# Patient Record
Sex: FEMALE | Race: BLACK OR AFRICAN AMERICAN | Employment: OTHER | URBAN - METROPOLITAN AREA
[De-identification: names, ages, dates, MRNs, and addresses within clinical notes are randomized per-mention and may not be internally consistent; named-entity substitution may affect disease eponyms.]

---

## 2017-01-03 ENCOUNTER — OFFICE VISIT (OUTPATIENT)
Dept: FAMILY MEDICINE CLINIC | Age: 67
End: 2017-01-03

## 2017-01-03 VITALS
HEIGHT: 62 IN | RESPIRATION RATE: 16 BRPM | TEMPERATURE: 98.1 F | OXYGEN SATURATION: 98 % | HEART RATE: 68 BPM | DIASTOLIC BLOOD PRESSURE: 60 MMHG | SYSTOLIC BLOOD PRESSURE: 135 MMHG | BODY MASS INDEX: 27.05 KG/M2 | WEIGHT: 147 LBS

## 2017-01-03 DIAGNOSIS — B02.9 HERPES ZOSTER WITHOUT COMPLICATION: Primary | ICD-10-CM

## 2017-01-03 RX ORDER — AMOXICILLIN AND CLAVULANATE POTASSIUM 875; 125 MG/1; MG/1
TABLET, FILM COATED ORAL
Refills: 0 | COMMUNITY
Start: 2016-12-19 | End: 2017-01-03 | Stop reason: ALTCHOICE

## 2017-01-03 RX ORDER — AMITRIPTYLINE HYDROCHLORIDE 25 MG/1
25 TABLET, FILM COATED ORAL
Qty: 30 TAB | Refills: 5 | Status: ON HOLD | OUTPATIENT
Start: 2017-01-03 | End: 2017-03-31

## 2017-01-03 RX ORDER — VALACYCLOVIR HYDROCHLORIDE 1 G/1
TABLET, FILM COATED ORAL
Refills: 0 | COMMUNITY
Start: 2016-12-21 | End: 2017-01-03 | Stop reason: ALTCHOICE

## 2017-01-03 RX ORDER — PREDNISONE 20 MG/1
TABLET ORAL
Refills: 0 | COMMUNITY
Start: 2016-12-21 | End: 2017-01-03 | Stop reason: ALTCHOICE

## 2017-01-03 NOTE — MR AVS SNAPSHOT
Visit Information Date & Time Provider Department Dept. Phone Encounter #  
 1/3/2017  2:15 PM April Rai MD [facility] [id number] Your Appointments 5/1/2017 10:45 AM  
ROUTINE CARE with April Rai MD  
[facility] Appt Note: 6m f/u  
 [address] [facility] [id number] [phone] [address] [facility] [address] Upcoming Health Maintenance Date Due  
 EYE EXAM RETINAL OR DILATED Q1 7/17/2016 FOOT EXAM Q1 8/13/2016 PAP AKA CERVICAL CYTOLOGY 4/23/2017 MICROALBUMIN Q1 3/29/2017 HEMOGLOBIN A1C Q6M 5/1/2017 LIPID PANEL Q1 11/1/2017 BREAST CANCER SCRN MAMMOGRAM 7/22/2018 COLONOSCOPY 5/2/2026 DTaP/Tdap/Td series (2 - Td) 8/9/2026 Allergies as of 1/3/2017  Review Complete On: 1/3/2017 By: April Rai MD  
  
 Severity Noted Reaction Type Reactions Lisinopril High 05/27/2015    Swelling Sulfa (Sulfonamide Antibiotics)  06/09/2010    Itching Current Immunizations  Reviewed on 11/17/2015 Name Date Influenza Vaccine 10/21/2014, 10/21/2013 Influenza Vaccine Alcario Ravens) 10/7/2015 Influenza Vaccine (Quad) PF 11/1/2016 Influenza Vaccine Split 10/25/2012, 10/13/2011, 10/19/2010 Pneumococcal Vaccine (Unspecified Type) 10/19/2010 Tdap 8/9/2016 Varicella Virus Vaccine 10/17/2012 Not reviewed this visit You Were Diagnosed With   
  
 Codes Comments Herpes zoster without complication    -  Primary ICD-10-CM: B02.9 ICD-9-CM: 066. 9 Vitals BP Pulse Temp Resp Height(growth percentile) Weight(growth percentile) 135/60 68 98.1 °F (36.7 °C) (Oral) 16 5' 2\" (1.575 m) 147 lb (66.7 kg) LMP SpO2 BMI OB Status Smoking Status (LMP Unknown) 98% 26.89 kg/m2 Postmenopausal Former Smoker BMI and BSA Data Body Mass Index Body Surface Area  
 26.89 kg/m 2 1.71 m 2 Preferred Pharmacy Pharmacy Name Phone [facility] [address] [phone] Your Updated Medication List  
  
   
This list is accurate as of: 1/3/17  2:44 PM.  Always use your most recent med list.  
  
  
  
  
 acetaminophen-codeine 300-30 mg per tablet Commonly known as:  TYLENOL #3 Take 1 Tab by mouth every four (4) hours as needed for Pain. Max Daily Amount: 6 Tabs. albuterol 90 mcg/actuation inhaler Commonly known as:  PROVENTIL HFA, VENTOLIN HFA, PROAIR HFA This is a rescue medication for when you are short of breath: 2 puffs every 4 hrs PRN wheezing/SOB (1 for home/1 for work/school) amitriptyline 25 mg tablet Commonly known as:  ELAVIL Take 1 Tab by mouth nightly. For shingles pain  
  
 atorvastatin 20 mg tablet Commonly known as:  LIPITOR Take 1 Tab by mouth daily. For cholesterol  
  
 benzonatate 100 mg capsule Commonly known as:  TESSALON  
  
 citalopram 20 mg tablet Commonly known as:  CELEXA  
TAKE ONE TABLET BY MOUTH ONE TIME DAILY  
  
 famotidine 20 mg tablet Commonly known as:  PEPCID  
  
 fluticasone-salmeterol 250-50 mcg/dose diskus inhaler Commonly known as:  ADVAIR Take 1 Puff by inhalation two (2) times a day. glucose blood VI test strips strip Commonly known as:  ACCU-CHEK SOFIA PLUS TEST STRP Test blood sugar 2 - 4 times daily  
  
 hydroCHLOROthiazide 12.5 mg tablet Commonly known as:  HYDRODIURIL Take 1 Tab by mouth daily. For blood pressure  
  
 ibuprofen 600 mg tablet Commonly known as:  MOTRIN Take 1 Tab by mouth every eight (8) hours as needed for Pain. Lancets Misc Commonly known as:  ACCU-CHEK MULTICLIX LANCET  
test blood sugar as directed by physician. Dx code E11.9  
  
 metFORMIN 1,000 mg tablet Commonly known as:  GLUCOPHAGE  
1  in am and 1 in pm. For diabetes  
  
 naproxen sodium 220 mg Cap Commonly known as:  ALEVE  
1 tab twice daily as needed for back pain pantoprazole 40 mg tablet Commonly known as:  PROTONIX  
  
 promethazine-dextromethorphan 6.25-15 mg/5 mL syrup Commonly known as:  PROMETHAZINE-DM Take 5 mL by mouth four (4) times daily as needed for Cough. zolpidem 10 mg tablet Commonly known as:  AMBIEN  
TAKE ONE TABLET BY MOUTH NIGHTLY AT BEDTIME AS NEEDED Prescriptions Sent to Pharmacy Refills  
 amitriptyline (ELAVIL) 25 mg tablet 5 Sig: Take 1 Tab by mouth nightly. For shingles pain  
 Class: Normal  
 Pharmacy: [address] Ph #: [phone] Route: Oral  
  
Introducing [facility] SERVICES! Dear Anthony Burns: Thank you for requesting a [employer] account. Our records indicate that you already have an active [employer] account. You can access your account anytime at [email or web address] Did you know that you can access your hospital and ER discharge instructions at any time in [employer] You can also review all of your test results from your hospital stay or ER visit. Additional Information If you have questions, please visit the Frequently Asked Questions section of the [employer] website at [email or web address] Remember, [employer] is NOT to be used for urgent needs. For medical emergencies, dial 911. Now available from your iPhone and Android! Please provide this summary of care documentation to your next provider. Your primary care clinician is listed as Yareli Loaiza. If you have any questions after today's visit, please call [phone].

## 2017-01-03 NOTE — PROGRESS NOTES
Chief Complaint   Patient presents with    Skin Problem     shingles follow up     1. Have you been to the ER, urgent care clinic since your last visit? Hospitalized since your last visit? [facility] Dec 2016  Shingles    2. Have you seen or consulted any other health care providers outside of the [address] [place] [address] since your last visit? Include any pap smears or colon screening.  No     Health Maintenance Due   Topic Date Due    EYE EXAM RETINAL OR DILATED Q1  07/17/2016    FOOT EXAM Q1  08/13/2016    PAP AKA CERVICAL CYTOLOGY  04/23/2017

## 2017-01-03 NOTE — PROGRESS NOTES
HISTORY OF PRESENT ILLNESS  Haseeb Vizcarra is a 61 y.o. female. HPI 2 week hx L side of face to L ear. Was having pain, itching, burning. Still has some pain and itching around L ear. ROS    Physical Exam   Constitutional: She is oriented to person, place, and time. She appears well-developed and well-nourished. Neck: No thyromegaly present. Cardiovascular: Normal rate, regular rhythm and normal heart sounds. No murmur heard. Pulmonary/Chest: Effort normal and breath sounds normal. She has no wheezes. Abdominal: Soft. Bowel sounds are normal. She exhibits no distension. There is no tenderness. There is no rebound and no guarding. Musculoskeletal: Normal range of motion. She exhibits no edema. Lymphadenopathy:     She has no cervical adenopathy. Neurological: She is alert and oriented to person, place, and time. Nursing note and vitals reviewed. ASSESSMENT and PLAN  Orders Placed This Encounter    amitriptyline (ELAVIL) 25 mg tablet     Elizabeth Pedraza was seen today for skin problem. Diagnoses and all orders for this visit:    Herpes zoster without complication    Other orders  -     amitriptyline (ELAVIL) 25 mg tablet; Take 1 Tab by mouth nightly.  For shingles pain      Follow-up Disposition: Not on File

## 2017-01-16 RX ORDER — ZOLPIDEM TARTRATE 10 MG/1
TABLET ORAL
Qty: 30 TAB | Refills: 5 | OUTPATIENT
Start: 2017-01-16 | End: 2017-11-15 | Stop reason: ALTCHOICE

## 2017-02-09 ENCOUNTER — HOSPITAL ENCOUNTER (EMERGENCY)
Age: 67
Discharge: HOME | End: 2017-02-09
Attending: EMERGENCY MEDICINE
Payer: COMMERCIAL

## 2017-02-09 ENCOUNTER — APPOINTMENT (OUTPATIENT)
Dept: ULTRASOUND IMAGING | Age: 67
End: 2017-02-09
Attending: EMERGENCY MEDICINE
Payer: COMMERCIAL

## 2017-02-09 VITALS
DIASTOLIC BLOOD PRESSURE: 65 MMHG | SYSTOLIC BLOOD PRESSURE: 130 MMHG | WEIGHT: 140.65 LBS | HEIGHT: 62 IN | TEMPERATURE: 97.4 F | RESPIRATION RATE: 18 BRPM | HEART RATE: 77 BPM | OXYGEN SATURATION: 97 % | BODY MASS INDEX: 25.88 KG/M2

## 2017-02-09 DIAGNOSIS — R10.13 ABDOMINAL PAIN, EPIGASTRIC: Primary | ICD-10-CM

## 2017-02-09 LAB
ALBUMIN SERPL BCP-MCNC: 4.4 G/DL (ref 3.5–5)
ALBUMIN/GLOB SERPL: 1.1 {RATIO} (ref 1.1–2.2)
ALP SERPL-CCNC: 87 U/L (ref 45–117)
ALT SERPL-CCNC: 40 U/L (ref 12–78)
ANION GAP BLD CALC-SCNC: 11 MMOL/L (ref 5–15)
APPEARANCE UR: CLEAR
AST SERPL W P-5'-P-CCNC: 40 U/L (ref 15–37)
BACTERIA URNS QL MICRO: NEGATIVE /HPF
BASOPHILS # BLD AUTO: 0 K/UL (ref 0–0.1)
BASOPHILS # BLD: 1 % (ref 0–1)
BILIRUB SERPL-MCNC: 0.7 MG/DL (ref 0.2–1)
BILIRUB UR QL CFM: NEGATIVE
BUN SERPL-MCNC: 18 MG/DL (ref 6–20)
BUN/CREAT SERPL: 16 (ref 12–20)
CALCIUM SERPL-MCNC: 9.5 MG/DL (ref 8.5–10.1)
CHLORIDE SERPL-SCNC: 103 MMOL/L (ref 97–108)
CO2 SERPL-SCNC: 27 MMOL/L (ref 21–32)
COLOR UR: ABNORMAL
CREAT SERPL-MCNC: 1.15 MG/DL (ref 0.55–1.02)
EOSINOPHIL # BLD: 0 K/UL (ref 0–0.4)
EOSINOPHIL NFR BLD: 1 % (ref 0–7)
EPITH CASTS URNS QL MICRO: ABNORMAL /LPF
ERYTHROCYTE [DISTWIDTH] IN BLOOD BY AUTOMATED COUNT: 14.8 % (ref 11.5–14.5)
GLOBULIN SER CALC-MCNC: 4.1 G/DL (ref 2–4)
GLUCOSE SERPL-MCNC: 122 MG/DL (ref 65–100)
GLUCOSE UR STRIP.AUTO-MCNC: NEGATIVE MG/DL
HCT VFR BLD AUTO: 39.3 % (ref 35–47)
HGB BLD-MCNC: 13.4 G/DL (ref 11.5–16)
HGB UR QL STRIP: ABNORMAL
HYALINE CASTS URNS QL MICRO: ABNORMAL /LPF (ref 0–5)
KETONES UR QL STRIP.AUTO: NEGATIVE MG/DL
LEUKOCYTE ESTERASE UR QL STRIP.AUTO: ABNORMAL
LIPASE SERPL-CCNC: 124 U/L (ref 73–393)
LYMPHOCYTES # BLD AUTO: 42 % (ref 12–49)
LYMPHOCYTES # BLD: 1.7 K/UL (ref 0.8–3.5)
MCH RBC QN AUTO: 30.2 PG (ref 26–34)
MCHC RBC AUTO-ENTMCNC: 34.1 G/DL (ref 30–36.5)
MCV RBC AUTO: 88.7 FL (ref 80–99)
MONOCYTES # BLD: 0.3 K/UL (ref 0–1)
MONOCYTES NFR BLD AUTO: 8 % (ref 5–13)
NEUTS SEG # BLD: 2 K/UL (ref 1.8–8)
NEUTS SEG NFR BLD AUTO: 48 % (ref 32–75)
NITRITE UR QL STRIP.AUTO: NEGATIVE
PH UR STRIP: 6 [PH] (ref 5–8)
PLATELET # BLD AUTO: 168 K/UL (ref 150–400)
POTASSIUM SERPL-SCNC: 3.3 MMOL/L (ref 3.5–5.1)
PROT SERPL-MCNC: 8.5 G/DL (ref 6.4–8.2)
PROT UR STRIP-MCNC: ABNORMAL MG/DL
RBC # BLD AUTO: 4.43 M/UL (ref 3.8–5.2)
RBC #/AREA URNS HPF: ABNORMAL /HPF (ref 0–5)
SODIUM SERPL-SCNC: 141 MMOL/L (ref 136–145)
SP GR UR REFRACTOMETRY: 1.02 (ref 1–1.03)
UA: UC IF INDICATED,UAUC: ABNORMAL
UROBILINOGEN UR QL STRIP.AUTO: 1 EU/DL (ref 0.2–1)
WBC # BLD AUTO: 4.1 K/UL (ref 3.6–11)
WBC URNS QL MICRO: ABNORMAL /HPF (ref 0–4)

## 2017-02-09 PROCEDURE — 74011000250 HC RX REV CODE- 250: Performed by: EMERGENCY MEDICINE

## 2017-02-09 PROCEDURE — 36415 COLL VENOUS BLD VENIPUNCTURE: CPT | Performed by: EMERGENCY MEDICINE

## 2017-02-09 PROCEDURE — 96361 HYDRATE IV INFUSION ADD-ON: CPT

## 2017-02-09 PROCEDURE — 76705 ECHO EXAM OF ABDOMEN: CPT

## 2017-02-09 PROCEDURE — 96375 TX/PRO/DX INJ NEW DRUG ADDON: CPT

## 2017-02-09 PROCEDURE — 74011250636 HC RX REV CODE- 250/636: Performed by: EMERGENCY MEDICINE

## 2017-02-09 PROCEDURE — 80053 COMPREHEN METABOLIC PANEL: CPT | Performed by: EMERGENCY MEDICINE

## 2017-02-09 PROCEDURE — 74011250637 HC RX REV CODE- 250/637: Performed by: EMERGENCY MEDICINE

## 2017-02-09 PROCEDURE — 96376 TX/PRO/DX INJ SAME DRUG ADON: CPT

## 2017-02-09 PROCEDURE — 83690 ASSAY OF LIPASE: CPT | Performed by: EMERGENCY MEDICINE

## 2017-02-09 PROCEDURE — 85025 COMPLETE CBC W/AUTO DIFF WBC: CPT | Performed by: EMERGENCY MEDICINE

## 2017-02-09 PROCEDURE — 99284 EMERGENCY DEPT VISIT MOD MDM: CPT

## 2017-02-09 PROCEDURE — 81001 URINALYSIS AUTO W/SCOPE: CPT | Performed by: EMERGENCY MEDICINE

## 2017-02-09 PROCEDURE — 96374 THER/PROPH/DIAG INJ IV PUSH: CPT

## 2017-02-09 RX ORDER — SUCRALFATE 1 G/10ML
1 SUSPENSION ORAL 4 TIMES DAILY
Qty: 414 ML | Refills: 1 | Status: ON HOLD | OUTPATIENT
Start: 2017-02-09 | End: 2017-03-31

## 2017-02-09 RX ORDER — SODIUM CHLORIDE 0.9 % (FLUSH) 0.9 %
5-10 SYRINGE (ML) INJECTION AS NEEDED
Status: DISCONTINUED | OUTPATIENT
Start: 2017-02-09 | End: 2017-02-09 | Stop reason: HOSPADM

## 2017-02-09 RX ORDER — SODIUM CHLORIDE 0.9 % (FLUSH) 0.9 %
5-10 SYRINGE (ML) INJECTION EVERY 8 HOURS
Status: DISCONTINUED | OUTPATIENT
Start: 2017-02-09 | End: 2017-02-09 | Stop reason: HOSPADM

## 2017-02-09 RX ORDER — ONDANSETRON 2 MG/ML
4 INJECTION INTRAMUSCULAR; INTRAVENOUS
Status: COMPLETED | OUTPATIENT
Start: 2017-02-09 | End: 2017-02-09

## 2017-02-09 RX ORDER — HYDROMORPHONE HYDROCHLORIDE 1 MG/ML
0.5 INJECTION, SOLUTION INTRAMUSCULAR; INTRAVENOUS; SUBCUTANEOUS
Status: COMPLETED | OUTPATIENT
Start: 2017-02-09 | End: 2017-02-09

## 2017-02-09 RX ORDER — ONDANSETRON 4 MG/1
4 TABLET, ORALLY DISINTEGRATING ORAL
Qty: 10 TAB | Refills: 0 | Status: SHIPPED | OUTPATIENT
Start: 2017-02-09 | End: 2017-04-08

## 2017-02-09 RX ORDER — LIDOCAINE HYDROCHLORIDE 20 MG/ML
10 SOLUTION OROPHARYNGEAL AS NEEDED
Qty: 1 BOTTLE | Refills: 1 | Status: ON HOLD | OUTPATIENT
Start: 2017-02-09 | End: 2017-03-31

## 2017-02-09 RX ORDER — HYDROCODONE BITARTRATE AND ACETAMINOPHEN 7.5; 325 MG/1; MG/1
1 TABLET ORAL
Qty: 20 TAB | Refills: 0 | Status: ON HOLD | OUTPATIENT
Start: 2017-02-09 | End: 2017-03-31

## 2017-02-09 RX ADMIN — HYDROMORPHONE HYDROCHLORIDE 0.5 MG: 1 INJECTION, SOLUTION INTRAMUSCULAR; INTRAVENOUS; SUBCUTANEOUS at 15:58

## 2017-02-09 RX ADMIN — ONDANSETRON HYDROCHLORIDE 4 MG: 2 INJECTION, SOLUTION INTRAMUSCULAR; INTRAVENOUS at 12:47

## 2017-02-09 RX ADMIN — SODIUM CHLORIDE 1000 ML: 900 INJECTION, SOLUTION INTRAVENOUS at 12:54

## 2017-02-09 RX ADMIN — LIDOCAINE HYDROCHLORIDE 40 ML: 20 SOLUTION ORAL; TOPICAL at 16:21

## 2017-02-09 RX ADMIN — Medication 10 ML: at 15:58

## 2017-02-09 RX ADMIN — HYDROMORPHONE HYDROCHLORIDE 0.5 MG: 1 INJECTION, SOLUTION INTRAMUSCULAR; INTRAVENOUS; SUBCUTANEOUS at 12:46

## 2017-02-09 RX ADMIN — ONDANSETRON HYDROCHLORIDE 4 MG: 2 INJECTION, SOLUTION INTRAMUSCULAR; INTRAVENOUS at 15:57

## 2017-02-09 NOTE — ED NOTES
Assumed care of pt. Pt. Placed in position of comfort. Call bell within reach. Pt. Made aware of care plan. Pt came in with abdominal pain that gets worse with eating.

## 2017-02-09 NOTE — ED PROVIDER NOTES
HPI Comments: Honorio Wellington, 61 y.o. Female with PMHx of kidney stones, HTN, GERD, DM, and asthma presents ambulatory to ED [facility] ED with cc of worsening, severe RUQ pain x 2 weeks. Pt notes that RUQ pain increased significantly last night. Pt also c/o of a metallic taste in her mouth that is worsened after eating. Pt has taken pain medication without relief. She denies a hx of gallbladder or pancreas problems. She denies any fevers, chills, nausea, vomiting, diarrhea, CP, SOB, dysuria, hematuria, or change in urinary frequency. PCP: Diana Casarez MD    Social history significant for: - Tobacco, - EtOH, - Illicit Drug Use  PSHx: T&A, EGD    There are no other complaints, changes, or physical findings at this time. Written by Tom Flores ED Scribrogelio, as dictated by Jaylyn Landeros DO. The history is provided by the patient. No  was used. Past Medical History:   Diagnosis Date    Allergic rhinitis due to allergen 10/22/2014    Asthma     Depression     Diabetes (HCC)     GERD (gastroesophageal reflux disease)     Hypertension     Kidney stones     Other ill-defined conditions(799.89)      GERD    Positive PPD 2009     treated with INH       Past Surgical History:   Procedure Laterality Date    Hx tonsil and adenoidectomy      Endoscopy, colon, diagnostic  12/2007     2 polyps    Pr breast surgery procedure unlisted       abcess    Colonoscopy  4-11     manetas- n ormal    Egd  4-11     manetas- esophageal ring    Hx orthopaedic       had an injection in her back to help with leg pain         Family History:   Problem Relation Age of Onset    Diabetes Mother     Hypertension Mother     Cancer Father      lung       Social History     Social History    Marital status:      Spouse name: N/A    Number of children: N/A    Years of education: N/A     Occupational History    Not on file.      Social History Main Topics    Smoking status: Former Smoker Packs/day: 1.00     Quit date: 8/23/2010    Smokeless tobacco: Never Used    Alcohol use No    Drug use: No    Sexual activity: Not on file     Other Topics Concern    Not on file     Social History Narrative         ALLERGIES: Lisinopril and Sulfa (sulfonamide antibiotics)    Review of Systems   Constitutional: Negative. Negative for appetite change, chills, fatigue and fever. HENT: Negative for congestion, rhinorrhea, sinus pressure and sore throat. (+) metallic taste in mouth    Eyes: Negative. Respiratory: Negative. Negative for cough, choking, chest tightness, shortness of breath and wheezing. Cardiovascular: Negative. Negative for chest pain, palpitations and leg swelling. Gastrointestinal: Positive for abdominal pain. Negative for constipation, diarrhea, nausea and vomiting. Endocrine: Negative. Genitourinary: Negative. Negative for difficulty urinating, dysuria, flank pain and urgency. Musculoskeletal: Negative. Skin: Negative. Neurological: Negative. Negative for dizziness, speech difficulty, weakness, light-headedness, numbness and headaches. Psychiatric/Behavioral: Negative. All other systems reviewed and are negative. Patient Vitals for the past 12 hrs:   Temp Pulse Resp BP SpO2   02/09/17 1645 - - - 130/65 97 %   02/09/17 1615 - - - 121/57 99 %   02/09/17 1545 - - - 138/69 100 %   02/09/17 1515 - - - 139/75 100 %   02/09/17 1220 97.4 °F (36.3 °C) 77 18 (!) 166/96 97 %       Physical Exam   Constitutional: She is oriented to person, place, and time. She appears well-developed and well-nourished. No distress. HENT:   Head: Normocephalic and atraumatic. Mouth/Throat: Oropharynx is clear and moist. No oropharyngeal exudate. Eyes: Conjunctivae and EOM are normal. Pupils are equal, round, and reactive to light. Neck: Normal range of motion. Neck supple. No JVD present. No tracheal deviation present.    Cardiovascular: Normal rate, regular rhythm, normal heart sounds and intact distal pulses. No murmur heard. Pulmonary/Chest: Effort normal and breath sounds normal. No stridor. No respiratory distress. She has no wheezes. She has no rales. She exhibits no tenderness. Abdominal: Soft. She exhibits no distension. There is no tenderness. There is no rebound and no guarding.   + tenderness in the RUQ and epigastrum   Musculoskeletal: Normal range of motion. She exhibits no edema or tenderness. Neurological: She is alert and oriented to person, place, and time. No cranial nerve deficit. No gross motor or sensory deficits    Skin: Skin is warm and dry. She is not diaphoretic. Psychiatric: She has a normal mood and affect. Her behavior is normal.   Nursing note and vitals reviewed. MDM  Number of Diagnoses or Management Options  Diagnosis management comments: DDx: cholecystitis, cholelithiasis, common bowel duct obstruction, pancreatitis        Amount and/or Complexity of Data Reviewed  Clinical lab tests: reviewed and ordered  Tests in the radiology section of CPT®: ordered and reviewed  Review and summarize past medical records: yes    Patient Progress  Patient progress: stable    ED Course       Procedures      3:17 PM  Reevaluated the pt. Pt is still having discomfort but does not want any pain medication. 3:38 PM  Updated pt on lab results. Will give pt more pain medication. 4:43 PM  Updated pt on US results. PT states she is feeling better. Will PO challenge the pt.     LABORATORY TESTS:  Recent Results (from the past 12 hour(s))   URINALYSIS W/ REFLEX CULTURE    Collection Time: 02/09/17 12:34 PM   Result Value Ref Range    Color YELLOW/STRAW      Appearance CLEAR CLEAR      Specific gravity 1.025 1.003 - 1.030      pH (UA) 6.0 5.0 - 8.0      Protein TRACE (A) NEG mg/dL    Glucose NEGATIVE  NEG mg/dL    Ketone NEGATIVE  NEG mg/dL    Blood TRACE (A) NEG      Urobilinogen 1.0 0.2 - 1.0 EU/dL    Nitrites NEGATIVE  NEG      Leukocyte Esterase TRACE (A) NEG      WBC 0-4 0 - 4 /hpf    RBC 5-10 0 - 5 /hpf    Epithelial cells MANY (A) FEW /lpf    Bacteria NEGATIVE  NEG /hpf    UA:UC IF INDICATED CULTURE NOT INDICATED BY UA RESULT CNI      Hyaline cast 0-2 0 - 5 /lpf   BILIRUBIN, CONFIRM    Collection Time: 02/09/17 12:34 PM   Result Value Ref Range    Bilirubin UA, confirm NEGATIVE  NEG     CBC WITH AUTOMATED DIFF    Collection Time: 02/09/17 12:41 PM   Result Value Ref Range    WBC 4.1 3.6 - 11.0 K/uL    RBC 4.43 3.80 - 5.20 M/uL    HGB 13.4 11.5 - 16.0 g/dL    HCT 39.3 35.0 - 47.0 %    MCV 88.7 80.0 - 99.0 FL    MCH 30.2 26.0 - 34.0 PG    MCHC 34.1 30.0 - 36.5 g/dL    RDW 14.8 (H) 11.5 - 14.5 %    PLATELET 780 570 - 813 K/uL    NEUTROPHILS 48 32 - 75 %    LYMPHOCYTES 42 12 - 49 %    MONOCYTES 8 5 - 13 %    EOSINOPHILS 1 0 - 7 %    BASOPHILS 1 0 - 1 %    ABS. NEUTROPHILS 2.0 1.8 - 8.0 K/UL    ABS. LYMPHOCYTES 1.7 0.8 - 3.5 K/UL    ABS. MONOCYTES 0.3 0.0 - 1.0 K/UL    ABS. EOSINOPHILS 0.0 0.0 - 0.4 K/UL    ABS. BASOPHILS 0.0 0.0 - 0.1 K/UL   METABOLIC PANEL, COMPREHENSIVE    Collection Time: 02/09/17 12:41 PM   Result Value Ref Range    Sodium 141 136 - 145 mmol/L    Potassium 3.3 (L) 3.5 - 5.1 mmol/L    Chloride 103 97 - 108 mmol/L    CO2 27 21 - 32 mmol/L    Anion gap 11 5 - 15 mmol/L    Glucose 122 (H) 65 - 100 mg/dL    BUN 18 6 - 20 MG/DL    Creatinine 1.15 (H) 0.55 - 1.02 MG/DL    BUN/Creatinine ratio 16 12 - 20      GFR est AA 58 (L) >60 ml/min/1.73m2    GFR est non-AA 48 (L) >60 ml/min/1.73m2    Calcium 9.5 8.5 - 10.1 MG/DL    Bilirubin, total 0.7 0.2 - 1.0 MG/DL    ALT (SGPT) 40 12 - 78 U/L    AST (SGOT) 40 (H) 15 - 37 U/L    Alk.  phosphatase 87 45 - 117 U/L    Protein, total 8.5 (H) 6.4 - 8.2 g/dL    Albumin 4.4 3.5 - 5.0 g/dL    Globulin 4.1 (H) 2.0 - 4.0 g/dL    A-G Ratio 1.1 1.1 - 2.2     LIPASE    Collection Time: 02/09/17 12:41 PM   Result Value Ref Range    Lipase 124 73 - 393 U/L       IMAGING RESULTS:  INDICATION: Right upper quadrant pain with nausea and vomiting.     COMPARISON: 3/16/2006.      TECHNIQUE:   Multiple sonographic real time images were obtained of the right upper abdomen.      FINDINGS:   The gallbladder is visualized as a normally distended fluid filled structure. No gallstones, gallbladder wall thickening, pericholecystic edema, or biliary  ductal dilatation is seen. The visualized portions of the liver are echogenic with decreased penetrance  compatible with hepatic steatosis. Flow in the portal vein is appropriate towards the liver. The visualized portions of the pancreas are normal in size. The right kidney measures 8.5 cm and shows no hydronephrosis. No ascites is visualized.       IMPRESSION  IMPRESSION:   Echogenic liver with decreased penetrance suggesting hepatic steatosis.     No gallstones or biliary ductal dilatation.     No right hydronephrosis. MEDICATIONS GIVEN:  Medications   sodium chloride (NS) flush 5-10 mL (10 mL IntraVENous Given 2/9/17 1558)   sodium chloride (NS) flush 5-10 mL (not administered)   sodium chloride 0.9 % bolus infusion 1,000 mL (0 mL IntraVENous IV Completed 2/9/17 1608)   HYDROmorphone (PF) (DILAUDID) injection 0.5 mg (0.5 mg IntraVENous Given 2/9/17 1246)   ondansetron (ZOFRAN) injection 4 mg (4 mg IntraVENous Given 2/9/17 1247)   HYDROmorphone (PF) (DILAUDID) injection 0.5 mg (0.5 mg IntraVENous Given 2/9/17 1558)   ondansetron (ZOFRAN) injection 4 mg (4 mg IntraVENous Given 2/9/17 1557)   mylanta/viscous lidocaine (LENA)(GI COCKTAIL) (40 mL Oral Given 2/9/17 1621)       IMPRESSION:  1. Abdominal pain, epigastric        PLAN:  1. Current Discharge Medication List      CONTINUE these medications which have NOT CHANGED    Details   acetaminophen-codeine (TYLENOL #3) 300-30 mg per tablet Take 1 Tab by mouth every four (4) hours as needed for Pain. Max Daily Amount: 6 Tabs.   Qty: 50 Tab, Refills: 3      glucose blood VI test strips (ACCU-CHEK SOFIA PLUS TEST STRP) strip Test blood sugar 2 - 4 times daily  Qty: 100 Strip, Refills: 11      atorvastatin (LIPITOR) 20 mg tablet Take 1 Tab by mouth daily. For cholesterol  Qty: 30 Tab, Refills: 12      Hydrochlorothiazide (HYDRODIURIL) 12.5 mg tablet Take 1 Tab by mouth daily. For blood pressure  Qty: 90 Tab, Refills: 3      albuterol (PROVENTIL HFA, VENTOLIN HFA, PROAIR HFA) 90 mcg/actuation inhaler This is a rescue medication for when you are short of breath: 2 puffs every 4 hrs PRN wheezing/SOB (1 for home/1 for work/school)  Qty: 2 Inhaler, Refills: 11    Associated Diagnoses: Cough      citalopram (CELEXA) 20 mg tablet TAKE ONE TABLET BY MOUTH ONE TIME DAILY   Qty: 30 Tab, Refills: 12      fluticasone-salmeterol (ADVAIR) 250-50 mcg/dose diskus inhaler Take 1 Puff by inhalation two (2) times a day. Qty: 1 Inhaler, Refills: 12      zolpidem (AMBIEN) 10 mg tablet TAKE ONE TABLET BY MOUTH NIGHTLY AT BEDTIME AS NEEDED  Qty: 30 Tab, Refills: 5    Comments: Not to exceed 5 additional fills before 08/28/2016. amitriptyline (ELAVIL) 25 mg tablet Take 1 Tab by mouth nightly. For shingles pain  Qty: 30 Tab, Refills: 5      Lancets (ACCU-CHEK MULTICLIX LANCET) misc test blood sugar as directed by physician. Dx code E11.9  Qty: 100 Each, Refills: PRN      metFORMIN (GLUCOPHAGE) 1,000 mg tablet 1  in am and 1 in pm. For diabetes  Qty: 75 Tab, Refills: 11      pantoprazole (PROTONIX) 40 mg tablet     Associated Diagnoses: Type 2 diabetes mellitus without complication, with long-term current use of insulin (HCC)      ibuprofen (MOTRIN) 600 mg tablet Take 1 Tab by mouth every eight (8) hours as needed for Pain. Qty: 90 Tab, Refills: 5      benzonatate (TESSALON) 100 mg capsule       promethazine-dextromethorphan (PROMETHAZINE-DM) 6.25-15 mg/5 mL syrup Take 5 mL by mouth four (4) times daily as needed for Cough.   Qty: 240 mL, Refills: 2      famotidine (PEPCID) 20 mg tablet       naproxen sodium (ALEVE) 220 mg cap 1 tab twice daily as needed for back pain  Qty: 60 Cap, Refills: 2           2. Follow-up Information     Follow up With Details Comments Contact Info    MD Jt Solis [id number] [phone]      Luis Sung, [address] [place] [address]  [phone]          Return to ED if worse     DISCHARGE NOTE:    5:50 PM  The patient is ready for discharge. The patient signs, symptoms, diagnosis, and discharge instructions have been discussed and the patient has conveyed their understanding. The patient is to follow-up as reccommended or returned to the ER should their symptoms worsen. Plan has been discussed and patient is in agreement.

## 2017-02-09 NOTE — DISCHARGE INSTRUCTIONS
Abdominal Pain: Care Instructions  Your Care Instructions    Abdominal pain has many possible causes. Some aren't serious and get better on their own in a few days. Others need more testing and treatment. If your pain continues or gets worse, you need to be rechecked and may need more tests to find out what is wrong. You may need surgery to correct the problem. Don't ignore new symptoms, such as fever, nausea and vomiting, urination problems, pain that gets worse, and dizziness. These may be signs of a more serious problem. Your doctor may have recommended a follow-up visit in the next 8 to 12 hours. If you are not getting better, you may need more tests or treatment. The doctor has checked you carefully, but problems can develop later. If you notice any problems or new symptoms, get medical treatment right away. Follow-up care is a key part of your treatment and safety. Be sure to make and go to all appointments, and call your doctor if you are having problems. It's also a good idea to know your test results and keep a list of the medicines you take. How can you care for yourself at home? · Rest until you feel better. · To prevent dehydration, drink plenty of fluids, enough so that your urine is light yellow or clear like water. Choose water and other caffeine-free clear liquids until you feel better. If you have kidney, heart, or liver disease and have to limit fluids, talk with your doctor before you increase the amount of fluids you drink. · If your stomach is upset, eat mild foods, such as rice, dry toast or crackers, bananas, and applesauce. Try eating several small meals instead of two or three large ones. · Wait until 48 hours after all symptoms have gone away before you have spicy foods, alcohol, and drinks that contain caffeine. · Do not eat foods that are high in fat. · Avoid anti-inflammatory medicines such as aspirin, ibuprofen (Advil, Motrin), and naproxen (Aleve).  These can cause stomach upset. Talk to your doctor if you take daily aspirin for another health problem. When should you call for help? Call 911 anytime you think you may need emergency care. For example, call if:  · You passed out (lost consciousness). · You pass maroon or very bloody stools. · You vomit blood or what looks like coffee grounds. · You have new, severe belly pain. Call your doctor now or seek immediate medical care if:  · Your pain gets worse, especially if it becomes focused in one area of your belly. · You have a new or higher fever. · Your stools are black and look like tar, or they have streaks of blood. · You have unexpected vaginal bleeding. · You have symptoms of a urinary tract infection. These may include:  ¨ Pain when you urinate. ¨ Urinating more often than usual.  ¨ Blood in your urine. · You are dizzy or lightheaded, or you feel like you may faint. Watch closely for changes in your health, and be sure to contact your doctor if:  · You are not getting better after 1 day (24 hours). Where can you learn more? Go to http://dolly-juani.info/. Enter B381 in the search box to learn more about \"Abdominal Pain: Care Instructions. \"  Current as of: May 27, 2016  Content Version: 11.1  © 9175-9610 [employer]. Care instructions adapted under license by [employer] (which disclaims liability or warranty for this information). If you have questions about a medical condition or this instruction, always ask your healthcare professional. [place] [address] any warranty or liability for your use of this information. Indigestion (Dyspepsia or Heartburn): Care Instructions  Your Care Instructions  Sometimes it can be hard to pinpoint the cause of indigestion (dyspepsia or heartburn). Most cases of an upset stomach with bloating, burning, burping, and nausea are minor and go away within several hours.  Home treatment and over-the-counter medicine often are able to control symptoms. But if you take medicine to relieve your indigestion without making diet and lifestyle changes, your symptoms are likely to return again and again. If you get indigestion often, it may be a sign of a more serious medical problem. Be sure to follow up with your doctor, who may want to do tests to be sure of the cause of your indigestion. Follow-up care is a key part of your treatment and safety. Be sure to make and go to all appointments, and call your doctor if you are having problems. Its also a good idea to know your test results and keep a list of the medicines you take. How can you care for yourself at home? · Your doctor may recommend over-the-counter medicine. For mild or occasional indigestion, antacids such as Tums, Gaviscon, Mylanta, or Maalox may help. Your doctor also may recommend over-the-counter acid reducers, such as Pepcid AC, Tagamet HB, Zantac 75, or Prilosec. Read and follow all instructions on the label. If you use these medicines often, talk with your doctor. · Change your eating habits. ¨ Its best to eat several small meals instead of two or three large meals. ¨ After you eat, wait 2 to 3 hours before you lie down. ¨ Chocolate, mint, and alcohol can make GERD worse. ¨ Spicy foods, foods that have a lot of acid (like tomatoes and oranges), and coffee can make GERD symptoms worse in some people. If your symptoms are worse after you eat a certain food, you may want to stop eating that food to see if your symptoms get better. · Do not smoke or chew tobacco. Smoking can make GERD worse. If you need help quitting, talk to your doctor about stop-smoking programs and medicines. These can increase your chances of quitting for good. · If you have GERD symptoms at night, raise the head of your bed 6 to 8 inches by putting the frame on blocks or placing a foam wedge under the head of your mattress.  (Adding extra pillows does not work.)  · Do not wear tight clothing around your middle. · Lose weight if you need to. Losing just 5 to 10 pounds can help. · Do not take anti-inflammatory medicines, such as aspirin, ibuprofen (Advil, Motrin), or naproxen (Aleve). These can irritate the stomach. If you need a pain medicine, try acetaminophen (Tylenol), which does not cause stomach upset. When should you call for help? Call 911 anytime you think you may need emergency care. For example, call if:  · You passed out (lost consciousness). · You vomit blood or what looks like coffee grounds. · You pass maroon or very bloody stools. · You have chest pain or pressure. This may occur with:  ¨ Sweating. ¨ Shortness of breath. ¨ Nausea or vomiting. ¨ Pain that spreads from the chest to the neck, jaw, or one or both shoulders or arms. ¨ Feeling dizzy or lightheaded. ¨ A fast or uneven pulse. After calling 911, chew 1 adult-strength aspirin. Wait for an ambulance. Do not try to drive yourself. Call your doctor now or seek immediate medical care if:  · You have severe belly pain. · Your stools are black and tarlike or have streaks of blood. · You have trouble swallowing. · You are losing weight and do not know why. Watch closely for changes in your health, and be sure to contact your doctor if:  · You do not get better as expected. Where can you learn more? Go to http://dolly-juani.info/. Enter J189 in the search box to learn more about \"Indigestion (Dyspepsia or Heartburn): Care Instructions. \"  Current as of: August 9, 2016  Content Version: 11.1  © 3677-5912 [employer]. Care instructions adapted under license by [employer] (which disclaims liability or warranty for this information). If you have questions about a medical condition or this instruction, always ask your healthcare professional. [address] any warranty or liability for your use of this information.

## 2017-02-09 NOTE — ED NOTES
Patient remains in ultrasound. Ultrasound dept contacted and state that patient having test performed at this time.

## 2017-02-10 ENCOUNTER — OFFICE VISIT (OUTPATIENT)
Dept: FAMILY MEDICINE CLINIC | Age: 67
End: 2017-02-10

## 2017-02-10 VITALS
TEMPERATURE: 98.4 F | WEIGHT: 142.8 LBS | HEIGHT: 62 IN | BODY MASS INDEX: 26.28 KG/M2 | DIASTOLIC BLOOD PRESSURE: 58 MMHG | RESPIRATION RATE: 14 BRPM | HEART RATE: 64 BPM | SYSTOLIC BLOOD PRESSURE: 130 MMHG | OXYGEN SATURATION: 98 %

## 2017-02-10 DIAGNOSIS — R10.11 RUQ PAIN: Primary | ICD-10-CM

## 2017-02-10 RX ORDER — HYDROCHLOROTHIAZIDE 12.5 MG/1
CAPSULE ORAL
COMMUNITY
Start: 2017-01-10 | End: 2017-04-20

## 2017-02-10 RX ORDER — FAMOTIDINE 40 MG/1
TABLET, FILM COATED ORAL
Refills: 11 | COMMUNITY
Start: 2017-01-13 | End: 2018-07-29 | Stop reason: DRUGHIGH

## 2017-02-10 NOTE — PROGRESS NOTES
Chief Complaint   Patient presents with   [facility] ED Follow-up     Stomach pain/metalic taste in mouth  ED [facility]  2/10/17    Epigastric Pain     1. Have you been to the ER, urgent care clinic since your last visit? Hospitalized since your last visit? See Chief complaint    2. Have you seen or consulted any other health care providers outside of the [address] [place] [address] since your last visit? Include any pap smears or colon screening.  No    Health Maintenance Due   Topic Date Due    EYE EXAM RETINAL OR DILATED Q1  07/17/2016    FOOT EXAM Q1  08/13/2016    PAP AKA CERVICAL CYTOLOGY  04/23/2017

## 2017-02-10 NOTE — MR AVS SNAPSHOT
Visit Information Date & Time Provider Department Dept. Phone Encounter #  
 2/10/2017 10:45 AM Cy Mckoy MD [facility] [phone] [id number] Your Appointments 5/1/2017 10:45 AM  
ROUTINE CARE with Cy Mckoy MD  
[facility]) Appt Note: 6m f/u  
 [address] [place]  [id number] [phone] [address] [place] [address] [address] Upcoming Health Maintenance Date Due  
 EYE EXAM RETINAL OR DILATED Q1 7/17/2016 FOOT EXAM Q1 8/13/2016 PAP AKA CERVICAL CYTOLOGY 4/23/2017 MICROALBUMIN Q1 3/29/2017 HEMOGLOBIN A1C Q6M 5/1/2017 LIPID PANEL Q1 11/1/2017 BREAST CANCER SCRN MAMMOGRAM 7/22/2018 COLONOSCOPY 5/2/2026 DTaP/Tdap/Td series (2 - Td) 8/9/2026 Allergies as of 2/10/2017  Review Complete On: 2/10/2017 By: Cy Mckoy MD  
  
 Severity Noted Reaction Type Reactions Lisinopril High 05/27/2015    Swelling Sulfa (Sulfonamide Antibiotics)  06/09/2010    Itching Current Immunizations  Reviewed on 11/17/2015 Name Date Influenza Vaccine 10/21/2014, 10/21/2013 Influenza Vaccine Che Savory) 10/7/2015 Influenza Vaccine (Quad) PF 11/1/2016 Influenza Vaccine Split 10/25/2012, 10/13/2011, 10/19/2010 Pneumococcal Vaccine (Unspecified Type) 10/19/2010 Tdap 8/9/2016 Varicella Virus Vaccine 10/17/2012 Not reviewed this visit You Were Diagnosed With   
  
 Codes Comments RUQ pain    -  Primary ICD-10-CM: R10.11 ICD-9-CM: 789.01 Vitals BP Pulse Temp Resp Height(growth percentile) Weight(growth percentile) 130/58 64 98.4 °F (36.9 °C) (Oral) 14 5' 2\" (1.575 m) 142 lb 12.8 oz (64.8 kg) LMP SpO2 BMI OB Status Smoking Status (LMP Unknown) 98% 26.12 kg/m2 Postmenopausal Former Smoker Vitals History BMI and BSA Data  Body Mass Index Body Surface Area  
 26.12 kg/m 2 1.68 m 2  
  
  
 Preferred Pharmacy Pharmacy Name Phone [facility] [place] [address] [phone] Your Updated Medication List  
  
   
This list is accurate as of: 2/10/17 12:28 PM.  Always use your most recent med list.  
  
  
  
  
 acetaminophen-codeine 300-30 mg per tablet Commonly known as:  TYLENOL #3 Take 1 Tab by mouth every four (4) hours as needed for Pain. Max Daily Amount: 6 Tabs. albuterol 90 mcg/actuation inhaler Commonly known as:  PROVENTIL HFA, VENTOLIN HFA, PROAIR HFA This is a rescue medication for when you are short of breath: 2 puffs every 4 hrs PRN wheezing/SOB (1 for home/1 for work/school) amitriptyline 25 mg tablet Commonly known as:  ELAVIL Take 1 Tab by mouth nightly. For shingles pain  
  
 atorvastatin 20 mg tablet Commonly known as:  LIPITOR Take 1 Tab by mouth daily. For cholesterol  
  
 benzonatate 100 mg capsule Commonly known as:  TESSALON  
  
 citalopram 20 mg tablet Commonly known as:  CELEXA  
TAKE ONE TABLET BY MOUTH ONE TIME DAILY * famotidine 20 mg tablet Commonly known as:  PEPCID * famotidine 40 mg tablet Commonly known as:  PEPCID  
TAKE 1 TABLET BY MOUTH AT BEDTIME  
  
 fluticasone-salmeterol 250-50 mcg/dose diskus inhaler Commonly known as:  ADVAIR Take 1 Puff by inhalation two (2) times a day. glucose blood VI test strips strip Commonly known as:  ACCU-CHEK SOFIA PLUS TEST STRP Test blood sugar 2 - 4 times daily * hydroCHLOROthiazide 12.5 mg tablet Commonly known as:  HYDRODIURIL Take 1 Tab by mouth daily. For blood pressure * hydroCHLOROthiazide 12.5 mg capsule Commonly known as:  Vertie Cheadle HYDROcodone-acetaminophen 7.5-325 mg per tablet Commonly known as:  Macy Saenz Take 1 Tab by mouth every six (6) hours as needed for Pain. Max Daily Amount: 4 Tabs. ibuprofen 600 mg tablet Commonly known as:  MOTRIN  
 Take 1 Tab by mouth every eight (8) hours as needed for Pain. Lancets Misc Commonly known as:  ACCU-CHEK MULTICLIX LANCET  
test blood sugar as directed by physician. Dx code E11.9  
  
 lidocaine 2 % solution Commonly known as:  XYLOCAINE Take 10 mL by mouth as needed for Pain.  
  
 metFORMIN 1,000 mg tablet Commonly known as:  GLUCOPHAGE  
1  in am and 1 in pm. For diabetes  
  
 naproxen sodium 220 mg Cap Commonly known as:  ALEVE  
1 tab twice daily as needed for back pain  
  
 ondansetron 4 mg disintegrating tablet Commonly known as:  ZOFRAN ODT Take 1 Tab by mouth every eight (8) hours as needed for Nausea. pantoprazole 40 mg tablet Commonly known as:  PROTONIX  
  
 promethazine-dextromethorphan 6.25-15 mg/5 mL syrup Commonly known as:  PROMETHAZINE-DM Take 5 mL by mouth four (4) times daily as needed for Cough. sucralfate 100 mg/mL suspension Commonly known as:  Gladystine Kalyani Take 10 mL by mouth four (4) times daily. zolpidem 10 mg tablet Commonly known as:  AMBIEN  
TAKE ONE TABLET BY MOUTH NIGHTLY AT BEDTIME AS NEEDED * Notice: This list has 4 medication(s) that are the same as other medications prescribed for you. Read the directions carefully, and ask your doctor or other care provider to review them with you. We Performed the Following AMB POC HEMOGLOBIN A1C [91406 CPT(R)] REFERRAL TO GASTROENTEROLOGY [JBS94 Custom] Comments:  
 Please evaluate patient for RUQ pain. To-Do List   
 02/10/2017 Imaging:  NM HEPATOBILIARY DUCT SCAN Referral Information Referral ID Referred By Referred To  
  
 [id number] Joselyn Brown Not Available Visits Status Start Date End Date 1 New Request 2/10/17 2/10/18 If your referral has a status of pending review or denied, additional information will be sent to support the outcome of this decision. Introducing [facility]! Dear Pasha James: Thank you for requesting a Otogami account. Our records indicate that you already have an active Otogami account. You can access your account anytime at [email or web address] Did you know that you can access your hospital and ER discharge instructions at any time in Otogami? You can also review all of your test results from your hospital stay or ER visit. Additional Information If you have questions, please visit the Frequently Asked Questions section of the Otogami website at [email or web address] Remember, Otogami is NOT to be used for urgent needs. For medical emergencies, dial 911. Now available from your iPhone and Android! Please provide this summary of care documentation to your next provider. Your primary care clinician is listed as Yeny Fernando. If you have any questions after today's visit, please call [phone].

## 2017-02-10 NOTE — PROGRESS NOTES
HISTORY OF PRESENT ILLNESS  Graciela Nolasco is a 61 y.o. female. HPI Has been having abdominal pains, in ruq. Pains began Wednesday night, became fairly severe, went to ER yesterday. Has had these pains every time after eating for the last 6-7 weeks. Had a normal ultrasound, and was given caralfate. Has been taking protonix and famotidine regularly for years. Rarely takes otc meds, takes tyl #3 for pain, and rarely takes ibuprofen. Has had an EGD with Dr. Brenna Lay in the past. Has lost some weight, not eating well. ROS    Physical Exam   Constitutional: She is oriented to person, place, and time. She appears well-developed and well-nourished. Neck: No thyromegaly present. Cardiovascular: Normal rate, regular rhythm and normal heart sounds. No murmur heard. Pulmonary/Chest: Effort normal and breath sounds normal. She has no wheezes. Abdominal: Soft. Bowel sounds are normal. She exhibits no distension. There is no tenderness. There is no rebound and no guarding. Mild tenderness RUQ   Musculoskeletal: Normal range of motion. She exhibits no edema. Lymphadenopathy:     She has no cervical adenopathy. Neurological: She is alert and oriented to person, place, and time. Nursing note and vitals reviewed. ASSESSMENT and PLAN  Orders Placed This Encounter    NM HEPATOBILIARY DUCT SCAN    REFERRAL TO GASTROENTEROLOGY    AMB POC HEMOGLOBIN A1C     Thong Almazan was seen today for ed follow-up and epigastric pain.     Diagnoses and all orders for this visit:    RUQ pain  -     AMB POC HEMOGLOBIN A1C  -     NM HEPATOBILIARY DUCT SCAN; Future  -     REFERRAL TO GASTROENTEROLOGY      Follow-up Disposition: Not on File

## 2017-02-17 ENCOUNTER — HOSPITAL ENCOUNTER (OUTPATIENT)
Dept: NUCLEAR MEDICINE | Age: 67
Discharge: HOME | End: 2017-02-17
Attending: FAMILY MEDICINE
Payer: COMMERCIAL

## 2017-02-17 VITALS — BODY MASS INDEX: 26.52 KG/M2 | WEIGHT: 145 LBS

## 2017-02-17 DIAGNOSIS — R10.11 RUQ PAIN: ICD-10-CM

## 2017-02-17 PROCEDURE — 78227 HEPATOBIL SYST IMAGE W/DRUG: CPT

## 2017-02-17 PROCEDURE — 78226 HEPATOBILIARY SYSTEM IMAGING: CPT

## 2017-02-17 PROCEDURE — 74011250636 HC RX REV CODE- 250/636

## 2017-02-17 RX ADMIN — SINCALIDE 1.32 MCG: 5 INJECTION, POWDER, LYOPHILIZED, FOR SOLUTION INTRAVENOUS at 11:30

## 2017-03-17 ENCOUNTER — HOSPITAL ENCOUNTER (OUTPATIENT)
Dept: NUCLEAR MEDICINE | Age: 67
Discharge: HOME | End: 2017-03-17
Attending: PHYSICIAN ASSISTANT
Payer: COMMERCIAL

## 2017-03-17 DIAGNOSIS — R11.2 NAUSEA AND VOMITING: ICD-10-CM

## 2017-03-17 DIAGNOSIS — R10.13 EPIGASTRIC PAIN: ICD-10-CM

## 2017-03-17 DIAGNOSIS — R10.11 RIGHT UPPER QUADRANT PAIN: ICD-10-CM

## 2017-03-17 DIAGNOSIS — R68.81 EARLY SATIETY: ICD-10-CM

## 2017-03-17 PROCEDURE — 78264 GASTRIC EMPTYING IMG STUDY: CPT

## 2017-03-23 ENCOUNTER — TELEPHONE (OUTPATIENT)
Dept: FAMILY MEDICINE CLINIC | Age: 67
End: 2017-03-23

## 2017-03-23 RX ORDER — CITALOPRAM 20 MG/1
TABLET, FILM COATED ORAL
Qty: 30 TAB | Refills: 0 | Status: SHIPPED | OUTPATIENT
Start: 2017-03-23 | End: 2017-04-26 | Stop reason: SDUPTHER

## 2017-03-23 NOTE — TELEPHONE ENCOUNTER
Pharm is [facility] [phone], calling reg a drug interaction alert with the Celexa, pt is on Zofran.

## 2017-03-23 NOTE — TELEPHONE ENCOUNTER
Spoke with patient and gave patient Юлия Cornell NP recommendation regarding pharmacy notice of possible interaction of Celexa (prescribed by Dr. Tena Boss) and Zofran ( prescribed by Dr. Nathaniel Flynn). Per Юлия Cornell NP patient to take Celexa as prescribed and refrain from Zofran. Patient may use Pepto until clearance from primary physician. Patient stated Dr. Nathaniel Aggarwal prescribing physician for Zofran and she had contacted her for clearance already. Patient verbalized understanding of Celexa at pharmacy for .

## 2017-03-23 NOTE — TELEPHONE ENCOUNTER
Spoke with patient regarding medications Zofran and Celexa. Patient has been on Celexa since 2010 per patient and Zofran one month without any issues. Patient notified this information will be reviewed by Christ Bajwa NP and I will call patient back with prescription status update. Patient verbalized understanding.

## 2017-03-24 RX ORDER — FLUTICASONE PROPIONATE 50 MCG
SPRAY, SUSPENSION (ML) NASAL
Refills: 3 | COMMUNITY
Start: 2017-03-10 | End: 2018-07-29

## 2017-03-24 RX ORDER — ONDANSETRON HYDROCHLORIDE 8 MG/1
TABLET, FILM COATED ORAL
Refills: 1 | COMMUNITY
Start: 2017-03-08 | End: 2017-04-08

## 2017-03-27 RX ORDER — CITALOPRAM 20 MG/1
TABLET, FILM COATED ORAL
Qty: 30 TAB | Refills: 0 | OUTPATIENT
Start: 2017-03-27

## 2017-03-31 ENCOUNTER — ANESTHESIA EVENT (OUTPATIENT)
Dept: ENDOSCOPY | Age: 67
End: 2017-03-31
Payer: COMMERCIAL

## 2017-03-31 ENCOUNTER — ANESTHESIA (OUTPATIENT)
Dept: ENDOSCOPY | Age: 67
End: 2017-03-31
Payer: COMMERCIAL

## 2017-03-31 ENCOUNTER — APPOINTMENT (OUTPATIENT)
Dept: ULTRASOUND IMAGING | Age: 67
End: 2017-03-31
Attending: INTERNAL MEDICINE
Payer: COMMERCIAL

## 2017-03-31 ENCOUNTER — HOSPITAL ENCOUNTER (OUTPATIENT)
Age: 67
Setting detail: OUTPATIENT SURGERY
Discharge: HOME | End: 2017-03-31
Attending: INTERNAL MEDICINE | Admitting: INTERNAL MEDICINE
Payer: COMMERCIAL

## 2017-03-31 VITALS
HEIGHT: 62 IN | TEMPERATURE: 97 F | OXYGEN SATURATION: 97 % | RESPIRATION RATE: 14 BRPM | HEART RATE: 65 BPM | SYSTOLIC BLOOD PRESSURE: 113 MMHG | BODY MASS INDEX: 25.95 KG/M2 | WEIGHT: 141 LBS | DIASTOLIC BLOOD PRESSURE: 60 MMHG

## 2017-03-31 LAB
GLUCOSE BLD STRIP.AUTO-MCNC: 140 MG/DL (ref 65–100)
H PYLORI FROM TISSUE: NEGATIVE
KIT LOT NO., HCLOLOT: NORMAL
NEGATIVE CONTROL: NEGATIVE
POSITIVE CONTROL: POSITIVE
SERVICE CMNT-IMP: ABNORMAL

## 2017-03-31 PROCEDURE — 82962 GLUCOSE BLOOD TEST: CPT

## 2017-03-31 PROCEDURE — 76040000019: Performed by: INTERNAL MEDICINE

## 2017-03-31 PROCEDURE — 87077 CULTURE AEROBIC IDENTIFY: CPT | Performed by: INTERNAL MEDICINE

## 2017-03-31 PROCEDURE — 74011000250 HC RX REV CODE- 250

## 2017-03-31 PROCEDURE — 77030009426 HC FCPS BIOP ENDOSC BSC -B: Performed by: INTERNAL MEDICINE

## 2017-03-31 PROCEDURE — 74011250636 HC RX REV CODE- 250/636

## 2017-03-31 PROCEDURE — 76060000031 HC ANESTHESIA FIRST 0.5 HR: Performed by: INTERNAL MEDICINE

## 2017-03-31 RX ORDER — SODIUM CHLORIDE 0.9 % (FLUSH) 0.9 %
5-10 SYRINGE (ML) INJECTION AS NEEDED
Status: DISCONTINUED | OUTPATIENT
Start: 2017-03-31 | End: 2017-03-31 | Stop reason: HOSPADM

## 2017-03-31 RX ORDER — PROPOFOL 10 MG/ML
INJECTION, EMULSION INTRAVENOUS AS NEEDED
Status: DISCONTINUED | OUTPATIENT
Start: 2017-03-31 | End: 2017-03-31 | Stop reason: HOSPADM

## 2017-03-31 RX ORDER — LIDOCAINE HYDROCHLORIDE 20 MG/ML
INJECTION, SOLUTION EPIDURAL; INFILTRATION; INTRACAUDAL; PERINEURAL AS NEEDED
Status: DISCONTINUED | OUTPATIENT
Start: 2017-03-31 | End: 2017-03-31 | Stop reason: HOSPADM

## 2017-03-31 RX ORDER — NALOXONE HYDROCHLORIDE 0.4 MG/ML
0.4 INJECTION, SOLUTION INTRAMUSCULAR; INTRAVENOUS; SUBCUTANEOUS
Status: DISCONTINUED | OUTPATIENT
Start: 2017-03-31 | End: 2017-03-31 | Stop reason: HOSPADM

## 2017-03-31 RX ORDER — MIDAZOLAM HYDROCHLORIDE 1 MG/ML
.25-1 INJECTION, SOLUTION INTRAMUSCULAR; INTRAVENOUS
Status: DISCONTINUED | OUTPATIENT
Start: 2017-03-31 | End: 2017-03-31 | Stop reason: HOSPADM

## 2017-03-31 RX ORDER — FLUMAZENIL 0.1 MG/ML
0.2 INJECTION INTRAVENOUS
Status: DISCONTINUED | OUTPATIENT
Start: 2017-03-31 | End: 2017-03-31 | Stop reason: HOSPADM

## 2017-03-31 RX ORDER — FENTANYL CITRATE 50 UG/ML
100 INJECTION, SOLUTION INTRAMUSCULAR; INTRAVENOUS
Status: DISCONTINUED | OUTPATIENT
Start: 2017-03-31 | End: 2017-03-31 | Stop reason: HOSPADM

## 2017-03-31 RX ORDER — EPINEPHRINE 0.1 MG/ML
1 INJECTION INTRACARDIAC; INTRAVENOUS
Status: DISCONTINUED | OUTPATIENT
Start: 2017-03-31 | End: 2017-03-31 | Stop reason: HOSPADM

## 2017-03-31 RX ORDER — ATROPINE SULFATE 0.1 MG/ML
0.5 INJECTION INTRAVENOUS
Status: DISCONTINUED | OUTPATIENT
Start: 2017-03-31 | End: 2017-03-31 | Stop reason: HOSPADM

## 2017-03-31 RX ORDER — SODIUM CHLORIDE 0.9 % (FLUSH) 0.9 %
5-10 SYRINGE (ML) INJECTION EVERY 8 HOURS
Status: DISCONTINUED | OUTPATIENT
Start: 2017-03-31 | End: 2017-03-31 | Stop reason: HOSPADM

## 2017-03-31 RX ORDER — SODIUM CHLORIDE 9 MG/ML
50 INJECTION, SOLUTION INTRAVENOUS CONTINUOUS
Status: DISCONTINUED | OUTPATIENT
Start: 2017-03-31 | End: 2017-03-31 | Stop reason: HOSPADM

## 2017-03-31 RX ORDER — DEXTROMETHORPHAN/PSEUDOEPHED 2.5-7.5/.8
1.2 DROPS ORAL
Status: DISCONTINUED | OUTPATIENT
Start: 2017-03-31 | End: 2017-03-31 | Stop reason: HOSPADM

## 2017-03-31 RX ORDER — SODIUM CHLORIDE 9 MG/ML
INJECTION, SOLUTION INTRAVENOUS
Status: DISCONTINUED | OUTPATIENT
Start: 2017-03-31 | End: 2017-03-31 | Stop reason: HOSPADM

## 2017-03-31 RX ADMIN — PROPOFOL 30 MG: 10 INJECTION, EMULSION INTRAVENOUS at 09:27

## 2017-03-31 RX ADMIN — PROPOFOL 40 MG: 10 INJECTION, EMULSION INTRAVENOUS at 09:38

## 2017-03-31 RX ADMIN — PROPOFOL 100 MG: 10 INJECTION, EMULSION INTRAVENOUS at 09:23

## 2017-03-31 RX ADMIN — PROPOFOL 20 MG: 10 INJECTION, EMULSION INTRAVENOUS at 09:36

## 2017-03-31 RX ADMIN — PROPOFOL 40 MG: 10 INJECTION, EMULSION INTRAVENOUS at 09:32

## 2017-03-31 RX ADMIN — LIDOCAINE HYDROCHLORIDE 40 MG: 20 INJECTION, SOLUTION EPIDURAL; INFILTRATION; INTRACAUDAL; PERINEURAL at 09:22

## 2017-03-31 RX ADMIN — PROPOFOL 30 MG: 10 INJECTION, EMULSION INTRAVENOUS at 09:25

## 2017-03-31 RX ADMIN — PROPOFOL 40 MG: 10 INJECTION, EMULSION INTRAVENOUS at 09:40

## 2017-03-31 RX ADMIN — PROPOFOL 40 MG: 10 INJECTION, EMULSION INTRAVENOUS at 09:30

## 2017-03-31 RX ADMIN — PROPOFOL 40 MG: 10 INJECTION, EMULSION INTRAVENOUS at 09:34

## 2017-03-31 RX ADMIN — SODIUM CHLORIDE: 9 INJECTION, SOLUTION INTRAVENOUS at 09:19

## 2017-03-31 NOTE — ROUTINE PROCESS
Omer [facility]  1950  [id number]    Situation:  Verbal report received from: Mavis Tinsley RN  Procedure: Procedure(s) with comments:  ENDOSCOPIC ULTRASOUND (EUS)/ no path - eus  ESOPHAGOGASTRODUODENOSCOPY (EGD)  ESOPHAGOGASTRODUODENAL (EGD) BIOPSY    Background:    Preoperative diagnosis: eus  Postoperative diagnosis: 1. Gastritis  2. Hiatal Hernia      :  Dr. Maritza Cleaning  Assistant(s): Endoscopy Technician-1: Trell Ruiz  Endoscopy RN-1: Abhilash Ken RN    Specimens: * No specimens in log *  H. Pylori  yes    Assessment:  Intra-procedure medications     Anesthesia gave intra-procedure sedation and medications, see anesthesia flow sheet yes    Intravenous fluids: NS@ KVO     Vital signs stable     Abdominal assessment: round and soft     Recommendation:  Discharge patient per MD order.     Family or Friend   Permission to share finding with family or friend yes

## 2017-03-31 NOTE — DISCHARGE INSTRUCTIONS
[address] [facility].  [address] [facility] [address]  [id number]  1950    DISCOMFORT:  Sore throat- warm salt water gargle  redness at IV site- apply warm compress to area; if redness or soreness persist- contact your physician  Gaseous discomfort- walking, belching will help relieve any discomfort  You may not operate a vehicle for 12 hours  You may not engage in an occupation involving machinery or appliances for rest of today  You may not drink alcoholic beverages for at least 12 hours  Avoid making any critical decisions for at least 24 hour  DIET  You may eat and drink after you leave. You may resume your regular diet - however -  remember your colon is empty and a heavy meal will produce gas. Avoid these foods:  vegetables, fried / greasy foods, carbonated drinks    ACTIVITY  You may resume your normal daily activities   Spend the remainder of the day resting -  avoid any strenuous activity. CALL M.D. ANY SIGN OF   Increasing pain, nausea, vomiting  Abdominal distension (swelling)  New increased bleeding (oral or rectal)  Fever (chills)  Pain in chest area  Bloody discharge from nose or mouth  Shortness of breath    Follow-up Instructions:   Call Dr. Omaira Arana for any questions or problems. If we took a biopsy please call the office within 2 weeks to discuss your                             pathology results. Telephone # [phone]       Continue same medications.

## 2017-03-31 NOTE — PROCEDURES
[address]  [address] [place], [address]  [phone]                                Endoscopic Ultrasound    NAME:  Omer Salgado   :   1950   MRN:   [id number]       Date/Time:  3/31/2017   Procedure Type: Linear Upper EUS    Indications: Abdominal pain    Pre-operative Diagnosis: see indication above    Post-operative Diagnosis:  See findings below    : Harry Hermosillo MD    Referring Provider: Duane Brito MD -Patricia Stevenson MD    Anethesia/Sedation:  MAC anesthesia      Procedure Details   After infom consent was obtained for the procedure, with all risks and benefits of procedure explained the patient was taken to the endoscopy suite and placed in the left lateral decubitus position. Following sequential administration of sedation as per above, the linear echoendoscope was inserted into the mouth and advanced under direct vision to second portion of the duodenum. A careful inspection was made as the gastroscope was withdrawn, including a retroflexed view of the proximal stomach; findings and interventions are described below. Findings:     Endoscopic:   Esophagus: Lots of tertiary contractions were noted. A small sliding hiatal hernia was seen. Stomach: Patchy erythema was noted in the gastric antrum. No ulcer was seen. Rest of the stomach was normal.    Duodenum/jejunum: normal    Ultrasound:   Esophagus: not examined   Stomach: not examined   Pancreas:     Areas examined: the entire gland    Parenchyma: -normal pancreatogram, including the main pancreatic duct and side branches    Pancreatic Duct: normal findings   Liver:     Parenchyma: not examined    Gallbladder: Two small 2-3 mm sldge ball were seen. No wall thickening, stones or layering sludge were seen. Bile Duct: the common bile duct was normal and no stones or sludge were seen. It measured about 4 mm in largest dimension. Cystic duct was patent.                 Lymph Node: no adenopathy        Specimen Removed:  Gastric biopsy from antrum and body for ANNALISE test    Complications: None. EBL:  None.     Interventions: none      Recommendations:   -Await ANNALISE test  -Continue current medications  -Follow up with Dr Ramsey Patrick as scheduled    Sharda Hassan MD  3/31/2017  9:43 AM

## 2017-03-31 NOTE — H&P
[address] [facility].  [address] [address] [address], [address]  [phone]                                History and Physical     NAME: Leilani Kussmaul   :  1950   MRN:  [id number]     HPI:  The patient was seen and examined.     Past Surgical History:   Procedure Laterality Date    BREAST SURGERY PROCEDURE UNLISTED      abcess    COLONOSCOPY  4-11    manetas- n ormal    EGD  4-11    manetas- esophageal ring    ENDOSCOPY, COLON, DIAGNOSTIC  2007    2 polyps    HX ORTHOPAEDIC      had an injection in her back to help with leg pain    HX TONSIL AND ADENOIDECTOMY       Past Medical History:   Diagnosis Date    Allergic rhinitis due to allergen 10/22/2014    Asthma     Depression     Diabetes ([facility] [address]     GERD (gastroesophageal reflux disease)     Hypertension     Kidney stones     Other ill-defined conditions(799.89)     GERD    Positive PPD     treated with INH     Social History   Substance Use Topics    Smoking status: Former Smoker     Packs/day: 1.00     Quit date: 2010    Smokeless tobacco: Never Used    Alcohol use No     Allergies   Allergen Reactions    Lisinopril Swelling    Sulfa (Sulfonamide Antibiotics) Itching     Family History   Problem Relation Age of Onset    Diabetes Mother     Hypertension Mother     Cancer Father      lung     Current Facility-Administered Medications   Medication Dose Route Frequency    0.9% sodium chloride infusion  50 mL/hr IntraVENous CONTINUOUS    sodium chloride (NS) flush 5-10 mL  5-10 mL IntraVENous Q8H    sodium chloride (NS) flush 5-10 mL  5-10 mL IntraVENous PRN    midazolam (VERSED) injection 0.25-10 mg  0.25-10 mg IntraVENous Multiple    fentaNYL citrate (PF) injection 100 mcg  100 mcg IntraVENous MULTIPLE DOSE GIVEN    naloxone (NARCAN) injection 0.4 mg  0.4 mg IntraVENous Multiple    flumazenil (ROMAZICON) 0.1 mg/mL injection 0.2 mg  0.2 mg IntraVENous Multiple    simethicone (MYLICON) 09UC/6.7YT oral drops 80 mg  1.2 mL Oral Multiple    atropine injection 0.5 mg  0.5 mg IntraVENous ONCE PRN    EPINEPHrine (ADRENALIN) 0.1 mg/mL syringe 1 mg  1 mg Endoscopically ONCE PRN         PHYSICAL EXAM:  General: WD, WN. Alert, cooperative, no acute distress    HEENT: NC, Atraumatic. PERRLA, EOMI. Anicteric sclerae. Lungs:  CTA Bilaterally. No Wheezing/Rhonchi/Rales. Heart:  Regular  rhythm,  No murmur, No Rubs, No Gallops  Abdomen: Soft, Non distended, Non tender.  +Bowel sounds, no HSM  Extremities: No c/c/e  Neurologic:  CN 2-12 gi, Alert and oriented X 3. No acute neurological distress   Psych:   Good insight. Not anxious nor agitated. The heart, lungs and mental status were satisfactory for the administration of MAC sedation and for the procedure.       Mallampati score: 3       Assessment:   · RUQ pain    Plan:   · Endoscopic procedure  · MAC sedation   ·

## 2017-03-31 NOTE — ANESTHESIA PREPROCEDURE EVALUATION
Anesthetic History               Review of Systems / Medical History  Patient summary reviewed, nursing notes reviewed and pertinent labs reviewed    Pulmonary            Asthma        Neuro/Psych         Psychiatric history     Cardiovascular    Hypertension              Exercise tolerance: >4 METS     GI/Hepatic/Renal               Comments: Ab pain Endo/Other    Diabetes: well controlled, type 2         Other Findings              Physical Exam    Airway  Mallampati: I  TM Distance: > 6 cm  Neck ROM: normal range of motion   Mouth opening: Normal     Cardiovascular    Rhythm: regular  Rate: normal         Dental  No notable dental hx       Pulmonary  Breath sounds clear to auscultation               Abdominal  Abdominal exam normal       Other Findings            Anesthetic Plan    ASA: 2  Anesthesia type: MAC          Induction: Intravenous  Anesthetic plan and risks discussed with: Patient

## 2017-03-31 NOTE — ANESTHESIA POSTPROCEDURE EVALUATION
Post-Anesthesia Evaluation and Assessment    Patient: Simran Packer MRN: [id number]  SSN: [id number]    YOB: 1950  Age: 61 y.o. Sex: female       Cardiovascular Function/Vital Signs  Visit Vitals    /60    Pulse 65    Temp 36.1 °C (97 °F)    Resp 14    Ht 5' 2\" (1.575 m)    Wt 64 kg (141 lb)    SpO2 97%    BMI 25.79 kg/m2       Patient is status post MAC anesthesia for Procedure(s):  ENDOSCOPIC ULTRASOUND (EUS)/ no path  ESOPHAGOGASTRODUODENOSCOPY (EGD)  ESOPHAGOGASTRODUODENAL (EGD) BIOPSY. Nausea/Vomiting: None    Postoperative hydration reviewed and adequate. Pain:  Pain Scale 1: Numeric (0 - 10) (03/31/17 1006)  Pain Intensity 1: 0 (03/31/17 1006)   Managed    Neurological Status: At baseline    Mental Status and Level of Consciousness: Arousable    Pulmonary Status:   O2 Device: Room air (03/31/17 1006)   Adequate oxygenation and airway patent    Complications related to anesthesia: None    Post-anesthesia assessment completed.  No concerns    Signed By: Ramona Davis MD     March 31, 2017

## 2017-03-31 NOTE — IP AVS SNAPSHOT
[address] 
[phone] Patient: Dmitriy Stevenson MRN: [id number] IQL:[id number] You are allergic to the following Allergen Reactions Lisinopril Swelling Sulfa (Sulfonamide Antibiotics) Itching Recent Documentation Height Weight BMI OB Status Smoking Status 1.575 m 64 kg 25.79 kg/m2 Postmenopausal Former Smoker Emergency Contacts Name Discharge Info Relation Home Work Mobile [address] [employer] CAREGIVER [3] Other Relative [6] [phone] About your hospitalization You were admitted on:  March 31, 2017 You last received care in the:  [facility] ENDOSCOPY You were discharged on:  March 31, 2017 Unit phone number:  [phone] Why you were hospitalized Your primary diagnosis was:  Not on File Providers Seen During Your Hospitalizations Provider Role Specialty Primary office phone Pao Paredes MD Attending Provider Gastroenterology [phone] Your Primary Care Physician (PCP) Primary Care Physician Office Phone Office Fax Bonnie Burroughs [phone] Follow-up Information None Your Appointments Monday May 01, 2017 10:45 AM EDT ROUTINE CARE with Corby Mckinley MD  
[address] [id number] [phone] Current Discharge Medication List  
  
CONTINUE these medications which have NOT CHANGED Dose & Instructions Dispensing Information Comments Morning Noon Evening Bedtime  
 acetaminophen-codeine 300-30 mg per tablet Commonly known as:  TYLENOL #3 Your last dose was: Your next dose is:    
   
   
 Dose:  1 Tab Take 1 Tab by mouth every four (4) hours as needed for Pain. Max Daily Amount: 6 Tabs. Quantity:  50 Tab Refills:  3 albuterol 90 mcg/actuation inhaler Commonly known as:  PROVENTIL HFA, VENTOLIN HFA, PROAIR HFA Your last dose was: Your next dose is: This is a rescue medication for when you are short of breath: 2 puffs every 4 hrs PRN wheezing/SOB (1 for home/1 for work/school) Quantity:  2 Inhaler Refills:  11  
     
   
   
   
  
 atorvastatin 20 mg tablet Commonly known as:  LIPITOR Your last dose was: Your next dose is:    
   
   
 Dose:  20 mg Take 1 Tab by mouth daily. For cholesterol Quantity:  30 Tab Refills:  12  
     
   
   
   
  
 citalopram 20 mg tablet Commonly known as:  Dasha Demarco Your last dose was: Your next dose is: TAKE ONE TABLET BY MOUTH ONE TIME DAILY Quantity:  30 Tab Refills:  0  
     
   
   
   
  
 * famotidine 20 mg tablet Commonly known as:  PEPCID Your last dose was: Your next dose is:    
   
   
  Refills:  0  
     
   
   
   
  
 * famotidine 40 mg tablet Commonly known as:  PEPCID Your last dose was: Your next dose is: TAKE 1 TABLET BY MOUTH AT BEDTIME Refills:  11  
     
   
   
   
  
 glucose blood VI test strips strip Commonly known as:  ACCU-CHEK SOFIA PLUS TEST STRP Your last dose was: Your next dose is:    
   
   
 Test blood sugar 2 - 4 times daily Quantity:  100 Strip Refills:  11  
     
   
   
   
  
 * hydroCHLOROthiazide 12.5 mg tablet Commonly known as:  HYDRODIURIL Your last dose was: Your next dose is:    
   
   
 Dose:  12.5 mg Take 1 Tab by mouth daily. For blood pressure Quantity:  90 Tab Refills:  3  
     
   
   
   
  
 * hydroCHLOROthiazide 12.5 mg capsule Commonly known as:  Roe Umanzor Your last dose was: Your next dose is:    
   
   
  Refills:  0 Lancets Misc Commonly known as:  [address] [place] [address] Your last dose was: Your next dose is:    
   
   
 test blood sugar as directed by physician. Dx code E11.9 Quantity:  100 Each Refills:  PRN  
     
   
   
   
  
 metFORMIN 1,000 mg tablet Commonly known as:  GLUCOPHAGE Your last dose was: Your next dose is:    
   
   
 1  in am and 1 in pm. For diabetes Quantity:  75 Tab Refills:  11  
     
   
   
   
  
 naproxen sodium 220 mg Cap Commonly known as:  Christa Crisp Your last dose was: Your next dose is:    
   
   
 1 tab twice daily as needed for back pain Quantity:  60 Cap Refills:  2  
     
   
   
   
  
 ondansetron 4 mg disintegrating tablet Commonly known as:  ZOFRAN ODT Your last dose was: Your next dose is:    
   
   
 Dose:  4 mg Take 1 Tab by mouth every eight (8) hours as needed for Nausea. Quantity:  10 Tab Refills:  0  
     
   
   
   
  
 ondansetron hcl 8 mg tablet Commonly known as:  Penny Anes Your last dose was: Your next dose is: TAKE 1 TABLET BY MOUTH TWICE A DAY AS NEEDED FOR NAUSEA Refills:  1  
     
   
   
   
  
 pantoprazole 40 mg tablet Commonly known as:  PROTONIX Your last dose was: Your next dose is:    
   
   
  Refills:  0 PURELAX 17 gram packet Generic drug:  polyethylene glycol Your last dose was: Your next dose is: DRINK 1 PACKET DISSOLVED IN WATER ONCE A DAY Refills:  3  
     
   
   
   
  
 zolpidem 10 mg tablet Commonly known as:  AMBIEN Your last dose was: Your next dose is: TAKE ONE TABLET BY MOUTH NIGHTLY AT BEDTIME AS NEEDED Quantity:  30 Tab Refills:  5 Not to exceed 5 additional fills before 08/28/2016. * Notice: This list has 4 medication(s) that are the same as other medications prescribed for you. Read the directions carefully, and ask your doctor or other care provider to review them with you. Discharge Instructions Nadeem Diana. 
[address] [facility] [address] [place], [address] 
[phone] DISCHARGE INSTRUCTIONS Benito Troncoso [id number] 
1950 DISCOMFORT: 
Sore throat- warm salt water gargle 
redness at IV site- apply warm compress to area; if redness or soreness persist- contact your physician Gaseous discomfort- walking, belching will help relieve any discomfort You may not operate a vehicle for 12 hours You may not engage in an occupation involving machinery or appliances for rest of today You may not drink alcoholic beverages for at least 12 hours Avoid making any critical decisions for at least 24 hour DIET You may eat and drink after you leave. You may resume your regular diet  however -  remember your colon is empty and a heavy meal will produce gas. Avoid these foods:  vegetables, fried / greasy foods, carbonated drinks ACTIVITY You may resume your normal daily activities Spend the remainder of the day resting -  avoid any strenuous activity. CALL M.D. ANY SIGN OF Increasing pain, nausea, vomiting Abdominal distension (swelling) New increased bleeding (oral or rectal) Fever (chills) Pain in chest area Bloody discharge from nose or mouth Shortness of breath Follow-up Instructions: 
 Call Dr. Uma Calixto for any questions or problems. If we took a biopsy please call the office within 2 weeks to discuss your                             pathology results. Telephone # [phone] Continue same medications. Discharge Orders None Introducing [facility]! Dear Edwin Stager: Thank you for requesting a [employer] account. Our records indicate that you already have an active [employer] account. You can access your account anytime at [email or web address] Did you know that you can access your hospital and ER discharge instructions at any time in MyChart? You can also review all of your test results from your hospital stay or ER visit. Additional Information If you have questions, please visit the Frequently Asked Questions section of the [employer] website at [email or web address] Remember, MyChart is NOT to be used for urgent needs. For medical emergencies, dial 911. Now available from your iPhone and Android! General Information Please provide this summary of care documentation to your next provider. Patient Signature:  ____________________________________________________________ Date:  ____________________________________________________________  
  
[facility] Provider Signature:  ____________________________________________________________ Date:  ____________________________________________________________

## 2017-03-31 NOTE — PROGRESS NOTES
Assumed care of the patient at the time of this note from Medina Sarmiento CRNA. Patient transported to recovery by Endoscopy Procedure RN. SBAR report given to recovery RN.

## 2017-04-08 ENCOUNTER — HOSPITAL ENCOUNTER (EMERGENCY)
Age: 67
Discharge: HOME | End: 2017-04-09
Attending: EMERGENCY MEDICINE | Admitting: EMERGENCY MEDICINE
Payer: COMMERCIAL

## 2017-04-08 ENCOUNTER — APPOINTMENT (OUTPATIENT)
Dept: CT IMAGING | Age: 67
End: 2017-04-08
Attending: EMERGENCY MEDICINE
Payer: COMMERCIAL

## 2017-04-08 DIAGNOSIS — R10.11 RUQ PAIN: Primary | ICD-10-CM

## 2017-04-08 LAB
ALBUMIN SERPL BCP-MCNC: 3.7 G/DL (ref 3.5–5)
ALBUMIN/GLOB SERPL: 1 {RATIO} (ref 1.1–2.2)
ALP SERPL-CCNC: 92 U/L (ref 45–117)
ALT SERPL-CCNC: 24 U/L (ref 12–78)
ANION GAP BLD CALC-SCNC: 10 MMOL/L (ref 5–15)
APPEARANCE UR: CLEAR
AST SERPL W P-5'-P-CCNC: 16 U/L (ref 15–37)
BACTERIA URNS QL MICRO: NEGATIVE /HPF
BASOPHILS # BLD AUTO: 0 K/UL (ref 0–0.1)
BASOPHILS # BLD: 0 % (ref 0–1)
BILIRUB SERPL-MCNC: 0.3 MG/DL (ref 0.2–1)
BILIRUB UR QL CFM: NEGATIVE
BUN SERPL-MCNC: 12 MG/DL (ref 6–20)
BUN/CREAT SERPL: 11 (ref 12–20)
CALCIUM SERPL-MCNC: 9.4 MG/DL (ref 8.5–10.1)
CHLORIDE SERPL-SCNC: 105 MMOL/L (ref 97–108)
CO2 SERPL-SCNC: 28 MMOL/L (ref 21–32)
COLOR UR: ABNORMAL
CREAT SERPL-MCNC: 1.1 MG/DL (ref 0.55–1.02)
EOSINOPHIL # BLD: 0.1 K/UL (ref 0–0.4)
EOSINOPHIL NFR BLD: 2 % (ref 0–7)
EPITH CASTS URNS QL MICRO: ABNORMAL /LPF
ERYTHROCYTE [DISTWIDTH] IN BLOOD BY AUTOMATED COUNT: 14.2 % (ref 11.5–14.5)
GLOBULIN SER CALC-MCNC: 3.8 G/DL (ref 2–4)
GLUCOSE SERPL-MCNC: 108 MG/DL (ref 65–100)
GLUCOSE UR STRIP.AUTO-MCNC: NEGATIVE MG/DL
HCT VFR BLD AUTO: 36.8 % (ref 35–47)
HGB BLD-MCNC: 12.3 G/DL (ref 11.5–16)
HGB UR QL STRIP: NEGATIVE
HYALINE CASTS URNS QL MICRO: ABNORMAL /LPF (ref 0–5)
KETONES UR QL STRIP.AUTO: ABNORMAL MG/DL
LEUKOCYTE ESTERASE UR QL STRIP.AUTO: ABNORMAL
LYMPHOCYTES # BLD AUTO: 52 % (ref 12–49)
LYMPHOCYTES # BLD: 2.4 K/UL (ref 0.8–3.5)
MCH RBC QN AUTO: 29.9 PG (ref 26–34)
MCHC RBC AUTO-ENTMCNC: 33.4 G/DL (ref 30–36.5)
MCV RBC AUTO: 89.5 FL (ref 80–99)
MONOCYTES # BLD: 0.3 K/UL (ref 0–1)
MONOCYTES NFR BLD AUTO: 7 % (ref 5–13)
NEUTS SEG # BLD: 1.8 K/UL (ref 1.8–8)
NEUTS SEG NFR BLD AUTO: 39 % (ref 32–75)
NITRITE UR QL STRIP.AUTO: NEGATIVE
PH UR STRIP: 5.5 [PH] (ref 5–8)
PLATELET # BLD AUTO: 175 K/UL (ref 150–400)
POTASSIUM SERPL-SCNC: 3.4 MMOL/L (ref 3.5–5.1)
PROT SERPL-MCNC: 7.5 G/DL (ref 6.4–8.2)
PROT UR STRIP-MCNC: NEGATIVE MG/DL
RBC # BLD AUTO: 4.11 M/UL (ref 3.8–5.2)
RBC #/AREA URNS HPF: ABNORMAL /HPF (ref 0–5)
SODIUM SERPL-SCNC: 143 MMOL/L (ref 136–145)
SP GR UR REFRACTOMETRY: 1.02 (ref 1–1.03)
UA: UC IF INDICATED,UAUC: ABNORMAL
UROBILINOGEN UR QL STRIP.AUTO: 1 EU/DL (ref 0.2–1)
WBC # BLD AUTO: 4.6 K/UL (ref 3.6–11)
WBC URNS QL MICRO: ABNORMAL /HPF (ref 0–4)

## 2017-04-08 PROCEDURE — 87186 SC STD MICRODIL/AGAR DIL: CPT | Performed by: EMERGENCY MEDICINE

## 2017-04-08 PROCEDURE — 74177 CT ABD & PELVIS W/CONTRAST: CPT

## 2017-04-08 PROCEDURE — 96374 THER/PROPH/DIAG INJ IV PUSH: CPT

## 2017-04-08 PROCEDURE — 74011636320 HC RX REV CODE- 636/320: Performed by: EMERGENCY MEDICINE

## 2017-04-08 PROCEDURE — 87077 CULTURE AEROBIC IDENTIFY: CPT | Performed by: EMERGENCY MEDICINE

## 2017-04-08 PROCEDURE — 83690 ASSAY OF LIPASE: CPT | Performed by: EMERGENCY MEDICINE

## 2017-04-08 PROCEDURE — 81001 URINALYSIS AUTO W/SCOPE: CPT | Performed by: EMERGENCY MEDICINE

## 2017-04-08 PROCEDURE — 36415 COLL VENOUS BLD VENIPUNCTURE: CPT | Performed by: EMERGENCY MEDICINE

## 2017-04-08 PROCEDURE — 85025 COMPLETE CBC W/AUTO DIFF WBC: CPT | Performed by: EMERGENCY MEDICINE

## 2017-04-08 PROCEDURE — 99282 EMERGENCY DEPT VISIT SF MDM: CPT

## 2017-04-08 PROCEDURE — 96361 HYDRATE IV INFUSION ADD-ON: CPT

## 2017-04-08 PROCEDURE — 80053 COMPREHEN METABOLIC PANEL: CPT | Performed by: EMERGENCY MEDICINE

## 2017-04-08 PROCEDURE — 87086 URINE CULTURE/COLONY COUNT: CPT | Performed by: EMERGENCY MEDICINE

## 2017-04-08 PROCEDURE — 96375 TX/PRO/DX INJ NEW DRUG ADDON: CPT

## 2017-04-08 PROCEDURE — 74011250636 HC RX REV CODE- 250/636: Performed by: EMERGENCY MEDICINE

## 2017-04-08 RX ORDER — SODIUM CHLORIDE 0.9 % (FLUSH) 0.9 %
10 SYRINGE (ML) INJECTION
Status: COMPLETED | OUTPATIENT
Start: 2017-04-08 | End: 2017-04-08

## 2017-04-08 RX ORDER — FENTANYL CITRATE 50 UG/ML
50 INJECTION, SOLUTION INTRAMUSCULAR; INTRAVENOUS
Status: COMPLETED | OUTPATIENT
Start: 2017-04-08 | End: 2017-04-08

## 2017-04-08 RX ORDER — SODIUM CHLORIDE 9 MG/ML
50 INJECTION, SOLUTION INTRAVENOUS
Status: COMPLETED | OUTPATIENT
Start: 2017-04-08 | End: 2017-04-09

## 2017-04-08 RX ORDER — ONDANSETRON 2 MG/ML
4 INJECTION INTRAMUSCULAR; INTRAVENOUS
Status: COMPLETED | OUTPATIENT
Start: 2017-04-08 | End: 2017-04-08

## 2017-04-08 RX ADMIN — SODIUM CHLORIDE 50 ML/HR: 900 INJECTION, SOLUTION INTRAVENOUS at 23:49

## 2017-04-08 RX ADMIN — ONDANSETRON HYDROCHLORIDE 4 MG: 2 INJECTION, SOLUTION INTRAMUSCULAR; INTRAVENOUS at 23:21

## 2017-04-08 RX ADMIN — IOPAMIDOL 100 ML: 755 INJECTION, SOLUTION INTRAVENOUS at 23:49

## 2017-04-08 RX ADMIN — Medication 10 ML: at 23:49

## 2017-04-08 RX ADMIN — FENTANYL CITRATE 50 MCG: 50 INJECTION, SOLUTION INTRAMUSCULAR; INTRAVENOUS at 23:21

## 2017-04-08 RX ADMIN — SODIUM CHLORIDE 1000 ML: 900 INJECTION, SOLUTION INTRAVENOUS at 23:22

## 2017-04-09 VITALS
RESPIRATION RATE: 18 BRPM | SYSTOLIC BLOOD PRESSURE: 117 MMHG | WEIGHT: 144.4 LBS | HEART RATE: 77 BPM | BODY MASS INDEX: 26.57 KG/M2 | TEMPERATURE: 98.9 F | HEIGHT: 62 IN | OXYGEN SATURATION: 94 % | DIASTOLIC BLOOD PRESSURE: 43 MMHG

## 2017-04-09 LAB — LIPASE SERPL-CCNC: 173 U/L (ref 73–393)

## 2017-04-09 RX ORDER — CYCLOBENZAPRINE HCL 5 MG
5 TABLET ORAL
Qty: 20 TAB | Refills: 0 | Status: SHIPPED | OUTPATIENT
Start: 2017-04-09 | End: 2017-04-20

## 2017-04-09 RX ORDER — HYDROCODONE BITARTRATE AND ACETAMINOPHEN 5; 325 MG/1; MG/1
1 TABLET ORAL
Qty: 10 TAB | Refills: 0 | Status: SHIPPED | OUTPATIENT
Start: 2017-04-09 | End: 2017-05-15 | Stop reason: ALTCHOICE

## 2017-04-09 RX ORDER — ONDANSETRON 4 MG/1
4 TABLET, FILM COATED ORAL
Qty: 20 TAB | Refills: 0 | Status: SHIPPED | OUTPATIENT
Start: 2017-04-09 | End: 2017-05-15 | Stop reason: ALTCHOICE

## 2017-04-09 NOTE — ED NOTES
Pt arrives ambulatory to room. Pt c/o RUQ abdominal pain and nausea since 7 pm. Pt has had 1 episode like this about 2 months prior and nothing was found. Pt has GI specialist who thinks this may be the gall bladder. Pt states she has had many tests and all were negative. Monitor x 2. Call bell within reach and plan of care discussed.

## 2017-04-09 NOTE — ED PROVIDER NOTES
HPI Comments: Maury Fisher is a 61 y.o. female with PMHx significant for HTN,GERD who presents ambulatory to the ED with cc of  intermittent RUQ pain rated 10/10 usually starting after meals, the most recent episode starting at 1900 today. Pt states that she has had this issue for months. She also c/o nasuea. Pt reports that she is followed by GI and has had numerous tests including ultrasounds, endoscopies, barium swallows, Nuclear medicine, and HIDA scans that have showed no obvious gallbladder etiology. Chart shows a normal EGD. Chart also shows that a CT of the abdomen was ordered but does not show any results, even though pt claims to remember the scan. Pt denies any F/C, vomiting, diarrhea, urinary sxs, CP, SOB. PCP: Shruthi Lowe MD    Social hx: smoking (quit 2010) EtOH (-)    There are no other complaints, changes, or physical findings at this time. The history is provided by the patient. Past Medical History:   Diagnosis Date    Allergic rhinitis due to allergen 10/22/2014    Asthma     Depression     Diabetes ([facility]     GERD (gastroesophageal reflux disease)     Hypertension     Kidney stones     Other ill-defined conditions     GERD    Positive PPD 2009    treated with INH       Past Surgical History:   Procedure Laterality Date    BREAST SURGERY PROCEDURE UNLISTED      abcess    COLONOSCOPY  4-11    manetas- n ormal    EGD  4-11    manetas- esophageal ring    ENDOSCOPY, COLON, DIAGNOSTIC  12/2007    2 polyps    HX ORTHOPAEDIC      had an injection in her back to help with leg pain    HX TONSIL AND ADENOIDECTOMY           Family History:   Problem Relation Age of Onset    Diabetes Mother     Hypertension Mother     Cancer Father      lung       Social History     Social History    Marital status:      Spouse name: N/A    Number of children: N/A    Years of education: N/A     Occupational History    Not on file.      Social History Main Topics    Smoking status: Former Smoker     Packs/day: 1.00     Quit date: 8/23/2010    Smokeless tobacco: Never Used    Alcohol use No    Drug use: No    Sexual activity: Not on file     Other Topics Concern    Not on file     Social History Narrative         ALLERGIES: Lisinopril and Sulfa (sulfonamide antibiotics)    Review of Systems   Constitutional: Negative. Negative for chills, diaphoresis and fever. HENT: Negative. Negative for congestion, sore throat and trouble swallowing. Eyes: Negative. Negative for photophobia, pain and redness. Respiratory: Negative. Negative for cough, chest tightness, shortness of breath and wheezing. Cardiovascular: Negative. Negative for chest pain and palpitations. Gastrointestinal: Positive for abdominal pain and nausea. Negative for blood in stool, diarrhea and vomiting. Genitourinary: Negative for difficulty urinating, dysuria and frequency. Musculoskeletal: Negative. Negative for arthralgias, myalgias, neck pain and neck stiffness. Skin: Negative. Neurological: Negative. Negative for dizziness, tremors, seizures, syncope, speech difficulty, light-headedness and headaches. Psychiatric/Behavioral: Negative. Negative for confusion. The patient is not nervous/anxious. All other systems reviewed and are negative. Vitals:    04/08/17 2155 04/08/17 2315 04/08/17 2330   BP: 137/65 128/69 117/43   Pulse: 77     Resp: 18     Temp: 98.9 °F (37.2 °C)     SpO2: 99% 99% 94%   Weight: 65.5 kg (144 lb 6.4 oz)     Height: 5' 2\" (1.575 m)              Physical Exam   Constitutional: She is oriented to person, place, and time. She appears well-developed and well-nourished. HENT:   Head: Normocephalic and atraumatic. Eyes: Conjunctivae and EOM are normal.   Neck: Normal range of motion. Neck supple. Cardiovascular: Normal rate and regular rhythm. Pulmonary/Chest: Effort normal and breath sounds normal. No respiratory distress. Abdominal: Soft.  She exhibits no distension. There is tenderness. Mild RUQ tenderness   Musculoskeletal: Normal range of motion. Neurological: She is alert and oriented to person, place, and time. Skin: Skin is warm and dry. Psychiatric: She has a normal mood and affect. Nursing note and vitals reviewed. MDM  Number of Diagnoses or Management Options  RUQ pain:   Diagnosis management comments: Patient presents with RUQ abdominal pain. Differential includes gastritis, pancreatitis cholelithiasis, cholecystitis, hepatitis, muscular strain, renal pathology, gastroenteritis. Less likely ACS. Will obtain labs and imaging. I have reviewed all available prior imaging results- doesn't seem to be the GB. MSK? Will give fluids, analgesics and antiemetics PRN. Amount and/or Complexity of Data Reviewed  Clinical lab tests: reviewed and ordered  Tests in the radiology section of CPT®: reviewed and ordered  Review and summarize past medical records: yes    Patient Progress  Patient progress: stable    ED Course       Procedures    Progress Note:  12:44 AM  Negative CT and labs. Will try flexril. Ready for discharge.   Written by Kaz Jenkins ED Scribe as dictated by Rom Vazquez M.D      DISCHARGE NOTE:  LABORATORY TESTS:  Recent Results (from the past 12 hour(s))   URINALYSIS W/ REFLEX CULTURE    Collection Time: 04/08/17 10:05 PM   Result Value Ref Range    Color YELLOW/STRAW      Appearance CLEAR CLEAR      Specific gravity 1.024 1.003 - 1.030      pH (UA) 5.5 5.0 - 8.0      Protein NEGATIVE  NEG mg/dL    Glucose NEGATIVE  NEG mg/dL    Ketone TRACE (A) NEG mg/dL    Blood NEGATIVE  NEG      Urobilinogen 1.0 0.2 - 1.0 EU/dL    Nitrites NEGATIVE  NEG      Leukocyte Esterase SMALL (A) NEG      WBC 5-10 0 - 4 /hpf    RBC 0-5 0 - 5 /hpf    Epithelial cells FEW FEW /lpf    Bacteria NEGATIVE  NEG /hpf    UA:UC IF INDICATED URINE CULTURE ORDERED (A) CNI      Hyaline cast 0-2 0 - 5 /lpf   BILIRUBIN, CONFIRM    Collection Time: 04/08/17 10:05 PM   Result Value Ref Range    Bilirubin UA, confirm NEGATIVE  NEG     CBC WITH AUTOMATED DIFF    Collection Time: 04/08/17 10:35 PM   Result Value Ref Range    WBC 4.6 3.6 - 11.0 K/uL    RBC 4.11 3.80 - 5.20 M/uL    HGB 12.3 11.5 - 16.0 g/dL    HCT 36.8 35.0 - 47.0 %    MCV 89.5 80.0 - 99.0 FL    MCH 29.9 26.0 - 34.0 PG    MCHC 33.4 30.0 - 36.5 g/dL    RDW 14.2 11.5 - 14.5 %    PLATELET 152 512 - 546 K/uL    NEUTROPHILS 39 32 - 75 %    LYMPHOCYTES 52 (H) 12 - 49 %    MONOCYTES 7 5 - 13 %    EOSINOPHILS 2 0 - 7 %    BASOPHILS 0 0 - 1 %    ABS. NEUTROPHILS 1.8 1.8 - 8.0 K/UL    ABS. LYMPHOCYTES 2.4 0.8 - 3.5 K/UL    ABS. MONOCYTES 0.3 0.0 - 1.0 K/UL    ABS. EOSINOPHILS 0.1 0.0 - 0.4 K/UL    ABS. BASOPHILS 0.0 0.0 - 0.1 K/UL   METABOLIC PANEL, COMPREHENSIVE    Collection Time: 04/08/17 10:35 PM   Result Value Ref Range    Sodium 143 136 - 145 mmol/L    Potassium 3.4 (L) 3.5 - 5.1 mmol/L    Chloride 105 97 - 108 mmol/L    CO2 28 21 - 32 mmol/L    Anion gap 10 5 - 15 mmol/L    Glucose 108 (H) 65 - 100 mg/dL    BUN 12 6 - 20 MG/DL    Creatinine 1.10 (H) 0.55 - 1.02 MG/DL    BUN/Creatinine ratio 11 (L) 12 - 20      GFR est AA >60 >60 ml/min/1.73m2    GFR est non-AA 50 (L) >60 ml/min/1.73m2    Calcium 9.4 8.5 - 10.1 MG/DL    Bilirubin, total 0.3 0.2 - 1.0 MG/DL    ALT (SGPT) 24 12 - 78 U/L    AST (SGOT) 16 15 - 37 U/L    Alk. phosphatase 92 45 - 117 U/L    Protein, total 7.5 6.4 - 8.2 g/dL    Albumin 3.7 3.5 - 5.0 g/dL    Globulin 3.8 2.0 - 4.0 g/dL    A-G Ratio 1.0 (L) 1.1 - 2.2     LIPASE    Collection Time: 04/08/17 10:35 PM   Result Value Ref Range    Lipase 173 73 - 393 U/L       IMAGING RESULTS:  CT ABD PELV W CONT   Final ResultINDICATION: Nonacute right upper quadrant abdominal pain.     COMPARISON: Ultrasound right upper quadrant of the abdomen on 2/9/2017.  CT  abdomen/pelvis on 7/22/2014.     TECHNIQUE:   Following the uneventful intravenous administration of 76 cc Isovue-370, thin  axial images were obtained through the abdomen and pelvis. Coronal and sagittal  reconstructions were generated. Oral contrast was not administered. CT dose  reduction was achieved through use of a standardized protocol tailored for this  examination and automatic exposure control for dose modulation.     FINDINGS:   LUNG BASES: Patchy dependent atelectasis. No pneumonia. INCIDENTALLY IMAGED HEART AND MEDIASTINUM: Unremarkable. LIVER: Hypoattenuation is most compatible with hepatic steatosis. No liver mass. GALLBLADDER: Unremarkable. SPLEEN: No mass. PANCREAS: No mass or ductal dilatation. ADRENALS: Unremarkable. KIDNEYS: No solid renal mass or hydronephrosis. Bilateral nonobstructing  nephrolithiasis is favored over early excretion of contrast.  STOMACH: Distended with food products. SMALL BOWEL: No dilatation or wall thickening. COLON: No dilatation or wall thickening. APPENDIX: Unremarkable. PERITONEUM: No ascites or pneumoperitoneum. RETROPERITONEUM: Aorta is atherosclerotic without aneurysm. No lymphadenopathy. REPRODUCTIVE ORGANS:  URINARY BLADDER: Partially distended. BONES: L5-S1 osseous ankylosis. ADDITIONAL COMMENTS: N/A     IMPRESSION  IMPRESSION:     1. No acute process on CT. 2. Evidence of hepatic steatosis.   3. Bilateral nonobstructing nephrolithiasis.             MEDICATIONS GIVEN:  Medications   sodium chloride 0.9 % bolus infusion 1,000 mL (1,000 mL IntraVENous New Bag 4/8/17 2322)   ondansetron (ZOFRAN) injection 4 mg (4 mg IntraVENous Given 4/8/17 2321)   fentaNYL citrate (PF) injection 50 mcg (50 mcg IntraVENous Given 4/8/17 2321)   0.9% sodium chloride infusion (50 mL/hr IntraVENous New Bag 4/8/17 2349)   iopamidol (ISOVUE-370) 76 % injection 100 mL (100 mL IntraVENous Given 4/8/17 2349)   sodium chloride (NS) flush 10 mL (10 mL IntraVENous Given 4/8/17 2349)       IMPRESSION:  1. RUQ pain        PLAN:  Current Discharge Medication List      START taking these medications    Details HYDROcodone-acetaminophen (NORCO) 5-325 mg per tablet Take 1 Tab by mouth every four (4) hours as needed for Pain. Max Daily Amount: 6 Tabs. Qty: 10 Tab, Refills: 0      cyclobenzaprine (FLEXERIL) 5 mg tablet Take 1 Tab by mouth three (3) times daily as needed for Muscle Spasm(s). Qty: 20 Tab, Refills: 0      ondansetron hcl (ZOFRAN, AS HYDROCHLORIDE,) 4 mg tablet Take 1 Tab by mouth every eight (8) hours as needed for Nausea. Qty: 20 Tab, Refills: 0           Follow-up Information     Follow up With Details Comments Contact Info    Your GI doctor  As needed         Return to ED if worse       Discharge Note:  12:50 AM  The patient has been re-evaluated and is ready for discharge. Reviewed available results with patient. Counseled patient on diagnosis and care plan. Patient has expressed understanding, and all questions have been answered. Patient agrees with plan and agrees to follow up as recommended, or to return to the ED if their symptoms worsen. Discharge instructions have been provided and explained to the patient, along with reasons to return to the ED. Written by Marycruz Rivas, ED Scribe, as dictated by Kaitlynn Brooks M.D. ATTESTATION:  This note is prepared by Marycruz Rivas, acting as Scribe for Kaitlynn Brooks M.D. Kaitlynn Brooks M.D and personally reviewed by me in its entirety. I confirm that the note above accurately reflects all work, treatment, procedures, and medical decision making performed by me.

## 2017-04-09 NOTE — DISCHARGE INSTRUCTIONS

## 2017-04-09 NOTE — ED NOTES
Pt received discharge instructions from Dr Alaina Cook and verbalized understanding. Pt ambulatory to exit.

## 2017-04-11 LAB
BACTERIA SPEC CULT: ABNORMAL
CC UR VC: ABNORMAL
SERVICE CMNT-IMP: ABNORMAL

## 2017-04-19 ENCOUNTER — OFFICE VISIT (OUTPATIENT)
Dept: SURGERY | Age: 67
End: 2017-04-19

## 2017-04-19 VITALS
WEIGHT: 143.5 LBS | SYSTOLIC BLOOD PRESSURE: 134 MMHG | BODY MASS INDEX: 26.41 KG/M2 | OXYGEN SATURATION: 96 % | DIASTOLIC BLOOD PRESSURE: 62 MMHG | HEART RATE: 68 BPM | HEIGHT: 62 IN

## 2017-04-19 DIAGNOSIS — K83.8 BILIARY SLUDGE DETERMINED BY ULTRASOUND: Primary | ICD-10-CM

## 2017-04-19 NOTE — MR AVS SNAPSHOT
Visit Information Date & Time Provider Department Dept. Phone Encounter #  
 4/19/2017  2:20 PM Juventino Sandoval MD [facility] [place] [id number] Your Appointments 5/1/2017 10:45 AM  
ROUTINE CARE with Yuri Arauz MD  
[facility]) Appt Note: 6m f/u  
 [address] [facility] [id number] [phone] [address] Upcoming Health Maintenance Date Due  
 EYE EXAM RETINAL OR DILATED Q1 7/17/2016 FOOT EXAM Q1 8/13/2016 MICROALBUMIN Q1 3/29/2017 PAP AKA CERVICAL CYTOLOGY 4/23/2017 HEMOGLOBIN A1C Q6M 5/1/2017 LIPID PANEL Q1 11/1/2017 BREAST CANCER SCRN MAMMOGRAM 7/22/2018 COLONOSCOPY 5/2/2026 DTaP/Tdap/Td series (2 - Td) 8/9/2026 Allergies as of 4/19/2017  Review Complete On: 4/19/2017 By: Juventino Sandoval MD  
  
 Severity Noted Reaction Type Reactions Lisinopril High 05/27/2015    Swelling Sulfa (Sulfonamide Antibiotics)  06/09/2010    Itching Current Immunizations  Reviewed on 11/17/2015 Name Date Influenza Vaccine 10/21/2014, 10/21/2013 Influenza Vaccine Gregg Vic) 10/7/2015 Influenza Vaccine (Quad) PF 11/1/2016 Influenza Vaccine Split 10/25/2012, 10/13/2011, 10/19/2010 Pneumococcal Vaccine (Unspecified Type) 10/19/2010 Tdap 8/9/2016 Varicella Virus Vaccine 10/17/2012 Not reviewed this visit Vitals BP Pulse Height(growth percentile) Weight(growth percentile) LMP SpO2  
 134/62 (BP 1 Location: Right arm, BP Patient Position: Sitting) 68 5' 2\" (1.575 m) 143 lb 8 oz (65.1 kg) (LMP Unknown) 96% BMI OB Status Smoking Status 26.25 kg/m2 Postmenopausal Former Smoker Vitals History BMI and BSA Data Body Mass Index Body Surface Area  
 26.25 kg/m 2 1.69 m 2 Preferred Pharmacy Pharmacy Name Phone [address] [phone] Your Updated Medication List  
  
   
This list is accurate as of: 4/19/17  3:14 PM.  Always use your most recent med list.  
  
  
  
  
 albuterol 90 mcg/actuation inhaler Commonly known as:  PROVENTIL HFA, VENTOLIN HFA, PROAIR HFA This is a rescue medication for when you are short of breath: 2 puffs every 4 hrs PRN wheezing/SOB (1 for home/1 for work/school)  
  
 atorvastatin 20 mg tablet Commonly known as:  LIPITOR Take 1 Tab by mouth daily. For cholesterol  
  
 citalopram 20 mg tablet Commonly known as:  CELEXA  
TAKE ONE TABLET BY MOUTH ONE TIME DAILY  
  
 cyclobenzaprine 5 mg tablet Commonly known as:  FLEXERIL Take 1 Tab by mouth three (3) times daily as needed for Muscle Spasm(s). * famotidine 20 mg tablet Commonly known as:  PEPCID * famotidine 40 mg tablet Commonly known as:  PEPCID  
TAKE 1 TABLET BY MOUTH AT BEDTIME  
  
 glucose blood VI test strips strip Commonly known as:  ACCU-CHEK SOFIA PLUS TEST STRP Test blood sugar 2 - 4 times daily * hydroCHLOROthiazide 12.5 mg tablet Commonly known as:  HYDRODIURIL Take 1 Tab by mouth daily. For blood pressure * hydroCHLOROthiazide 12.5 mg capsule Commonly known as:  Guerita Hernandez HYDROcodone-acetaminophen 5-325 mg per tablet Commonly known as:  Brown Fryer Take 1 Tab by mouth every four (4) hours as needed for Pain. Max Daily Amount: 6 Tabs. Lancets Misc Commonly known as:  ACCU-CHEK MULTICLIX LANCET  
test blood sugar as directed by physician. Dx code E11.9  
  
 metFORMIN 1,000 mg tablet Commonly known as:  GLUCOPHAGE  
1  in am and 1 in pm. For diabetes  
  
 ondansetron hcl 4 mg tablet Commonly known as:  ZOFRAN (AS HYDROCHLORIDE) Take 1 Tab by mouth every eight (8) hours as needed for Nausea. pantoprazole 40 mg tablet Commonly known as:  PROTONIX PURELAX 17 gram packet Generic drug:  polyethylene glycol DRINK 1 PACKET DISSOLVED IN WATER ONCE A DAY  
  
 zolpidem 10 mg tablet Commonly known as:  AMBIEN  
TAKE ONE TABLET BY MOUTH NIGHTLY AT BEDTIME AS NEEDED * Notice: This list has 4 medication(s) that are the same as other medications prescribed for you. Read the directions carefully, and ask your doctor or other care provider to review them with you. Patient Instructions Surgery Instruction Sheet You have been scheduled for surgery on 4/24/17 at 3:30 at [facility]. Please report to the Surgery Center at 2:30, this is approximately 1 hours prior to your surgery time. The Surgery Center is located on the [address] [place] [address] side of the [facility], [address]. You may or may not need to have a Pre-op Visit prior to your surgery. The Surgery Center nurse will call you directly and set this appointment with you. Bring a list of medications and your insurance cards with you. You may eat/drink prior to this visit. The Pre-op nurse will review your medical history, medications and give you additional instructions. They will also confirm your arrival time Call your physician immediately if you notice a change in your health between the time you saw your physician and the day of surgery If you take a blood thinner, please let us know. Call your ordering Doctor to make sure you can stop taking it prior to your surgery. STOP YOUR  ASPIRIN 10 DAYS PRIOR TO SURGERY. DO NOT TAKE IBUPROFEN, ADVIL, MOTRIN, ALEVE, EXCEDRIN, BC POWDER, GOODIES, FISH OIL OR ANY MEDICATION CONTAINING ASPIRIN 10 DAYS PRIOR TO YOUR SURGERY. MAY TAKE TYLENOL Eat a light dinner the evening before your surgery. DO NOT EAT OR DRINK ANYTHING AFTER MIDNIGHT THE NIGHT BEFORE YOUR SURGERY. This includes water, chewing gum, lifesavers, etc.  The Pre op nurse will check with you about any medication that you may need to take the morning of surgery. Shower with a new bar of Dial, Safeguard (antibacterial) soap or solution given to you by Preop, the night before surgery. Do not use lotion, powder or deodorant on the skin after showering. Wear loose, comfortable clothing the day of surgery and bring a container to store your contacts, eyeglasses, dentures, hearing aid, etc.  Do not bring money, valuables, jewelry, etc. to the hospital.   
 
If you are having outpatient surgery, someone must come with you the morning of surgery to drive you home. You can not drive for 24 hours after any anesthesia. Sometimes it is necessary to stay overnight and leave the next morning. This is still considered outpatient for insurance deductibles. Someone will still need to drive you home. If you have questions or concerns, please feel free to call Dr Chely Martinez at [phone]. If you need to cancel your surgery, please call as soon as possible. Introducing [facility]! Dear Tunde Maldonado: Thank you for requesting a [employer] account. Our records indicate that you already have an active [employer] account. You can access your account anytime at [email or web address] Did you know that you can access your hospital and ER discharge instructions at any time in [employer]? You can also review all of your test results from your hospital stay or ER visit. Additional Information If you have questions, please visit the Frequently Asked Questions section of the [employer] website at [email or web address] Remember, [employer] is NOT to be used for urgent needs. For medical emergencies, dial 911. Now available from your iPhone and Android! Please provide this summary of care documentation to your next provider. Your primary care clinician is listed as Jazmine Strickland. If you have any questions after today's visit, please call [phone].

## 2017-04-19 NOTE — PROGRESS NOTES
HISTORY OF PRESENT ILLNESS  Dieudonne Parish is a 61 y.o. female who comes in for consultation by Thais Ambrose NP for biliary disease  HPI  She has been having post prandial RUQ pain since Feb 2017. It occurs all the time now but is worse after fatty food. It is associated with nausea and vomiting and radiates to the right upper back. She has some intermittent diarrhea/constipation as well as decreased appetite and weight loss due to fear of food. She had an EGD, US, CT (non obstructing bilateral kidney stones), gastric emptying study and HIDA scan which were normal with the exception that the CCK reproduced her pain during the HIDA scan. She subsequently underwent an EUS demonstrating gallbladder sludge balls. She denies fever, chills, sweats, melena, hematochezia, dysuria, hematuria.     Past Medical History:   Diagnosis Date    Allergic rhinitis due to allergen 10/22/2014    Arthritis     spine    Asthma     Depression     Diabetes ([address]     GERD (gastroesophageal reflux disease)     Hypercholesterolemia     Hypertension     Kidney stones     Other ill-defined conditions     GERD    Positive PPD 2009    treated with INH     Past Surgical History:   Procedure Laterality Date    BREAST SURGERY PROCEDURE UNLISTED      abcess    COLONOSCOPY  4-11    manetas- n ormal    EGD  4-11    manetas- esophageal ring    ENDOSCOPY, COLON, DIAGNOSTIC  12/2007    2 polyps    HX GYN  1978    tubal laparoscopy    HX ORTHOPAEDIC      had an injection in her back to help with leg pain    HX OTHER SURGICAL  1997    bunionectomy    HX TONSIL AND ADENOIDECTOMY       Family History   Problem Relation Age of Onset    Diabetes Mother     Hypertension Mother     Cancer Father      lung    Cancer Paternal Aunt      colon     Social History   Substance Use Topics    Smoking status: Former Smoker     Packs/day: 1.00     Quit date: 8/23/2010    Smokeless tobacco: Never Used    Alcohol use No     Current Outpatient Prescriptions   Medication Sig    HYDROcodone-acetaminophen (NORCO) 5-325 mg per tablet Take 1 Tab by mouth every four (4) hours as needed for Pain. Max Daily Amount: 6 Tabs.  ondansetron hcl (ZOFRAN, AS HYDROCHLORIDE,) 4 mg tablet Take 1 Tab by mouth every eight (8) hours as needed for Nausea.  PURELAX 17 gram packet DRINK 1 PACKET DISSOLVED IN WATER ONCE A DAY    citalopram (CELEXA) 20 mg tablet TAKE ONE TABLET BY MOUTH ONE TIME DAILY    zolpidem (AMBIEN) 10 mg tablet TAKE ONE TABLET BY MOUTH NIGHTLY AT BEDTIME AS NEEDED    Lancets (ACCU-CHEK MULTICLIX LANCET) misc test blood sugar as directed by physician. Dx code E11.9    metFORMIN (GLUCOPHAGE) 1,000 mg tablet 1  in am and 1 in pm. For diabetes    pantoprazole (PROTONIX) 40 mg tablet     glucose blood VI test strips (ACCU-CHEK SOFIA PLUS TEST STRP) strip Test blood sugar 2 - 4 times daily    atorvastatin (LIPITOR) 20 mg tablet Take 1 Tab by mouth daily. For cholesterol    albuterol (PROVENTIL HFA, VENTOLIN HFA, PROAIR HFA) 90 mcg/actuation inhaler This is a rescue medication for when you are short of breath: 2 puffs every 4 hrs PRN wheezing/SOB (1 for home/1 for work/school)    cyclobenzaprine (FLEXERIL) 5 mg tablet Take 1 Tab by mouth three (3) times daily as needed for Muscle Spasm(s).  famotidine (PEPCID) 40 mg tablet TAKE 1 TABLET BY MOUTH AT BEDTIME    hydroCHLOROthiazide (MICROZIDE) 12.5 mg capsule     famotidine (PEPCID) 20 mg tablet     Hydrochlorothiazide (HYDRODIURIL) 12.5 mg tablet Take 1 Tab by mouth daily. For blood pressure     No current facility-administered medications for this visit. Allergies   Allergen Reactions    Lisinopril Swelling    Sulfa (Sulfonamide Antibiotics) Itching       Review of Systems   Constitutional: Positive for malaise/fatigue and weight loss. Negative for chills, diaphoresis and fever. HENT: Negative for congestion, ear pain and sore throat.     Eyes: Negative for blurred vision and pain. Respiratory: Negative for cough, hemoptysis, sputum production, shortness of breath, wheezing and stridor. Cardiovascular: Negative for chest pain, palpitations, orthopnea, claudication, leg swelling and PND. Gastrointestinal: Positive for abdominal pain, constipation, diarrhea, heartburn, nausea and vomiting. Negative for blood in stool and melena. Genitourinary: Negative for dysuria, flank pain, frequency, hematuria and urgency. Musculoskeletal: Negative for back pain, joint pain, myalgias and neck pain. Skin: Negative for itching and rash. Neurological: Negative for dizziness, tremors, focal weakness, seizures, weakness and headaches. Endo/Heme/Allergies: Negative for polydipsia. Psychiatric/Behavioral: Positive for depression. Negative for memory loss. The patient is not nervous/anxious. Visit Vitals    /62 (BP 1 Location: Right arm, BP Patient Position: Sitting)    Pulse 68    Ht 5' 2\" (1.575 m)    Wt 65.1 kg (143 lb 8 oz)    LMP  (LMP Unknown)    SpO2 96%    BMI 26.25 kg/m2       Physical Exam   Constitutional: She is oriented to person, place, and time. She appears well-developed and well-nourished. No distress. HENT:   Head: Normocephalic and atraumatic. Mouth/Throat: Oropharynx is clear and moist. No oropharyngeal exudate. Eyes: Conjunctivae and EOM are normal. Pupils are equal, round, and reactive to light. No scleral icterus. Neck: Normal range of motion. Neck supple. No JVD present. No tracheal deviation present. No thyromegaly present. Cardiovascular: Normal rate and regular rhythm. Exam reveals no gallop and no friction rub. No murmur heard. Pulmonary/Chest: Effort normal and breath sounds normal. No respiratory distress. She has no wheezes. She has no rales. Abdominal: Soft. Bowel sounds are normal. She exhibits no distension and no mass. There is no hepatosplenomegaly.  There is tenderness (mild to deep palpation) in the right upper quadrant. There is no rebound, no guarding, no CVA tenderness and negative Concepcion's sign. No hernia. Hernia confirmed negative in the ventral area. Musculoskeletal: Normal range of motion. She exhibits no edema. Lymphadenopathy:     She has no cervical adenopathy. Neurological: She is alert and oriented to person, place, and time. No cranial nerve deficit. Skin: Skin is warm and dry. No rash noted. She is not diaphoretic. No erythema. No pallor. Psychiatric: She has a normal mood and affect. Her behavior is normal. Judgment and thought content normal.       ASSESSMENT and PLAN  1. Biliary sludge with classic biliary colic. I explained the anatomy and pathophysiology of biliary tract disease and cholecystitis, pancreatitis, cholangitis, choledocholithiasis. I explained about laparoscopic possible open cholecystectomy with possible cholangiogram and the risks of surgery including but not limited to bleeding, infection, bile duct or bowel injury, hernia development, retained common duct stones requiring further therapy, non resolution of symptoms, post cholecystectomy diarrhea, DVT, and risks of general anesthesia. The patient wishes to proceed with a laparoscopic possible open cholecystectomy with cholangiogram under general anesthesia as an outpatient  2. Hypertension. Controlled on current therapy  3. NIDDM type 2.   Will increase risks of morbidity    Omer Werner MD FACS

## 2017-04-20 ENCOUNTER — HOSPITAL ENCOUNTER (OUTPATIENT)
Dept: SURGERY | Age: 67
Setting detail: OUTPATIENT SURGERY
Discharge: HOME | End: 2017-04-20
Payer: COMMERCIAL

## 2017-04-20 LAB
ATRIAL RATE: 64 BPM
CALCULATED P AXIS, ECG09: 61 DEGREES
CALCULATED T AXIS, ECG11: 51 DEGREES
DIAGNOSIS, 93000: NORMAL
P-R INTERVAL, ECG05: 154 MS
Q-T INTERVAL, ECG07: 428 MS
QRS DURATION, ECG06: 82 MS
QTC CALCULATION (BEZET), ECG08: 441 MS
VENTRICULAR RATE, ECG03: 64 BPM

## 2017-04-20 PROCEDURE — 93005 ELECTROCARDIOGRAM TRACING: CPT

## 2017-04-20 PROCEDURE — 93005 ELECTROCARDIOGRAM TRACING: CPT | Performed by: ANESTHESIOLOGY

## 2017-04-20 NOTE — PERIOP NOTES
[facility]  Ambulatory Surgery Unit  Pre-operative Instructions    Surgery/Procedure Date  4/24/17            Tentative Arrival Time 2:15pm      1. On the day of your surgery/procedure, please report to the Ambulatory Surgery Unit Registration Desk and sign in at your designated time. The Ambulatory Surgery Unit is located in [facility] on the [facility] side of the [facility] across from the [address] [place] [address]. Please have all of your health insurance cards and a photo ID. 2. You must have someone with you to drive you home, as you should not drive a car for 24 hours following anesthesia. Please make arrangements for a responsible adult friend or family member to stay with you for at least the first 24 hours after your surgery. 3. Do not have anything to eat or drink (including water, gum, mints, coffee, juice) after midnight   4/23/17. This may not apply to medications prescribed by your physician. (Please note below the special instructions with medications to take the morning of surgery, if applicable.)    4. We recommend you do not drink any alcoholic beverages for 24 hours before and after your surgery. 5. Stop all Aspirin, non-steroidal anti-inflammatory drugs (i.e. Advil, Aleve), vitamins, and supplements as directed by your surgeon's office. **If you are currently taking Plavix, Coumadin, or other blood-thinning agents, contact your surgeon for instructions. **    6. In an effort to help prevent surgical site infection, we ask that you shower with an anti-bacterial soap (i.e. Dial or Safeguard) for 3 days prior to and on the morning of surgery, using a fresh towel after each shower. (Please begin this process with fresh bed linens.) Do not apply any lotions, powders, or deodorants after the shower on the day of your procedure. If applicable, please do not shave the operative site for 48 hours prior to surgery. 7. Wear comfortable clothes.  Wear glasses instead of contacts. Do not bring any jewelry or money (other than copays or fees as instructed). Do not wear make-up, particularly mascara, the morning of your surgery. Do not wear nail polish, particularly if you are having foot /hand surgery. Wear your hair loose or down, no ponytails, buns, afshin pins or clips. All body piercings must be removed. 8. You should understand that if you do not follow these instructions your surgery may be cancelled. If your physical condition changes (i.e. fever, cold or flu) please contact your surgeon as soon as possible. 9. It is important that you be on time. If a situation occurs where you may be late, or if you have any questions or problems, please call [phone].    10. Your surgery time may be subject to change. You will receive a phone call the day prior to surgery to confirm your arrival time. 11. Pediatric patients: please bring a change of clothes, diapers, bottle/sippy cup, pacifier, etc.      Special Instructions:    MEDICATIONS TO TAKE THE MORNING OF SURGERY WITH A SIP OF WATER: Albuterol as needed (BRING DAY OF SURGERY), Citalopram, Zofran, Pantoprazole      I understand a pre-operative phone call will be made to verify my surgery time. In the event that I am not available, I give permission for a message to be left on my answering service and/or with another person?       yes         ___________________      ___________________      ________________4/20/17  (Signature of Patient)          (Witness)                   (Date and Time)

## 2017-04-20 NOTE — PERIOP NOTES
Called Fabian/Dr. wright's assistant: patient has CBC/BMP results from 4/8/17 with several abnormal lab values. Have faxed them to Sonya Solis. Sonya Solis will call Dr. Vincent Brewer at the main OR to see if these are okay so we won't have to repeat today. Faxed confirmation of CBC/BMP was received from Dr. St[facility] office. [id number]:Fabian returned my call: Dr. Vincent Brewer says the CBC/BMP done 4/8/17 okay, do not repeat per Dr. Vincent Brewer. 1200: Dr. Kong Means is okay with the BMP results from 4/8/17 labwork.

## 2017-04-21 ENCOUNTER — ANESTHESIA EVENT (OUTPATIENT)
Dept: SURGERY | Age: 67
End: 2017-04-21
Payer: COMMERCIAL

## 2017-04-21 RX ORDER — CEFAZOLIN SODIUM IN 0.9 % NACL 2 G/100 ML
2 PLASTIC BAG, INJECTION (ML) INTRAVENOUS ONCE
Status: DISCONTINUED | OUTPATIENT
Start: 2017-04-22 | End: 2017-04-21

## 2017-04-21 RX ORDER — CEFAZOLIN SODIUM IN 0.9 % NACL 2 G/100 ML
2 PLASTIC BAG, INJECTION (ML) INTRAVENOUS ONCE
Status: ACTIVE | OUTPATIENT
Start: 2017-04-24 | End: 2017-04-24

## 2017-04-24 ENCOUNTER — APPOINTMENT (OUTPATIENT)
Dept: GENERAL RADIOLOGY | Age: 67
End: 2017-04-24
Attending: SURGERY
Payer: COMMERCIAL

## 2017-04-24 ENCOUNTER — ANESTHESIA (OUTPATIENT)
Dept: SURGERY | Age: 67
End: 2017-04-24
Payer: COMMERCIAL

## 2017-04-24 ENCOUNTER — HOSPITAL ENCOUNTER (OUTPATIENT)
Age: 67
Setting detail: OUTPATIENT SURGERY
Discharge: HOME | End: 2017-04-24
Attending: SURGERY | Admitting: SURGERY
Payer: COMMERCIAL

## 2017-04-24 VITALS
RESPIRATION RATE: 16 BRPM | DIASTOLIC BLOOD PRESSURE: 51 MMHG | BODY MASS INDEX: 25.99 KG/M2 | HEIGHT: 62 IN | OXYGEN SATURATION: 91 % | HEART RATE: 73 BPM | SYSTOLIC BLOOD PRESSURE: 120 MMHG | TEMPERATURE: 97.7 F | WEIGHT: 141.25 LBS

## 2017-04-24 DIAGNOSIS — R11.2 NAUSEA AND VOMITING: ICD-10-CM

## 2017-04-24 DIAGNOSIS — R68.81 EARLY SATIETY: ICD-10-CM

## 2017-04-24 DIAGNOSIS — R10.11 RIGHT UPPER QUADRANT PAIN: ICD-10-CM

## 2017-04-24 DIAGNOSIS — R10.13 EPIGASTRIC PAIN: ICD-10-CM

## 2017-04-24 LAB
GLUCOSE BLD STRIP.AUTO-MCNC: 121 MG/DL (ref 65–100)
GLUCOSE BLD STRIP.AUTO-MCNC: 92 MG/DL (ref 65–100)
SERVICE CMNT-IMP: ABNORMAL
SERVICE CMNT-IMP: NORMAL

## 2017-04-24 PROCEDURE — 77030011640 HC PAD GRND REM COVD -A: Performed by: SURGERY

## 2017-04-24 PROCEDURE — 88304 TISSUE EXAM BY PATHOLOGIST: CPT | Performed by: SURGERY

## 2017-04-24 PROCEDURE — 76210000040 HC AMBSU PH I REC FIRST 0.5 HR: Performed by: SURGERY

## 2017-04-24 PROCEDURE — 77030026438 HC STYL ET INTUB CARD -A: Performed by: ANESTHESIOLOGY

## 2017-04-24 PROCEDURE — 77030035220 HC TRCR ENDOSC BLNTPRT ANCHR COVD -B: Performed by: SURGERY

## 2017-04-24 PROCEDURE — 77030008771 HC TU NG SALEM SUMP -A: Performed by: ANESTHESIOLOGY

## 2017-04-24 PROCEDURE — 77030008756 HC TU IRR SUC STRY -B: Performed by: SURGERY

## 2017-04-24 PROCEDURE — 74011000250 HC RX REV CODE- 250: Performed by: SURGERY

## 2017-04-24 PROCEDURE — 76210000050 HC AMBSU PH II REC 0.5 TO 1 HR: Performed by: SURGERY

## 2017-04-24 PROCEDURE — 77030008771 HC TU NG SALEM SUMP -A: Performed by: SURGERY

## 2017-04-24 PROCEDURE — 77030020255 HC SOL INJ LR 1000ML BG: Performed by: SURGERY

## 2017-04-24 PROCEDURE — 77030019908 HC STETH ESOPH SIMS -A: Performed by: ANESTHESIOLOGY

## 2017-04-24 PROCEDURE — 74300 X-RAY BILE DUCTS/PANCREAS: CPT

## 2017-04-24 PROCEDURE — 74011250636 HC RX REV CODE- 250/636: Performed by: ANESTHESIOLOGY

## 2017-04-24 PROCEDURE — 77030021352 HC CBL LD SYS DISP COVD -B: Performed by: SURGERY

## 2017-04-24 PROCEDURE — 77030008684 HC TU ET CUF COVD -B: Performed by: ANESTHESIOLOGY

## 2017-04-24 PROCEDURE — 77030018836 HC SOL IRR NACL ICUM -A: Performed by: SURGERY

## 2017-04-24 PROCEDURE — 74011250636 HC RX REV CODE- 250/636

## 2017-04-24 PROCEDURE — 77030010513 HC APPL CLP LIG J&J -C: Performed by: SURGERY

## 2017-04-24 PROCEDURE — C1758 CATHETER, URETERAL: HCPCS | Performed by: SURGERY

## 2017-04-24 PROCEDURE — 74011000250 HC RX REV CODE- 250

## 2017-04-24 PROCEDURE — 77030011265 HC ELECTRD BLD HEX COVD -A: Performed by: SURGERY

## 2017-04-24 PROCEDURE — 76030000001 HC AMB SURG OR TIME 1 TO 1.5: Performed by: SURGERY

## 2017-04-24 PROCEDURE — 74011250637 HC RX REV CODE- 250/637: Performed by: SURGERY

## 2017-04-24 PROCEDURE — 82962 GLUCOSE BLOOD TEST: CPT

## 2017-04-24 PROCEDURE — 76060000062 HC AMB SURG ANES 1 TO 1.5 HR: Performed by: SURGERY

## 2017-04-24 PROCEDURE — 77030010507 HC ADH SKN DERMBND J&J -B: Performed by: SURGERY

## 2017-04-24 PROCEDURE — 74011636320 HC RX REV CODE- 636/320: Performed by: SURGERY

## 2017-04-24 PROCEDURE — 77030031139 HC SUT VCRL2 J&J -A: Performed by: SURGERY

## 2017-04-24 RX ORDER — ROCURONIUM BROMIDE 10 MG/ML
INJECTION, SOLUTION INTRAVENOUS AS NEEDED
Status: DISCONTINUED | OUTPATIENT
Start: 2017-04-24 | End: 2017-04-24 | Stop reason: HOSPADM

## 2017-04-24 RX ORDER — ACETAMINOPHEN 10 MG/ML
INJECTION, SOLUTION INTRAVENOUS
Status: COMPLETED
Start: 2017-04-24 | End: 2017-04-24

## 2017-04-24 RX ORDER — MORPHINE SULFATE 10 MG/ML
2 INJECTION, SOLUTION INTRAMUSCULAR; INTRAVENOUS
Status: DISCONTINUED | OUTPATIENT
Start: 2017-04-24 | End: 2017-04-24 | Stop reason: HOSPADM

## 2017-04-24 RX ORDER — IBUPROFEN 400 MG/1
400 TABLET ORAL
Qty: 30 TAB | Refills: 0 | Status: SHIPPED | OUTPATIENT
Start: 2017-04-24 | End: 2017-05-14

## 2017-04-24 RX ORDER — NEOSTIGMINE METHYLSULFATE 1 MG/ML
INJECTION INTRAVENOUS AS NEEDED
Status: DISCONTINUED | OUTPATIENT
Start: 2017-04-24 | End: 2017-04-24 | Stop reason: HOSPADM

## 2017-04-24 RX ORDER — DIPHENHYDRAMINE HYDROCHLORIDE 50 MG/ML
12.5 INJECTION, SOLUTION INTRAMUSCULAR; INTRAVENOUS AS NEEDED
Status: DISCONTINUED | OUTPATIENT
Start: 2017-04-24 | End: 2017-04-24 | Stop reason: HOSPADM

## 2017-04-24 RX ORDER — SODIUM CHLORIDE 0.9 % (FLUSH) 0.9 %
5-10 SYRINGE (ML) INJECTION EVERY 8 HOURS
Status: DISCONTINUED | OUTPATIENT
Start: 2017-04-24 | End: 2017-04-24 | Stop reason: HOSPADM

## 2017-04-24 RX ORDER — OXYCODONE HYDROCHLORIDE 5 MG/1
5 TABLET ORAL
Status: COMPLETED | OUTPATIENT
Start: 2017-04-24 | End: 2017-04-24

## 2017-04-24 RX ORDER — SODIUM CHLORIDE, SODIUM LACTATE, POTASSIUM CHLORIDE, CALCIUM CHLORIDE 600; 310; 30; 20 MG/100ML; MG/100ML; MG/100ML; MG/100ML
25 INJECTION, SOLUTION INTRAVENOUS CONTINUOUS
Status: DISCONTINUED | OUTPATIENT
Start: 2017-04-24 | End: 2017-04-24 | Stop reason: HOSPADM

## 2017-04-24 RX ORDER — MIDAZOLAM HYDROCHLORIDE 1 MG/ML
INJECTION, SOLUTION INTRAMUSCULAR; INTRAVENOUS AS NEEDED
Status: DISCONTINUED | OUTPATIENT
Start: 2017-04-24 | End: 2017-04-24 | Stop reason: HOSPADM

## 2017-04-24 RX ORDER — FENTANYL CITRATE 50 UG/ML
INJECTION, SOLUTION INTRAMUSCULAR; INTRAVENOUS AS NEEDED
Status: DISCONTINUED | OUTPATIENT
Start: 2017-04-24 | End: 2017-04-24 | Stop reason: HOSPADM

## 2017-04-24 RX ORDER — HYDROMORPHONE HYDROCHLORIDE 2 MG/ML
INJECTION, SOLUTION INTRAMUSCULAR; INTRAVENOUS; SUBCUTANEOUS AS NEEDED
Status: DISCONTINUED | OUTPATIENT
Start: 2017-04-24 | End: 2017-04-24 | Stop reason: HOSPADM

## 2017-04-24 RX ORDER — FENTANYL CITRATE 50 UG/ML
INJECTION, SOLUTION INTRAMUSCULAR; INTRAVENOUS
Status: DISCONTINUED
Start: 2017-04-24 | End: 2017-04-24 | Stop reason: HOSPADM

## 2017-04-24 RX ORDER — LABETALOL HYDROCHLORIDE 5 MG/ML
INJECTION, SOLUTION INTRAVENOUS AS NEEDED
Status: DISCONTINUED | OUTPATIENT
Start: 2017-04-24 | End: 2017-04-24 | Stop reason: HOSPADM

## 2017-04-24 RX ORDER — CEFAZOLIN SODIUM IN 0.9 % NACL 2 G/100 ML
PLASTIC BAG, INJECTION (ML) INTRAVENOUS
Status: COMPLETED
Start: 2017-04-24 | End: 2017-04-24

## 2017-04-24 RX ORDER — SODIUM CHLORIDE 0.9 % (FLUSH) 0.9 %
5-10 SYRINGE (ML) INJECTION AS NEEDED
Status: DISCONTINUED | OUTPATIENT
Start: 2017-04-24 | End: 2017-04-24 | Stop reason: HOSPADM

## 2017-04-24 RX ORDER — PROPOFOL 10 MG/ML
INJECTION, EMULSION INTRAVENOUS AS NEEDED
Status: DISCONTINUED | OUTPATIENT
Start: 2017-04-24 | End: 2017-04-24 | Stop reason: HOSPADM

## 2017-04-24 RX ORDER — ACETAMINOPHEN 10 MG/ML
1000 INJECTION, SOLUTION INTRAVENOUS
Status: COMPLETED | OUTPATIENT
Start: 2017-04-24 | End: 2017-04-24

## 2017-04-24 RX ORDER — LIDOCAINE HYDROCHLORIDE 20 MG/ML
INJECTION, SOLUTION EPIDURAL; INFILTRATION; INTRACAUDAL; PERINEURAL AS NEEDED
Status: DISCONTINUED | OUTPATIENT
Start: 2017-04-24 | End: 2017-04-24 | Stop reason: HOSPADM

## 2017-04-24 RX ORDER — ONDANSETRON 2 MG/ML
INJECTION INTRAMUSCULAR; INTRAVENOUS AS NEEDED
Status: DISCONTINUED | OUTPATIENT
Start: 2017-04-24 | End: 2017-04-24 | Stop reason: HOSPADM

## 2017-04-24 RX ORDER — DEXAMETHASONE SODIUM PHOSPHATE 4 MG/ML
INJECTION, SOLUTION INTRA-ARTICULAR; INTRALESIONAL; INTRAMUSCULAR; INTRAVENOUS; SOFT TISSUE AS NEEDED
Status: DISCONTINUED | OUTPATIENT
Start: 2017-04-24 | End: 2017-04-24 | Stop reason: HOSPADM

## 2017-04-24 RX ORDER — HYDROMORPHONE HYDROCHLORIDE 1 MG/ML
.2-.5 INJECTION, SOLUTION INTRAMUSCULAR; INTRAVENOUS; SUBCUTANEOUS ONCE
Status: DISCONTINUED | OUTPATIENT
Start: 2017-04-24 | End: 2017-04-24 | Stop reason: HOSPADM

## 2017-04-24 RX ORDER — SUCCINYLCHOLINE CHLORIDE 20 MG/ML
INJECTION INTRAMUSCULAR; INTRAVENOUS AS NEEDED
Status: DISCONTINUED | OUTPATIENT
Start: 2017-04-24 | End: 2017-04-24 | Stop reason: HOSPADM

## 2017-04-24 RX ORDER — BUPIVACAINE HYDROCHLORIDE AND EPINEPHRINE 5; 5 MG/ML; UG/ML
INJECTION, SOLUTION EPIDURAL; INTRACAUDAL; PERINEURAL AS NEEDED
Status: DISCONTINUED | OUTPATIENT
Start: 2017-04-24 | End: 2017-04-24 | Stop reason: HOSPADM

## 2017-04-24 RX ORDER — OXYCODONE AND ACETAMINOPHEN 5; 325 MG/1; MG/1
1-2 TABLET ORAL
Qty: 30 TAB | Refills: 0 | Status: SHIPPED | OUTPATIENT
Start: 2017-04-24 | End: 2017-05-14

## 2017-04-24 RX ORDER — OXYCODONE AND ACETAMINOPHEN 5; 325 MG/1; MG/1
1 TABLET ORAL ONCE
Status: DISCONTINUED | OUTPATIENT
Start: 2017-04-24 | End: 2017-04-24 | Stop reason: HOSPADM

## 2017-04-24 RX ORDER — FENTANYL CITRATE 50 UG/ML
25 INJECTION, SOLUTION INTRAMUSCULAR; INTRAVENOUS
Status: DISCONTINUED | OUTPATIENT
Start: 2017-04-24 | End: 2017-04-24 | Stop reason: HOSPADM

## 2017-04-24 RX ORDER — LIDOCAINE HYDROCHLORIDE 10 MG/ML
0.1 INJECTION, SOLUTION EPIDURAL; INFILTRATION; INTRACAUDAL; PERINEURAL AS NEEDED
Status: DISCONTINUED | OUTPATIENT
Start: 2017-04-24 | End: 2017-04-24 | Stop reason: HOSPADM

## 2017-04-24 RX ORDER — GLYCOPYRROLATE 0.2 MG/ML
INJECTION INTRAMUSCULAR; INTRAVENOUS AS NEEDED
Status: DISCONTINUED | OUTPATIENT
Start: 2017-04-24 | End: 2017-04-24 | Stop reason: HOSPADM

## 2017-04-24 RX ORDER — OXYCODONE HYDROCHLORIDE 5 MG/1
TABLET ORAL
Status: DISCONTINUED
Start: 2017-04-24 | End: 2017-04-24 | Stop reason: HOSPADM

## 2017-04-24 RX ADMIN — HYDROMORPHONE HYDROCHLORIDE 0.6 MG: 2 INJECTION, SOLUTION INTRAMUSCULAR; INTRAVENOUS; SUBCUTANEOUS at 15:11

## 2017-04-24 RX ADMIN — FENTANYL CITRATE 25 MCG: 50 INJECTION, SOLUTION INTRAMUSCULAR; INTRAVENOUS at 16:15

## 2017-04-24 RX ADMIN — ACETAMINOPHEN 1000 MG: 10 INJECTION, SOLUTION INTRAVENOUS at 16:18

## 2017-04-24 RX ADMIN — FENTANYL CITRATE 100 MCG: 50 INJECTION, SOLUTION INTRAMUSCULAR; INTRAVENOUS at 14:54

## 2017-04-24 RX ADMIN — ROCURONIUM BROMIDE 5 MG: 10 INJECTION, SOLUTION INTRAVENOUS at 14:54

## 2017-04-24 RX ADMIN — SUCCINYLCHOLINE CHLORIDE 140 MG: 20 INJECTION INTRAMUSCULAR; INTRAVENOUS at 14:54

## 2017-04-24 RX ADMIN — CEFAZOLIN 2 G: 10 INJECTION, POWDER, FOR SOLUTION INTRAVENOUS; PARENTERAL at 14:57

## 2017-04-24 RX ADMIN — SODIUM CHLORIDE, SODIUM LACTATE, POTASSIUM CHLORIDE, AND CALCIUM CHLORIDE 25 ML/HR: 600; 310; 30; 20 INJECTION, SOLUTION INTRAVENOUS at 17:16

## 2017-04-24 RX ADMIN — SODIUM CHLORIDE, SODIUM LACTATE, POTASSIUM CHLORIDE, AND CALCIUM CHLORIDE 25 ML/HR: 600; 310; 30; 20 INJECTION, SOLUTION INTRAVENOUS at 14:32

## 2017-04-24 RX ADMIN — SODIUM CHLORIDE, SODIUM LACTATE, POTASSIUM CHLORIDE, AND CALCIUM CHLORIDE 25 ML/HR: 600; 310; 30; 20 INJECTION, SOLUTION INTRAVENOUS at 16:08

## 2017-04-24 RX ADMIN — LIDOCAINE HYDROCHLORIDE 60 MG: 20 INJECTION, SOLUTION EPIDURAL; INFILTRATION; INTRACAUDAL; PERINEURAL at 14:54

## 2017-04-24 RX ADMIN — MIDAZOLAM HYDROCHLORIDE 2 MG: 1 INJECTION, SOLUTION INTRAMUSCULAR; INTRAVENOUS at 14:45

## 2017-04-24 RX ADMIN — OXYCODONE HYDROCHLORIDE 5 MG: 5 TABLET ORAL at 17:06

## 2017-04-24 RX ADMIN — FENTANYL CITRATE 25 MCG: 50 INJECTION, SOLUTION INTRAMUSCULAR; INTRAVENOUS at 16:21

## 2017-04-24 RX ADMIN — NEOSTIGMINE METHYLSULFATE 3 MG: 1 INJECTION INTRAVENOUS at 15:38

## 2017-04-24 RX ADMIN — LABETALOL HYDROCHLORIDE 5 MG: 5 INJECTION, SOLUTION INTRAVENOUS at 15:28

## 2017-04-24 RX ADMIN — FENTANYL CITRATE 50 MCG: 50 INJECTION, SOLUTION INTRAMUSCULAR; INTRAVENOUS at 14:07

## 2017-04-24 RX ADMIN — PROPOFOL 150 MG: 10 INJECTION, EMULSION INTRAVENOUS at 14:54

## 2017-04-24 RX ADMIN — GLYCOPYRROLATE 0.5 MG: 0.2 INJECTION INTRAMUSCULAR; INTRAVENOUS at 15:38

## 2017-04-24 RX ADMIN — FENTANYL CITRATE 50 MCG: 50 INJECTION, SOLUTION INTRAMUSCULAR; INTRAVENOUS at 15:11

## 2017-04-24 RX ADMIN — FENTANYL CITRATE 25 MCG: 50 INJECTION, SOLUTION INTRAMUSCULAR; INTRAVENOUS at 16:55

## 2017-04-24 RX ADMIN — DEXAMETHASONE SODIUM PHOSPHATE 8 MG: 4 INJECTION, SOLUTION INTRA-ARTICULAR; INTRALESIONAL; INTRAMUSCULAR; INTRAVENOUS; SOFT TISSUE at 15:35

## 2017-04-24 RX ADMIN — ONDANSETRON 4 MG: 2 INJECTION INTRAMUSCULAR; INTRAVENOUS at 15:35

## 2017-04-24 RX ADMIN — ROCURONIUM BROMIDE 25 MG: 10 INJECTION, SOLUTION INTRAVENOUS at 15:03

## 2017-04-24 RX ADMIN — FENTANYL CITRATE 50 MCG: 50 INJECTION, SOLUTION INTRAMUSCULAR; INTRAVENOUS at 16:06

## 2017-04-24 NOTE — DISCHARGE INSTRUCTIONS
Discharge Instructions:  Laparoscopic Cholecystectomy (Gallbladder Removal)  Dr. Sirisha Hall    Call for appointment for follow up in 3 weeks [phone]    Activity:    Walk regularly. No lifting more than 10 -15 pounds for 4 weeks. Light aerobic activity is okay when you feel up to it. You may resume driving in three days unless still requiring narcotics for pain. Work:    You may return to work in 1 or 2 weeks to light activity. No lifting more than 10 pounds for four weeks. Diet:    You may resume normal diet after 24 hours. Fatty foods may still cause some stomach upset. Wound Care: You have a special dressing called Dermabond. It is okay to shower and let the water run over the incisions but do not scrub the area or soak in a tub. If you have a small amount of drainage you may place a dry bandage over the wound and change it daily. If you experience a lot of drainage, develop redness around the wound, or a fever over 101 F occurs please call the office. Medications:    Resume home medications as indicated on the Medical Reconciliation form. Aspirin and Coumadin can be restarted immediately if you were taking them preoperatively. If taking Plavix do not restart it until post operative day 2. Pain medications:  Non steroidal antiinflammatories seem to work best for post surgical pain. Try these first as prescribed. A narcotic prescription will also be given for breakthrough pain. Over the counter stool softeners and laxatives may be used if needed. Narcotics and anesthesia sometimes cause nausea and vomiting. If persistent please call the office. Do not hesitate to call with questions or concerns. You received an IV form of Tylenol 1000mg during your surgery, you may take tylenol (or pain medication containing Tylenol or Acetaminophen) in 6 hours at 10:30 pm.    DO NOT TAKE TYLENOL/ACETAMINOPHEN WITH PERCOCET.     TAKE NARCOTIC PAIN MEDICATIONS WITH FOOD   Narcotics tend to be constipating, we suggest taking a stool softener such as Colace or Miralax (follow package instructions). DO NOT DRIVE WHILE TAKING NARCOTIC PAIN MEDICATIONS. DO NOT TAKE SLEEPING MEDICATIONS OR ANTIANXIETY MEDICATIONS WHILE TAKING NARCOTIC PAIN MEDICATIONS,  ESPECIALLY THE NIGHT OF ANESTHESIA. CPAP PATIENTS BE SURE TO WEAR MACHINE WHENEVER NAPPING OR SLEEPING. DISCHARGE SUMMARY from Nurse    The following personal items collected during your admission are returned to you:   Dental Appliance: Dental Appliances: None  Vision: Visual Aid: None  Hearing Aid:    Jewelry:    Clothing: Clothing:  (clothing under stretcher)  Other Valuables:    Valuables sent to safe:        PATIENT INSTRUCTIONS:    After General Anesthesia or Intravenous Sedation, for 24 hours or while taking prescription Narcotics:        Someone should be with you for the next 24 hours. For your own safety, a responsible adult must drive you home. · Limit your activities  · Recommended activity: Rest today, up with assistance today. Do not climb stairs or shower unattended for the next 24 hours. · Please start with a soft bland diet and advance as tolerated (no nausea) to regular diet. · If you have a sore throat you should try the following: fluids, warm salt water gargles, or throat lozenges. If it does not improve after several days please follow up with your primary physician. · Do not drive and operate hazardous machinery  · Do not make important personal or business decisions  · Do  not drink alcoholic beverages  · If you have not urinated within 8 hours after discharge, please contact your surgeon on call.     Report the following to your surgeon:  · Excessive pain, swelling, redness or odor of or around the surgical area  · Temperature over 100.5  · Nausea and vomiting lasting longer than 4 hours or if unable to take medications  · Any signs of decreased circulation or nerve impairment to extremity: change in color, persistent  numbness, tingling, coldness or increase pain      · You will receive a Post Operative Call from one of the Recovery Room Nurses on the day after your surgery to check on you. It is very important for us to know how you are recovering after your surgery. If you have an issue or need to speak with someone, please call your surgeon, do not wait for the post operative call. · You may receive an e-mail or letter in the mail from [facility] regarding your experience with us in the Ambulatory Surgery Unit. Your feedback is valuable to us and we appreciate your participation in the survey. · If the above instructions are not adequate, please contact Chalo Mars RN, [facility] anesthesia Nurse Manager or our Anesthesiologist, at [phone]. If you are having problems after your surgery, call the physician at his office number. · We wish you a speedy recovery ? What to do at Home:      *  Please give a list of your current medications to your Primary Care Provider. *  Please update this list whenever your medications are discontinued, doses are      changed, or new medications (including over-the-counter products) are added. *  Please carry medication information at all times in case of emergency situations. These are general instructions for a healthy lifestyle:    No smoking/ No tobacco products/ Avoid exposure to second hand smoke    Surgeon General's Warning:  Quitting smoking now greatly reduces serious risk to your health.     Obesity, smoking, and sedentary lifestyle greatly increases your risk for illness    A healthy diet, regular physical exercise & weight monitoring are important for maintaining a healthy lifestyle    You may be retaining fluid if you have a history of heart failure or if you experience any of the following symptoms:  Weight gain of 3 pounds or more overnight or 5 pounds in a week, increased swelling in our hands or feet or shortness of breath while lying flat in bed. Please call your doctor as soon as you notice any of these symptoms; do not wait until your next office visit. Recognize signs and symptoms of STROKE:    F-face looks uneven    A-arms unable to move or move even    S-speech slurred or non-existent    T-time-call 911 as soon as signs and symptoms begin-DO NOT go       Back to bed or wait to see if you get better-TIME IS BRAIN. If you have not received your influenza and/or pneumococcal vaccine, please follow up with your primary care physician. The discharge information has been reviewed with the patient and caregiver. The patient and caregiver verbalized understanding. Jovanni Medellin THROMBOSIS AND PULMONARY EMBOLUS    SURGICAL PATIENTS  Surgical patients are the #1 risk for DVT and PE. WHAT IS DVT? WHAT IS PE?  DVT is a serious condition where blood clots develop deep in the veins of the legs. PE occurs when a blood clot breaks loose from the wall of a vein and travels to the lungs blocking the pulmonary artery or one of its branches impairing blood flow from the heart, which could result in death.   RISK FACTORS   Surgery lasting longer than 45 minutes   History of inflammatory bowel disease   Oral contraceptive or hormone replacement therapy   Immobilization   Varicose veins / swollen legs   Smoking    CHF / Acute MI / Irregular heart beat   Family history of thrombosis   General anesthesia greater than 2 hours   Obesity   Infection of less than one month   Less than 1 month postpartum   COPD / Pneumonia   Arthroscopic surgery   Malignancy / cancer   Spine surgery   Blood abnormalities   Stroke / Paralysis / Coma    SIGNS AND SYMPTOMS OF DEEP VEIN TROMBOSIS  Usually occurs in one leg, above or below the knee   Swelling - one calf or thigh may be larger than the other   Feeling increased warmth in the area of the leg that is swollen or painful   Leg pain, which may increase when standing or walking   Swelling along the vein of the leg   When swollen areas is pressed with a finger, a depression may remain   Tenderness of the leg that may be confined to one area   Change in leg color (bluish or red)    SIGNS AND SYMPTOMS OF PULMONARY EMBOLUS   Chest pain that gets worse with deep breath, coughing or chest movement   Coughing up blood   Sweating   Shortness of breath or difficulty breathing   Rapid heart beat   Lightheadedness    HOW TO REDUCE THE POSSIBLE RISK OF DVT   Exercise - simple activities as rotating ankles and wrists, wiggling toes and fingers, tightening and relaxing muscles in calves and thighs promotes circulation while recovering from surgery. Please do these exercises every hour during waking hours   Take mediation as prescribed by your physician (Lovenox, Coumadin, Aspirin)   Resume your normal activities as soon as your doctor advises you to do so.  Remember, when traveling, to [place] your legs frequently.         PATIENTS WHO BELIEVE THEY MAY BE EXPERIENCING SIGNS AND SYMPTOMS OF DVT OR PE SHOULD SEEK MEDICAL HELP IMMEDIATELY

## 2017-04-24 NOTE — OP NOTES
Operative Note/Laparoscopic Cholecystectomy      Patient ID:   Name: Margarito [facility] Record Number: [id number]   YOB: 1950            OPERATIVE REPORT      PREOPERATIVE DIAGNOSIS:   1. Biliary dyskinesia    POSTOPERATIVE DIAGNOSIS  1. Biliary dyskinesia  2. Possible sphincter of Oddi dysfunction    OPERATIVE PROCEDURE:   1. Laparoscopic cholecystectomy with intraoperative cholangiogram    SURGEON: Cris Hickman. Eleni Rojas MD    ANESTHESIA: General.    COMPLICATIONS:   None    SPECIMENS:  1.  Gallbladder    FINDINGS:  1.  minimally diseased gallbladder  2.  normal liver  3. Normal Intraoperative cholangiogram except that it required glucagon to relax the sphincter of Oddi ? ??sphincter dysfunction  4.  minimall adhesions    ESTIMATED BLOOD LOSS: 25 mL. BRIEF HISTORY: The patient is a 61 y.o. yo female with symptomatic cholelithiasis for cholecystectomy. The patient understood the risks and benefits  of laparoscopic cholecystectomy with possible cholangiogram including bleeding,  infection, biliary injury, bowel injury, post cholecystectomy diarrhea, and  residual stones, post operative respiratory and cardiac complications, DVT, and wishes to proceed. PROCEDURE: The patient was taken to the operating room, placed on  the operating table in the supine position and underwent general anesthesia. Afterward, the abdomen was prepped and  draped in the usual sterile fashion. After appropriate time-out 0.5%  Marcaine with epinephrine was infiltrated in the skin and subcutaneous  tissues in the periumbilical region. A curvilinear incision was made above  the umbilicus, and subcutaneous tissue dissected off bluntly. Electrocautery was used to go through midline of the fascia, and a 0 Vicryl  stay suture was placed on either side of the midline.  The peritoneal cavity  was cautiously entered, and a blunt 12-mm Glendy trocar was inserted in, CO2  insufflation begun, and 15 mmHg pressure gave good visualization of the  peritoneal cavity. Two 5-mm trocars were placed in the upper abdomen. The  liver looked normal, and the gallbladder was moderately diseased with adhesions around it. The remaining abdominal compartment was grossly without unusual findings. The infundibulum was pulled up superior-laterally and the cystic duct  dissected out. A clip was placed at the cystic duct-infundibular junction  and a partial ductotomy performed. A cholangiogram was obtained giving good filling of the cystic duct and intra and extrahepatic ducts. There was minimal drainage into the duodenum initially. A small amount drained but it seemed slow. After 1 mg glucagon, the contrast flowed freely into the duodenum. There were no strictures or filling defects. Two clips were placed proximally on the cystic duct and the cystic duct was divided. The cystic artery was dissected  out and 2 clips placed proximally and 1 distally, and the cystic artery was divided. Then utilizing the electrocautery,  the gallbladder was removed from the liver bed. The gallbladder was brought  out the umbilical trocar site. Reinsufflation begun. A small amount of  bleeding on the liver bed was controlled with electrocautery, and  irrigation and suctioning performed. Trocars were removed and there was no apparent bleeding internally from the trocar sites. CO2 was allowed to be evacuated from the abdominal cavity. Interrupted 0-Vicryl was used to approximate the fascia at the umbilical trocar site. Running 4-0 Vicryl used to close skin on all the incisions and a dermabond dressing was placed. Upon completion of the procedure, the needle, sponge and instrument  counts were correct x2. The patient was extubated and brought to the recovery room. The patient tolerated the procedure well.     Ann Gaines MD

## 2017-04-24 NOTE — ANESTHESIA POSTPROCEDURE EVALUATION
Post-Anesthesia Evaluation and Assessment    Patient: Armin Han MRN: [id number]  SSN: [id number]    YOB: 1950  Age: 61 y.o. Sex: female       Cardiovascular Function/Vital Signs  Visit Vitals    /51    Pulse 73    Temp 36.5 °C (97.7 °F)    Resp 16    Ht 5' 2\" (1.575 m)    Wt 64.1 kg (141 lb 4 oz)    SpO2 91%    BMI 25.83 kg/m2       Patient is status post general anesthesia for Procedure(s):  LAPAROSCOPIC CHOLECYSTECTOMY WITH CHOLANGIOGRAMS. Nausea/Vomiting: None    Postoperative hydration reviewed and adequate. Pain:  Pain Scale 1: Numeric (0 - 10) (04/24/17 1700)  Pain Intensity 1: 5 (04/24/17 1700)   Managed    Neurological Status:   Neuro (WDL): Exceptions to WDL (04/24/17 1700)  Neuro  Neurologic State: Eyes open to voice (04/24/17 1700)  LUE Motor Response: Spontaneous  (04/24/17 1700)  LLE Motor Response: Spontaneous  (04/24/17 1700)  RUE Motor Response: Spontaneous  (04/24/17 1700)  RLE Motor Response: Spontaneous  (04/24/17 1700)   At baseline    Mental Status and Level of Consciousness: Arousable    Pulmonary Status:   O2 Device: Room air (04/24/17 1700)   Adequate oxygenation and airway patent    Complications related to anesthesia: None    Post-anesthesia assessment completed.  No concerns    Signed By: Kerrie Scott MD     April 24, 2017

## 2017-04-24 NOTE — ANESTHESIA PREPROCEDURE EVALUATION
Anesthetic History   No history of anesthetic complications            Review of Systems / Medical History  Patient summary reviewed, nursing notes reviewed and pertinent labs reviewed    Pulmonary            Asthma : well controlled    Comments: Former smoker - Quit 2010 - 25 pack years   Neuro/Psych         Psychiatric history    Comments: Depression Cardiovascular    Hypertension: well controlled              Exercise tolerance: >4 METS     GI/Hepatic/Renal     GERD: well controlled    Renal disease: stones      Comments: Biliary Dyskinesia Endo/Other    Diabetes: well controlled, type 2    Arthritis     Other Findings            Physical Exam    Airway  Mallampati: II  TM Distance: > 6 cm  Neck ROM: normal range of motion   Mouth opening: Normal     Cardiovascular  Regular rate and rhythm,  S1 and S2 normal,  no murmur, click, rub, or gallop             Dental  No notable dental hx       Pulmonary  Breath sounds clear to auscultation               Abdominal  GI exam deferred       Other Findings            Anesthetic Plan    ASA: 2  Anesthesia type: general          Induction: Intravenous  Anesthetic plan and risks discussed with: Patient

## 2017-04-24 NOTE — IP AVS SNAPSHOT
[address] [facility] 
[phone] Patient: Maury Fisher MRN: [id number] You are allergic to the following Allergen Reactions Latex Itching Lisinopril Swelling Sulfa (Sulfonamide Antibiotics) Itching Recent Documentation Height Weight BMI OB Status Smoking Status 1.575 m 64.1 kg 25.83 kg/m2 Postmenopausal Former Smoker Emergency Contacts Name Discharge Info Relation Home Work Mobile [address] CAREGIVER [3] Other Relative [6] [phone] About your hospitalization You were admitted on:  April 24, 2017 You last received care in the:  [facility] PACU You were discharged on:  April 24, 2017 Unit phone number:  [phone] Why you were hospitalized Your primary diagnosis was:  Not on File Providers Seen During Your Hospitalizations Provider Role Specialty Primary office phone Carlos Bello MD Attending Provider General Surgery [phone] Your Primary Care Physician (PCP) Primary Care Physician Office Phone Office Fax Albert Carvalho [phone] Follow-up Information Follow up With Details Comments Contact Info Shruthi Lowe MD   [address] [place] [phone] 
[phone] Your Appointments Monday May 01, 2017 10:45 AM EDT ROUTINE CARE with Shruthi Lowe MD  
[facility]) [phone] [id number] [phone] Monday May 15, 2017  8:40 AM EDT  
POST OP with Carlos Bello MD  
[facility] Dr. Fish Simmons ([facility]) [address] [place] [address], [address] [facility]  
[phone] Current Discharge Medication List  
  
START taking these medications Dose & Instructions Dispensing Information Comments Morning Noon Evening Bedtime  
 ibuprofen 400 mg tablet Commonly known as:  MOTRIN Your last dose was: Your next dose is:    
   
   
 Dose:  400 mg Take 1 Tab by mouth every eight (8) hours as needed for Pain for up to 20 days. Quantity:  30 Tab Refills:  0  
     
   
   
   
  
 oxyCODONE-acetaminophen 5-325 mg per tablet Commonly known as:  PERCOCET Your last dose was: Your next dose is:    
   
   
 Dose:  1-2 Tab Take 1-2 Tabs by mouth every six (6) hours as needed for Pain for up to 20 days. Max Daily Amount: 8 Tabs. Quantity:  30 Tab Refills:  0 CONTINUE these medications which have CHANGED Dose & Instructions Dispensing Information Comments Morning Noon Evening Bedtime  
 atorvastatin 20 mg tablet Commonly known as:  LIPITOR What changed:   
- when to take this 
- additional instructions Your last dose was: Your next dose is:    
   
   
 Dose:  20 mg Take 1 Tab by mouth daily. For cholesterol Quantity:  30 Tab Refills:  12 CONTINUE these medications which have NOT CHANGED Dose & Instructions Dispensing Information Comments Morning Noon Evening Bedtime  
 albuterol 90 mcg/actuation inhaler Commonly known as:  PROVENTIL HFA, VENTOLIN HFA, PROAIR HFA Your last dose was: Your next dose is: This is a rescue medication for when you are short of breath: 2 puffs every 4 hrs PRN wheezing/SOB (1 for home/1 for work/school) Quantity:  2 Inhaler Refills:  11  
     
   
   
   
  
 citalopram 20 mg tablet Commonly known as:  Joshua Betancur Your last dose was: Your next dose is: TAKE ONE TABLET BY MOUTH ONE TIME DAILY Quantity:  30 Tab Refills:  0  
     
   
   
   
  
 famotidine 40 mg tablet Commonly known as:  PEPCID Your last dose was: Your next dose is: TAKE 1 TABLET BY MOUTH AT BEDTIME Refills:  11  
     
   
   
   
  
 glucose blood VI test strips strip Commonly known as:  ACCU-CHEK SOFIA PLUS TEST STRP Your last dose was: Your next dose is:    
   
   
 Test blood sugar 2 - 4 times daily Quantity:  100 Strip Refills:  11  
     
   
   
   
  
 hydroCHLOROthiazide 12.5 mg tablet Commonly known as:  HYDRODIURIL Your last dose was: Your next dose is:    
   
   
 Dose:  12.5 mg Take 1 Tab by mouth daily. For blood pressure Quantity:  90 Tab Refills:  3 HYDROcodone-acetaminophen 5-325 mg per tablet Commonly known as:  Beth Richard Your last dose was: Your next dose is:    
   
   
 Dose:  1 Tab Take 1 Tab by mouth every four (4) hours as needed for Pain. Max Daily Amount: 6 Tabs. Quantity:  10 Tab Refills:  0 Lancets Misc Commonly known as:  [address] [facility] Your last dose was: Your next dose is:    
   
   
 test blood sugar as directed by physician. Dx code E11.9 Quantity:  100 Each Refills:  PRN  
     
   
   
   
  
 metFORMIN 1,000 mg tablet Commonly known as:  GLUCOPHAGE Your last dose was: Your next dose is:    
   
   
 1  in am and 1 in pm. For diabetes Quantity:  75 Tab Refills:  11  
     
   
   
   
  
 ondansetron hcl 4 mg tablet Commonly known as:  ZOFRAN (AS HYDROCHLORIDE) Your last dose was: Your next dose is:    
   
   
 Dose:  4 mg Take 1 Tab by mouth every eight (8) hours as needed for Nausea. Quantity:  20 Tab Refills:  0  
     
   
   
   
  
 pantoprazole 40 mg tablet Commonly known as:  PROTONIX Your last dose was: Your next dose is:    
   
   
  Refills:  0 PURELAX 17 gram packet Generic drug:  polyethylene glycol Your last dose was: Your next dose is: DRINK 1 PACKET DISSOLVED IN WATER ONCE A DAY: takes at night Refills:  3  
     
   
   
   
  
 zolpidem 10 mg tablet Commonly known as:  AMBIEN Your last dose was: Your next dose is: TAKE ONE TABLET BY MOUTH NIGHTLY AT BEDTIME AS NEEDED Quantity:  30 Tab Refills:  5 Not to exceed 5 additional fills before 08/28/2016. Where to Get Your Medications These medications were sent to [address] [place] [address] Phone:  [phone]  
  ibuprofen 400 mg tablet Information on where to get these meds will be given to you by the nurse or doctor. ! Ask your nurse or doctor about these medications  
  oxyCODONE-acetaminophen 5-325 mg per tablet Discharge Instructions Discharge Instructions:  Laparoscopic Cholecystectomy (Gallbladder Removal)  Dr. Geovanni Lang Call for appointment for follow up in 3 weeks [phone] Activity: 
 
Walk regularly. No lifting more than 10 -15 pounds for 4 weeks. Light aerobic activity is okay when you feel up to it. You may resume driving in three days unless still requiring narcotics for pain. Work: 
 
You may return to work in 1 or 2 weeks to light activity. No lifting more than 10 pounds for four weeks. Diet: 
 
You may resume normal diet after 24 hours. Fatty foods may still cause some stomach upset. Wound Care: You have a special dressing called Dermabond. It is okay to shower and let the water run over the incisions but do not scrub the area or soak in a tub. If you have a small amount of drainage you may place a dry bandage over the wound and change it daily. If you experience a lot of drainage, develop redness around the wound, or a fever over 101 F occurs please call the office. Medications: 
 
Resume home medications as indicated on the Medical Reconciliation form. Aspirin and Coumadin can be restarted immediately if you were taking them preoperatively. If taking Plavix do not restart it until post operative day 2. Pain medications:  Non steroidal antiinflammatories seem to work best for post surgical pain. Try these first as prescribed. A narcotic prescription will also be given for breakthrough pain. Over the counter stool softeners and laxatives may be used if needed. Narcotics and anesthesia sometimes cause nausea and vomiting. If persistent please call the office. Do not hesitate to call with questions or concerns. You received an IV form of Tylenol 1000mg during your surgery, you may take tylenol (or pain medication containing Tylenol or Acetaminophen) in 6 hours at 10:30 pm. 
 
DO NOT TAKE TYLENOL/ACETAMINOPHEN WITH PERCOCET. TAKE NARCOTIC PAIN MEDICATIONS WITH FOOD Narcotics tend to be constipating, we suggest taking a stool softener such as Colace or Miralax (follow package instructions). DO NOT DRIVE WHILE TAKING NARCOTIC PAIN MEDICATIONS. DO NOT TAKE SLEEPING MEDICATIONS OR ANTIANXIETY MEDICATIONS WHILE TAKING NARCOTIC PAIN MEDICATIONS,  ESPECIALLY THE NIGHT OF ANESTHESIA. CPAP PATIENTS BE SURE TO WEAR MACHINE WHENEVER NAPPING OR SLEEPING. DISCHARGE SUMMARY from Nurse The following personal items collected during your admission are returned to you:  
Dental Appliance: Dental Appliances: None Vision: Visual Aid: None Hearing Aid:   
Jewelry:   
Clothing: Clothing:  (clothing under stretcher) Other Valuables:   
Valuables sent to safe:   
 
 
PATIENT INSTRUCTIONS: 
 
 
F-face looks uneven A-arms unable to move or move even S-speech slurred or non-existent T-time-call 911 as soon as signs and symptoms begin-DO NOT go Back to bed or wait to see if you get better-TIME IS BRAIN. If you have not received your influenza and/or pneumococcal vaccine, please follow up with your primary care physician. The discharge information has been reviewed with the patient and caregiver. The patient and caregiver verbalized understanding. [address] THROMBOSIS AND PULMONARY EMBOLUS 
 
SURGICAL PATIENTS Surgical patients are the #1 risk for DVT and PE. WHAT IS DVT? WHAT IS PE? 
DVT is a serious condition where blood clots develop deep in the veins of the legs.   PE occurs when a blood clot breaks loose from the wall of a vein and travels to the lungs blocking the pulmonary artery or one of its branches impairing blood flow from the heart, which could result in death. RISK FACTORS 
? Surgery lasting longer than 45 minutes ? History of inflammatory bowel disease 
? Oral contraceptive or hormone replacement therapy ? Immobilization ? Varicose veins / swollen legs ? Smoking  
? CHF / Acute MI / Irregular heart beat ? Family history of thrombosis ? General anesthesia greater than 2 hours ? Obesity ? Infection of less than one month ? Less than 1 month postpartum 
? COPD / Pneumonia ? Arthroscopic surgery ? Malignancy / cancer ? Spine surgery ? Blood abnormalities ? Stroke / Paralysis / Coma SIGNS AND SYMPTOMS OF DEEP VEIN TROMBOSIS Usually occurs in one leg, above or below the knee ? Swelling  one calf or thigh may be larger than the other ? Feeling increased warmth in the area of the leg that is swollen or painful ? Leg pain, which may increase when standing or walking ? Swelling along the vein of the leg ? When swollen areas is pressed with a finger, a depression may remain ? Tenderness of the leg that may be confined to one area ? Change in leg color (bluish or red) SIGNS AND SYMPTOMS OF PULMONARY EMBOLUS 
? Chest pain that gets worse with deep breath, coughing or chest movement ? Coughing up blood ? Sweating ? Shortness of breath or difficulty breathing ? Rapid heart beat ? Lightheadedness HOW TO REDUCE THE POSSIBLE RISK OF DVT ? Exercise  simple activities as rotating ankles and wrists, wiggling toes and fingers, tightening and relaxing muscles in calves and thighs promotes circulation while recovering from surgery. Please do these exercises every hour during waking hours ? Take mediation as prescribed by your physician (Lovenox, Coumadin, Aspirin) ? Resume your normal activities as soon as your doctor advises you to do so. 
? Remember, when traveling, to [place] your legs frequently. PATIENTS WHO BELIEVE THEY MAY BE EXPERIENCING SIGNS AND SYMPTOMS OF DVT OR PE SHOULD SEEK MEDICAL HELP IMMEDIATELY Discharge Instructions Attachments/References OXYCODONE/ACETAMINOPHEN (BY MOUTH) (ENGLISH) Discharge Orders None [facility]! Dear Rajinder: Thank you for requesting a [employer] account. Our records indicate that you already have an active [employer] account. You can access your account anytime at [email or web address] Did you know that you can access your hospital and ER discharge instructions at any time in [employer]? You can also review all of your test results from your hospital stay or ER visit. Additional Information If you have questions, please visit the Frequently Asked Questions section of the [employer] website at [email or web address] Remember, [employer] is NOT to be used for urgent needs. For medical emergencies, dial 911. Now available from your iPhone and Android! General Information Please provide this summary of care documentation to your next provider. Patient Signature:  ____________________________________________________________ Date:  ____________________________________________________________  
  
Henrik Amor Provider Signature:  ____________________________________________________________ Date:  ____________________________________________________________ More Information Oxycodone/Acetaminophen (By mouth) Acetaminophen (d-idbu-u-MIN-oh-fen), Oxycodone Hydrochloride (dd-c-QJF-done art-droe-KLOR-buzz) Treats moderate to moderately severe pain. This medicine is a narcotic pain reliever. Brand Name(s):Endocet, Percocet, Primlev, Roxicet, Xartemis XR There may be other brand names for this medicine. When This Medicine Should Not Be Used: This medicine is not right for everyone.  Do not use it if you had an allergic reaction to acetaminophen or oxycodone, or if you have serious breathing problems (such as severe asthma, hypercarbia, respiratory depression) or paralytic ileus. How to Use This Medicine:  
Capsule, Liquid, Tablet, Long Acting Tablet · Your doctor will tell you how much medicine to use. Do not use more than directed. · Measure the oral liquid medicine with a marked measuring spoon, oral syringe, or medicine cup. · Swallow the extended-release tablet whole. Do not crush, break, or chew it. Do not lick or wet the tablet before placing it in your mouth. Do not give this medicine through a feeding tube. · This medicine should come with a Medication Guide. Ask your pharmacist for a copy if you do not have one. · Store the medicine in a closed container at room temperature, away from heat, moisture, and direct light. Ask your pharmacist about the best way to dispose of medicine you do not use. Drugs and Foods to Avoid: Ask your doctor or pharmacist before using any other medicine, including over-the-counter medicines, vitamins, and herbal products. · Do not use Xartemis XR if you have used an MAO inhibitor in the past 14 days. · Some medicines and foods can affect how this medicine works. Tell your doctor if you are taking any of the following: ¨ Butorphanol, lamotrigine, nalbuphine, naltrexone, pentazocine, propranolol, zidovudine ¨ Birth control pills, a diuretic (water pill), muscle relaxants, a phenothiazine medicine (chlorpromazine, perphenazine, promethazine, prochlorperazine, thioridazine) · Do not drink alcohol while you are using this medicine. Acetaminophen can damage your liver, and alcohol can increase this risk. Do not take acetaminophen without asking your doctor if you have 3 or more drinks of alcohol every day. · Tell your doctor if you are using any medicine that makes you sleepy, such as allergy medicine or other narcotic pain medicine. Warnings While Using This Medicine: · Tell your doctor if you are pregnant, or if you have kidney disease, liver disease, heart disease, low blood pressure, breathing problems or lung disease, especially if you have asthma, COPD, cor pulmonale, or kyphoscoliosis (curved spine that causes breathing trouble). Tell your doctor if you have urination problems, an underactive thyroid, Fercho disease, pancreas or prostate problems, or a stomach disorder. Tell your doctor if you have a history of head injury or brain damage, seizures, or alcohol or drug abuse. Tell your doctor if you are allergic to codeine. · Do not breastfeed while you are using this medicine, unless otherwise directed by your doctor. · This medicine may cause the following problems: 
¨ Liver problems ¨ Serious skin reactions ¨ Low blood pressure · If you take this medicine for more than a few weeks, do not stop taking it suddenly. Your doctor will need to slowly decrease your dose before you stop it completely. · Get emergency help immediately if you think you may have taken too much of this medicine. Signs of an overdose include shallow breathing, fainting, confusion, nausea, vomiting, pinpoint pupils of the eyes, or pale or blue lips, fingernails, or skin. · This medicine may make you dizzy or drowsy. Do not drive or do anything that could be dangerous until you know how this medicine affects you. · This medicine contains acetaminophen. Read the labels of all other medicines you are using to see if they also contain acetaminophen, or ask your doctor or pharmacist. Filiberto Henderson not use more than 4 grams (4,000 milligrams) total of acetaminophen in one day. · This medicine can be habit-forming. Do not use more than your prescribed dose. Call your doctor if you think your medicine is not working. · This medicine may cause constipation, especially with long-term use. Ask your doctor if you should use a laxative to prevent and treat constipation. · Keep all medicine out of the reach of children. Never share your medicine with anyone. Possible Side Effects While Using This Medicine:  
Call your doctor right away if you notice any of these side effects: · Allergic reaction: Itching or hives, swelling in your face or hands, swelling or tingling in your mouth or throat, chest tightness, trouble breathing · Dark urine or pale stools, nausea, vomiting, loss of appetite, stomach pain, yellow skin or eyes · Extreme weakness, shallow breathing, uneven heartbeat, seizures, sweating, or cold or clammy skin · Lightheadedness, dizziness, or fainting · Trouble breathing If you notice these less serious side effects, talk with your doctor: · Constipation · Headache · Mild lightheadedness, sleepiness, or drowsiness · Mild nausea or vomiting If you notice other side effects that you think are caused by this medicine, tell your doctor. Call your doctor for medical advice about side effects. You may report side effects to FDA at 2-568-IOZ-4825 © 2016 [address] is for End User's use only and may not be sold, redistributed or otherwise used for commercial purposes. The above information is an  only. It is not intended as medical advice for individual conditions or treatments. Talk to your doctor, nurse or pharmacist before following any medical regimen to see if it is safe and effective for you.

## 2017-04-24 NOTE — PERIOP NOTES
Shoulder pain better after laying flat but belly pain still awful. Medicated with IV Fentanyl and IV Demerol and IV Tylenol. 1630 Pt states pain much better-(1). 2674 When raised HOB up pt states pain 6 again. Given another 25mcq Fentanyl and snack for PO Oxycodone. 1715 Pt. drowsy States pain tolerable-denies chill. Discharge instructions reviewed with caregiver and patient. Allowed and answered questions. Tolerating PO fluids. Both state ready for discharge.  1730 Pt 97% on room air at discharge and easily arousable

## 2017-04-24 NOTE — PERIOP NOTES
Adriana Waleska  1950  [id number]    Situation:  Verbal report given from: JENNIFER Norris RN and CRNA  Procedure: Procedure(s):  LAPAROSCOPIC CHOLECYSTECTOMY WITH CHOLANGIOGRAMS    Background:    Preoperative diagnosis: BILIARY DYSKENESIA    Postoperative diagnosis: BILIARY DYSKENESIA    :  Dr. Prince Aldana    Assistant(s): Circ-1: Gretchen Arndt  Circ-Relief: Abbey Osler, RN  Scrub Tech-1: Igor Marine  Surg Asst-1: Hermenia Maxim    Specimens:   ID Type Source Tests Collected by Time Destination   1 : Gallbladder Preservative Gallbladder  Priyanka Menjivar MD 4/24/2017 2339 Pathology       Assessment:  Intra-procedure medications         Anesthesia gave intra-procedure sedation and medications, see anesthesia flow sheet     Intravenous fluids: LR@ KVO     Vital signs stable. Pt complaining of severe pain in shoulder and belly. CRNA gave IV Fentanyl and Dr. Prince Aldana ordered 1000mg Acetaminophen IV.        Recommendation:    Permission to share finding with family or friend yes

## 2017-04-26 RX ORDER — CITALOPRAM 20 MG/1
TABLET, FILM COATED ORAL
Qty: 30 TAB | Refills: 0 | Status: SHIPPED | OUTPATIENT
Start: 2017-04-26 | End: 2017-05-25 | Stop reason: SDUPTHER

## 2017-05-01 ENCOUNTER — OFFICE VISIT (OUTPATIENT)
Dept: FAMILY MEDICINE CLINIC | Age: 67
End: 2017-05-01

## 2017-05-01 VITALS
TEMPERATURE: 97.4 F | RESPIRATION RATE: 16 BRPM | OXYGEN SATURATION: 99 % | WEIGHT: 142.6 LBS | SYSTOLIC BLOOD PRESSURE: 128 MMHG | HEIGHT: 62 IN | DIASTOLIC BLOOD PRESSURE: 53 MMHG | HEART RATE: 72 BPM | BODY MASS INDEX: 26.24 KG/M2

## 2017-05-01 DIAGNOSIS — E11.9 TYPE 2 DIABETES MELLITUS WITHOUT COMPLICATION, WITHOUT LONG-TERM CURRENT USE OF INSULIN (HCC): Primary | Chronic | ICD-10-CM

## 2017-05-01 DIAGNOSIS — I10 ESSENTIAL HYPERTENSION: Chronic | ICD-10-CM

## 2017-05-01 LAB — HBA1C MFR BLD HPLC: 6.8 %

## 2017-05-01 RX ORDER — HYDROCHLOROTHIAZIDE 12.5 MG/1
12.5 TABLET ORAL DAILY
Qty: 90 TAB | Refills: 3 | Status: SHIPPED | OUTPATIENT
Start: 2017-05-01 | End: 2017-10-26 | Stop reason: SDUPTHER

## 2017-05-01 RX ORDER — ATORVASTATIN CALCIUM 20 MG/1
20 TABLET, FILM COATED ORAL DAILY
Qty: 30 TAB | Refills: 12 | Status: SHIPPED | OUTPATIENT
Start: 2017-05-01 | End: 2017-10-26 | Stop reason: SDUPTHER

## 2017-05-01 NOTE — PROGRESS NOTES
Bhavesh Schaefer is a 61 y.o. female  Chief Complaint   Patient presents with    Hypertension    Diabetes     1. Have you been to an emergency room, urgent clinic, or hospitalized since your last visit? YES  If yes, where when, and reason for visit? [address] - gall bladder removed 4/24/17    2. Have seen or consulted any other health care provider since your last visit? NO  Please include any pap smears or colon screening in this section  If yes, where when, and reason for visit? 6. Do you have an Advanced Directive/ Living Will in place?  NO  If yes, do we have a copy on file NO  If no, would you like information NO

## 2017-05-01 NOTE — PROGRESS NOTES
HISTORY OF PRESENT ILLNESS  Dieudonne Parish is a 61 y.o. female. HPI In for recheck. Has had cholecystectomy since last seen. No complaints of chest pain, shortness of breath, TIAs, claudication or edema. ROS    Physical Exam   Constitutional: She is oriented to person, place, and time. She appears well-developed and well-nourished. Neck: No thyromegaly present. Cardiovascular: Normal rate, regular rhythm and normal heart sounds. No murmur heard. Pulmonary/Chest: Effort normal and breath sounds normal. She has no wheezes. Abdominal: Soft. Bowel sounds are normal. She exhibits no distension. There is no tenderness. There is no rebound and no guarding. Ecchymoses on abdomen from gallbladder surgery. Minimal tenderness   Musculoskeletal: Normal range of motion. She exhibits no edema. Lymphadenopathy:     She has no cervical adenopathy. Neurological: She is alert and oriented to person, place, and time. Nursing note and vitals reviewed. ASSESSMENT and PLAN  Orders Placed This Encounter    METABOLIC PANEL, COMPREHENSIVE    LIPID PANEL    CBC WITH AUTOMATED DIFF    AMB POC HEMOGLOBIN A1C    hydroCHLOROthiazide (HYDRODIURIL) 12.5 mg tablet    atorvastatin (LIPITOR) 20 mg tablet     Phil Quezada was seen today for hypertension and diabetes. Diagnoses and all orders for this visit:    Type 2 diabetes mellitus without complication, without long-term current use of insulin ([facility])  -     LIPID PANEL  -     AMB POC HEMOGLOBIN A1C    Essential hypertension  -     METABOLIC PANEL, COMPREHENSIVE  -     CBC WITH AUTOMATED DIFF    Other orders  -     hydroCHLOROthiazide (HYDRODIURIL) 12.5 mg tablet; Take 1 Tab by mouth daily. For blood pressure  -     atorvastatin (LIPITOR) 20 mg tablet; Take 1 Tab by mouth daily. For cholesterol      Follow-up Disposition:  Return in about 6 months (around 11/1/2017).

## 2017-05-01 NOTE — MR AVS SNAPSHOT
Visit Information Date & Time Provider Department Dept. Phone Encounter #  
 5/1/2017 10:45 AM Fidelina Brown MD [facility] [phone] [id number] Your Appointments 5/15/2017  8:40 AM  
POST OP with Natalia Krishnamurthy MD  
[facility] Simmons ([facility]) Appt Note: post op gallbladder 4/24/17  
 [address], [address], [address] [address]  
[address], [address], [address] [address] Upcoming Health Maintenance Date Due  
 EYE EXAM RETINAL OR DILATED Q1 7/17/2016 FOOT EXAM Q1 8/13/2016 MICROALBUMIN Q1 3/29/2017 PAP AKA CERVICAL CYTOLOGY 4/23/2017 HEMOGLOBIN A1C Q6M 5/1/2017 INFLUENZA AGE 9 TO ADULT 8/1/2017 LIPID PANEL Q1 11/1/2017 BREAST CANCER SCRN MAMMOGRAM 7/22/2018 COLONOSCOPY 5/2/2026 DTaP/Tdap/Td series (2 - Td) 8/9/2026 Allergies as of 5/1/2017  Review Complete On: 5/1/2017 By: Fidelina Brown MD  
  
 Severity Noted Reaction Type Reactions Latex Medium 04/20/2017   Topical Itching Lisinopril High 05/27/2015    Swelling Sulfa (Sulfonamide Antibiotics)  06/09/2010    Itching Current Immunizations  Reviewed on 11/17/2015 Name Date Influenza Vaccine 10/21/2014, 10/21/2013 Influenza Vaccine Otila Fish) 10/7/2015 Influenza Vaccine (Quad) PF 11/1/2016 Influenza Vaccine Split 10/25/2012, 10/13/2011, 10/19/2010 Pneumococcal Vaccine (Unspecified Type) 10/19/2010 Tdap 8/9/2016 Varicella Virus Vaccine 10/17/2012 Not reviewed this visit You Were Diagnosed With   
  
 Codes Comments Type 2 diabetes mellitus without complication, without long-term current use of insulin (HCC)    -  Primary ICD-10-CM: E11.9 ICD-9-CM: 250.00 Essential hypertension     ICD-10-CM: I10 
ICD-9-CM: 401.9 Vitals BP Pulse Temp Resp Height(growth percentile) Weight(growth percentile) 128/53 (BP 1 Location: Right arm, BP Patient Position: Sitting) 72 97.4 °F (36.3 °C) (Oral) 16 5' 2\" (1.575 m) 142 lb 9.6 oz (64.7 kg) LMP SpO2 BMI OB Status Smoking Status (LMP Unknown) 99% 26.08 kg/m2 Postmenopausal Former Smoker Vitals History BMI and BSA Data Body Mass Index Body Surface Area 26.08 kg/m 2 1.68 m 2 Preferred Pharmacy Pharmacy Name Phone [address] [phone] Your Updated Medication List  
  
   
This list is accurate as of: 5/1/17 12:05 PM.  Always use your most recent med list.  
  
  
  
  
 albuterol 90 mcg/actuation inhaler Commonly known as:  PROVENTIL HFA, VENTOLIN HFA, PROAIR HFA This is a rescue medication for when you are short of breath: 2 puffs every 4 hrs PRN wheezing/SOB (1 for home/1 for work/school)  
  
 atorvastatin 20 mg tablet Commonly known as:  LIPITOR Take 1 Tab by mouth daily. For cholesterol  
  
 citalopram 20 mg tablet Commonly known as:  CELEXA  
TAKE ONE TABLET BY MOUTH ONE TIME DAILY  
  
 famotidine 40 mg tablet Commonly known as:  PEPCID  
TAKE 1 TABLET BY MOUTH AT BEDTIME  
  
 glucose blood VI test strips strip Commonly known as:  ACCU-CHEK SOFIA PLUS TEST STRP Test blood sugar 2 - 4 times daily  
  
 hydroCHLOROthiazide 12.5 mg tablet Commonly known as:  HYDRODIURIL Take 1 Tab by mouth daily. For blood pressure HYDROcodone-acetaminophen 5-325 mg per tablet Commonly known as:  Carmina Holiday Take 1 Tab by mouth every four (4) hours as needed for Pain. Max Daily Amount: 6 Tabs. ibuprofen 400 mg tablet Commonly known as:  MOTRIN Take 1 Tab by mouth every eight (8) hours as needed for Pain for up to 20 days. Lancets Misc Commonly known as:  ACCU-CHEK MULTICLIX LANCET  
test blood sugar as directed by physician. Dx code E11.9  
  
 metFORMIN 1,000 mg tablet Commonly known as:  GLUCOPHAGE  
1  in am and 1 in pm. For diabetes ondansetron hcl 4 mg tablet Commonly known as:  ZOFRAN (AS HYDROCHLORIDE) Take 1 Tab by mouth every eight (8) hours as needed for Nausea. oxyCODONE-acetaminophen 5-325 mg per tablet Commonly known as:  PERCOCET Take 1-2 Tabs by mouth every six (6) hours as needed for Pain for up to 20 days. Max Daily Amount: 8 Tabs. pantoprazole 40 mg tablet Commonly known as:  PROTONIX PURELAX 17 gram packet Generic drug:  polyethylene glycol DRINK 1 PACKET DISSOLVED IN WATER ONCE A DAY: takes at night  
  
 zolpidem 10 mg tablet Commonly known as:  AMBIEN  
TAKE ONE TABLET BY MOUTH NIGHTLY AT BEDTIME AS NEEDED Prescriptions Sent to Pharmacy Refills  
 hydroCHLOROthiazide (HYDRODIURIL) 12.5 mg tablet 3 Sig: Take 1 Tab by mouth daily. For blood pressure Class: Normal  
 Pharmacy: [facility] [place] [address] Ph #: [phone] Route: Oral  
 atorvastatin (LIPITOR) 20 mg tablet 12 Sig: Take 1 Tab by mouth daily. For cholesterol Class: Normal  
 Pharmacy: [facility] [place] [address] Ph #: [phone] Route: Oral  
  
We Performed the Following AMB POC HEMOGLOBIN A1C [61975 CPT(R)] CBC WITH AUTOMATED DIFF [34841 CPT(R)] LIPID PANEL [26387 CPT(R)] METABOLIC PANEL, COMPREHENSIVE [80883 CPT(R)] Introducing \[facility] [place] [facility]\"" & [facility] SERVICES! Dear Vonnie Persons: Thank you for requesting a Genmab account. Our records indicate that you already have an active Genmab account. You can access your account anytime at [email or web address] Did you know that you can access your hospital and ER discharge instructions at any time in Genmab? You can also review all of your test results from your hospital stay or ER visit. Additional Information If you have questions, please visit the Frequently Asked Questions section of the Genmab website at [email or web address] Remember, [employer] is NOT to be used for urgent needs. For medical emergencies, dial 911. Now available from your iPhone and Android! Please provide this summary of care documentation to your next provider. Your primary care clinician is listed as Glenna Powers. If you have any questions after today's visit, please call [phone].

## 2017-05-02 LAB
ALBUMIN SERPL-MCNC: 4.1 G/DL (ref 3.6–4.8)
ALBUMIN/GLOB SERPL: 1.4 {RATIO} (ref 1.2–2.2)
ALP SERPL-CCNC: 121 IU/L (ref 39–117)
ALT SERPL-CCNC: 26 IU/L (ref 0–32)
AST SERPL-CCNC: 19 IU/L (ref 0–40)
BASOPHILS # BLD AUTO: 0 X10E3/UL (ref 0–0.2)
BASOPHILS NFR BLD AUTO: 1 %
BILIRUB SERPL-MCNC: 0.3 MG/DL (ref 0–1.2)
BUN SERPL-MCNC: 9 MG/DL (ref 8–27)
BUN/CREAT SERPL: 12 (ref 12–28)
CALCIUM SERPL-MCNC: 10 MG/DL (ref 8.7–10.3)
CHLORIDE SERPL-SCNC: 101 MMOL/L (ref 96–106)
CHOLEST SERPL-MCNC: 114 MG/DL (ref 100–199)
CO2 SERPL-SCNC: 24 MMOL/L (ref 18–29)
CREAT SERPL-MCNC: 0.73 MG/DL (ref 0.57–1)
EOSINOPHIL # BLD AUTO: 0.1 X10E3/UL (ref 0–0.4)
EOSINOPHIL NFR BLD AUTO: 1 %
ERYTHROCYTE [DISTWIDTH] IN BLOOD BY AUTOMATED COUNT: 14.7 % (ref 12.3–15.4)
GLOBULIN SER CALC-MCNC: 3 G/DL (ref 1.5–4.5)
GLUCOSE SERPL-MCNC: 118 MG/DL (ref 65–99)
HCT VFR BLD AUTO: 37 % (ref 34–46.6)
HDLC SERPL-MCNC: 52 MG/DL
HGB BLD-MCNC: 12.3 G/DL (ref 11.1–15.9)
IMM GRANULOCYTES # BLD: 0 X10E3/UL (ref 0–0.1)
IMM GRANULOCYTES NFR BLD: 0 %
INTERPRETATION, 910389: NORMAL
LDLC SERPL CALC-MCNC: 40 MG/DL (ref 0–99)
LYMPHOCYTES # BLD AUTO: 1.4 X10E3/UL (ref 0.7–3.1)
LYMPHOCYTES NFR BLD AUTO: 28 %
Lab: NORMAL
MCH RBC QN AUTO: 29.4 PG (ref 26.6–33)
MCHC RBC AUTO-ENTMCNC: 33.2 G/DL (ref 31.5–35.7)
MCV RBC AUTO: 89 FL (ref 79–97)
MONOCYTES # BLD AUTO: 0.4 X10E3/UL (ref 0.1–0.9)
MONOCYTES NFR BLD AUTO: 8 %
NEUTROPHILS # BLD AUTO: 3 X10E3/UL (ref 1.4–7)
NEUTROPHILS NFR BLD AUTO: 62 %
PLATELET # BLD AUTO: 231 X10E3/UL (ref 150–379)
POTASSIUM SERPL-SCNC: 4.8 MMOL/L (ref 3.5–5.2)
PROT SERPL-MCNC: 7.1 G/DL (ref 6–8.5)
RBC # BLD AUTO: 4.18 X10E6/UL (ref 3.77–5.28)
SODIUM SERPL-SCNC: 143 MMOL/L (ref 134–144)
TRIGL SERPL-MCNC: 109 MG/DL (ref 0–149)
VLDLC SERPL CALC-MCNC: 22 MG/DL (ref 5–40)
WBC # BLD AUTO: 4.8 X10E3/UL (ref 3.4–10.8)

## 2017-05-15 ENCOUNTER — OFFICE VISIT (OUTPATIENT)
Dept: SURGERY | Age: 67
End: 2017-05-15

## 2017-05-15 VITALS
HEIGHT: 62 IN | BODY MASS INDEX: 25.67 KG/M2 | WEIGHT: 139.5 LBS | OXYGEN SATURATION: 99 % | DIASTOLIC BLOOD PRESSURE: 64 MMHG | HEART RATE: 68 BPM | SYSTOLIC BLOOD PRESSURE: 126 MMHG | RESPIRATION RATE: 18 BRPM | TEMPERATURE: 96.2 F

## 2017-05-15 DIAGNOSIS — Z09 POSTOPERATIVE EXAMINATION: Primary | ICD-10-CM

## 2017-05-15 NOTE — PROGRESS NOTES
Surgery  Follow up  Procedure: lap enoch and IOC  OR date:  4/24/2017  Path:       Gallbladder, cholecystectomy:   Unremarkable gallbladder; clinically biliary dyskinesia. Cystic duct lymph node: Reactive lymph node.      S I feel fine, occ diarrhea    Visit Vitals    /64 (BP 1 Location: Left arm, BP Patient Position: Sitting)    Pulse 68    Temp 96.2 °F (35.7 °C) (Oral)    Resp 18    Ht 5' 2\" (1.575 m)    Wt 63.3 kg (139 lb 8 oz)    LMP  (LMP Unknown)    SpO2 99%    BMI 25.51 kg/m2       O Incisions healing well without infection   No signs of hernia    A/P Doing well   RTC prn    Billy Rodriguez MD FACS

## 2017-05-15 NOTE — MR AVS SNAPSHOT
Visit Information Date & Time Provider Department Dept. Phone Encounter #  
 5/15/2017  8:40 AM Stacy Lagunas MD [facility] [id number] Your Appointments 11/1/2017  9:30 AM  
ROUTINE CARE with Pavan Barth MD  
[facility] [address]) Appt Note: 6m f/u  
 [address] [address] [phone] [address] [facility] [address] Upcoming Health Maintenance Date Due  
 EYE EXAM RETINAL OR DILATED Q1 7/17/2016 FOOT EXAM Q1 8/13/2016 MICROALBUMIN Q1 3/29/2017 PAP AKA CERVICAL CYTOLOGY 4/23/2017 INFLUENZA AGE 9 TO ADULT 8/1/2017 HEMOGLOBIN A1C Q6M 11/1/2017 LIPID PANEL Q1 5/1/2018 BREAST CANCER SCRN MAMMOGRAM 7/22/2018 COLONOSCOPY 5/2/2026 DTaP/Tdap/Td series (2 - Td) 8/9/2026 Allergies as of 5/15/2017  Review Complete On: 5/15/2017 By: Stacy Lagunas MD  
  
 Severity Noted Reaction Type Reactions Latex Medium 04/20/2017   Topical Itching Lisinopril High 05/27/2015    Swelling Sulfa (Sulfonamide Antibiotics)  06/09/2010    Itching Current Immunizations  Reviewed on 11/17/2015 Name Date Influenza Vaccine 10/21/2014, 10/21/2013 Influenza Vaccine Elane Maureen) 10/7/2015 Influenza Vaccine (Quad) PF 11/1/2016 Influenza Vaccine Split 10/25/2012, 10/13/2011, 10/19/2010 Pneumococcal Vaccine (Unspecified Type) 10/19/2010 Tdap 8/9/2016 Varicella Virus Vaccine 10/17/2012 Not reviewed this visit You Were Diagnosed With   
  
 Codes Comments Postoperative examination    -  Primary ICD-10-CM: Y95 ICD-9-CM: V67.00 Vitals BP Pulse Temp Resp Height(growth percentile) Weight(growth percentile) 126/64 (BP 1 Location: Left arm, BP Patient Position: Sitting) 68 96.2 °F (35.7 °C) (Oral) 18 5' 2\" (1.575 m) 139 lb 8 oz (63.3 kg) LMP SpO2 BMI OB Status Smoking Status (LMP Unknown) 99% 25.51 kg/m2 Postmenopausal Former Smoker BMI and BSA Data Body Mass Index Body Surface Area 25.51 kg/m 2 1.66 m 2 Preferred Pharmacy Pharmacy Name Phone [facility] [place] [address] [phone] Your Updated Medication List  
  
   
This list is accurate as of: 5/15/17  9:25 AM.  Always use your most recent med list.  
  
  
  
  
 albuterol 90 mcg/actuation inhaler Commonly known as:  PROVENTIL HFA, VENTOLIN HFA, PROAIR HFA This is a rescue medication for when you are short of breath: 2 puffs every 4 hrs PRN wheezing/SOB (1 for home/1 for work/school)  
  
 atorvastatin 20 mg tablet Commonly known as:  LIPITOR Take 1 Tab by mouth daily. For cholesterol  
  
 citalopram 20 mg tablet Commonly known as:  CELEXA  
TAKE ONE TABLET BY MOUTH ONE TIME DAILY  
  
 famotidine 40 mg tablet Commonly known as:  PEPCID  
TAKE 1 TABLET BY MOUTH AT BEDTIME  
  
 glucose blood VI test strips strip Commonly known as:  ACCU-CHEK SOFIA PLUS TEST STRP Test blood sugar 2 - 4 times daily  
  
 hydroCHLOROthiazide 12.5 mg tablet Commonly known as:  HYDRODIURIL Take 1 Tab by mouth daily. For blood pressure Lancets Misc Commonly known as:  ACCU-CHEK MULTICLIX LANCET  
test blood sugar as directed by physician. Dx code E11.9  
  
 metFORMIN 1,000 mg tablet Commonly known as:  GLUCOPHAGE  
1  in am and 1 in pm. For diabetes  
  
 pantoprazole 40 mg tablet Commonly known as:  PROTONIX PURELAX 17 gram packet Generic drug:  polyethylene glycol DRINK 1 PACKET DISSOLVED IN WATER ONCE A DAY: takes at night  
  
 zolpidem 10 mg tablet Commonly known as:  AMBIEN  
TAKE ONE TABLET BY MOUTH NIGHTLY AT BEDTIME AS NEEDED Introducing [facility] & HEALTH SERVICES! Dear Phil Quezada: Thank you for requesting a Helpstream account. Our records indicate that you already have an active Helpstream account.   You can access your account anytime at [email or web address] Did you know that you can access your hospital and ER discharge instructions at any time in YourStreet? You can also review all of your test results from your hospital stay or ER visit. Additional Information If you have questions, please visit the Frequently Asked Questions section of the YourStreet website at [email or web address] Remember, YourStreet is NOT to be used for urgent needs. For medical emergencies, dial 911. Now available from your iPhone and Android! Please provide this summary of care documentation to your next provider. Your primary care clinician is listed as Trino Ivy. If you have any questions after today's visit, please call [phone].

## 2017-05-25 RX ORDER — CITALOPRAM 20 MG/1
TABLET, FILM COATED ORAL
Qty: 30 TAB | Refills: 8 | Status: SHIPPED | OUTPATIENT
Start: 2017-05-25 | End: 2017-10-26 | Stop reason: SDUPTHER

## 2017-06-15 ENCOUNTER — OFFICE VISIT (OUTPATIENT)
Dept: FAMILY MEDICINE CLINIC | Age: 67
End: 2017-06-15

## 2017-06-15 VITALS
WEIGHT: 145.8 LBS | BODY MASS INDEX: 26.83 KG/M2 | OXYGEN SATURATION: 99 % | DIASTOLIC BLOOD PRESSURE: 58 MMHG | RESPIRATION RATE: 18 BRPM | HEIGHT: 62 IN | HEART RATE: 77 BPM | TEMPERATURE: 97.4 F | SYSTOLIC BLOOD PRESSURE: 126 MMHG

## 2017-06-15 DIAGNOSIS — R05.9 COUGH: ICD-10-CM

## 2017-06-15 DIAGNOSIS — H61.23 BILATERAL IMPACTED CERUMEN: ICD-10-CM

## 2017-06-15 DIAGNOSIS — J01.10 ACUTE NON-RECURRENT FRONTAL SINUSITIS: Primary | ICD-10-CM

## 2017-06-15 RX ORDER — AMOXICILLIN 500 MG/1
500 CAPSULE ORAL 3 TIMES DAILY
Qty: 30 CAP | Refills: 0 | Status: SHIPPED | OUTPATIENT
Start: 2017-06-15 | End: 2017-06-25

## 2017-06-15 RX ORDER — ALBUTEROL SULFATE 90 UG/1
AEROSOL, METERED RESPIRATORY (INHALATION)
Qty: 2 INHALER | Refills: 11 | Status: SHIPPED | OUTPATIENT
Start: 2017-06-15 | End: 2018-07-29

## 2017-06-15 RX ORDER — MINERAL OIL
180 ENEMA (ML) RECTAL DAILY
Qty: 30 TAB | Refills: 5 | Status: SHIPPED | OUTPATIENT
Start: 2017-06-15 | End: 2018-05-29 | Stop reason: ALTCHOICE

## 2017-06-15 RX ORDER — CETIRIZINE HCL 10 MG
TABLET ORAL
COMMUNITY
End: 2018-07-29

## 2017-06-15 NOTE — PROGRESS NOTES
Chief Complaint   Patient presents with    Other     both ears are stopped up     Pt complaining of both ears being stopped up and cant hear well. Pt reports it has been a while but getting worse.  No pain in ears

## 2017-06-15 NOTE — PROGRESS NOTES
HISTORY OF PRESENT ILLNESS  Huerta Favorite is a 61 y.o. female. HPI Has had both ears stopped up, right worse than Left, for the last 2 weeks. Has also been having itchy eyes, runny nose, cough, frontal headache, post nasal drainage. Zyrtec- minimal relief. Has also been using eye drops. ROS    Physical Exam   Constitutional: She is oriented to person, place, and time. She appears well-developed and well-nourished. Neck: No thyromegaly present. Cardiovascular: Normal rate, regular rhythm and normal heart sounds. No murmur heard. Pulmonary/Chest: Effort normal and breath sounds normal. She has no wheezes. Abdominal: Soft. Bowel sounds are normal. She exhibits no distension. There is no tenderness. There is no rebound and no guarding. Musculoskeletal: Normal range of motion. She exhibits no edema. Lymphadenopathy:     She has no cervical adenopathy. Neurological: She is alert and oriented to person, place, and time. Nursing note and vitals reviewed. ASSESSMENT and PLAN  Orders Placed This Encounter    albuterol (PROVENTIL HFA, VENTOLIN HFA, PROAIR HFA) 90 mcg/actuation inhaler    amoxicillin (AMOXIL) 500 mg capsule    fexofenadine (ALLEGRA) 180 mg tablet     [facility] was seen today for other. Diagnoses and all orders for this visit:    Acute non-recurrent frontal sinusitis    Cough  -     albuterol (PROVENTIL HFA, VENTOLIN HFA, PROAIR HFA) 90 mcg/actuation inhaler; This is a rescue medication for when you are short of breath: 2 puffs every 4 hrs PRN wheezing/SOB (1 for home/1 for work/school)    Bilateral impacted cerumen    Other orders  -     amoxicillin (AMOXIL) 500 mg capsule; Take 1 Cap by mouth three (3) times daily for 10 days. -     fexofenadine (ALLEGRA) 180 mg tablet; Take 1 Tab by mouth daily.       Follow-up Disposition: Not on File

## 2017-06-15 NOTE — PROGRESS NOTES
Water flushed, cerumen removal from both ears. Pt tolerated procedure well and said they could hear better.

## 2017-06-15 NOTE — MR AVS SNAPSHOT
Visit Information Date & Time Provider Department Dept. Phone Encounter #  
 6/15/2017 10:30 AM Pavan Barth MD [facility] [phone] [id number] Your Appointments 11/1/2017  9:30 AM  
ROUTINE CARE with Pavan Barth MD  
[facility]) Appt Note: 6m f/u  
 [address] [facility] [place]  [id number] [phone] [address] [place] [address] [address] Upcoming Health Maintenance Date Due  
 EYE EXAM RETINAL OR DILATED Q1 7/17/2016 FOOT EXAM Q1 8/13/2016 MICROALBUMIN Q1 3/29/2017 PAP AKA CERVICAL CYTOLOGY 4/23/2017 INFLUENZA AGE 9 TO ADULT 8/1/2017 HEMOGLOBIN A1C Q6M 11/1/2017 LIPID PANEL Q1 5/1/2018 BREAST CANCER SCRN MAMMOGRAM 7/22/2018 COLONOSCOPY 5/2/2026 DTaP/Tdap/Td series (2 - Td) 8/9/2026 Allergies as of 6/15/2017  Review Complete On: 6/15/2017 By: Pavan Barth MD  
  
 Severity Noted Reaction Type Reactions Latex Medium 04/20/2017   Topical Itching Lisinopril High 05/27/2015    Swelling Sulfa (Sulfonamide Antibiotics)  06/09/2010    Itching Current Immunizations  Reviewed on 11/17/2015 Name Date Influenza Vaccine 10/21/2014, 10/21/2013 Influenza Vaccine Elane Maureen) 10/7/2015 Influenza Vaccine (Quad) PF 11/1/2016 Influenza Vaccine Split 10/25/2012, 10/13/2011, 10/19/2010 Pneumococcal Vaccine (Unspecified Type) 10/19/2010 Tdap 8/9/2016 Varicella Virus Vaccine 10/17/2012 Not reviewed this visit You Were Diagnosed With   
  
 Codes Comments Acute non-recurrent frontal sinusitis    -  Primary ICD-10-CM: J01.10 ICD-9-CM: 786.2 Cough     ICD-10-CM: R05 ICD-9-CM: 786.2 Bilateral impacted cerumen     ICD-10-CM: H61.23 
ICD-9-CM: 380.4 Vitals BP Pulse Temp Resp Height(growth percentile) Weight(growth percentile) 126/58 77 97.4 °F (36.3 °C) (Oral) 18 5' 2\" (1.575 m) 145 lb 12.8 oz (66.1 kg) LMP SpO2 BMI OB Status Smoking Status (LMP Unknown) 99% 26.67 kg/m2 Postmenopausal Former Smoker Vitals History BMI and BSA Data Body Mass Index Body Surface Area  
 26.67 kg/m 2 1.7 m 2 Preferred Pharmacy Pharmacy Name Phone [address] [phone] Your Updated Medication List  
  
   
This list is accurate as of: 6/15/17 11:08 AM.  Always use your most recent med list.  
  
  
  
  
 albuterol 90 mcg/actuation inhaler Commonly known as:  PROVENTIL HFA, VENTOLIN HFA, PROAIR HFA This is a rescue medication for when you are short of breath: 2 puffs every 4 hrs PRN wheezing/SOB (1 for home/1 for work/school)  
  
 amoxicillin 500 mg capsule Commonly known as:  AMOXIL Take 1 Cap by mouth three (3) times daily for 10 days. atorvastatin 20 mg tablet Commonly known as:  LIPITOR Take 1 Tab by mouth daily. For cholesterol  
  
 citalopram 20 mg tablet Commonly known as:  CELEXA  
TAKE ONE TABLET BY MOUTH ONE TIME DAILY  
  
 famotidine 40 mg tablet Commonly known as:  PEPCID  
TAKE 1 TABLET BY MOUTH AT BEDTIME  
  
 fexofenadine 180 mg tablet Commonly known as:  Kimo Houston Take 1 Tab by mouth daily. glucose blood VI test strips strip Commonly known as:  ACCU-CHEK SOFIA PLUS TEST STRP Test blood sugar 2 - 4 times daily  
  
 hydroCHLOROthiazide 12.5 mg tablet Commonly known as:  HYDRODIURIL Take 1 Tab by mouth daily. For blood pressure Lancets Misc Commonly known as:  ACCU-CHEK MULTICLIX LANCET  
test blood sugar as directed by physician. Dx code E11.9  
  
 metFORMIN 1,000 mg tablet Commonly known as:  GLUCOPHAGE  
1  in am and 1 in pm. For diabetes  
  
 pantoprazole 40 mg tablet Commonly known as:  PROTONIX PURELAX 17 gram packet Generic drug:  polyethylene glycol DRINK 1 PACKET DISSOLVED IN WATER ONCE A DAY: takes at night  
  
 zolpidem 10 mg tablet Commonly known as:  AMBIEN  
TAKE ONE TABLET BY MOUTH NIGHTLY AT BEDTIME AS NEEDED ZyrTEC 10 mg tablet Generic drug:  cetirizine Take  by mouth. Prescriptions Printed Refills  
 fexofenadine (ALLEGRA) 180 mg tablet 5 Sig: Take 1 Tab by mouth daily. Class: Print Route: Oral  
  
Prescriptions Sent to Pharmacy Refills  
 albuterol (PROVENTIL HFA, VENTOLIN HFA, PROAIR HFA) 90 mcg/actuation inhaler 11 Sig: This is a rescue medication for when you are short of breath: 2 puffs every 4 hrs PRN wheezing/SOB (1 for home/1 for work/school) Class: Normal  
 Pharmacy: [address] Ph #: [phone]  
 amoxicillin (AMOXIL) 500 mg capsule 0 Sig: Take 1 Cap by mouth three (3) times daily for 10 days. Class: Normal  
 Pharmacy: [facility] [address] [place] [address] Ph #: [phone] Route: Oral  
  
Introducing [facility] & HEALTH SERVICES! Dear Mariam Yarbrough: Thank you for requesting a Taketake account. Our records indicate that you already have an active Taketake account. You can access your account anytime at [email or web address] Did you know that you can access your hospital and ER discharge instructions at any time in Taketake? You can also review all of your test results from your hospital stay or ER visit. Additional Information If you have questions, please visit the Frequently Asked Questions section of the Taketake website at [email or web address] Remember, Taketake is NOT to be used for urgent needs. For medical emergencies, dial 911. Now available from your iPhone and Android! Please provide this summary of care documentation to your next provider. Your primary care clinician is listed as Verónica Guillen. If you have any questions after today's visit, please call [phone].

## 2017-07-11 ENCOUNTER — TELEPHONE (OUTPATIENT)
Dept: FAMILY MEDICINE CLINIC | Age: 67
End: 2017-07-11

## 2017-07-11 NOTE — TELEPHONE ENCOUNTER
----- Message from Ochoa Norris sent at 7/11/2017  1:47 PM EDT -----  Regarding: /Telephone  [employer] would like the status of medical records requested made 07/10/2017,  ref number [id number], Best contact number is [phone].

## 2017-08-01 ENCOUNTER — HOSPITAL ENCOUNTER (OUTPATIENT)
Dept: MAMMOGRAPHY | Age: 67
Discharge: HOME | End: 2017-08-01
Attending: FAMILY MEDICINE
Payer: COMMERCIAL

## 2017-08-01 DIAGNOSIS — Z12.31 VISIT FOR SCREENING MAMMOGRAM: ICD-10-CM

## 2017-08-01 PROCEDURE — 77067 SCR MAMMO BI INCL CAD: CPT

## 2017-10-26 RX ORDER — CITALOPRAM 20 MG/1
TABLET, FILM COATED ORAL
Qty: 90 TAB | Refills: 2 | Status: SHIPPED | OUTPATIENT
Start: 2017-10-26 | End: 2018-08-01 | Stop reason: SDUPTHER

## 2017-10-27 RX ORDER — ATORVASTATIN CALCIUM 20 MG/1
20 TABLET, FILM COATED ORAL DAILY
Qty: 90 TAB | Refills: 3 | Status: SHIPPED | OUTPATIENT
Start: 2017-10-27 | End: 2018-07-29 | Stop reason: DRUGHIGH

## 2017-10-27 RX ORDER — HYDROCHLOROTHIAZIDE 12.5 MG/1
12.5 TABLET ORAL DAILY
Qty: 90 TAB | Refills: 3 | Status: SHIPPED | OUTPATIENT
Start: 2017-10-27 | End: 2018-08-31 | Stop reason: SDUPTHER

## 2017-11-15 ENCOUNTER — OFFICE VISIT (OUTPATIENT)
Dept: FAMILY MEDICINE CLINIC | Age: 67
End: 2017-11-15

## 2017-11-15 VITALS
HEART RATE: 76 BPM | TEMPERATURE: 97 F | SYSTOLIC BLOOD PRESSURE: 118 MMHG | DIASTOLIC BLOOD PRESSURE: 55 MMHG | HEIGHT: 62 IN | BODY MASS INDEX: 27.49 KG/M2 | OXYGEN SATURATION: 99 % | WEIGHT: 149.4 LBS | RESPIRATION RATE: 14 BRPM

## 2017-11-15 DIAGNOSIS — Z01.818 PRE-OPERATIVE GENERAL PHYSICAL EXAMINATION: Primary | ICD-10-CM

## 2017-11-15 DIAGNOSIS — E11.9 TYPE 2 DIABETES MELLITUS WITHOUT COMPLICATION, WITHOUT LONG-TERM CURRENT USE OF INSULIN (HCC): Chronic | ICD-10-CM

## 2017-11-15 DIAGNOSIS — L72.3 SEBACEOUS CYST: ICD-10-CM

## 2017-11-15 LAB — HBA1C MFR BLD HPLC: 7 %

## 2017-11-15 RX ORDER — METFORMIN HYDROCHLORIDE 1000 MG/1
TABLET ORAL
Qty: 75 TAB | Refills: 11 | Status: ON HOLD | OUTPATIENT
Start: 2017-11-15 | End: 2018-07-30

## 2017-11-15 NOTE — PROGRESS NOTES
Chief Complaint   Patient presents with    Pre-op Exam     bunionectomy  left foot  midtarsal fusion  Dr. Yoseph Kellogg  12/12/17    Cyst     itchy   MID BACK - close to center  \"sometimes leaks\"       Diabetes     1. Have you been to the ER, urgent care clinic since your last visit? Hospitalized since your last visit? No    2. Have you seen or consulted any other health care providers outside of the [address] [place] [address] since your last visit? Include any pap smears or colon screening. No     Health Maintenance Due   Topic Date Due    FOOT EXAM Q1  08/13/2016    MICROALBUMIN Q1  03/29/2017    Influenza Age 9 to Adult  08/01/2017    OSTEOPOROSIS SCREENING (DEXA)  09/10/2017    Pneumococcal 65+ Low/Medium Risk (2 of 2 - PPSV23) 09/10/2017    MEDICARE YEARLY EXAM  09/10/2017    HEMOGLOBIN A1C Q6M  11/01/2017       [facility]  OFFICE PROCEDURE PROGRESS NOTE        Chart reviewed for the following:   Ignacio AVALOS LPN, have reviewed the History, Physical and updated the Allergic reactions for [address] [place] [address] gripNote performed immediately prior to start of procedure:   Ignacio AVALOS LPN, have performed the following [facility] to the start of the procedure:            * Patient was identified by name and date of birth   * Agreement on procedure being performed was verified  * Risks and Benefits explained to the patient  * Procedure site verified and marked as necessary  * Patient was positioned for comfort  * Consent was signed and verified     Time: 3:11pm      Date of procedure: 11/15/2017    Procedure performed by:  Jagdeep Palafox MD    Provider assisted by: Douglas Márquez LPN  Patient assisted by:  Douglas Márquez LPN    How tolerated by the patient ? Pt tolerated procedure well.   Comments pt said pain was a \"0\"

## 2017-11-15 NOTE — PROGRESS NOTES
HISTORY OF PRESENT ILLNESS  Delvis Sterling is a 74 y.o. female. HPI Going for bunionectomy Dec. 12, needs H and P. Also has a lesion on back that wants checked. ROS    Physical Exam   Constitutional: She is oriented to person, place, and time. She appears well-developed and well-nourished. Neck: No thyromegaly present. Cardiovascular: Normal rate, regular rhythm and normal heart sounds. No murmur heard. Pulmonary/Chest: Effort normal and breath sounds normal. She has no wheezes. Sebaceous cyst present on midback   Abdominal: Soft. Bowel sounds are normal. She exhibits no distension. There is no tenderness. There is no rebound and no guarding. Musculoskeletal: Normal range of motion. She exhibits no edema. Lymphadenopathy:     She has no cervical adenopathy. Neurological: She is alert and oriented to person, place, and time. Nursing note and vitals reviewed. ASSESSMENT and PLAN  Orders Placed This Encounter    CBC WITH AUTOMATED DIFF    METABOLIC PANEL, COMPREHENSIVE    LIPID PANEL    MICROALBUMIN, UR, RAND W/ MICROALBUMIN/CREA RATIO    AMB POC HEMOGLOBIN A1C     DIABETES FOOT EXAM    metFORMIN (GLUCOPHAGE) 1,000 mg tablet     Diagnoses and all orders for this visit:    1. Pre-operative general physical examination    2. Type 2 diabetes mellitus without complication, without long-term current use of insulin ([facility])  -     CBC WITH AUTOMATED DIFF  -     METABOLIC PANEL, COMPREHENSIVE  -     AMB POC HEMOGLOBIN A1C  -     LIPID PANEL  -      DIABETES FOOT EXAM  -     MICROALBUMIN, UR, RAND W/ MICROALBUMIN/CREA RATIO    3. Sebaceous cyst    Other orders  -     metFORMIN (GLUCOPHAGE) 1,000 mg tablet; 1  in am and 1 in pm. For diabetes      Follow-up Disposition:  Return in about 2 weeks (around 11/29/2017).

## 2017-11-16 LAB
ALBUMIN SERPL-MCNC: 4.2 G/DL (ref 3.6–4.8)
ALBUMIN/CREAT UR: 6.1 MG/G CREAT (ref 0–30)
ALBUMIN/GLOB SERPL: 1.6 {RATIO} (ref 1.2–2.2)
ALP SERPL-CCNC: 97 IU/L (ref 39–117)
ALT SERPL-CCNC: 20 IU/L (ref 0–32)
AST SERPL-CCNC: 24 IU/L (ref 0–40)
BASOPHILS # BLD AUTO: 0 X10E3/UL (ref 0–0.2)
BASOPHILS NFR BLD AUTO: 0 %
BILIRUB SERPL-MCNC: 0.2 MG/DL (ref 0–1.2)
BUN SERPL-MCNC: 19 MG/DL (ref 8–27)
BUN/CREAT SERPL: 21 (ref 12–28)
CALCIUM SERPL-MCNC: 9.6 MG/DL (ref 8.7–10.3)
CHLORIDE SERPL-SCNC: 101 MMOL/L (ref 96–106)
CHOLEST SERPL-MCNC: 135 MG/DL (ref 100–199)
CO2 SERPL-SCNC: 25 MMOL/L (ref 18–29)
CREAT SERPL-MCNC: 0.92 MG/DL (ref 0.57–1)
CREAT UR-MCNC: 184.8 MG/DL
EOSINOPHIL # BLD AUTO: 0.1 X10E3/UL (ref 0–0.4)
EOSINOPHIL NFR BLD AUTO: 2 %
ERYTHROCYTE [DISTWIDTH] IN BLOOD BY AUTOMATED COUNT: 14.8 % (ref 12.3–15.4)
GFR SERPLBLD CREATININE-BSD FMLA CKD-EPI: 66 ML/MIN/1.73
GFR SERPLBLD CREATININE-BSD FMLA CKD-EPI: 76 ML/MIN/1.73
GLOBULIN SER CALC-MCNC: 2.6 G/DL (ref 1.5–4.5)
GLUCOSE SERPL-MCNC: 85 MG/DL (ref 65–99)
HCT VFR BLD AUTO: 36.5 % (ref 34–46.6)
HDLC SERPL-MCNC: 62 MG/DL
HGB BLD-MCNC: 12.2 G/DL (ref 11.1–15.9)
IMM GRANULOCYTES # BLD: 0 X10E3/UL (ref 0–0.1)
IMM GRANULOCYTES NFR BLD: 0 %
INTERPRETATION, 910389: NORMAL
LDLC SERPL CALC-MCNC: 43 MG/DL (ref 0–99)
LYMPHOCYTES # BLD AUTO: 2.2 X10E3/UL (ref 0.7–3.1)
LYMPHOCYTES NFR BLD AUTO: 45 %
Lab: NORMAL
Lab: NORMAL
MCH RBC QN AUTO: 28.8 PG (ref 26.6–33)
MCHC RBC AUTO-ENTMCNC: 33.4 G/DL (ref 31.5–35.7)
MCV RBC AUTO: 86 FL (ref 79–97)
MICROALBUMIN UR-MCNC: 11.3 UG/ML
MONOCYTES # BLD AUTO: 0.5 X10E3/UL (ref 0.1–0.9)
MONOCYTES NFR BLD AUTO: 11 %
NEUTROPHILS # BLD AUTO: 2.1 X10E3/UL (ref 1.4–7)
NEUTROPHILS NFR BLD AUTO: 42 %
PLATELET # BLD AUTO: 201 X10E3/UL (ref 150–379)
POTASSIUM SERPL-SCNC: 3.7 MMOL/L (ref 3.5–5.2)
PROT SERPL-MCNC: 6.8 G/DL (ref 6–8.5)
RBC # BLD AUTO: 4.24 X10E6/UL (ref 3.77–5.28)
SODIUM SERPL-SCNC: 143 MMOL/L (ref 134–144)
TRIGL SERPL-MCNC: 148 MG/DL (ref 0–149)
VLDLC SERPL CALC-MCNC: 30 MG/DL (ref 5–40)
WBC # BLD AUTO: 5 X10E3/UL (ref 3.4–10.8)

## 2017-11-29 ENCOUNTER — OFFICE VISIT (OUTPATIENT)
Dept: FAMILY MEDICINE CLINIC | Age: 67
End: 2017-11-29

## 2017-11-29 VITALS
HEART RATE: 68 BPM | HEIGHT: 62 IN | SYSTOLIC BLOOD PRESSURE: 126 MMHG | DIASTOLIC BLOOD PRESSURE: 61 MMHG | RESPIRATION RATE: 14 BRPM | BODY MASS INDEX: 27.6 KG/M2 | TEMPERATURE: 97.3 F | WEIGHT: 150 LBS | OXYGEN SATURATION: 95 %

## 2017-11-29 DIAGNOSIS — Z23 ENCOUNTER FOR IMMUNIZATION: Primary | ICD-10-CM

## 2017-11-29 NOTE — PROGRESS NOTES
HISTORY OF PRESENT ILLNESS  Honorio Wellington is a 74 y.o. female. HPI IN for recheck of syst removal and need prevnar vaccine    ROS    Physical Exam Back- no sns infection at site of cyst removal.     ASSESSMENT and PLAN  Orders Placed This Encounter    Pneumococcal conjugate 13 valent, IM (PREVNAR-13)     Diagnoses and all orders for this visit:    1. Encounter for immunization  -     Pneumococcal conjugate 13 valent, IM (PREVNAR-13)    sutures removed.    Follow-up Disposition: Not on File

## 2017-11-29 NOTE — MR AVS SNAPSHOT
Visit Information Date & Time Provider Department Dept. Phone Encounter #  
 11/29/2017 12:00 PM Dustin Arce MD [facility] [phone] [id number] Upcoming Health Maintenance Date Due OSTEOPOROSIS SCREENING (DEXA) 9/10/2017 Pneumococcal 65+ Low/Medium Risk (2 of 2 - PPSV23) 9/10/2017 MEDICARE YEARLY EXAM 9/10/2017 HEMOGLOBIN A1C Q6M 5/15/2018 EYE EXAM RETINAL OR DILATED Q1 9/11/2018 FOOT EXAM Q1 11/15/2018 MICROALBUMIN Q1 11/15/2018 LIPID PANEL Q1 11/15/2018 BREAST CANCER SCRN MAMMOGRAM 8/1/2019 GLAUCOMA SCREENING Q2Y 9/11/2019 COLONOSCOPY 5/2/2026 DTaP/Tdap/Td series (2 - Td) 8/9/2026 Allergies as of 11/29/2017  Review Complete On: 11/29/2017 By: Bola Reynoso Severity Noted Reaction Type Reactions Latex Medium 04/20/2017   Topical Itching Lisinopril High 05/27/2015    Swelling Sulfa (Sulfonamide Antibiotics)  06/09/2010    Itching Current Immunizations  Reviewed on 11/17/2015 Name Date Influenza Vaccine 10/25/2017, 10/21/2014, 10/21/2013 Influenza Vaccine [place] Can) 10/7/2015 Influenza Vaccine (Quad) PF 11/1/2016 Influenza Vaccine Split 10/25/2012, 10/13/2011, 10/19/2010 Tdap 8/9/2016 Varicella Virus Vaccine 10/17/2012 ZZZ-RETIRED (DO NOT USE) Pneumococcal Vaccine (Unspecified Type) 10/19/2010 Not reviewed this visit You Were Diagnosed With   
  
 Codes Comments Encounter for immunization    -  Primary ICD-10-CM: L68 ICD-9-CM: V03.89 Vitals BP Pulse Temp Resp Height(growth percentile) Weight(growth percentile) 126/61 (BP 1 Location: Right arm, BP Patient Position: Sitting) 68 97.3 °F (36.3 °C) 14 5' 2\" (1.575 m) 150 lb (68 kg) LMP SpO2 BMI OB Status Smoking Status (LMP Unknown) 95% 27.44 kg/m2 Postmenopausal Former Smoker BMI and BSA Data Body Mass Index Body Surface Area  
 27.44 kg/m 2 1.72 m 2 Preferred Pharmacy Pharmacy Name Phone [address] [phone] Your Updated Medication List  
  
   
This list is accurate as of: 11/29/17  1:13 PM.  Always use your most recent med list.  
  
  
  
  
 albuterol 90 mcg/actuation inhaler Commonly known as:  PROVENTIL HFA, VENTOLIN HFA, PROAIR HFA This is a rescue medication for when you are short of breath: 2 puffs every 4 hrs PRN wheezing/SOB (1 for home/1 for work/school)  
  
 atorvastatin 20 mg tablet Commonly known as:  LIPITOR Take 1 Tab by mouth daily. For cholesterol  
  
 citalopram 20 mg tablet Commonly known as:  CELEXA  
TAKE ONE TABLET BY MOUTH ONE TIME DAILY  
  
 famotidine 40 mg tablet Commonly known as:  PEPCID  
TAKE 1 TABLET BY MOUTH AT BEDTIME  
  
 fexofenadine 180 mg tablet Commonly known as:  Jo Ann Saran Take 1 Tab by mouth daily. glucose blood VI test strips strip Commonly known as:  ACCU-CHEK SOFIA PLUS TEST STRP Test blood sugar 2 - 4 times daily  
  
 hydroCHLOROthiazide 12.5 mg tablet Commonly known as:  HYDRODIURIL Take 1 Tab by mouth daily. For blood pressure Lancets Misc Commonly known as:  ACCU-CHEK MULTICLIX LANCET  
test blood sugar as directed by physician. Dx code E11.9  
  
 metFORMIN 1,000 mg tablet Commonly known as:  GLUCOPHAGE  
1  in am and 1 in pm. For diabetes  
  
 pantoprazole 40 mg tablet Commonly known as:  PROTONIX PURELAX 17 gram packet Generic drug:  polyethylene glycol DRINK 1 PACKET DISSOLVED IN WATER ONCE A DAY: takes at night ZyrTEC 10 mg tablet Generic drug:  cetirizine Take  by mouth. Patient Instructions Vaccine Information Statement Pneumococcal Polysaccharide Vaccine: What You Need to Know Many Vaccine Information Statements are available in Greek and other languages. See www.immunize.org/vis.  
Hojas de información Sobre Vacunas están disponibles en español y en eugenia otros marcia. Visite Bob.si. 1. Why get vaccinated? Vaccination can protect older adults (and some children and younger adults) from pneumococcal disease. Pneumococcal disease is caused by bacteria that can spread from person to person through close contact. It can cause ear infections, and it can also lead to more serious infections of the: 
 Lungs (pneumonia),  Blood (bacteremia), and 
 Covering of the brain and spinal cord (meningitis). Meningitis can cause deafness and brain damage, and it can be fatal.   
 
Anyone can get pneumococcal disease, but children under 3years of age, people with certain medical conditions, adults over 72years of age, and cigarette smokers are at the highest risk. About 18,000 older adults die each year from pneumococcal disease in the [place]. Treatment of pneumococcal infections with penicillin and other drugs used to be more effective. But some strains of the disease have become resistant to these drugs. This makes prevention of the disease, through vaccination, even more important. 2. Pneumococcal polysaccharide vaccine (PPSV23) Pneumococcal polysaccharide vaccine (PPSV23) protects against 23 types of pneumococcal bacteria. It will not prevent all pneumococcal disease. PPSV23 is recommended for:  All adults 72years of age and older,  Anyone 2 through 59years of age with certain long-term health problems, 
 Anyone 2 through 59years of age with a weakened immune system, 
 Adults 23 through 59years of age who smoke cigarettes or have asthma. Most people need only one dose of PPSV. A second dose is recommended for certain high-risk groups. People 72 and older should get a dose even if they have gotten one or more doses of the vaccine before they turned 65. Your healthcare provider can give you more information about these recommendations. Most healthy adults develop protection within 2 to 3 weeks of getting the shot. 3. Some people should not get this vaccine  Anyone who has had a life-threatening allergic reaction to PPSV should not get another dose.  Anyone who has a severe allergy to any component of PPSV should not receive it. Tell your provider if you have any severe allergies.  Anyone who is moderately or severely ill when the shot is scheduled may be asked to wait until they recover before getting the vaccine. Someone with a mild illness can usually be vaccinated.  Children less than 3years of age should not receive this vaccine.  There is no evidence that PPSV is harmful to either a pregnant woman or to her fetus. However, as a precaution, women who need the vaccine should be vaccinated before becoming pregnant, if possible. 4. Risks of a vaccine reaction With any medicine, including vaccines, there is a chance of side effects. These are usually mild and go away on their own, but serious reactions are also possible. About half of people who get PPSV have mild side effects, such as redness or pain where the shot is given, which go away within about two days. Less than 1 out of 100 people develop a fever, muscle aches, or more severe local reactions. Problems that could happen after any vaccine:  People sometimes faint after a medical procedure, including vaccination. Sitting or lying down for about 15 minutes can help prevent fainting, and injuries caused by a fall. Tell your doctor if you feel dizzy, or have vision changes or ringing in the ears.  Some people get severe pain in the shoulder and have difficulty moving the arm where a shot was given. This happens very rarely.  Any medication can cause a severe allergic reaction. Such reactions from a vaccine are very rare, estimated at about 1 in a million doses, and would happen within a few minutes to a few hours after the vaccination. As with any medicine, there is a very remote chance of a vaccine causing a serious injury or death. The safety of vaccines is always being monitored. For more information, visit: www.cdc.gov/vaccinesafety/  
 
5. What if there is a serious reaction? What should I look for? Look for anything that concerns you, such as signs of a severe allergic reaction, very high fever, or unusual behavior. Signs of a severe allergic reaction can include hives, swelling of the face and throat, difficulty breathing, a fast heartbeat, dizziness, and weakness. These would usually start a few minutes to a few hours after the vaccination. What should I do? If you think it is a severe allergic reaction or other emergency that cant wait, call 9-1-1 or get to the nearest hospital. Otherwise, call your doctor. Afterward, the reaction should be reported to the Vaccine Adverse Event Reporting System (VAERS). Your doctor might file this report, or you can do it yourself through the VAERS web site at www.vaers. [facility].gov, or by calling 1-213.838.2519. VAERS does not give medical advice. 6. How can I learn more?  Ask your doctor. He or she can give you the vaccine package insert or suggest other sources of information.  Call your local or state health department.  Contact the Centers for Disease Control and Prevention (CDC): 
- Call 4-299.243.4298 (1-800-CDC-INFO) or 
- Visit CDCs website at www.cdc.gov/vaccines Vaccine Information Statement PPSV  
04/24/2015 Department of Health and [employer] Centers for Disease Control and Prevention Office Use Only Introducing [facility]! Dear Pasha James: Thank you for requesting a [employer]-One account. Our records indicate that you already have an active N-of-One account. You can access your account anytime at [email or web address] Did you know that you can access your hospital and ER discharge instructions at any time in Ryonet? You can also review all of your test results from your hospital stay or ER visit. Additional Information If you have questions, please visit the Frequently Asked Questions section of the Ryonet website at [email or web address] Remember, Ryonet is NOT to be used for urgent needs. For medical emergencies, dial 911. Now available from your iPhone and Android! Please provide this summary of care documentation to your next provider. Your primary care clinician is listed as Rox Hardin. If you have any questions after today's visit, please call [phone].

## 2017-11-29 NOTE — PATIENT INSTRUCTIONS
Vaccine Information Statement    Pneumococcal Polysaccharide Vaccine: What You Need to Know    Many Vaccine Information Statements are available in Georgian and other languages. See www.immunize.org/vis. Hojas de información Sobre Vacunas están disponibles en español y en muchos otros idiomas. Visite Bob.si. 1. Why get vaccinated? Vaccination can protect older adults (and some children and younger adults) from pneumococcal disease. Pneumococcal disease is caused by bacteria that can spread from person to person through close contact. It can cause ear infections, and it can also lead to more serious infections of the:   Lungs (pneumonia),   Blood (bacteremia), and   Covering of the brain and spinal cord (meningitis). Meningitis can cause deafness and brain damage, and it can be fatal.      Anyone can get pneumococcal disease, but children under 3years of age, people with certain medical conditions, adults over 72years of age, and cigarette smokers are at the highest risk. About 18,000 older adults die each year from pneumococcal disease in the [place]. Treatment of pneumococcal infections with penicillin and other drugs used to be more effective. But some strains of the disease have become resistant to these drugs. This makes prevention of the disease, through vaccination, even more important. 2. Pneumococcal polysaccharide vaccine (PPSV23)    Pneumococcal polysaccharide vaccine (PPSV23) protects against 23 types of pneumococcal bacteria. It will not prevent all pneumococcal disease. PPSV23 is recommended for:   All adults 72years of age and older,   Anyone 2 through 59years of age with certain long-term health problems,   Anyone 2 through 59years of age with a weakened immune system,   Adults 23 through 59years of age who smoke cigarettes or have asthma. Most people need only one dose of PPSV.   A second dose is recommended for certain high-risk groups. People 72 and older should get a dose even if they have gotten one or more doses of the vaccine before they turned 65. Your healthcare provider can give you more information about these recommendations. Most healthy adults develop protection within 2 to 3 weeks of getting the shot. 3. Some people should not get this vaccine     Anyone who has had a life-threatening allergic reaction to PPSV should not get another dose.  Anyone who has a severe allergy to any component of PPSV should not receive it. Tell your provider if you have any severe allergies.  Anyone who is moderately or severely ill when the shot is scheduled may be asked to wait until they recover before getting the vaccine. Someone with a mild illness can usually be vaccinated.  Children less than 3years of age should not receive this vaccine.  There is no evidence that PPSV is harmful to either a pregnant woman or to her fetus. However, as a precaution, women who need the vaccine should be vaccinated before becoming pregnant, if possible. 4. Risks of a vaccine reaction    With any medicine, including vaccines, there is a chance of side effects. These are usually mild and go away on their own, but serious reactions are also possible. About half of people who get PPSV have mild side effects, such as redness or pain where the shot is given, which go away within about two days. Less than 1 out of 100 people develop a fever, muscle aches, or more severe local reactions. Problems that could happen after any vaccine:     People sometimes faint after a medical procedure, including vaccination. Sitting or lying down for about 15 minutes can help prevent fainting, and injuries caused by a fall. Tell your doctor if you feel dizzy, or have vision changes or ringing in the ears.  Some people get severe pain in the shoulder and have difficulty moving the arm where a shot was given.  This happens very rarely.  Any medication can cause a severe allergic reaction. Such reactions from a vaccine are very rare, estimated at about 1 in a million doses, and would happen within a few minutes to a few hours after the vaccination. As with any medicine, there is a very remote chance of a vaccine causing a serious injury or death. The safety of vaccines is always being monitored. For more information, visit: www.cdc.gov/vaccinesafety/     5. What if there is a serious reaction? What should I look for? Look for anything that concerns you, such as signs of a severe allergic reaction, very high fever, or unusual behavior. Signs of a severe allergic reaction can include hives, swelling of the face and throat, difficulty breathing, a fast heartbeat, dizziness, and weakness. These would usually start a few minutes to a few hours after the vaccination. What should I do? If you think it is a severe allergic reaction or other emergency that cant wait, call 9-1-1 or get to the nearest hospital. Otherwise, call your doctor. Afterward, the reaction should be reported to the Vaccine Adverse Event Reporting System (VAERS). Your doctor might file this report, or you can do it yourself through the VAERS web site at www.vaers. [facility].gov, or by calling 0-808.963.9746. [employer] does not give medical advice. 6. How can I learn more?  Ask your doctor. He or she can give you the vaccine package insert or suggest other sources of information.  Call your local or state health department.    Contact the Centers for Disease Control and Prevention (CDC):  - Call 2-246.380.6118 (1-800-CDC-INFO) or  - Visit CDCs website at [employer] 18 04/24/2015    [facility] for Disease Control and Prevention    Office Use Only

## 2017-11-29 NOTE — PROGRESS NOTES
Name and  Verified    Chief Complaint   Patient presents with    Diabetes     f/u    Suture Removal     from back     1. Have you been to the ER, urgent care clinic since your last visit? Hospitalized since your last visit? No    2. Have you seen or consulted any other health care providers outside of the [address] [place] [address] since your last visit? Include any pap smears or colon screening.  No    Verbal Order per Dr. Shelli Webber for Prevnar-13  Given in  Left Deltoid  Injection given without difficulty  Pt tolerated well with no reaction

## 2017-12-11 ENCOUNTER — ANESTHESIA EVENT (OUTPATIENT)
Dept: SURGERY | Age: 67
End: 2017-12-11
Payer: MEDICARE

## 2017-12-12 ENCOUNTER — ANESTHESIA (OUTPATIENT)
Dept: SURGERY | Age: 67
End: 2017-12-12
Payer: MEDICARE

## 2017-12-12 ENCOUNTER — HOSPITAL ENCOUNTER (OUTPATIENT)
Age: 67
Setting detail: OUTPATIENT SURGERY
Discharge: HOME | End: 2017-12-12
Attending: PODIATRIST | Admitting: PODIATRIST
Payer: MEDICARE

## 2017-12-12 VITALS
RESPIRATION RATE: 12 BRPM | WEIGHT: 150 LBS | SYSTOLIC BLOOD PRESSURE: 122 MMHG | TEMPERATURE: 98.5 F | BODY MASS INDEX: 27.6 KG/M2 | HEART RATE: 70 BPM | OXYGEN SATURATION: 94 % | DIASTOLIC BLOOD PRESSURE: 65 MMHG | HEIGHT: 62 IN

## 2017-12-12 LAB
GLUCOSE BLD STRIP.AUTO-MCNC: 108 MG/DL (ref 65–100)
GLUCOSE BLD STRIP.AUTO-MCNC: 125 MG/DL (ref 65–100)
SERVICE CMNT-IMP: ABNORMAL
SERVICE CMNT-IMP: ABNORMAL

## 2017-12-12 PROCEDURE — 74011250636 HC RX REV CODE- 250/636: Performed by: PODIATRIST

## 2017-12-12 PROCEDURE — 76210000023 HC REC RM PH II 2 TO 2.5 HR: Performed by: PODIATRIST

## 2017-12-12 PROCEDURE — 77030018836 HC SOL IRR NACL ICUM -A: Performed by: PODIATRIST

## 2017-12-12 PROCEDURE — 77030006773 HC BLD SAW OSC BRSM -A: Performed by: PODIATRIST

## 2017-12-12 PROCEDURE — 74011250636 HC RX REV CODE- 250/636: Performed by: ANESTHESIOLOGY

## 2017-12-12 PROCEDURE — 74011250636 HC RX REV CODE- 250/636

## 2017-12-12 PROCEDURE — 77030008684 HC TU ET CUF COVD -B: Performed by: ANESTHESIOLOGY

## 2017-12-12 PROCEDURE — 82962 GLUCOSE BLOOD TEST: CPT

## 2017-12-12 PROCEDURE — 76210000016 HC OR PH I REC 1 TO 1.5 HR: Performed by: PODIATRIST

## 2017-12-12 PROCEDURE — 77030028224 HC PDNG CST BSNM -A: Performed by: PODIATRIST

## 2017-12-12 PROCEDURE — 77030002933 HC SUT MCRYL J&J -A: Performed by: PODIATRIST

## 2017-12-12 PROCEDURE — 77030026438 HC STYL ET INTUB CARD -A: Performed by: ANESTHESIOLOGY

## 2017-12-12 PROCEDURE — 77030013079 HC BLNKT BAIR HGGR 3M -A: Performed by: ANESTHESIOLOGY

## 2017-12-12 PROCEDURE — 74011000250 HC RX REV CODE- 250: Performed by: PODIATRIST

## 2017-12-12 PROCEDURE — 74011250637 HC RX REV CODE- 250/637: Performed by: ANESTHESIOLOGY

## 2017-12-12 PROCEDURE — 77030020754 HC CUF TRNQT 2BLA STRY -B: Performed by: PODIATRIST

## 2017-12-12 PROCEDURE — 77030031139 HC SUT VCRL2 J&J -A: Performed by: PODIATRIST

## 2017-12-12 PROCEDURE — 77030020274 HC MISC IMPL ORTHOPEDIC: Performed by: PODIATRIST

## 2017-12-12 PROCEDURE — 76060000035 HC ANESTHESIA 2 TO 2.5 HR: Performed by: PODIATRIST

## 2017-12-12 PROCEDURE — 77030020275 HC MISC ORTHOPEDIC: Performed by: PODIATRIST

## 2017-12-12 PROCEDURE — 76010000131 HC OR TIME 2 TO 2.5 HR: Performed by: PODIATRIST

## 2017-12-12 PROCEDURE — 74011000250 HC RX REV CODE- 250

## 2017-12-12 PROCEDURE — C1713 ANCHOR/SCREW BN/BN,TIS/BN: HCPCS | Performed by: PODIATRIST

## 2017-12-12 PROCEDURE — 77030011640 HC PAD GRND REM COVD -A: Performed by: PODIATRIST

## 2017-12-12 RX ORDER — PROPOFOL 10 MG/ML
INJECTION, EMULSION INTRAVENOUS AS NEEDED
Status: DISCONTINUED | OUTPATIENT
Start: 2017-12-12 | End: 2017-12-12 | Stop reason: HOSPADM

## 2017-12-12 RX ORDER — ASPIRIN 325 MG
325 TABLET ORAL DAILY
Qty: 30 TAB | Refills: 0 | Status: SHIPPED | OUTPATIENT
Start: 2017-12-12 | End: 2018-07-29

## 2017-12-12 RX ORDER — CEFAZOLIN SODIUM/WATER 2 G/20 ML
2 SYRINGE (ML) INTRAVENOUS ONCE
Status: COMPLETED | OUTPATIENT
Start: 2017-12-12 | End: 2017-12-12

## 2017-12-12 RX ORDER — BUPIVACAINE HYDROCHLORIDE 5 MG/ML
30 INJECTION, SOLUTION EPIDURAL; INTRACAUDAL ONCE
Status: COMPLETED | OUTPATIENT
Start: 2017-12-12 | End: 2017-12-12

## 2017-12-12 RX ORDER — ROCURONIUM BROMIDE 10 MG/ML
INJECTION, SOLUTION INTRAVENOUS AS NEEDED
Status: DISCONTINUED | OUTPATIENT
Start: 2017-12-12 | End: 2017-12-12 | Stop reason: HOSPADM

## 2017-12-12 RX ORDER — KETOROLAC TROMETHAMINE 30 MG/ML
INJECTION, SOLUTION INTRAMUSCULAR; INTRAVENOUS AS NEEDED
Status: DISCONTINUED | OUTPATIENT
Start: 2017-12-12 | End: 2017-12-12 | Stop reason: HOSPADM

## 2017-12-12 RX ORDER — MIDAZOLAM HYDROCHLORIDE 1 MG/ML
0.5 INJECTION, SOLUTION INTRAMUSCULAR; INTRAVENOUS
Status: DISCONTINUED | OUTPATIENT
Start: 2017-12-12 | End: 2017-12-13 | Stop reason: HOSPADM

## 2017-12-12 RX ORDER — FENTANYL CITRATE 50 UG/ML
50 INJECTION, SOLUTION INTRAMUSCULAR; INTRAVENOUS AS NEEDED
Status: DISCONTINUED | OUTPATIENT
Start: 2017-12-12 | End: 2017-12-12 | Stop reason: HOSPADM

## 2017-12-12 RX ORDER — OXYCODONE AND ACETAMINOPHEN 5; 325 MG/1; MG/1
1-2 TABLET ORAL
Qty: 50 TAB | Refills: 0 | Status: SHIPPED | OUTPATIENT
Start: 2017-12-12 | End: 2018-05-29 | Stop reason: ALTCHOICE

## 2017-12-12 RX ORDER — MORPHINE SULFATE 10 MG/ML
2 INJECTION, SOLUTION INTRAMUSCULAR; INTRAVENOUS
Status: DISCONTINUED | OUTPATIENT
Start: 2017-12-12 | End: 2017-12-13 | Stop reason: HOSPADM

## 2017-12-12 RX ORDER — MIDAZOLAM HYDROCHLORIDE 1 MG/ML
1 INJECTION, SOLUTION INTRAMUSCULAR; INTRAVENOUS AS NEEDED
Status: DISCONTINUED | OUTPATIENT
Start: 2017-12-12 | End: 2017-12-12 | Stop reason: HOSPADM

## 2017-12-12 RX ORDER — FENTANYL CITRATE 50 UG/ML
25 INJECTION, SOLUTION INTRAMUSCULAR; INTRAVENOUS
Status: DISCONTINUED | OUTPATIENT
Start: 2017-12-12 | End: 2017-12-13 | Stop reason: HOSPADM

## 2017-12-12 RX ORDER — SODIUM CHLORIDE 0.9 % (FLUSH) 0.9 %
5-10 SYRINGE (ML) INJECTION AS NEEDED
Status: DISCONTINUED | OUTPATIENT
Start: 2017-12-12 | End: 2017-12-13 | Stop reason: HOSPADM

## 2017-12-12 RX ORDER — ONDANSETRON 2 MG/ML
4 INJECTION INTRAMUSCULAR; INTRAVENOUS AS NEEDED
Status: DISCONTINUED | OUTPATIENT
Start: 2017-12-12 | End: 2017-12-13 | Stop reason: HOSPADM

## 2017-12-12 RX ORDER — SUCCINYLCHOLINE CHLORIDE 20 MG/ML
INJECTION INTRAMUSCULAR; INTRAVENOUS AS NEEDED
Status: DISCONTINUED | OUTPATIENT
Start: 2017-12-12 | End: 2017-12-12 | Stop reason: HOSPADM

## 2017-12-12 RX ORDER — SODIUM CHLORIDE, SODIUM LACTATE, POTASSIUM CHLORIDE, CALCIUM CHLORIDE 600; 310; 30; 20 MG/100ML; MG/100ML; MG/100ML; MG/100ML
125 INJECTION, SOLUTION INTRAVENOUS CONTINUOUS
Status: DISCONTINUED | OUTPATIENT
Start: 2017-12-12 | End: 2017-12-13 | Stop reason: HOSPADM

## 2017-12-12 RX ORDER — CEPHALEXIN 500 MG/1
500 CAPSULE ORAL 2 TIMES DAILY
Qty: 14 CAP | Refills: 0 | Status: SHIPPED | OUTPATIENT
Start: 2017-12-12 | End: 2017-12-19

## 2017-12-12 RX ORDER — SODIUM CHLORIDE 0.9 % (FLUSH) 0.9 %
5-10 SYRINGE (ML) INJECTION EVERY 8 HOURS
Status: DISCONTINUED | OUTPATIENT
Start: 2017-12-12 | End: 2017-12-12 | Stop reason: HOSPADM

## 2017-12-12 RX ORDER — SODIUM CHLORIDE, SODIUM LACTATE, POTASSIUM CHLORIDE, CALCIUM CHLORIDE 600; 310; 30; 20 MG/100ML; MG/100ML; MG/100ML; MG/100ML
125 INJECTION, SOLUTION INTRAVENOUS CONTINUOUS
Status: DISCONTINUED | OUTPATIENT
Start: 2017-12-12 | End: 2017-12-12 | Stop reason: HOSPADM

## 2017-12-12 RX ORDER — DEXAMETHASONE SODIUM PHOSPHATE 4 MG/ML
INJECTION, SOLUTION INTRA-ARTICULAR; INTRALESIONAL; INTRAMUSCULAR; INTRAVENOUS; SOFT TISSUE AS NEEDED
Status: DISCONTINUED | OUTPATIENT
Start: 2017-12-12 | End: 2017-12-12 | Stop reason: HOSPADM

## 2017-12-12 RX ORDER — MIDAZOLAM HYDROCHLORIDE 1 MG/ML
INJECTION, SOLUTION INTRAMUSCULAR; INTRAVENOUS AS NEEDED
Status: DISCONTINUED | OUTPATIENT
Start: 2017-12-12 | End: 2017-12-12 | Stop reason: HOSPADM

## 2017-12-12 RX ORDER — ONDANSETRON 2 MG/ML
INJECTION INTRAMUSCULAR; INTRAVENOUS AS NEEDED
Status: DISCONTINUED | OUTPATIENT
Start: 2017-12-12 | End: 2017-12-12 | Stop reason: HOSPADM

## 2017-12-12 RX ORDER — OXYCODONE AND ACETAMINOPHEN 5; 325 MG/1; MG/1
1 TABLET ORAL AS NEEDED
Status: DISCONTINUED | OUTPATIENT
Start: 2017-12-12 | End: 2017-12-13 | Stop reason: HOSPADM

## 2017-12-12 RX ORDER — LABETALOL HYDROCHLORIDE 5 MG/ML
INJECTION, SOLUTION INTRAVENOUS AS NEEDED
Status: DISCONTINUED | OUTPATIENT
Start: 2017-12-12 | End: 2017-12-12 | Stop reason: HOSPADM

## 2017-12-12 RX ORDER — DIPHENHYDRAMINE HYDROCHLORIDE 50 MG/ML
12.5 INJECTION, SOLUTION INTRAMUSCULAR; INTRAVENOUS AS NEEDED
Status: ACTIVE | OUTPATIENT
Start: 2017-12-12 | End: 2017-12-12

## 2017-12-12 RX ORDER — LIDOCAINE HYDROCHLORIDE 10 MG/ML
20 INJECTION INFILTRATION; PERINEURAL ONCE
Status: COMPLETED | OUTPATIENT
Start: 2017-12-12 | End: 2017-12-12

## 2017-12-12 RX ORDER — LIDOCAINE HYDROCHLORIDE 20 MG/ML
INJECTION, SOLUTION EPIDURAL; INFILTRATION; INTRACAUDAL; PERINEURAL AS NEEDED
Status: DISCONTINUED | OUTPATIENT
Start: 2017-12-12 | End: 2017-12-12 | Stop reason: HOSPADM

## 2017-12-12 RX ORDER — SODIUM CHLORIDE 9 MG/ML
25 INJECTION, SOLUTION INTRAVENOUS CONTINUOUS
Status: DISCONTINUED | OUTPATIENT
Start: 2017-12-12 | End: 2017-12-12 | Stop reason: HOSPADM

## 2017-12-12 RX ORDER — LIDOCAINE HYDROCHLORIDE 10 MG/ML
0.1 INJECTION, SOLUTION EPIDURAL; INFILTRATION; INTRACAUDAL; PERINEURAL AS NEEDED
Status: DISCONTINUED | OUTPATIENT
Start: 2017-12-12 | End: 2017-12-12 | Stop reason: HOSPADM

## 2017-12-12 RX ORDER — PROMETHAZINE HYDROCHLORIDE 25 MG/1
25 TABLET ORAL
Qty: 30 TAB | Refills: 0 | Status: SHIPPED | OUTPATIENT
Start: 2017-12-12 | End: 2018-05-29 | Stop reason: ALTCHOICE

## 2017-12-12 RX ORDER — SODIUM CHLORIDE, SODIUM LACTATE, POTASSIUM CHLORIDE, CALCIUM CHLORIDE 600; 310; 30; 20 MG/100ML; MG/100ML; MG/100ML; MG/100ML
INJECTION, SOLUTION INTRAVENOUS
Status: DISCONTINUED | OUTPATIENT
Start: 2017-12-12 | End: 2017-12-12 | Stop reason: HOSPADM

## 2017-12-12 RX ORDER — SODIUM CHLORIDE 0.9 % (FLUSH) 0.9 %
5-10 SYRINGE (ML) INJECTION AS NEEDED
Status: DISCONTINUED | OUTPATIENT
Start: 2017-12-12 | End: 2017-12-12 | Stop reason: HOSPADM

## 2017-12-12 RX ORDER — FENTANYL CITRATE 50 UG/ML
INJECTION, SOLUTION INTRAMUSCULAR; INTRAVENOUS AS NEEDED
Status: DISCONTINUED | OUTPATIENT
Start: 2017-12-12 | End: 2017-12-12 | Stop reason: HOSPADM

## 2017-12-12 RX ADMIN — SODIUM CHLORIDE, SODIUM LACTATE, POTASSIUM CHLORIDE, AND CALCIUM CHLORIDE 125 ML/HR: 600; 310; 30; 20 INJECTION, SOLUTION INTRAVENOUS at 12:23

## 2017-12-12 RX ADMIN — MEPERIDINE HYDROCHLORIDE 12.5 MG: 25 INJECTION INTRAMUSCULAR; INTRAVENOUS; SUBCUTANEOUS at 17:37

## 2017-12-12 RX ADMIN — OXYCODONE HYDROCHLORIDE AND ACETAMINOPHEN 1 TABLET: 5; 325 TABLET ORAL at 15:55

## 2017-12-12 RX ADMIN — LABETALOL HYDROCHLORIDE 7.5 MG: 5 INJECTION, SOLUTION INTRAVENOUS at 14:00

## 2017-12-12 RX ADMIN — MIDAZOLAM HYDROCHLORIDE 2 MG: 1 INJECTION, SOLUTION INTRAMUSCULAR; INTRAVENOUS at 12:36

## 2017-12-12 RX ADMIN — FENTANYL CITRATE 50 MCG: 50 INJECTION, SOLUTION INTRAMUSCULAR; INTRAVENOUS at 13:00

## 2017-12-12 RX ADMIN — ROCURONIUM BROMIDE 10 MG: 10 INJECTION, SOLUTION INTRAVENOUS at 12:48

## 2017-12-12 RX ADMIN — SUCCINYLCHOLINE CHLORIDE 30 MG: 20 INJECTION INTRAMUSCULAR; INTRAVENOUS at 12:53

## 2017-12-12 RX ADMIN — ONDANSETRON 4 MG: 2 INJECTION INTRAMUSCULAR; INTRAVENOUS at 16:55

## 2017-12-12 RX ADMIN — SUCCINYLCHOLINE CHLORIDE 140 MG: 20 INJECTION INTRAMUSCULAR; INTRAVENOUS at 12:48

## 2017-12-12 RX ADMIN — SODIUM CHLORIDE, SODIUM LACTATE, POTASSIUM CHLORIDE, CALCIUM CHLORIDE: 600; 310; 30; 20 INJECTION, SOLUTION INTRAVENOUS at 12:36

## 2017-12-12 RX ADMIN — PROPOFOL 50 MG: 10 INJECTION, EMULSION INTRAVENOUS at 14:11

## 2017-12-12 RX ADMIN — Medication 2 G: at 12:59

## 2017-12-12 RX ADMIN — LIDOCAINE HYDROCHLORIDE 60 MG: 20 INJECTION, SOLUTION EPIDURAL; INFILTRATION; INTRACAUDAL; PERINEURAL at 12:48

## 2017-12-12 RX ADMIN — KETOROLAC TROMETHAMINE 15 MG: 30 INJECTION, SOLUTION INTRAMUSCULAR; INTRAVENOUS at 14:40

## 2017-12-12 RX ADMIN — DEXAMETHASONE SODIUM PHOSPHATE 8 MG: 4 INJECTION, SOLUTION INTRA-ARTICULAR; INTRALESIONAL; INTRAMUSCULAR; INTRAVENOUS; SOFT TISSUE at 14:31

## 2017-12-12 RX ADMIN — PROPOFOL 100 MG: 10 INJECTION, EMULSION INTRAVENOUS at 14:00

## 2017-12-12 RX ADMIN — PROPOFOL 150 MG: 10 INJECTION, EMULSION INTRAVENOUS at 12:48

## 2017-12-12 RX ADMIN — PROPOFOL 50 MG: 10 INJECTION, EMULSION INTRAVENOUS at 12:53

## 2017-12-12 RX ADMIN — ONDANSETRON 4 MG: 2 INJECTION INTRAMUSCULAR; INTRAVENOUS at 14:31

## 2017-12-12 RX ADMIN — FENTANYL CITRATE 50 MCG: 50 INJECTION, SOLUTION INTRAMUSCULAR; INTRAVENOUS at 12:48

## 2017-12-12 NOTE — ANESTHESIA PREPROCEDURE EVALUATION
Anesthetic History   No history of anesthetic complications            Review of Systems / Medical History  Patient summary reviewed, nursing notes reviewed and pertinent labs reviewed    Pulmonary            Asthma : well controlled    Comments: Former smoker - Quit 2010 - 25 pack years   Neuro/Psych         Psychiatric history    Comments: Depression Cardiovascular    Hypertension: well controlled              Exercise tolerance: >4 METS     GI/Hepatic/Renal     GERD: well controlled    Renal disease: stones      Comments: Biliary Dyskinesia Endo/Other    Diabetes: well controlled, type 2    Arthritis     Other Findings              Physical Exam    Airway  Mallampati: II  TM Distance: > 6 cm  Neck ROM: normal range of motion   Mouth opening: Normal     Cardiovascular  Regular rate and rhythm,  S1 and S2 normal,  no murmur, click, rub, or gallop             Dental  No notable dental hx       Pulmonary  Breath sounds clear to auscultation               Abdominal  GI exam deferred       Other Findings            Anesthetic Plan    ASA: 3  Anesthesia type: general          Induction: Intravenous  Anesthetic plan and risks discussed with: Patient

## 2017-12-12 NOTE — BRIEF OP NOTE
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  Item Serial No.  Lot No. LRB No. Used Action   Lapidus Plate   NA PARAGON 28 NA Left 1 Implanted   Short THread Screw   NA PARAGON 28 NA Left 1 Implanted   2.7 X 20mm Locking Plate Screw   NA PARAGON 28 NA Left 1 Implanted   2.7 X 16mm Locking Plate Screw   NA PARAGON 28 NA Left 2 Implanted   2.7 X 18mm Locking Plate Screw   NA PARAGON 28 NA Left 1 Implanted   Non-Locking Plate Screw     NA PARAGON 28 NA Left 1 Implanted

## 2017-12-12 NOTE — DISCHARGE INSTRUCTIONS
Bunionectomy: What to Expect at [address] had bunion surgery to remove a lump of bone (bunion) from the joint where your big toe joins your foot, and to straighten your big toe. You will have pain and swelling that slowly improves in the 6 weeks after surgery. You may have some minor pain and swelling that lasts as long as 6 months to a year. After surgery, you will need to wear a cast or a special type of shoe to protect your toe and to keep it in the right position for at least 3 to 6 weeks. After some types of surgeries, a cast or special shoe is used for a few months. Your doctor will remove your stitches or sutures about 2 weeks after the surgery. If you have removable pins holding your toe in place, they are usually removed in about 4 to 6 weeks. This care sheet gives you a general idea about how long it will take for you to recover. But each person recovers at a different pace. Follow the steps below to get better as quickly as possible. How can you care for yourself at home? Activity  ? · Rest when you feel tired. Getting enough sleep will help you recover. ? · Ask your doctor when you can drive again. ? · You may shower, unless your doctor tells you not to. Keep the bandage dry. If the bandage has been removed, you can wash the area with warm water and soap. Pat the area dry. ? · You will probably need to take several weeks off from work. How much time you need to take off depends on the type of work you do and the extent of your surgery. ? · You may need to avoid heavy lifting for 3 to 8 weeks or longer, depending on the type of surgery you had.   ? · You may need to do regular rehabilitation (rehab) exercises to strengthen your foot and improve movement. Start out slowly, and follow your doctor's instructions. Diet  ? · You can eat your normal diet. If your stomach is upset, try bland, low-fat foods like plain rice, broiled chicken, toast, and yogurt.    ? · You may notice that your bowel movements are not regular right after your surgery. This is common. Try to avoid constipation and straining with bowel movements. You may want to take a fiber supplement every day. If you have not had a bowel movement after a couple of days, ask your doctor about taking a mild laxative. Medicines  ? · Your doctor will tell you if and when you can restart your medicines. He or she will also give you instructionsabout taking any new medicines. ? · If you take blood thinners, such as warfarin (Coumadin), clopidogrel (Plavix), or aspirin, be sure to talk to yourdoctor. He or she will tell you if and when to start taking those medicines again. Make sure that you understandexactly what your doctor wants you to do. ? · Be safe with medicines. Take pain medicines exactly as directed. ¨ If the doctor gave you a prescription medicine for pain, take it as prescribed. ¨ If you are not taking a prescription pain medicine, ask your doctor if you can take an over-the-counter medicine. ? · If your doctor prescribed antibiotics, take them as directed. Do not stop taking them just because you feel better. You need to take the full course of antibiotics. ? · If you think your pain medicine is making you sick to your stomach:  ¨ Take your medicine after meals (unless your doctor has told you not to). ¨ Ask your doctor for a different pain medicine. Incision care  ? · You will leave the hospital with bandages holding your toe in the correct position. Your doctor will probably remove the bandages after several days. Or your doctor may have you remove your bandages at home. Do not touch the surgery area. Keep it dry. ? · Do not soak your foot until your doctor says it is okay. Ice and elevation  ? · For pain and swelling, put ice or a cold pack on your foot for 10 to 20 minutes each hour. Put a thin cloth between the ice and your skin.    ? · Prop up your foot and leg on a pillow when you ice it or anytime you sit or lie down during the next 3 days. Try to keep it above the level of your heart. This will help reduce swelling. Follow-up care is a key part of your treatment and safety. Be sure to make and go to all appointments, and call your doctor if you are having problems. It's also a good idea to know your test results and keep a list of the medicines you take. When should you call for help? Call 911 anytime you think you may need emergency care. For example, call if:  ? · You passed out (lost consciousness). ? · You have sudden chest pain and shortness of breath, or you cough up blood. ? · You have severe trouble breathing. ?Call your doctor now or seek immediate medical care if:  ? · Your foot or toe is cool or pale or changes color. ? · You have numbness, tingling, or less feeling in your foot or toes. ? · You have pain that does not get better after you take pain medicine. ? · You have loose stitches, or your incision comes open. ? · Bright red blood has soaked through the bandage over your incision. ? · You have signs of infection, such as:  ¨ Increased pain, swelling, warmth, or redness. ¨ Red streaks leading from the incision. ¨ Pus draining from the incision. ¨ Swollen lymph nodes in your neck, armpits, or groin. ¨ A fever. ? Watch closely for any changes in your health, and be sure to contact your doctor if:  ? · You do not have a bowel movement after taking a laxative. Where can you learn more? Go to http://dolly-juani.info/. Enter 0699 472 39 89 in the search box to learn more about \"Bunionectomy: What to Expect at Home. \"  Current as of: March 21, 2017  Content Version: 11.4  © 8516-1700 [employer]. Care instructions adapted under license by [employer] (which disclaims liability or warranty for this information).  If you have questions about a medical condition or this instruction, always ask your healthcare professional. [employer] disclaims any warranty or liability for your use of this information. Learning About How to Use Crutches  Your Care Instructions  Crutches can help you walk when you have an injured hip, leg, knee, ankle, or foot. Your doctor will tell you how much weight-if any-you can put on your leg. Be sure your crutches fit you. When you stand up in your normal posture, there should be space for two or three fingers between the top of the crutch and your armpit. When you let your hands hang down, the hand  should be at your wrists. When you put your hands on the hand , your elbows should be slightly bent. To stay safe when using crutches:  · Look straight ahead, not down at your feet. · Clear away small rugs, cords, or anything else that could cause you to trip, slip, or fall. · Be very careful around pets and small children. They can get in your path when you least expect it. · Be sure the rubber tips on your crutches are clean and in good condition to help prevent slipping. · Avoid slick conditions, such as wet floors and snowy or icy driveways. In bad weather, be especially careful on curbs and steps. How to use crutches  Getting ready to walk    1. Bend your elbows slightly. Press the padded top parts of the crutches against your sides, under your armpits. 2. If you have been told not to put any weight on your injured leg, keep that leg bent and off the ground. Walking with crutches    1. Put both crutches about 12 inches in front of you. 2. Put your weight on the handgrips, not on the pads under your arms. (Constant pressure against your underarms can cause numbness.) Swing your body forward. (If you have been told not to put any weight on your injured leg, keep that leg bent and off the ground.)  3. To complete the step, put your weight on the strong leg. 4. Move your crutches about 12 inches in front of you, and start the next step.   5. When you're confident using the crutches, you can move the crutches and your injured leg at the same time. Then push straight down on the crutches as you step past the crutches with your strong leg, as you would in normal walking. 6. Take small steps. 7. Use ramps and elevators when you can. Sitting down    1. To sit, back up to the chair. Use one hand to hold both crutches by the handgrips, beside your injured leg. With the other hand, hold onto the seat and slowly lower yourself onto the chair. 2. Lay the crutches on the ground near your chair. If you prop them up, they may fall over. Getting up from a chair    1. To get up from a chair,  the crutches and put them in one hand beside your injured leg. 2. Put your weight on the handgrips of the crutches and on your strong leg to stand up. Walking up stairs    1. To go up stairs, step up with your strong leg and then bring the crutches and your injured leg to the upper step. 2. For stairs that have handrails: Put both crutches under the arm opposite the handrail. Use the hand opposite the handrail to hold both crutches by the handgrips. 3. Hold onto the handrail as you go up. Put your strong leg on the step first when you go up. Walking down stairs    1. To go down stairs, put your crutches and injured leg on the lower step. 2. Bring your strong leg to the lower step. This saying may help you remember: \"Up with the good, down with the bad. \"  3. For stairs that have handrails: Put both crutches under the arm opposite the handrail. Use the hand opposite the handrail to hold both crutches by the handgrips. Hold onto the handrail as you go down. Follow the same process you use for stairs: Lead with your crutches and injured leg on the way down. Follow-up care is a key part of your treatment and safety. Be sure to make and go to all appointments, and call your doctor if you are having problems. It's also a good idea to know your test results and keep a list of the medicines you take.   Where can you learn more? Go to http://dolly-juani.info/. Enter J786 in the search box to learn more about \"Learning About How to Use Crutches. \"  Current as of: March 21, 2017  Content Version: 11.4  © 0445-9524 [employer]. Care instructions adapted under license by [employer] (which disclaims liability or warranty for this information). If you have questions about a medical condition or this instruction, always ask your healthcare professional. [place] [address] any warranty or liability for your use of this information. DISCHARGE SUMMARY from Nurse    PATIENT INSTRUCTIONS:    After general anesthesia or intravenous sedation, for 24 hours or while taking prescription Narcotics:  · Limit your activities  · Do not drive and operate hazardous machinery  · Do not make important personal or business decisions  · Do  not drink alcoholic beverages  · If you have not urinated within 8 hours after discharge, please contact your surgeon on call. Report the following to your surgeon:  · Excessive pain, swelling, redness or odor of or around the surgical area  · Temperature over 100.5  · Nausea and vomiting lasting longer than 4 hours or if unable to take medications  · Any signs of decreased circulation or nerve impairment to extremity: change in color, persistent  numbness, tingling, coldness or increase pain  · Any questions      *  Please give a list of your current medications to your Primary Care Provider. *  Please update this list whenever your medications are discontinued, doses are      changed, or new medications (including over-the-counter products) are added. *  Please carry medication information at all times in case of emergency situations.     These are general instructions for a healthy lifestyle:    No smoking/ No tobacco products/ Avoid exposure to second hand smoke  Surgeon General's Warning:  Quitting smoking now greatly reduces serious risk to your health. Obesity, smoking, and sedentary lifestyle greatly increases your risk for illness    A healthy diet, regular physical exercise & weight monitoring are important for maintaining a healthy lifestyle    You may be retaining fluid if you have a history of heart failure or if you experience any of the following symptoms:  Weight gain of 3 pounds or more overnight or 5 pounds in a week, increased swelling in our hands or feet or shortness of breath while lying flat in bed. Please call your doctor as soon as you notice any of these symptoms; do not wait until your next office visit. Recognize signs and symptoms of STROKE:    F-face looks uneven    A-arms unable to move or move unevenly    S-speech slurred or non-existent    T-time-call 911 as soon as signs and symptoms begin-DO NOT go       Back to bed or wait to see if you get better-TIME IS BRAIN. Warning Signs of HEART ATTACK     Call 911 if you have these symptoms:   Chest discomfort. Most heart attacks involve discomfort in the center of the chest that lasts more than a few minutes, or that goes away and comes back. It can feel like uncomfortable pressure, squeezing, fullness, or pain.  Discomfort in other areas of the upper body. Symptoms can include pain or discomfort in one or both arms, the back, neck, jaw, or stomach.  Shortness of breath with or without chest discomfort.  Other signs may include breaking out in a cold sweat, nausea, or lightheadedness. Don't wait more than five minutes to call 911 - MINUTES MATTER! Fast action can save your life. Calling 911 is almost always the fastest way to get lifesaving treatment. Emergency Medical Services staff can begin treatment when they arrive -- up to an hour sooner than if someone gets to the hospital by car. The discharge information has been reviewed with the {PATIENT PARENT GUARDIAN:61171}. The {PATIENT PARENT GUARDIAN:96852} verbalized understanding.   Discharge medications reviewed with the {Sourav meds reviewed CUT} and appropriate educational materials and side effects teaching were provided. ___________________________________________________________________________________________________________________________________[address], [facility]Gen[facility] Murillo, DPAUGUSTINE  [address]  [place], [place]. [address]  Phone: [phone]  Fax: [phone]    Post-Op Instructions    Proper care during the postoperative period is an integral part of your surgical treatment program.  It is imperative that these instructions are followed to insure proper healing and to obtain the best results. 1.) Go directly home and keep your feet elevated on the way. 2.) At home, elevate your feet 6 inches above hip level by supporting feet & legs with pillows. 3.) Apply an ice bag covered with a towel just above but not on the operative site for 30 minutes out of each hour for the first 48 hours. 4.) Limited swelling is expected. Occasionally, the skin may take on a bruised appearance. There is no cause for alarm. 5.) Keep your bandages/cast clean and dry. Do NOT remove the bandages or inspect the wound. A small amount of blood on the bandage is normal.    6.) Have prescriptions filled and take medication as directed. If medication causes stomach upset, headache, rash, or other abnormal reactions, discontinue use and CALL THE DOCTOR.    7.) Get plenty of rest with the foot elevated. Drink plenty of fluids and eat regular well-balanced meals. 8.) If you have any problems or concerns, you can call the office anytime. There is a doctor on call 24 hours a day.   CALL THE OFFICE IMMEDIATELY if:   -The bandages become overly stained   -Your medications do not stop the discomfort    -You bump or injure the surgical site   -You develop a fever above 100 degrees   -You get your dressings wet    9.) Your activity level is:   _____Weightbearing as tolerated on _________ foot with surgical shoe   __X___Partial weight bearing to ____LEFT______ heel with surgical boot & crutches   _____Non weight bearing on ___________ foot with crutches    10.) Your return appointment with Dr. Madisyn Rogel is:      ______Monday, 12/18/17 @ 10:15am_____________________________________________

## 2017-12-12 NOTE — IP AVS SNAPSHOT
[address] [id number] Patient: Nathan Montenegro MRN: [id number] About your hospitalization You were admitted on:  December 12, 2017 You last received care in the:  [facility] PACU You were discharged on:  December 12, 2017 Why you were hospitalized Your primary diagnosis was:  Not on File Things You Need To Do (next 8 weeks) Follow up with Renate Patino MD  
  
Phone:  [phone] Where:  [address] [facility], [place] [id number] Monday Dec 18, 2017 Go to Mare Williamson DPM  
@ 10:15 Phone:  [phone] Where:  [address], [address] [facility] [address], [facility] [address] [address] Discharge Orders None A check joelle indicates which time of day the medication should be taken. My Medications TAKE these medications as instructed Instructions Each Dose to Equal  
 Morning Noon Evening Bedtime  
 albuterol 90 mcg/actuation inhaler Commonly known as:  PROVENTIL HFA, VENTOLIN HFA, PROAIR HFA Your last dose was: Your next dose is: This is a rescue medication for when you are short of breath: 2 puffs every 4 hrs PRN wheezing/SOB (1 for home/1 for work/school) aspirin 325 mg tablet Commonly known as:  ASPIRIN Your last dose was: Your next dose is: Take 1 Tab by mouth daily. 325 mg  
    
   
   
   
  
 atorvastatin 20 mg tablet Commonly known as:  LIPITOR Your last dose was: Your next dose is: Take 1 Tab by mouth daily. For cholesterol 20 mg  
    
   
   
   
  
 cephALEXin 500 mg capsule Commonly known as:  Renzosafia Ramosr Your last dose was: Your next dose is: Take 1 Cap by mouth two (2) times a day for 7 days. Take with food. Take until done.   
 500 mg  
    
   
   
   
  
 citalopram 20 mg tablet Commonly known as:  Cade Gupta Your last dose was: Your next dose is: TAKE ONE TABLET BY MOUTH ONE TIME DAILY  
     
   
   
   
  
 famotidine 40 mg tablet Commonly known as:  PEPCID Your last dose was: Your next dose is: TAKE 1 TABLET BY MOUTH AT BEDTIME  
     
   
   
   
  
 fexofenadine 180 mg tablet Commonly known as:  Alexei Woods Your last dose was: Your next dose is: Take 1 Tab by mouth daily. 180 mg  
    
   
   
   
  
 glucose blood VI test strips strip Commonly known as:  ACCU-CHEK SOFIA PLUS TEST STRP Your last dose was: Your next dose is:    
   
   
 Test blood sugar 2 - 4 times daily  
     
   
   
   
  
 hydroCHLOROthiazide 12.5 mg tablet Commonly known as:  HYDRODIURIL Your last dose was: Your next dose is: Take 1 Tab by mouth daily. For blood pressure 12.5 mg Lancets Misc Commonly known as:  [address] [place] [address] Your last dose was: Your next dose is:    
   
   
 test blood sugar as directed by physician. Dx code E11.9  
     
   
   
   
  
 metFORMIN 1,000 mg tablet Commonly known as:  GLUCOPHAGE Your last dose was: Your next dose is:    
   
   
 1  in am and 1 in pm. For diabetes  
     
   
   
   
  
 oxyCODONE-acetaminophen 5-325 mg per tablet Commonly known as:  PERCOCET Your last dose was: Your next dose is: Take 1-2 Tabs by mouth every four (4) hours as needed for Pain. Max Daily Amount: 12 Tabs. Indications: Pain 1-2 Tab  
    
   
   
   
  
 pantoprazole 40 mg tablet Commonly known as:  PROTONIX Your last dose was: Your next dose is:    
   
   
      
   
   
   
  
 promethazine 25 mg tablet Commonly known as:  PHENERGAN Your last dose was: Your next dose is: Take 1 Tab by mouth every six (6) hours as needed for Nausea. 25 mg PURELAX 17 gram packet Generic drug:  polyethylene glycol Your last dose was: Your next dose is: DRINK 1 PACKET DISSOLVED IN WATER ONCE A DAY: takes at night ZyrTEC 10 mg tablet Generic drug:  cetirizine Your last dose was: Your next dose is: Take  by mouth. Where to Get Your Medications Information on where to get these meds will be given to you by the nurse or doctor. ! Ask your nurse or doctor about these medications  
  aspirin 325 mg tablet  
 cephALEXin 500 mg capsule  
 oxyCODONE-acetaminophen 5-325 mg per tablet  
 promethazine 25 mg tablet Discharge Instructions Bunionectomy: What to Expect at [facility] Your Recovery You had bunion surgery to remove a lump of bone (bunion) from the joint where your big toe joins your foot, and to straighten your big toe. You will have pain and swelling that slowly improves in the 6 weeks after surgery. You may have some minor pain and swelling that lasts as long as 6 months to a year. After surgery, you will need to wear a cast or a special type of shoe to protect your toe and to keep it in the right position for at least 3 to 6 weeks. After some types of surgeries, a cast or special shoe is used for a few months. Your doctor will remove your stitches or sutures about 2 weeks after the surgery. If you have removable pins holding your toe in place, they are usually removed in about 4 to 6 weeks. This care sheet gives you a general idea about how long it will take for you to recover. But each person recovers at a different pace. Follow the steps below to get better as quickly as possible. How can you care for yourself at home? Activity ? · Rest when you feel tired. Getting enough sleep will help you recover. ? · Ask your doctor when you can drive again. ? · You may shower, unless your doctor tells you not to. Keep the bandage dry. If the bandage has been removed, you can wash the area with warm water and soap. Pat the area dry. ? · You will probably need to take several weeks off from work. How much time you need to take off depends on the type of work you do and the extent of your surgery. ? · You may need to avoid heavy lifting for 3 to 8 weeks or longer, depending on the type of surgery you had.  
? · You may need to do regular rehabilitation (rehab) exercises to strengthen your foot and improve movement. Start out slowly, and follow your doctor's instructions. Diet ? · You can eat your normal diet. If your stomach is upset, try bland, low-fat foods like plain rice, broiled chicken, toast, and yogurt. ? · You may notice that your bowel movements are not regular right after your surgery. This is common. Try to avoid constipation and straining with bowel movements. You may want to take a fiber supplement every day. If you have not had a bowel movement after a couple of days, ask your doctor about taking a mild laxative. Medicines ? · Your doctor will tell you if and when you can restart your medicines. He or she will also give you instructionsabout taking any new medicines. ? · If you take blood thinners, such as warfarin (Coumadin), clopidogrel (Plavix), or aspirin, be sure to talk to yourdoctor. He or she will tell you if and when to start taking those medicines again. Make sure that you understandexactly what your doctor wants you to do. ? · Be safe with medicines. Take pain medicines exactly as directed. ¨ If the doctor gave you a prescription medicine for pain, take it as prescribed. ¨ If you are not taking a prescription pain medicine, ask your doctor if you can take an over-the-counter medicine. ? · If your doctor prescribed antibiotics, take them as directed.  Do not stop taking them just because you feel better. You need to take the full course of antibiotics. ? · If you think your pain medicine is making you sick to your stomach: 
¨ Take your medicine after meals (unless your doctor has told you not to). ¨ Ask your doctor for a different pain medicine. Incision care ? · You will leave the hospital with bandages holding your toe in the correct position. Your doctor will probably remove the bandages after several days. Or your doctor may have you remove your bandages at home. Do not touch the surgery area. Keep it dry. ? · Do not soak your foot until your doctor says it is okay. Ice and elevation ? · For pain and swelling, put ice or a cold pack on your foot for 10 to 20 minutes each hour. Put a thin cloth between the ice and your skin. ? · Prop up your foot and leg on a pillow when you ice it or anytime you sit or lie down during the next 3 days. Try to keep it above the level of your heart. This will help reduce swelling. Follow-up care is a key part of your treatment and safety. Be sure to make and go to all appointments, and call your doctor if you are having problems. It's also a good idea to know your test results and keep a list of the medicines you take. When should you call for help? Call 911 anytime you think you may need emergency care. For example, call if: 
? · You passed out (lost consciousness). ? · You have sudden chest pain and shortness of breath, or you cough up blood. ? · You have severe trouble breathing. ?Call your doctor now or seek immediate medical care if: 
? · Your foot or toe is cool or pale or changes color. ? · You have numbness, tingling, or less feeling in your foot or toes. ? · You have pain that does not get better after you take pain medicine. ? · You have loose stitches, or your incision comes open. ? · Bright red blood has soaked through the bandage over your incision. ? · You have signs of infection, such as: 
¨ Increased pain, swelling, warmth, or redness. ¨ Red streaks leading from the incision. ¨ Pus draining from the incision. ¨ Swollen lymph nodes in your neck, armpits, or groin. ¨ A fever. ? Watch closely for any changes in your health, and be sure to contact your doctor if: 
? · You do not have a bowel movement after taking a laxative. Where can you learn more? Go to http://dolly-juani.info/. Enter 0699 472 39 [id number] in the search box to learn more about \"Bunionectomy: What to Expect at Home. \" Current as of: March 21, 2017 Content Version: 11.4 © 8570-0794 [employer]. Care instructions adapted under license by [employer] (which disclaims liability or warranty for this information). If you have questions about a medical condition or this instruction, always ask your healthcare professional. [place] [address] any warranty or liability for your use of this information. Learning About How to Use Crutches Your Care Instructions Crutches can help you walk when you have an injured hip, leg, knee, ankle, or foot. Your doctor will tell you how much weight-if any-you can put on your leg. Be sure your crutches fit you. When you stand up in your normal posture, there should be space for two or three fingers between the top of the crutch and your armpit. When you let your hands hang down, the hand  should be at your wrists. When you put your hands on the hand , your elbows should be slightly bent. To stay safe when using crutches: 
· Look straight ahead, not down at your feet. · Clear away small rugs, cords, or anything else that could cause you to trip, slip, or fall. · Be very careful around pets and small children. They can get in your path when you least expect it. · Be sure the rubber tips on your crutches are clean and in good condition to help prevent slipping. · Avoid slick conditions, such as wet floors and snowy or icy driveways. In bad weather, be especially careful on curbs and steps. How to use crutches Getting ready to walk 1. Bend your elbows slightly. Press the padded top parts of the crutches against your sides, under your armpits. 2. If you have been told not to put any weight on your injured leg, keep that leg bent and off the ground. Walking with crutches 1. Put both crutches about 12 inches in front of you. 2. Put your weight on the handgrips, not on the pads under your arms. (Constant pressure against your underarms can cause numbness.) Swing your body forward. (If you have been told not to put any weight on your injured leg, keep that leg bent and off the ground.) 3. To complete the step, put your weight on the strong leg. 4. Move your crutches about 12 inches in front of you, and start the next step. 5. When you're confident using the crutches, you can move the crutches and your injured leg at the same time. Then push straight down on the crutches as you step past the crutches with your strong leg, as you would in normal walking. 6. Take small steps. 7. Use ramps and elevators when you can. Sitting down 1. To sit, back up to the chair. Use one hand to hold both crutches by the handgrips, beside your injured leg. With the other hand, hold onto the seat and slowly lower yourself onto the chair. 2. Lay the crutches on the ground near your chair. If you prop them up, they may fall over. Getting up from a chair 1. To get up from a chair,  the crutches and put them in one hand beside your injured leg. 2. Put your weight on the handgrips of the crutches and on your strong leg to stand up. Walking up stairs 1. To go up stairs, step up with your strong leg and then bring the crutches and your injured leg to the upper step.  
2. For stairs that have handrails: Put both crutches under the arm opposite the handrail. Use the hand opposite the handrail to hold both crutches by the handgrips. 3. Hold onto the handrail as you go up. Put your strong leg on the step first when you go up. Walking down stairs 1. To go down stairs, put your crutches and injured leg on the lower step. 2. Bring your strong leg to the lower step. This saying may help you remember: \"Up with the good, down with the bad. \" 3. For stairs that have handrails: Put both crutches under the arm opposite the handrail. Use the hand opposite the handrail to hold both crutches by the handgrips. Hold onto the handrail as you go down. Follow the same process you use for stairs: Lead with your crutches and injured leg on the way down. Follow-up care is a key part of your treatment and safety. Be sure to make and go to all appointments, and call your doctor if you are having problems. It's also a good idea to know your test results and keep a list of the medicines you take. Where can you learn more? Go to http://dolly-juani.info/. Enter S024 in the search box to learn more about \"Learning About How to Use Crutches. \" Current as of: March 21, 2017 Content Version: 11.4 © 7606-9617 Healthwise[employer] Incorporated. Care instructions adapted under license by [employer]ueda[employer] (which disclaims liability or warranty for this information). If you have questions about a medical condition or this instruction, always ask your healthcare professional. [place] [address] any warranty or liability for your use of this information. DISCHARGE SUMMARY from Nurse PATIENT INSTRUCTIONS: 
 
 
F-face looks uneven A-arms unable to move or move unevenly S-speech slurred or non-existent T-time-call 911 as soon as signs and symptoms begin-DO NOT go Back to bed or wait to see if you get better-TIME IS BRAIN.  
 
Warning Signs of HEART ATTACK  
 Call 911 if you have these symptoms: 
? Chest discomfort. Most heart attacks involve discomfort in the center of the chest that lasts more than a few minutes, or that goes away and comes back. It can feel like uncomfortable pressure, squeezing, fullness, or pain. ? Discomfort in other areas of the upper body. Symptoms can include pain or discomfort in one or both arms, the back, neck, jaw, or stomach. ? Shortness of breath with or without chest discomfort. ? Other signs may include breaking out in a cold sweat, nausea, or lightheadedness. Don't wait more than five minutes to call [address]! Fast action can save your life. Calling 911 is almost always the fastest way to get lifesaving treatment. Emergency Medical Services staff can begin treatment when they arrive  up to an hour sooner than if someone gets to the hospital by car. The discharge information has been reviewed with the {PATIENT PARENT GUARDIAN:55105}. The {PATIENT PARENT GUARDIAN:10166} verbalized understanding. Discharge medications reviewed with the {Dishcarge meds reviewed SIFS:75823} and appropriate educational materials and side effects teaching were provided. ___________________________________________________________________________________________________________________________________[address] Ortiz StreetDILAN DPM 
[address] [place] [address] [address] [place], [place]. [address] Phone: [phone] Fax: [phone] Post-Op Instructions Proper care during the postoperative period is an integral part of your surgical treatment program.  It is imperative that these instructions are followed to insure proper healing and to obtain the best results. 1.) Go directly home and keep your feet elevated on the way. 2.) At home, elevate your feet 6 inches above hip level by supporting feet & legs with pillows.  
 
3.) Apply an ice bag covered with a towel just above but not on the operative site for 30 minutes out of each hour for the first 48 hours. 4.) Limited swelling is expected. Occasionally, the skin may take on a bruised appearance. There is no cause for alarm. 5.) Keep your bandages/cast clean and dry. Do NOT remove the bandages or inspect the wound. A small amount of blood on the bandage is normal. 
 
6.) Have prescriptions filled and take medication as directed. If medication causes stomach upset, headache, rash, or other abnormal reactions, discontinue use and CALL THE DOCTOR. 
 
7.) Get plenty of rest with the foot elevated. Drink plenty of fluids and eat regular well-balanced meals. 8.) If you have any problems or concerns, you can call the office anytime. There is a doctor on call 24 hours a day. CALL THE OFFICE IMMEDIATELY if: 
 -The bandages become overly stained 
 -Your medications do not stop the discomfort  
 -You bump or injure the surgical site 
 -You develop a fever above 100 degrees 
 -You get your dressings wet 
 
9.) Your activity level is: 
 _____Weightbearing as tolerated on _________ foot with surgical shoe 
 __X___Partial weight bearing to ____LEFT______ heel with surgical boot & crutches 
 _____Non weight bearing on ___________ foot with crutches 10.) Your return appointment with Dr. Andriy Cardoso is: 
  
 ______Monday, 12/18/17 @ 10:15am_____________________________________________ Introducing [facility] & HEALTH SERVICES! Dear Brandon Rizo: Thank you for requesting a [employer] account. Our records indicate that you already have an active [employer] account. You can access your account anytime at [email or web address] Did you know that you can access your hospital and ER discharge instructions at any time in Hammer [employer] Chisel? You can also review all of your test results from your hospital stay or ER visit. Additional Information If you have questions, please visit the Frequently Asked Questions section of the [employer] website at [email or web address] Remember, MyChart is NOT to be used for urgent needs. For medical emergencies, dial 911. Now available from your iPhone and Android! Providers Seen During Your Hospitalization Provider Specialty Primary office phone [facility] [phone] Your Primary Care Physician (PCP) Primary Care Physician Office Phone Office Fax Patricia Dick [phone] You are allergic to the following Allergen Reactions Latex Itching Lisinopril Swelling Sulfa (Sulfonamide Antibiotics) Itching Recent Documentation Height Weight BMI OB Status Smoking Status 1.575 m 68 kg 27.44 kg/m2 Postmenopausal Former Smoker Emergency Contacts Name Discharge Info Relation Home Work Mobile Jessy Ernandez(Niece) DISCHARGE CAREGIVER [3] Other Relative [6] [phone] Wendy Escobar  Other Relative [6] [phone] Patient Belongings The following personal items are in your possession at time of discharge: 
  Dental Appliances: None                Clothing: At bedside, With patient    Other Valuables: At bedside, Brace, With patient Discharge Instructions Attachments/References MEFS - OXYCODONE/ACETAMINOPHEN (PERCOCET, ROXICET) - (BY MOUTH) (ENGLISH) MEFS - PROMETHAZINE (PHENERGAN, PROMACOT) - (BY MOUTH) (ENGLISH) MEFS - ASPIRIN (RALPH EXTRA STRENGTH, RALPH ASPIRIN CHILDREN'S, BUFFERIN, BUFFERIN LOW DOSE) - (BY MOUTH) (ENGLISH) MEFS - CEPHALEXIN (BIO-CEF, KEFLEX) - (BY MOUTH) (ENGLISH) Patient Handouts Oxycodone/Acetaminophen (Percocet, Roxicet) - (By mouth) Why this medicine is used:  
Treats pain. This medicine contains a narcotic pain reliever. Contact a nurse or doctor right away if you have: 
· Extreme weakness, shallow breathing, slow heartbeat · Sweating or cold, clammy skin · Skin blisters, rash, or peeling Common side effects: 
· Constipation · Nausea, vomiting · Tiredness © 2017 Am[employer] Information is for End User's use only and may not be sold, redistributed or otherwise used for commercial purposes. Promethazine (Phenergan, Promacot) - (By mouth) Why this medicine is used:  
Treats allergies and motion sickness. Also helps control nausea and vomiting. Contact a nurse or doctor right away if you have: 
· Fast, pounding, or uneven heartbeat; lightheadedness or fainting · Slow heartbeat, trouble breathing or speaking · Extreme tiredness or weakness · Fever, sweating, confusion, uneven heartbeat, muscle stiffness · Twitching, muscle movements you cannot control Common side effects: 
· Drowsiness · Nausea, vomiting, constipation, dry mouth © 2017 [employer] Information is for End User's use only and may not be sold, redistributed or otherwise used for commercial purposes. Aspirin (Elba Extra Strength, Elba Aspirin Children's, Bufferin, Bufferin Low Dose) - (By mouth) Why this medicine is used:  
Treats pain, fever, and inflammation. May also reduce the risk of heart attack. Contact a nurse or doctor right away if you have: · Bloody vomit or vomit that looks like coffee grounds · Blood in urine or bloody or black, tarry stools · Wheezing or trouble breathing Common side effects: · Upset stomach 
© 2017 [employer] Information is for End User's use only and may not be sold, redistributed or otherwise used for commercial purposes. Cephalexin (Bio-Cef, Keflex) - (By mouth) Why this medicine is used:  
Treats infections. Contact a nurse or doctor right away if you have: · Blistering, peeling, or red skin rash · Severe or bloody diarrhea Common side effects: · Mild diarrhea or nausea © 2017 [employer] Information is for End User's use only and may not be sold, redistributed or otherwise used for commercial purposes. Please provide this summary of care documentation to your next provider. Signatures-by signing, you are acknowledging that this After Visit Summary has been reviewed with you and you have received a copy. Patient Signature:  ____________________________________________________________ Date:  ____________________________________________________________  
  
Aloma Snellen Provider Signature:  ____________________________________________________________ Date:  ____________________________________________________________

## 2017-12-12 NOTE — PERIOP NOTES
Patient: Emir Alan MRN: [id number]  SSN: [id number]   YOB: 1950  Age: 74 y.o. Sex: female     Patient is status post Procedure(s):  BUNIONECTOMY WITH MIDTARSAL FUSION LEFT FOOT  . Surgeon(s) and Role:     * Carlis Sicard, DPM - Primary    Local/Dose/Irrigation:  See [facility]                  Peripheral IV 12/12/17 Right Wrist (Active)            Airway - Endotracheal Tube (Active)       Airway - Endotracheal Tube 12/12/17 (Active)                   Dressing/Packing:  Wound Foot Left-DRESSING TYPE: 4 x 4;Adhesive wound closure strips (Steri-Strips); Adhesive wound dressing (Mastisol); Fluffs (12/12/17 1300)  Splint/Cast:  ]    Other:

## 2017-12-13 NOTE — OP NOTES
Operative Report     Patient: Lalita Muñoz MRN: [id number]  SSN: [id number]    YOB: 1950  Age: 74 y.o. Sex: female       Indications: This is a 75 yo female who presents with a painful bunion deformity on the left foot. She is here for surgical intervention. Date of Surgery: 12/12/2017     Preoperative Diagnosis:  HALUX VALGUS LEFT FOOT    Postoperative Diagnosis: HALUX VALGUS LEFT FOOT     Surgeon(s) and Role:     * Thu Carbajal DPM - Primary      Anesthesia: General    Procedure:  Procedure(s):  BUNIONECTOMY WITH MIDTARSAL FUSION LEFT FOOT      Procedure in Detail:     Once the patient was identified by myself and my initials were placed on the left foot, she was brought back to the operating room under mild sedation. Next she was placed on the OR table in a supine position. Next general anesthesia was administered. Next a pneumatic thigh tourniquet was applied to the left thigh and was pre-set to 300 mmHg. Next the left foot and leg were prepped an draped in the normal aseptic fashion. Once the foot was exsanguinated and the tourniquet was inflated, a 15 blade was used to make a dorsal-medial incision along the left 1st MPJ. All superficial vessels were cauterized using the bovie. Next a lateral release was performed in the 1st interspace using a combination of a 15 blade and Metzenbaum scissors. Next a 15 blade was used to incise the capsule and expose the underlying 1st MPJ. The head of the 1st metatarsal was then freed from the base of the proximal phalanx using a 15 blade and McGlamary elevator. Next a sagittal saw was used to remove the medial eminence along the 1st met head. Next a fresh 15 blade was used to incise the capsule along the proximal 1st met and 1st met-medial cuneform joint. A saw was used to resect the articulating surfaces of this joint.   Once this was done, the 1st metatarsal was then placed in a more suitable, anatomic position and was temporarily fixated using K-wires. All positioning was checked using C-arm. Once I was satisfied with the positioning, a paragon lapidus plate was temporarily fixated along the medial aspect of the fusion site and using the associated guidance system, a 4.0 cannulated screw was inserted across the fusion site in standard inter-fragmentary fashion. Next, the plate was then fixated using the below listed screws. All final positioning was checked using C-arm. Next the surgical site was irrigated with saline and repaired with 3-0 & 4-0 vicryl and 4-0 monocryl. The incision was dressed with steri-strips, 4x4 gauze, kerlix, and a 4 inch ace wrap. The patient tolerated the procedure well. The tourniquet was deflated and the Pt was brought to the PACU afebrile, vital signs stable, and cap-refill intact to all toes on the left foot. She was instructed to be partial weightbearing to the heel with a surgical boot and crutches. She will follow up with me on Monday. She was given prescriptions for percocet 5/325mg #50, keflex 500mg #14, phenergan 25mg #30, and ASA 325mg #30. Estimated Blood Loss:  minimal    Specimens: * No specimens in log *            Implants:    Implant Name Type Inv.  Item Serial No.  Lot No. LRB No. Used Action   Lapidus Plate   NA PARAGON 28 NA Left 1 Implanted   Short THread Screw   NA PARAGON 28 NA Left 1 Implanted   2.7 X 20mm Locking Plate Screw   NA PARAGON 28 NA Left 1 Implanted   2.7 X 16mm Locking Plate Screw   NA PARAGON 28 NA Left 2 Implanted   2.7 X 18mm Locking Plate Screw   NA PARAGON 28 NA Left 1 Implanted   Non-Locking Plate Screw     NA PARAGON 28 NA Left 1 Implanted              Complications:  None    Signed By: Khushboo Figueredo DPM     December 12, 2017

## 2018-01-04 DIAGNOSIS — F51.01 PRIMARY INSOMNIA: Primary | ICD-10-CM

## 2018-01-04 RX ORDER — ZOLPIDEM TARTRATE 10 MG/1
10 TABLET ORAL
Qty: 30 TAB | Refills: 5 | Status: SHIPPED | OUTPATIENT
Start: 2018-01-04 | End: 2018-07-17 | Stop reason: SDUPTHER

## 2018-01-05 ENCOUNTER — DOCUMENTATION ONLY (OUTPATIENT)
Dept: FAMILY MEDICINE CLINIC | Age: 68
End: 2018-01-05

## 2018-01-10 NOTE — TELEPHONE ENCOUNTER
----- Message from Loida Jeff sent at 1/9/2018  5:28 PM EST -----  Regarding: Dr. Rashad Downey from [place] [address] in target request to have nurse call him in reference to pt's rx, best contact number [phone].

## 2018-05-29 ENCOUNTER — HOSPITAL ENCOUNTER (OUTPATIENT)
Dept: LAB | Age: 68
Discharge: HOME | End: 2018-05-29
Payer: MEDICARE

## 2018-05-29 ENCOUNTER — OFFICE VISIT (OUTPATIENT)
Dept: FAMILY MEDICINE CLINIC | Age: 68
End: 2018-05-29

## 2018-05-29 VITALS
TEMPERATURE: 96.6 F | BODY MASS INDEX: 27.42 KG/M2 | SYSTOLIC BLOOD PRESSURE: 119 MMHG | DIASTOLIC BLOOD PRESSURE: 56 MMHG | HEART RATE: 64 BPM | OXYGEN SATURATION: 96 % | HEIGHT: 62 IN | RESPIRATION RATE: 14 BRPM | WEIGHT: 149 LBS

## 2018-05-29 DIAGNOSIS — Z00.00 ENCOUNTER FOR MEDICARE ANNUAL WELLNESS EXAM: Primary | ICD-10-CM

## 2018-05-29 DIAGNOSIS — E11.9 TYPE 2 DIABETES MELLITUS WITHOUT COMPLICATION, WITHOUT LONG-TERM CURRENT USE OF INSULIN (HCC): Chronic | ICD-10-CM

## 2018-05-29 LAB
BILIRUB UR QL STRIP: NEGATIVE
GLUCOSE UR-MCNC: NEGATIVE MG/DL
HBA1C MFR BLD HPLC: 7.3 %
KETONES P FAST UR STRIP-MCNC: NEGATIVE MG/DL
PH UR STRIP: 6.5 [PH] (ref 4.6–8)
PROT UR QL STRIP: NEGATIVE
SP GR UR STRIP: 1.01 (ref 1–1.03)
UA UROBILINOGEN AMB POC: NORMAL (ref 0.2–1)
URINALYSIS CLARITY POC: CLEAR
URINALYSIS COLOR POC: YELLOW
URINE BLOOD POC: NEGATIVE
URINE LEUKOCYTES POC: NEGATIVE
URINE NITRITES POC: NEGATIVE

## 2018-05-29 PROCEDURE — 85025 COMPLETE CBC W/AUTO DIFF WBC: CPT

## 2018-05-29 PROCEDURE — 80053 COMPREHEN METABOLIC PANEL: CPT

## 2018-05-29 PROCEDURE — 36415 COLL VENOUS BLD VENIPUNCTURE: CPT

## 2018-05-29 PROCEDURE — 80061 LIPID PANEL: CPT

## 2018-05-29 PROCEDURE — 82043 UR ALBUMIN QUANTITATIVE: CPT

## 2018-05-29 RX ORDER — DICLOFENAC SODIUM 75 MG/1
TABLET, DELAYED RELEASE ORAL
Refills: 1 | COMMUNITY
Start: 2018-03-06 | End: 2018-07-29

## 2018-05-29 NOTE — PROGRESS NOTES
Chief Complaint   Patient presents with    Hypertension    Diabetes    Depression     1. Have you been to the ER, urgent care clinic since your last visit? Hospitalized since your last visit? No    2. Have you seen or consulted any other health care providers outside of the [address] [place] [address] since your last visit? Include any pap smears or colon screening.  No     Health Maintenance Due   Topic Date Due    Bone Densitometry (Dexa) Screening  09/10/2017    MEDICARE YEARLY EXAM  03/14/2018    HEMOGLOBIN A1C Q6M  05/15/2018

## 2018-05-29 NOTE — PROGRESS NOTES
HISTORY OF PRESENT ILLNESS  Eliane Grove is a 74 y.o. female. HPI In for medicare wellness exam. Some fatiuge. Review of Systems   HENT: Negative for hearing loss and tinnitus. Respiratory: Negative for cough and shortness of breath. Nonsmoker   Cardiovascular: Negative for chest pain and orthopnea. Walks 30 minutes  5 days a week. Gastrointestinal: Negative for abdominal pain and blood in stool. Genitourinary: Negative for frequency and hematuria. Skin: Negative for itching and rash. Neurological: Negative for focal weakness and loss of consciousness. Psychiatric/Behavioral: Negative for depression. The patient does not have insomnia. No alcohol       Physical Exam   Constitutional: She appears well-developed and well-nourished. HENT:   Right Ear: External ear normal.   Left Ear: External ear normal.   Mouth/Throat: Oropharynx is clear and moist.   Neck: No thyromegaly present. Cardiovascular: Normal rate, regular rhythm, normal heart sounds and intact distal pulses. Pulmonary/Chest: Breath sounds normal. She has no wheezes. Abdominal: Soft. Bowel sounds are normal. She exhibits no distension and no mass. There is no tenderness. Musculoskeletal: She exhibits no edema. Lymphadenopathy:     She has no cervical adenopathy. Nursing note and vitals reviewed. ASSESSMENT and PLAN  Orders Placed This Encounter    METABOLIC PANEL, COMPREHENSIVE    LIPID PANEL    CBC WITH AUTOMATED DIFF    MICROALBUMIN, UR, RAND W/ MICROALB/CREAT RATIO    AMB POC URINALYSIS DIP STICK AUTO W/ MICRO    AMB POC HEMOGLOBIN A1C     Orders Placed This Encounter    METABOLIC PANEL, COMPREHENSIVE    LIPID PANEL    CBC WITH AUTOMATED DIFF    MICROALBUMIN, UR, RAND W/ MICROALB/CREAT RATIO    AMB POC URINALYSIS DIP STICK AUTO W/ MICRO    AMB POC HEMOGLOBIN A1C     Diagnoses and all orders for this visit:    1.  Encounter for Medicare annual wellness exam  -     METABOLIC PANEL, COMPREHENSIVE  -     LIPID PANEL  -     CBC WITH AUTOMATED DIFF  -     AMB POC URINALYSIS DIP STICK AUTO W/ MICRO    2.  Type 2 diabetes mellitus without complication, without long-term current use of insulin (HCC)  -     AMB POC HEMOGLOBIN A1C  -     MICROALBUMIN, UR, RAND W/ MICROALB/CREAT RATIO

## 2018-05-29 NOTE — MR AVS SNAPSHOT
[facility] 
 
 
 [address] [facility] [address] [address] 
[phone] Patient: Phuong Baker MRN: [id number] EKV:[id number] Visit Information Date & Time Provider Department Dept. Phone Encounter #  
 5/29/2018  9:15 AM Chris Cota MD [facility] [phone] [id number] Upcoming Health Maintenance Date Due Bone Densitometry (Dexa) Screening 9/10/2017 MEDICARE YEARLY EXAM 3/14/2018 HEMOGLOBIN A1C Q6M 5/15/2018 Influenza Age 5 to Adult 8/1/2018 EYE EXAM RETINAL OR DILATED Q1 9/11/2018 FOOT EXAM Q1 11/15/2018 MICROALBUMIN Q1 11/15/2018 LIPID PANEL Q1 11/15/2018 Pneumococcal 65+ Low/Medium Risk (2 of 2 - PPSV23) 11/29/2018 BREAST CANCER SCRN MAMMOGRAM 8/1/2019 GLAUCOMA SCREENING Q2Y 9/11/2019 COLONOSCOPY 5/2/2026 DTaP/Tdap/Td series (2 - Td) 8/9/2026 Allergies as of 5/29/2018  Review Complete On: 5/29/2018 By: Chris Cota MD  
  
 Severity Noted Reaction Type Reactions Latex Medium 04/20/2017   Topical Itching Lisinopril High 05/27/2015    Swelling Sulfa (Sulfonamide Antibiotics)  06/09/2010    Itching Current Immunizations  Reviewed on 11/17/2015 Name Date Influenza Vaccine 10/25/2017, 10/21/2014, 10/21/2013 Influenza Vaccine Susana Blight) 10/7/2015 Influenza Vaccine (Quad) PF 11/1/2016 Influenza Vaccine Split 10/25/2012, 10/13/2011, 10/19/2010 Pneumococcal Conjugate (PCV-13) 11/29/2017 Tdap 8/9/2016 Varicella Virus Vaccine 10/17/2012 ZZZ-RETIRED (DO NOT USE) Pneumococcal Vaccine (Unspecified Type) 10/19/2010 Not reviewed this visit You Were Diagnosed With   
  
 Codes Comments Encounter for Medicare annual wellness exam    -  Primary ICD-10-CM: Z00.00 ICD-9-CM: V70.0 Type 2 diabetes mellitus without complication, without long-term current use of insulin (HCC)     ICD-10-CM: E11.9 ICD-9-CM: 250.00 Vitals BP Pulse Temp Resp Height(growth percentile) Weight(growth percentile) 119/56 64 96.6 °F (35.9 °C) (Oral) 14 5' 2\" (1.575 m) 149 lb (67.6 kg) LMP SpO2 BMI OB Status Smoking Status (LMP Unknown) 96% 27.25 kg/m2 Postmenopausal Former Smoker BMI and BSA Data Body Mass Index Body Surface Area  
 27.25 kg/m 2 1.72 m 2 Preferred Pharmacy Pharmacy Name Phone [address] [phone] Your Updated Medication List  
  
   
This list is accurate as of 5/29/18 10:28 AM.  Always use your most recent med list.  
  
  
  
  
 albuterol 90 mcg/actuation inhaler Commonly known as:  PROVENTIL HFA, VENTOLIN HFA, PROAIR HFA This is a rescue medication for when you are short of breath: 2 puffs every 4 hrs PRN wheezing/SOB (1 for home/1 for work/school) aspirin 325 mg tablet Commonly known as:  ASPIRIN Take 1 Tab by mouth daily. atorvastatin 20 mg tablet Commonly known as:  LIPITOR Take 1 Tab by mouth daily. For cholesterol  
  
 citalopram 20 mg tablet Commonly known as:  CELEXA  
TAKE ONE TABLET BY MOUTH ONE TIME DAILY  
  
 diclofenac EC 75 mg EC tablet Commonly known as:  VOLTAREN  
TAKE 1 TABLET BY MOUTH TWICE A DAY WITH FOOD FOR INFLAMATION AND PAIN  
  
 famotidine 40 mg tablet Commonly known as:  PEPCID  
TAKE 1 TABLET BY MOUTH AT BEDTIME  
  
 glucose blood VI test strips strip Commonly known as:  ACCU-CHEK SOFIA PLUS TEST STRP Test blood sugar 4 times daily. Dx code E11.9  
  
 hydroCHLOROthiazide 12.5 mg tablet Commonly known as:  HYDRODIURIL Take 1 Tab by mouth daily. For blood pressure Lancets Misc Commonly known as:  ACCU-CHEK MULTICLIX LANCET  
test blood sugar as directed by physician. Dx code E11.9  
  
 metFORMIN 1,000 mg tablet Commonly known as:  GLUCOPHAGE  
1  in am and 1 in pm. For diabetes  
  
 pantoprazole 40 mg tablet Commonly known as:  PROTONIX PURELAX 17 gram packet Generic drug:  polyethylene glycol DRINK 1 PACKET DISSOLVED IN WATER ONCE A DAY: takes at night  
  
 zolpidem 10 mg tablet Commonly known as:  AMBIEN Take 1 Tab by mouth nightly as needed for Sleep. Max Daily Amount: 10 mg. Take One tablet by mouth nightly ZyrTEC 10 mg tablet Generic drug:  cetirizine Take  by mouth. We Performed the Following AMB POC HEMOGLOBIN A1C [06216 CPT(R)] AMB POC URINALYSIS DIP STICK AUTO W/ MICRO [33531 CPT(R)] CBC WITH AUTOMATED DIFF [52204 CPT(R)] LIPID PANEL [63466 CPT(R)] METABOLIC PANEL, COMPREHENSIVE [70429 CPT(R)] MICROALBUMIN, UR, RAND W/ MICROALB/CREAT RATIO [id number] CPT(R)] Introducing [facility] SERVICES! Dear Theodora Muhammad: Thank you for requesting a [employer] account. Our records indicate that you already have an active [employer] account. You can access your account anytime at [email or web address] Did you know that you can access your hospital and ER discharge instructions at any time in [employer]? You can also review all of your test results from your hospital stay or ER visit. Additional Information If you have questions, please visit the Frequently Asked Questions section of the [employer] website at [email or web address] Remember, [employer] is NOT to be used for urgent needs. For medical emergencies, dial 911. Now available from your iPhone and Android! Please provide this summary of care documentation to your next provider. Your primary care clinician is listed as Nik Ge. If you have any questions after today's visit, please call [phone].

## 2018-05-29 NOTE — ACP (ADVANCE CARE PLANNING)
Wouldn't want to be kept alive on ventilator or feeding tube if was terminally ill. Would want nialexandra Flanagan to make medical decisions for her.

## 2018-05-30 LAB
ALBUMIN SERPL-MCNC: 4.7 G/DL (ref 3.6–4.8)
ALBUMIN/CREAT UR: 2.9 MG/G CREAT (ref 0–30)
ALBUMIN/GLOB SERPL: 1.7 {RATIO} (ref 1.2–2.2)
ALP SERPL-CCNC: 108 IU/L (ref 39–117)
ALT SERPL-CCNC: 32 IU/L (ref 0–32)
AST SERPL-CCNC: 29 IU/L (ref 0–40)
BASOPHILS # BLD AUTO: 0 X10E3/UL (ref 0–0.2)
BASOPHILS NFR BLD AUTO: 0 %
BILIRUB SERPL-MCNC: 0.5 MG/DL (ref 0–1.2)
BUN SERPL-MCNC: 16 MG/DL (ref 8–27)
BUN/CREAT SERPL: 15 (ref 12–28)
CALCIUM SERPL-MCNC: 10 MG/DL (ref 8.7–10.3)
CHLORIDE SERPL-SCNC: 101 MMOL/L (ref 96–106)
CHOLEST SERPL-MCNC: 133 MG/DL (ref 100–199)
CO2 SERPL-SCNC: 24 MMOL/L (ref 18–29)
CREAT SERPL-MCNC: 1.09 MG/DL (ref 0.57–1)
CREAT UR-MCNC: 143.7 MG/DL
EOSINOPHIL # BLD AUTO: 0.1 X10E3/UL (ref 0–0.4)
EOSINOPHIL NFR BLD AUTO: 1 %
ERYTHROCYTE [DISTWIDTH] IN BLOOD BY AUTOMATED COUNT: 14.7 % (ref 12.3–15.4)
GFR SERPLBLD CREATININE-BSD FMLA CKD-EPI: 53 ML/MIN/1.73
GFR SERPLBLD CREATININE-BSD FMLA CKD-EPI: 62 ML/MIN/1.73
GLOBULIN SER CALC-MCNC: 2.8 G/DL (ref 1.5–4.5)
GLUCOSE SERPL-MCNC: 108 MG/DL (ref 65–99)
HCT VFR BLD AUTO: 38.1 % (ref 34–46.6)
HDLC SERPL-MCNC: 63 MG/DL
HGB BLD-MCNC: 12.5 G/DL (ref 11.1–15.9)
IMM GRANULOCYTES # BLD: 0 X10E3/UL (ref 0–0.1)
IMM GRANULOCYTES NFR BLD: 0 %
INTERPRETATION, 910389: NORMAL
INTERPRETATION: NORMAL
LDLC SERPL CALC-MCNC: 49 MG/DL (ref 0–99)
LYMPHOCYTES # BLD AUTO: 1.8 X10E3/UL (ref 0.7–3.1)
LYMPHOCYTES NFR BLD AUTO: 33 %
Lab: NORMAL
MCH RBC QN AUTO: 28.6 PG (ref 26.6–33)
MCHC RBC AUTO-ENTMCNC: 32.8 G/DL (ref 31.5–35.7)
MCV RBC AUTO: 87 FL (ref 79–97)
MICROALBUMIN UR-MCNC: 4.2 UG/ML
MONOCYTES # BLD AUTO: 0.3 X10E3/UL (ref 0.1–0.9)
MONOCYTES NFR BLD AUTO: 5 %
NEUTROPHILS # BLD AUTO: 3.2 X10E3/UL (ref 1.4–7)
NEUTROPHILS NFR BLD AUTO: 61 %
PDF IMAGE, 910387: NORMAL
PLATELET # BLD AUTO: 194 X10E3/UL (ref 150–379)
POTASSIUM SERPL-SCNC: 3.9 MMOL/L (ref 3.5–5.2)
PROT SERPL-MCNC: 7.5 G/DL (ref 6–8.5)
RBC # BLD AUTO: 4.37 X10E6/UL (ref 3.77–5.28)
SODIUM SERPL-SCNC: 141 MMOL/L (ref 134–144)
TRIGL SERPL-MCNC: 103 MG/DL (ref 0–149)
VLDLC SERPL CALC-MCNC: 21 MG/DL (ref 5–40)
WBC # BLD AUTO: 5.3 X10E3/UL (ref 3.4–10.8)

## 2018-07-17 DIAGNOSIS — F51.01 PRIMARY INSOMNIA: ICD-10-CM

## 2018-07-17 RX ORDER — ZOLPIDEM TARTRATE 10 MG/1
10 TABLET ORAL
Qty: 30 TAB | Refills: 5 | Status: SHIPPED | OUTPATIENT
Start: 2018-07-17 | End: 2019-01-21 | Stop reason: SDUPTHER

## 2018-07-17 NOTE — TELEPHONE ENCOUNTER
Print and check     Last Visit: 5/29/18-Barbie  Next Appt: 11/30/18-Barbie  Previous Refill Encounter: 1/4/18-30+5    Requested Prescriptions     Pending Prescriptions Disp Refills    zolpidem (AMBIEN) 10 mg tablet 30 Tab 5     Sig: Take 1 Tab by mouth nightly as needed for Sleep. Max Daily Amount: 10 mg.

## 2018-07-18 ENCOUNTER — DOCUMENTATION ONLY (OUTPATIENT)
Dept: FAMILY MEDICINE CLINIC | Age: 68
End: 2018-07-18

## 2018-07-29 ENCOUNTER — APPOINTMENT (OUTPATIENT)
Dept: GENERAL RADIOLOGY | Age: 68
End: 2018-07-29
Attending: EMERGENCY MEDICINE
Payer: MEDICARE

## 2018-07-29 ENCOUNTER — HOSPITAL ENCOUNTER (OUTPATIENT)
Age: 68
Setting detail: OBSERVATION
Discharge: HOME | End: 2018-07-30
Attending: EMERGENCY MEDICINE | Admitting: INTERNAL MEDICINE
Payer: MEDICARE

## 2018-07-29 DIAGNOSIS — I20.0 UNSTABLE ANGINA (HCC): Primary | ICD-10-CM

## 2018-07-29 PROBLEM — I25.110 CORONARY ARTERY DISEASE INVOLVING NATIVE CORONARY ARTERY WITH UNSTABLE ANGINA PECTORIS (HCC): Status: ACTIVE | Noted: 2018-07-29

## 2018-07-29 LAB
ALBUMIN SERPL-MCNC: 4.1 G/DL (ref 3.5–5)
ALBUMIN/GLOB SERPL: 1 {RATIO} (ref 1.1–2.2)
ALP SERPL-CCNC: 107 U/L (ref 45–117)
ALT SERPL-CCNC: 42 U/L (ref 12–78)
ANION GAP SERPL CALC-SCNC: 8 MMOL/L (ref 5–15)
AST SERPL-CCNC: 28 U/L (ref 15–37)
BASOPHILS # BLD: 0.1 K/UL (ref 0–0.1)
BASOPHILS NFR BLD: 1 % (ref 0–1)
BILIRUB SERPL-MCNC: 0.5 MG/DL (ref 0.2–1)
BUN SERPL-MCNC: 18 MG/DL (ref 6–20)
BUN/CREAT SERPL: 14 (ref 12–20)
CALCIUM SERPL-MCNC: 9.4 MG/DL (ref 8.5–10.1)
CHLORIDE SERPL-SCNC: 103 MMOL/L (ref 97–108)
CK MB CFR SERPL CALC: 1.1 % (ref 0–2.5)
CK MB SERPL-MCNC: 1.3 NG/ML (ref 5–25)
CK SERPL-CCNC: 122 U/L (ref 26–192)
CK SERPL-CCNC: 138 U/L (ref 26–192)
CO2 SERPL-SCNC: 27 MMOL/L (ref 21–32)
CREAT SERPL-MCNC: 1.33 MG/DL (ref 0.55–1.02)
DIFFERENTIAL METHOD BLD: NORMAL
EOSINOPHIL # BLD: 0 K/UL (ref 0–0.4)
EOSINOPHIL NFR BLD: 0 % (ref 0–7)
ERYTHROCYTE [DISTWIDTH] IN BLOOD BY AUTOMATED COUNT: 13.7 % (ref 11.5–14.5)
GLOBULIN SER CALC-MCNC: 4.1 G/DL (ref 2–4)
GLUCOSE BLD STRIP.AUTO-MCNC: 102 MG/DL (ref 65–100)
GLUCOSE BLD STRIP.AUTO-MCNC: 260 MG/DL (ref 65–100)
GLUCOSE BLD STRIP.AUTO-MCNC: 97 MG/DL (ref 65–100)
GLUCOSE SERPL-MCNC: 117 MG/DL (ref 65–100)
HCT VFR BLD AUTO: 38.7 % (ref 35–47)
HGB BLD-MCNC: 12.7 G/DL (ref 11.5–16)
IMM GRANULOCYTES # BLD: 0 K/UL (ref 0–0.04)
IMM GRANULOCYTES NFR BLD AUTO: 0 % (ref 0–0.5)
LYMPHOCYTES # BLD: 1.2 K/UL (ref 0.8–3.5)
LYMPHOCYTES NFR BLD: 23 % (ref 12–49)
MCH RBC QN AUTO: 28.8 PG (ref 26–34)
MCHC RBC AUTO-ENTMCNC: 32.8 G/DL (ref 30–36.5)
MCV RBC AUTO: 87.8 FL (ref 80–99)
MONOCYTES # BLD: 0.5 K/UL (ref 0–1)
MONOCYTES NFR BLD: 9 % (ref 5–13)
NEUTS SEG # BLD: 3.5 K/UL (ref 1.8–8)
NEUTS SEG NFR BLD: 67 % (ref 32–75)
NRBC # BLD: 0 K/UL (ref 0–0.01)
NRBC BLD-RTO: 0 PER 100 WBC
PLATELET # BLD AUTO: 175 K/UL (ref 150–400)
PMV BLD AUTO: 11.7 FL (ref 8.9–12.9)
POTASSIUM SERPL-SCNC: 3.4 MMOL/L (ref 3.5–5.1)
PROT SERPL-MCNC: 8.2 G/DL (ref 6.4–8.2)
RBC # BLD AUTO: 4.41 M/UL (ref 3.8–5.2)
SERVICE CMNT-IMP: ABNORMAL
SERVICE CMNT-IMP: ABNORMAL
SERVICE CMNT-IMP: NORMAL
SODIUM SERPL-SCNC: 138 MMOL/L (ref 136–145)
TROPONIN I SERPL-MCNC: <0.05 NG/ML
TROPONIN I SERPL-MCNC: <0.05 NG/ML
WBC # BLD AUTO: 5.3 K/UL (ref 3.6–11)

## 2018-07-29 PROCEDURE — 84484 ASSAY OF TROPONIN QUANT: CPT | Performed by: PHYSICIAN ASSISTANT

## 2018-07-29 PROCEDURE — 99218 HC RM OBSERVATION: CPT

## 2018-07-29 PROCEDURE — 74011250637 HC RX REV CODE- 250/637: Performed by: INTERNAL MEDICINE

## 2018-07-29 PROCEDURE — 82550 ASSAY OF CK (CPK): CPT | Performed by: PHYSICIAN ASSISTANT

## 2018-07-29 PROCEDURE — 36415 COLL VENOUS BLD VENIPUNCTURE: CPT | Performed by: INTERNAL MEDICINE

## 2018-07-29 PROCEDURE — 82553 CREATINE MB FRACTION: CPT | Performed by: INTERNAL MEDICINE

## 2018-07-29 PROCEDURE — 96360 HYDRATION IV INFUSION INIT: CPT

## 2018-07-29 PROCEDURE — 80053 COMPREHEN METABOLIC PANEL: CPT | Performed by: PHYSICIAN ASSISTANT

## 2018-07-29 PROCEDURE — 74011250636 HC RX REV CODE- 250/636: Performed by: EMERGENCY MEDICINE

## 2018-07-29 PROCEDURE — 82962 GLUCOSE BLOOD TEST: CPT

## 2018-07-29 PROCEDURE — 99285 EMERGENCY DEPT VISIT HI MDM: CPT

## 2018-07-29 PROCEDURE — 85025 COMPLETE CBC W/AUTO DIFF WBC: CPT | Performed by: PHYSICIAN ASSISTANT

## 2018-07-29 PROCEDURE — 71046 X-RAY EXAM CHEST 2 VIEWS: CPT

## 2018-07-29 PROCEDURE — 93005 ELECTROCARDIOGRAM TRACING: CPT

## 2018-07-29 PROCEDURE — 74011250637 HC RX REV CODE- 250/637: Performed by: EMERGENCY MEDICINE

## 2018-07-29 RX ORDER — SODIUM CHLORIDE 0.9 % (FLUSH) 0.9 %
5-10 SYRINGE (ML) INJECTION AS NEEDED
Status: DISCONTINUED | OUTPATIENT
Start: 2018-07-29 | End: 2018-07-30 | Stop reason: HOSPADM

## 2018-07-29 RX ORDER — METOPROLOL TARTRATE 25 MG/1
12.5 TABLET, FILM COATED ORAL EVERY 12 HOURS
Status: DISCONTINUED | OUTPATIENT
Start: 2018-07-29 | End: 2018-07-30 | Stop reason: HOSPADM

## 2018-07-29 RX ORDER — NITROGLYCERIN 0.4 MG/1
0.4 TABLET SUBLINGUAL AS NEEDED
Status: DISCONTINUED | OUTPATIENT
Start: 2018-07-29 | End: 2018-07-30 | Stop reason: HOSPADM

## 2018-07-29 RX ORDER — MAGNESIUM SULFATE 100 %
4 CRYSTALS MISCELLANEOUS AS NEEDED
Status: DISCONTINUED | OUTPATIENT
Start: 2018-07-29 | End: 2018-07-30 | Stop reason: HOSPADM

## 2018-07-29 RX ORDER — FAMOTIDINE 20 MG/1
40 TABLET, FILM COATED ORAL
Status: DISCONTINUED | OUTPATIENT
Start: 2018-07-29 | End: 2018-07-30 | Stop reason: HOSPADM

## 2018-07-29 RX ORDER — NITROGLYCERIN 0.4 MG/1
0.4 TABLET SUBLINGUAL
Status: COMPLETED | OUTPATIENT
Start: 2018-07-29 | End: 2018-07-29

## 2018-07-29 RX ORDER — POLYETHYLENE GLYCOL 3350 17 G/17G
17 POWDER, FOR SOLUTION ORAL
COMMUNITY

## 2018-07-29 RX ORDER — CITALOPRAM 20 MG/1
20 TABLET, FILM COATED ORAL DAILY
Status: DISCONTINUED | OUTPATIENT
Start: 2018-07-30 | End: 2018-07-30 | Stop reason: HOSPADM

## 2018-07-29 RX ORDER — ASPIRIN 81 MG/1
81 TABLET ORAL DAILY
Status: DISCONTINUED | OUTPATIENT
Start: 2018-07-30 | End: 2018-07-30 | Stop reason: HOSPADM

## 2018-07-29 RX ORDER — ATORVASTATIN CALCIUM 20 MG/1
20 TABLET, FILM COATED ORAL
COMMUNITY
End: 2018-12-12 | Stop reason: ALTCHOICE

## 2018-07-29 RX ORDER — HYDROCHLOROTHIAZIDE 25 MG/1
12.5 TABLET ORAL DAILY
Status: DISCONTINUED | OUTPATIENT
Start: 2018-07-30 | End: 2018-07-30 | Stop reason: HOSPADM

## 2018-07-29 RX ORDER — GUAIFENESIN 100 MG/5ML
324 LIQUID (ML) ORAL
Status: COMPLETED | OUTPATIENT
Start: 2018-07-29 | End: 2018-07-29

## 2018-07-29 RX ORDER — ZOLPIDEM TARTRATE 5 MG/1
10 TABLET ORAL
Status: DISCONTINUED | OUTPATIENT
Start: 2018-07-29 | End: 2018-07-30 | Stop reason: HOSPADM

## 2018-07-29 RX ORDER — ATORVASTATIN CALCIUM 20 MG/1
20 TABLET, FILM COATED ORAL
Status: DISCONTINUED | OUTPATIENT
Start: 2018-07-29 | End: 2018-07-30 | Stop reason: HOSPADM

## 2018-07-29 RX ORDER — INSULIN LISPRO 100 [IU]/ML
INJECTION, SOLUTION INTRAVENOUS; SUBCUTANEOUS
Status: DISCONTINUED | OUTPATIENT
Start: 2018-07-29 | End: 2018-07-30 | Stop reason: HOSPADM

## 2018-07-29 RX ORDER — DEXTROSE 50 % IN WATER (D50W) INTRAVENOUS SYRINGE
12.5-25 AS NEEDED
Status: DISCONTINUED | OUTPATIENT
Start: 2018-07-29 | End: 2018-07-30 | Stop reason: HOSPADM

## 2018-07-29 RX ORDER — ACETAMINOPHEN 325 MG/1
650 TABLET ORAL
Status: DISCONTINUED | OUTPATIENT
Start: 2018-07-29 | End: 2018-07-30 | Stop reason: HOSPADM

## 2018-07-29 RX ORDER — SODIUM CHLORIDE 0.9 % (FLUSH) 0.9 %
5-10 SYRINGE (ML) INJECTION EVERY 8 HOURS
Status: DISCONTINUED | OUTPATIENT
Start: 2018-07-29 | End: 2018-07-30 | Stop reason: HOSPADM

## 2018-07-29 RX ORDER — FAMOTIDINE 40 MG/1
40 TABLET, FILM COATED ORAL
COMMUNITY
End: 2021-05-05 | Stop reason: ALTCHOICE

## 2018-07-29 RX ORDER — PANTOPRAZOLE SODIUM 40 MG/1
40 TABLET, DELAYED RELEASE ORAL
Status: DISCONTINUED | OUTPATIENT
Start: 2018-07-30 | End: 2018-07-30 | Stop reason: HOSPADM

## 2018-07-29 RX ADMIN — ACETAMINOPHEN 650 MG: 325 TABLET ORAL at 17:01

## 2018-07-29 RX ADMIN — NITROGLYCERIN 0.5 INCH: 20 OINTMENT TOPICAL at 17:01

## 2018-07-29 RX ADMIN — METOPROLOL TARTRATE 12.5 MG: 25 TABLET ORAL at 17:03

## 2018-07-29 RX ADMIN — ASPIRIN 81 MG 324 MG: 81 TABLET ORAL at 13:49

## 2018-07-29 RX ADMIN — FAMOTIDINE 40 MG: 20 TABLET, FILM COATED ORAL at 21:43

## 2018-07-29 RX ADMIN — Medication 10 ML: at 17:03

## 2018-07-29 RX ADMIN — NITROGLYCERIN 0.4 MG: 0.4 TABLET, ORALLY DISINTEGRATING SUBLINGUAL at 13:49

## 2018-07-29 RX ADMIN — NITROGLYCERIN 0.4 MG: 0.4 TABLET, ORALLY DISINTEGRATING SUBLINGUAL at 16:44

## 2018-07-29 RX ADMIN — NITROGLYCERIN 0.4 MG: 0.4 TABLET, ORALLY DISINTEGRATING SUBLINGUAL at 16:39

## 2018-07-29 RX ADMIN — ATORVASTATIN CALCIUM 20 MG: 20 TABLET, FILM COATED ORAL at 21:43

## 2018-07-29 RX ADMIN — ACETAMINOPHEN 650 MG: 325 TABLET ORAL at 21:43

## 2018-07-29 RX ADMIN — NITROGLYCERIN 0.5 INCH: 20 OINTMENT TOPICAL at 23:04

## 2018-07-29 RX ADMIN — SODIUM CHLORIDE 1000 ML: 900 INJECTION, SOLUTION INTRAVENOUS at 13:49

## 2018-07-29 NOTE — CONSULTS
CARDIOLOGY H&P       Date of  Admission: 7/29/2018 11:21 AM     Admission type:Emergency    Consult for: Chest pain  Consulted by: Dr. Blaine Walker:     Honorio Wellington is a 74 y.o. female with PMHx significant for HTN, GERD, asthma, arthritis, DM, hypercholesterolemia, presents ambulatory to the ED with cc of constant CP since 3205-1468 this morning. Pt states her pain radiates to her back and describes it as a \"heaviness. \" She reports intermittent CP on exertion x a few days that resolves with resting as well as intermittent pain with deep inspiration. She states she has never been evaluated by cardiology and had a stress test less than 5 years ago but no cardiac cath. She also reports episode of lightheadedness today and notes she thought her blood sugar dropped so she drank OJ and had a glucose tab. She denies hx of PE. She was last seen by Dr. Christiano Fuller in 2014 with chest discomfort and had a negative stress test and echo at that time. CP resolved with 1 NTG in ER. EKG nonischemic. Markers times 1 negative.     Patient Active Problem List    Diagnosis Date Noted    Coronary artery disease involving native coronary artery with unstable angina pectoris ([facility] 07/29/2018    Biliary sludge determined by ultrasound 04/19/2017    Suppurative otitis media 10/22/2014    Cerumen impaction 10/22/2014    Allergic rhinitis due to allergen 10/22/2014    DM (diabetes mellitus) ([facility] 02/17/2011    HTN (hypertension) 02/17/2011    Depression 02/17/2011    Hypokalemia 02/17/2011      Diana Casarez MD  Past Medical History:   Diagnosis Date    Allergic rhinitis due to allergen 10/22/2014    Arthritis     spine    Asthma     Depression     Diabetes ([facility]     Type II    GERD (gastroesophageal reflux disease)     Hypercholesterolemia     Hypertension     Kidney stones 1990's    Other ill-defined conditions(799.89)     GERD    Positive PPD 2009    treated with 124 NewACT       Social History Social History    Marital status: SINGLE     Spouse name: N/A    Number of children: N/A    Years of education: N/A     Social History Main Topics    Smoking status: Former Smoker     Packs/day: 1.00     Years: 25.00     Quit date: 8/23/2010    Smokeless tobacco: Never Used    Alcohol use No    Drug use: No    Sexual activity: Not on file     Other Topics Concern    Not on file     Social History Narrative    Retired schoolteacher. Raised 2 nieces. Allergies   Allergen Reactions    Latex Itching    Lisinopril Swelling    Sulfa (Sulfonamide Antibiotics) Itching      Family History   Problem Relation Age of Onset    Diabetes Mother     Hypertension Mother     Cancer Father      lung    Cancer Paternal Aunt      colon      Current Facility-Administered Medications   Medication Dose Route Frequency    nitroglycerin (NITROSTAT) tablet 0.4 mg  0.4 mg SubLINGual Q5MIN PRN    sodium chloride 0.9 % bolus infusion 1,000 mL  1,000 mL IntraVENous ONCE     Current Outpatient Prescriptions   Medication Sig    zolpidem (AMBIEN) 10 mg tablet Take 1 Tab by mouth nightly as needed for Sleep. Max Daily Amount: 10 mg.    diclofenac EC (VOLTAREN) 75 mg EC tablet TAKE 1 TABLET BY MOUTH TWICE A DAY WITH FOOD FOR INFLAMATION AND PAIN    glucose blood VI test strips (ACCU-CHEK SOFIA PLUS TEST STRP) strip Test blood sugar 4 times daily. Dx code E11.9    aspirin (ASPIRIN) 325 mg tablet Take 1 Tab by mouth daily.  metFORMIN (GLUCOPHAGE) 1,000 mg tablet 1  in am and 1 in pm. For diabetes    atorvastatin (LIPITOR) 20 mg tablet Take 1 Tab by mouth daily. For cholesterol    hydroCHLOROthiazide (HYDRODIURIL) 12.5 mg tablet Take 1 Tab by mouth daily. For blood pressure    citalopram (CELEXA) 20 mg tablet TAKE ONE TABLET BY MOUTH ONE TIME DAILY    cetirizine (ZYRTEC) 10 mg tablet Take  by mouth.     albuterol (PROVENTIL HFA, VENTOLIN HFA, PROAIR HFA) 90 mcg/actuation inhaler This is a rescue medication for when you are short of breath: 2 puffs every 4 hrs PRN wheezing/SOB (1 for home/1 for work/school)    PURELAX 17 gram packet DRINK 1 PACKET DISSOLVED IN WATER ONCE A DAY: takes at night    famotidine (PEPCID) 40 mg tablet TAKE 1 TABLET BY MOUTH AT BEDTIME    Lancets (ACCU-CHEK MULTICLIX LANCET) misc test blood sugar as directed by physician. Dx code E11.9    pantoprazole (PROTONIX) 40 mg tablet          Review of Symptoms:  11 systems reviewed, negative other than as stated in HPI     Subjective:        Visit Vitals    /61    Pulse 69    Temp 98.5 °F (36.9 °C)    Resp 12    Wt 160 lb (72.6 kg)    SpO2 100%    BMI 29.26 kg/m2       Physical:  Gen:  NAD  Heart: regular rhythm, no murmur, gallop or rub  Lungs: clear to auscultation bilaterally  Neck: supple, symmetrical, trachea midline, no adenopathy, thyroid: not enlarged, symmetric, no tenderness/mass/nodules, no carotid bruit and no JVD  Abdomen: soft, non-tender.  Bowel sounds normal. No masses,  no organomegaly  Extremities: extremities normal, atraumatic, no cyanosis or edema, positive femorals without bruits, normal dp pulses  Neurologic: CN, motor and speech grossly normal    Data Review:   Recent Labs      07/29/18   1254   WBC  5.3   HGB  12.7   HCT  38.7   PLT  175     Recent Labs      07/29/18   1254   NA  138   K  3.4*   CL  103   CO2  27   GLU  117*   BUN  18   CREA  1.33*   CA  9.4   ALB  4.1   TBILI  0.5   SGOT  28   ALT  42       Recent Labs      07/29/18   1254   TROIQ  <0.05       Lab Results   Component Value Date/Time    Cholesterol, total 133 05/29/2018 10:36 AM    HDL Cholesterol 63 05/29/2018 10:36 AM    LDL, calculated 49 05/29/2018 10:36 AM    VLDL, calculated 21 05/29/2018 10:36 AM    Triglyceride 103 05/29/2018 10:36 AM    CHOL/HDL Ratio 3.8 10/18/2010 11:35 AM         No intake or output data in the 24 hours ending 07/29/18 1431     Cardiographics    Telemetry: normal sinus rhythm    ECG: normal EKG, normal sinus rhythm    Echocardiogram: Normal in 2014, ordered for this admission     Assessment:       Principal Problem:    Coronary artery disease involving native coronary artery with unstable angina pectoris ([facility] (7/29/2018)    Active Problems:    DM (diabetes mellitus) ([facility] (2/17/2011)      HTN (hypertension) (2/17/2011)         Plan:     74 y.o. with multiple CV risk factors, admitted with unstable angina pectoris:  Telemetry   Rule out MI by serial cardiac markers  ASA, beta-blocker, nitrate, statin, ACEI. Antiplatelet agent/ anticoagulation if positive markers or evidence of ischemia, otherwise will give on cardiac cath table PRN. I will otherwise continue the patient's home regimen. I have discussed the risks and benefits of stress test and or cardiac catheterization +/- percutaneous coronary intervention if necessary. The patient expressed understanding and wishes to proceed with whichever Dr. Ana Francis feels is appropriate based on clinical course. NPO p MN, consent. The patient knows to call a nurse immediately if any recurrence/ progression of symptoms. DM:   accuchecks and SSI.

## 2018-07-29 NOTE — PROGRESS NOTES
Pharmacy Clarification of Prior to Admission Medication Regimen     The patient was interviewed regarding clarification of the prior to admission medication regimen. Patient's friend was present in room and obtained permission from patient to discuss drug regimen with visitor(s) present. Patient was questioned regarding use of any other inhalers, topical products, over the counter medications, herbal medications, vitamin products or ophthalmic/nasal/otic medication use. Information Obtained From: Patient and Rx Query    Pertinent Pharmacy Findings: None    PTA medication list was corrected to the following:     Prior to Admission Medications   Prescriptions Last Dose Informant Patient Reported? Taking?   atorvastatin (LIPITOR) 20 mg tablet 2018 at Unknown time Self Yes Yes   Sig: Take 20 mg by mouth nightly. citalopram (CELEXA) 20 mg tablet 2018 at Unknown time Self No Yes   Sig: TAKE ONE TABLET BY MOUTH ONE TIME DAILY   famotidine (PEPCID) 40 mg tablet 2018 at Unknown time Self Yes Yes   Sig: Take 40 mg by mouth nightly. hydroCHLOROthiazide (HYDRODIURIL) 12.5 mg tablet 2018 at Unknown time Self No Yes   Sig: Take 1 Tab by mouth daily. For blood pressure   metFORMIN (GLUCOPHAGE) 1,000 mg tablet 2018 at Unknown time Self No Yes   Si  in am and 1 in pm. For diabetes   multivit,calc,mins/iron/folic (ONE-A-DAY WOMENS FORMULA PO) 2018 at Unknown time Self Yes Yes   Sig: Take 1 Tab by mouth daily. pantoprazole (PROTONIX) 40 mg tablet 2018 at Unknown time Self Yes Yes   Sig: Take 40 mg by mouth daily. polyethylene glycol (MIRALAX) 17 gram packet 2018 at Unknown time Self Yes Yes   Sig: Take 17 g by mouth daily as needed ('Constipation'). zolpidem (AMBIEN) 10 mg tablet 2018 at Unknown time Self No Yes   Sig: Take 1 Tab by mouth nightly as needed for Sleep. Max Daily Amount: 10 mg.       Facility-Administered Medications: None          Thank you,  BHAVANA Inocente Alas  Medication History Pharmacy Technician

## 2018-07-29 NOTE — PROGRESS NOTES
TRANSFER - IN REPORT:    Verbal report received from Shaylee Jennings RN (name) on Deepak Cruz  being received from ED (unit) for routine progression of care      Report consisted of patients Situation, Background, Assessment and   Recommendations(SBAR). Information from the following report(s) SBAR, ED Summary, Intake/Output, Recent Results and Cardiac Rhythm NSR was reviewed with the receiving nurse. Opportunity for questions and clarification was provided. Assessment completed upon patients arrival to unit and care assumed. 3165  Patient complains of 5/10 pressure to midline chest that radiates to her back. Denies associated symptoms. 0.4 sublingual nitroglycerin administered. Patient report pain resolved on reassessment. 0.5 nitroglycerin patch applied to right leg per order. Verbal report given to oncoming nurse Abelardo Rowe RN    Report consisted of patients Situation, Background, Assessment, and   Recommendations    Information was reviewed with the receiving nurse. Opportunity for questions and clarification was provided.       Anita Birmingham RN

## 2018-07-29 NOTE — IP AVS SNAPSHOT
[address] [facility] 
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 [address] [phone] Nick Diez MD On 9/10/2018 10;15am  Cardiology follow-up [address] [facility] 
[phone] Your Scheduled Appointments Wednesday August 15, 2018  3:00 PM EDT  
[facility] MAMMO SCREENING with [address] [facility] 1 [facility] ([address]) 311 [facility] [address] [address] [facility]  
[phone] Shower or bathe using soap and water. Do not use deodorant, powder, perfumes, or lotion the day of your exam.  If your prior mammograms were not performed at [facility] 6 please bring films with you or forward prior images 2 days before your procedure. Check in at registration 15min before your appointment time unless you were instructed to do otherwise. A script is not necessary, but if you have one, please bring it on the day of the mammogram or have it faxed to the department. You are responsible for finding a method of transportation to your appointment. If you don't have transportation, please reschedule your appointment at least 24 hours in advance.   [facility] [phone] [facility]  [phone] [facility] [address] [facility] [phone] [facility] 1150 [facility] UKDPRZOSO [phone] Monday September 10, 2018 10:15 AM EDT  
ESTABLISHED PATIENT with Mukund Márquez MD  
[facility]) [address] [facility] [address]  
[phone] Discharge Orders None A check joelle indicates which time of day the medication should be taken. My Medications CHANGE how you take these medications Instructions Each Dose to Equal  
 Morning Noon Evening Bedtime  
 atorvastatin 20 mg tablet Commonly known as:  LIPITOR What changed:  Another medication with the same name was removed. Continue taking this medication, and follow the directions you see here. Your last dose was: Your next dose is: Take 20 mg by mouth nightly. 20 mg  
    
   
   
   
  
 famotidine 40 mg tablet Commonly known as:  PEPCID What changed:  Another medication with the same name was removed. Continue taking this medication, and follow the directions you see here. Your last dose was: Your next dose is: Take 40 mg by mouth nightly. 40 mg  
    
   
   
   
  
 metFORMIN 1,000 mg tablet Commonly known as:  GLUCOPHAGE What changed:  additional instructions Your last dose was: Your next dose is:    
   
   
 1  in am and 1 in pm. For diabetes  Resume on Wednesday 08/01/2018. CONTINUE taking these medications Instructions Each Dose to Equal  
 Morning Noon Evening Bedtime  
 citalopram 20 mg tablet Commonly known as:  Delona Nestle Your last dose was: Your next dose is: TAKE ONE TABLET BY MOUTH ONE TIME DAILY  
     
   
   
   
  
 hydroCHLOROthiazide 12.5 mg tablet Commonly known as:  HYDRODIURIL Your last dose was: Your next dose is: Take 1 Tab by mouth daily. For blood pressure 12.5 mg MIRALAX 17 gram packet Generic drug:  polyethylene glycol Your last dose was: Your next dose is: Take 17 g by mouth daily as needed ('Constipation'). 17 g ONE-A-DAY WOMENS FORMULA PO Your last dose was: Your next dose is: Take 1 Tab by mouth daily. 1 Tab  
    
   
   
   
  
 pantoprazole 40 mg tablet Commonly known as:  PROTONIX Your last dose was: Your next dose is: Take 40 mg by mouth daily. 40 mg  
    
   
   
   
  
 zolpidem 10 mg tablet Commonly known as:  AMBIEN Your last dose was: Your next dose is: Take 1 Tab by mouth nightly as needed for Sleep. Max Daily Amount: 10 mg.  
 10 mg Where to Get Your Medications Information on where to get these meds will be given to you by the nurse or doctor. ! Ask your nurse or doctor about these medications  
  metFORMIN 1,000 mg tablet Discharge Instructions Patient ID: 
Master Galvez [id number] 
74 y.o. 
1950 Admit Date: 7/29/2018 Discharge Date: 7/30/2018 Admitting Physician: Ashley Louie MD  
 
Discharge Physician: Ashley Louie MD 
 
Admission Diagnoses: Unstable angina pectoris ([facility].) Discharge Diagnoses: Principal Problem: 
  Coronary artery disease involving native coronary artery with unstable angina pectoris ([facility] (7/29/2018) Active Problems: 
  DM (diabetes mellitus) ([facility] (2/17/2011) HTN (hypertension) (2/17/2011) Unstable angina pectoris ([facility] (7/29/2018) Discharge Condition: Good Cardiology Procedures this Admission:  Diagnostic left heart catheterization Disposition: home Patient Instructions:  
 
 
Reference discharge instructions provided by nursing for diet and activity. Follow-up with me in 6 wks. Signed: Briseida Person MD 
7/30/2018 
2:49 PM 
 
 
Radial Cardiac Catheterization/Angiography Discharge Instructions It is normal to feel tired the first couple days. Take it easy and follow the physicians instructions. CHECK THE CATHETER INSERTION SITE DAILY: 
 
Remove the wrist dressing 24 hours after the procedure. You may shower 24 hours after the procedure. Wash with soap and water and pat dry. Gentle cleaning of the site with soap and water is sufficient, cover with a dry clean dressing or bandage. Do not apply creams or powders to the area. No soaking the wrist for 3 days. Leave the puncture site open to air after 24 hours post-procedure. CALL THE PHYSICIANS:  
 
If the site becomes red, swollen or feels warm to the touch If there is bleeding or drainage or if there is unusual pain at the radial site. If there is any minor oozing, you may apply a band-aid and remove after 12 hours. If the bleeding continues, hold pressure with the middle finger against the puncture site and the thumb against the back of the wrist,call 911 to be transported to the hospital. 
DO NOT DRIVE YOURSELF, [address]. ACTIVITY:  
For the first 24 hours do not manipulate the wrist. 
No lifting, pushing or pulling over 3-5 pounds with the affected wrist for 7 daysand no straining the insertion site. Do not life grocery bags or the garbage can, do not run the vacuum  or  for 7 days. Start with short walks as in the hospital and gradually increase as tolerated each day. It is recommended to walk 30 minutes 5-7 days per week. Follow your physicians instructions on activity. Avoid walking outside in extremes of heat or cold. Walk inside when it is cold and windy or hot and humid. Things to keep in mind: 
No driving for at least 24 hours, or as designated by your physician. Limit the number of times you go up and down the stairs Take rests and pace yourself with activity. Be careful and do not strain with bowel movements. MEDICATIONS: 
 
Take all medications as prescribed Call your physician if you have any questions Keep an updated list of your medications with you at all times and give a list to your physician and pharmacist 
 
SIGNS AND SYMPTOMS:  
Be cautious of symptoms of angina or recurrent symptoms such as chest discomfort, unusual shortness of breath or fatigue. These could be symptoms of restenosis, a new blockage or a heart attack. If your symptoms are relieved with rest it is still recommended that you notify your physician of recurrent chest pain or discomfort. For CHEST PAIN or symptoms of angina not relieved with rest:  If the discomfort is not relieved with rest, and you have been prescribed Nitroglycerin, take as directed (taken under the tongue, one at a time 5 minutes apart for a total of 3 doses). If the discomfort is not relieved after the 3rd nitroglycerin, call 911. If you have not been prescribed Nitroglycerin  and your chest discomfort is not relieved with rest, call 911. AFTER CARE:  
Follow up with your physician as instructed. Follow a heart healthy diet with proper portion control, daily stress management, daily exercise, blood pressure and cholesterol control , and smoking cessation. ACO Transitions of Care Introducing [employer] [address] Isamar Gann offers a voluntary care coordination program to provide high quality service and care to [facility] fee-for-service beneficiaries. Candie Asher was designed to help you enhance your health and well-being through the following services: ? Transitions of Care  support for individuals who are transitioning from one care setting to another (example: Hospital to home). ?  Chronic and Complex Care Coordination  support for individuals and caregivers of those with serious or chronic illnesses or with more than one chronic (ongoing) condition and those who take a number of different medications. If you meet specific medical criteria, a [facility] [address] may call you directly to coordinate your care with your primary care physician and your other care providers. For questions about the [facility] programs, please, contact your physicians office. For general questions or additional information about Accountable Care Organizations: 
Please visit www.medicare.gov/acos. html or call 1-800-MEDICARE (0-441.176.5606) TTY users should call 0-775.575.5907. Introducing [facility]! Dear Pasha James: Thank you for requesting a [employer] account. Our records indicate that you already have an active [employer] account. You can access your account anytime at [email or web address] Did you know that you can access your hospital and ER discharge instructions at any time in [employer]? You can also review all of your test results from your hospital stay or ER visit. Additional Information If you have questions, please visit the Frequently Asked Questions section of the [employer] website at [email or web address] Remember, [employer] is NOT to be used for urgent needs. For medical emergencies, dial 911. Now available from your iPhone and Android! Introducing Jayme Lipscomb As a [facility]ael Jordan patient, I wanted to make you aware of our electronic visit tool called Jayme Lipscomb. [facility] 24/7 allows you to connect within minutes with a medical provider 24 hours a day, seven days a week via a mobile device or tablet or logging into a secure website from your computer. You can access Jayme Lipscomb from anywhere in the [place].  
 
A virtual visit might be right for you when you have a simple condition and feel like you just dont want to get out of bed, or cant get away from work for an appointment, when your regular [employer] provider is not available (evenings, weekends or holidays), or when youre out of town and need minor care. Electronic visits cost only $49 and if the [employer] 24/7 provider determines a prescription is needed to treat your condition, one can be electronically transmitted to a nearby pharmacy*. Please take a moment to enroll today if you have not already done so. The enrollment process is free and takes just a few minutes. To enroll, please download the [employer] 24/7 carlos to your tablet or phone, or visit [email or web address] to enroll on your computer. And, as an [address] [place] [address] patient with a [employer] account, the results of your visits will be scanned into your electronic medical record and your primary care provider will be able to view the scanned results. We urge you to continue to see your regular [employer] provider for your ongoing medical care. And while your primary care provider may not be the one available when you seek a [employer] virtual visit, the peace of mind you get from getting a real diagnosis real time can be priceless. For more information on [employer], view our Frequently Asked Questions (FAQs) at [email or web address]. Sincerely, 
 
Nataly Proctor MD 
Chief Medical Officer [employer] *:  certain medications cannot be prescribed via [employer] Providers Seen During Your Hospitalization Provider Specialty Primary office phone Coral Printers, DO Emergency Medicine [phone] Naheed Petit MD Cardiology [phone] Your Primary Care Physician (PCP) Primary Care Physician Office Phone Office Fax Maria D Lemus [phone] You are allergic to the following Allergen Reactions Latex Itching Lisinopril Swelling Sulfa (Sulfonamide Antibiotics) Itching Recent Documentation Height Weight Breastfeeding? BMI OB Status Smoking Status 1.57 m 72.6 kg No 29.44 kg/m2 Postmenopausal Former Smoker Emergency Contacts Name Discharge Info Relation Home Work Mobile Jessy Ernandez(Niece) DISCHARGE CAREGIVER [3] Other Relative [6] [phone] Destiney Pappas  Other Relative [6] [phone] Patient Belongings The following personal items are in your possession at time of discharge: 
  Dental Appliances: None  Visual Aid: Glasses      Home Medications: None   Jewelry: Ring, Earrings, Bracelet  Clothing: At bedside, Dress, Footwear, Undergarments    Other Valuables: Cell Phone  Personal Items Sent to Safe: Declines Please provide this summary of care documentation to your next provider. Signatures-by signing, you are acknowledging that this After Visit Summary has been reviewed with you and you have received a copy. Patient Signature:  ____________________________________________________________ Date:  ____________________________________________________________  
  
Kishore Ribeiro Provider Signature:  ____________________________________________________________ Date:  ____________________________________________________________

## 2018-07-29 NOTE — ED NOTES
TRANSFER - OUT REPORT:    Verbal report given to Sayda(name) on Andreina Garcia  being transferred to IVCU(unit) for routine progression of care       Report consisted of patients Situation, Background, Assessment and   Recommendations(SBAR). Information from the following report(s) SBAR, ED Summary, [facility] and Recent Results was reviewed with the receiving nurse. Lines:   Peripheral IV 07/29/18 Right Antecubital (Active)   Site Assessment Clean, dry, & intact 7/29/2018  2:24 PM   Phlebitis Assessment 0 7/29/2018  2:24 PM   Infiltration Assessment 0 7/29/2018  2:24 PM   Dressing Status Clean, dry, & intact 7/29/2018  2:24 PM   Dressing Type Transparent 7/29/2018  2:24 PM   Hub Color/Line Status Blue 7/29/2018  2:24 PM   Action Taken Blood drawn 7/29/2018  2:24 PM        Opportunity for questions and clarification was provided.

## 2018-07-29 NOTE — IP AVS SNAPSHOT
[address] 
[phone] Patient: Patrick Deleon MRN: [id number] YVL:9/49/[birth year]01 A check joelle indicates which time of day the medication should be taken. My Medications CHANGE how you take these medications Instructions Each Dose to Equal  
 Morning Noon Evening Bedtime  
 atorvastatin 20 mg tablet Commonly known as:  LIPITOR What changed:  Another medication with the same name was removed. Continue taking this medication, and follow the directions you see here. Your last dose was: Your next dose is: Take 20 mg by mouth nightly. 20 mg  
    
   
   
   
  
 famotidine 40 mg tablet Commonly known as:  PEPCID What changed:  Another medication with the same name was removed. Continue taking this medication, and follow the directions you see here. Your last dose was: Your next dose is: Take 40 mg by mouth nightly. 40 mg  
    
   
   
   
  
 metFORMIN 1,000 mg tablet Commonly known as:  GLUCOPHAGE What changed:  additional instructions Your last dose was: Your next dose is:    
   
   
 1  in am and 1 in pm. For diabetes  Resume on Wednesday 08/01/2018. CONTINUE taking these medications Instructions Each Dose to Equal  
 Morning Noon Evening Bedtime  
 citalopram 20 mg tablet Commonly known as:  Kacie Sol Your last dose was: Your next dose is: TAKE ONE TABLET BY MOUTH ONE TIME DAILY  
     
   
   
   
  
 hydroCHLOROthiazide 12.5 mg tablet Commonly known as:  HYDRODIURIL Your last dose was: Your next dose is: Take 1 Tab by mouth daily. For blood pressure 12.5 mg MIRALAX 17 gram packet Generic drug:  polyethylene glycol Your last dose was: Your next dose is: Take 17 g by mouth daily as needed ('Constipation'). 17 g ONE-A-DAY WOMENS FORMULA PO Your last dose was: Your next dose is: Take 1 Tab by mouth daily. 1 Tab  
    
   
   
   
  
 pantoprazole 40 mg tablet Commonly known as:  PROTONIX Your last dose was: Your next dose is: Take 40 mg by mouth daily. 40 mg  
    
   
   
   
  
 zolpidem 10 mg tablet Commonly known as:  AMBIEN Your last dose was: Your next dose is: Take 1 Tab by mouth nightly as needed for Sleep. Max Daily Amount: 10 mg.  
 10 mg Where to Get Your Medications Information on where to get these meds will be given to you by the nurse or doctor. ! Ask your nurse or doctor about these medications  
  metFORMIN 1,000 mg tablet

## 2018-07-29 NOTE — ED PROVIDER NOTES
EMERGENCY DEPARTMENT HISTORY AND PHYSICAL EXAM      Date: 7/29/2018  Patient Name: Delvis Sterling    History of Presenting Illness     Chief Complaint   Patient presents with    Chest Pain     near syncopal episode with chest pain PTA. Patient states she thought her blood sugar dropped and treated herself with glucose tabs and OJ       History Provided By: Patient    HPI: Delvis Sterling, 74 y.o. female with PMHx significant for HTN, GERD, asthma, arthritis, DM, hypercholesterolemia, presents ambulatory to the ED with cc of constant CP since [id number] this morning. Pt states her pain radiates to her back and describes it as a \"heaviness. \" She denies any alleviating factors. She reports intermittent CP on exertion x a few days, that resolves with resting, as well as intermittent pain with deep inspiration. She states she has never been evaluated by cardiology and had a stress test less than 5 years ago but no cardiac cath. She also reports episode of lightheadedness today and notes she thought her blood sugar dropped so she drank OJ and had a glucose tab. She denies hx of PE. Pt specifically denies any ABD pain or SOB. There are no other complaints, changes, or physical findings at this time.     PCP: Adolfo Schulte MD    Current Facility-Administered Medications   Medication Dose Route Frequency Provider Last Rate Last Dose    nitroglycerin (NITROSTAT) tablet 0.4 mg  0.4 mg SubLINGual Q5MIN PRN [employer], DO   0.4 mg at 07/29/18 1639    [START ON 7/30/2018] pantoprazole (PROTONIX) tablet 40 mg  40 mg Oral DAILY Leone Baumann, MD  Jacqlyn Felty ON 7/30/2018] citalopram (CELEXA) tablet 20 mg  20 mg Oral DAILY Leone Baumann, MD  Jacqlyn Felty ON 7/30/2018] hydroCHLOROthiazide (HYDRODIURIL) tablet 12.5 mg  12.5 mg Oral DAILY Jero Parker MD        zolpidem [facility]) tablet 10 mg  10 mg Oral QHS PRN Jero Parker MD        atorvastatin (LIPITOR) tablet 20 mg  20 mg Oral QHS Jose Cabrera MD Tequila        famotidine (PEPCID) tablet 40 mg  40 mg Oral QHS Sandie Dozier MD        sodium chloride (NS) flush 5-10 mL  5-10 mL IntraVENous Q8H Sandie Dozier MD        sodium chloride (NS) flush 5-10 mL  5-10 mL IntraVENous PRN Sandie Dozier MD        nitroglycerin (NITROBID) 2 % ointment 0.5 Inch  0.5 Inch Topical Q6H Sandie Dozier MD        nitroglycerin (NITROSTAT) tablet 0.4 mg  0.4 mg SubLINGual PRN Sandie Dozier MD        metoprolol tartrate (LOPRESSOR) tablet 12.5 mg  12.5 mg Oral Q12H Sandie Dozier MD        acetaminophen (TYLENOL) solution 650 mg  650 mg Oral Q4H PRN MD Lizet Bennett ON 7/30/2018] aspirin delayed-release tablet 81 mg  81 mg Oral DAILY Sandie Dozier MD        glucose chewable tablet 16 g  4 Tab Oral PRN Sandie Dozier MD        dextrose (D50W) injection syrg 12.5-25 g  12.5-25 g IntraVENous PRN Sandie Dozier MD        glucagon [facility]) injection 1 mg  1 mg IntraMUSCular PRN Sandie Dozier MD        insulin lispro (HUMALOG) injection   SubCUTAneous AC&HS Sandie Dozier MD   Stopped at 07/29/18 1630    acetaminophen (TYLENOL) tablet 650 mg  650 mg Oral Q4H PRN Sandie Dozier MD           Past History     Past Medical History:  Past Medical History:   Diagnosis Date    Allergic rhinitis due to allergen 10/22/2014    Arthritis     spine    Asthma     Depression     Diabetes ([facility]     Type II    GERD (gastroesophageal reflux disease)     Hypercholesterolemia     Hypertension     Kidney stones 1990's    Other ill-defined conditions(799.89)     GERD    Positive PPD 2009    treated with INH       Past Surgical History:  Past Surgical History:   Procedure Laterality Date    BREAST SURGERY PROCEDURE UNLISTED  1990's    abcess bilateral    COLONOSCOPY  4-11    manetas- n ormal    EGD  4-11    manetas- esophageal ring    ENDOSCOPY, COLON, DIAGNOSTIC  12/2007    2 polyps    HX BREAST BIOPSY Bilateral 1980s    all benign cysts    HX CHOLECYSTECTOMY      HX GYN  1978    tubal laparoscopy    HX ORTHOPAEDIC  2016    had an injection in her back to help with leg pain    HX ORTHOPAEDIC Left 12/12/2017    foot surg. bunion removal  \"Put a plate in\"    HX OTHER SURGICAL  1997    bunionectomy    HX TONSIL AND ADENOIDECTOMY      approx 11years old       Family History:  Family History   Problem Relation Age of Onset    Diabetes Mother     Hypertension Mother     Cancer Father      lung    Cancer Paternal Aunt      colon       Social History:  Social History   Substance Use Topics    Smoking status: Former Smoker     Packs/day: 1.00     Years: 25.00     Quit date: 8/23/2010    Smokeless tobacco: Never Used    Alcohol use No       Allergies: Allergies   Allergen Reactions    Latex Itching    Lisinopril Swelling    Sulfa (Sulfonamide Antibiotics) Itching         Review of Systems   Review of Systems   Constitutional: Negative for chills and fever. HENT: Negative for congestion and sore throat. Eyes: Negative for visual disturbance. Respiratory: Negative for cough and shortness of breath. Cardiovascular: Positive for chest pain. Negative for leg swelling. Gastrointestinal: Negative for abdominal pain, blood in stool, diarrhea and nausea. Endocrine: Negative for polyuria. Genitourinary: Negative for dysuria, flank pain, vaginal bleeding and vaginal discharge. Musculoskeletal: Negative for myalgias. Skin: Negative for rash. Allergic/Immunologic: Negative for immunocompromised state. Neurological: Positive for light-headedness. Negative for weakness and headaches. Psychiatric/Behavioral: Negative for confusion. Physical Exam   Physical Exam   Constitutional: She is oriented to person, place, and time. She appears well-developed and well-nourished. HENT:   Head: Normocephalic and atraumatic.    Moist mucous membranes   Eyes: Conjunctivae are normal. Pupils are equal, round, and reactive to light. Right eye exhibits no discharge. Left eye exhibits no discharge. Neck: Normal range of motion. Neck supple. No tracheal deviation present. Cardiovascular: Normal rate, regular rhythm and normal heart sounds. No murmur heard. Pulmonary/Chest: Effort normal and breath sounds normal. No respiratory distress. She has no wheezes. She has no rales. Abdominal: Soft. Bowel sounds are normal. There is no tenderness. There is no rebound and no guarding. Musculoskeletal: Normal range of motion. She exhibits no edema, tenderness or deformity. Neurological: She is alert and oriented to person, place, and time. Skin: Skin is warm and dry. No rash noted. No erythema. Psychiatric: Her behavior is normal.   Nursing note and vitals reviewed.     Diagnostic Study Results     Labs -     Recent Results (from the past 12 hour(s))   EKG, 12 LEAD, INITIAL    Collection Time: 07/29/18 10:42 AM   Result Value Ref Range    Ventricular Rate 90 BPM    Atrial Rate 90 BPM    P-R Interval 154 ms    QRS Duration 78 ms    Q-T Interval 382 ms    QTC Calculation (Bezet) 467 ms    Calculated P Axis 65 degrees    Calculated R Axis 34 degrees    Calculated T Axis 54 degrees    Diagnosis       Normal sinus rhythm  Possible Left atrial enlargement  Septal infarct , age undetermined  When compared with ECG of 20-APR-2017 10:13,  Septal infarct is now present     GLUCOSE, POC    Collection Time: 07/29/18 10:50 AM   Result Value Ref Range    Glucose (POC) 260 (H) 65 - 100 mg/dL    Performed by Dylan Wolfe    CBC WITH AUTOMATED DIFF    Collection Time: 07/29/18 12:54 PM   Result Value Ref Range    WBC 5.3 3.6 - 11.0 K/uL    RBC 4.41 3.80 - 5.20 M/uL    HGB 12.7 11.5 - 16.0 g/dL    HCT 38.7 35.0 - 47.0 %    MCV 87.8 80.0 - 99.0 FL    MCH 28.8 26.0 - 34.0 PG    MCHC 32.8 30.0 - 36.5 g/dL    RDW 13.7 11.5 - 14.5 %    PLATELET 441 163 - 811 K/uL    MPV 11.7 8.9 - 12.9 FL    NRBC 0.0 0  WBC ABSOLUTE NRBC 0.00 0.00 - 0.01 K/uL    NEUTROPHILS 67 32 - 75 %    LYMPHOCYTES 23 12 - 49 %    MONOCYTES 9 5 - 13 %    EOSINOPHILS 0 0 - 7 %    BASOPHILS 1 0 - 1 %    IMMATURE GRANULOCYTES 0 0.0 - 0.5 %    ABS. NEUTROPHILS 3.5 1.8 - 8.0 K/UL    ABS. LYMPHOCYTES 1.2 0.8 - 3.5 K/UL    ABS. MONOCYTES 0.5 0.0 - 1.0 K/UL    ABS. EOSINOPHILS 0.0 0.0 - 0.4 K/UL    ABS. BASOPHILS 0.1 0.0 - 0.1 K/UL    ABS. IMM. GRANS. 0.0 0.00 - 0.04 K/UL    DF AUTOMATED     METABOLIC PANEL, COMPREHENSIVE    Collection Time: 07/29/18 12:54 PM   Result Value Ref Range    Sodium 138 136 - 145 mmol/L    Potassium 3.4 (L) 3.5 - 5.1 mmol/L    Chloride 103 97 - 108 mmol/L    CO2 27 21 - 32 mmol/L    Anion gap 8 5 - 15 mmol/L    Glucose 117 (H) 65 - 100 mg/dL    BUN 18 6 - 20 MG/DL    Creatinine 1.33 (H) 0.55 - 1.02 MG/DL    BUN/Creatinine ratio 14 12 - 20      GFR est AA 49 (L) >60 ml/min/1.73m2    GFR est non-AA 40 (L) >60 ml/min/1.73m2    Calcium 9.4 8.5 - 10.1 MG/DL    Bilirubin, total 0.5 0.2 - 1.0 MG/DL    ALT (SGPT) 42 12 - 78 U/L    AST (SGOT) 28 15 - 37 U/L    Alk.  phosphatase 107 45 - 117 U/L    Protein, total 8.2 6.4 - 8.2 g/dL    Albumin 4.1 3.5 - 5.0 g/dL    Globulin 4.1 (H) 2.0 - 4.0 g/dL    A-G Ratio 1.0 (L) 1.1 - 2.2     CK W/ REFLX CKMB    Collection Time: 07/29/18 12:54 PM   Result Value Ref Range     26 - 192 U/L   TROPONIN I    Collection Time: 07/29/18 12:54 PM   Result Value Ref Range    Troponin-I, Qt. <0.05 <0.05 ng/mL   CK W/ CKMB & INDEX    Collection Time: 07/29/18  4:01 PM   Result Value Ref Range     26 - 192 U/L    CK - MB 1.3 <3.6 NG/ML    CK-MB Index 1.1 0 - 2.5     TROPONIN I    Collection Time: 07/29/18  4:01 PM   Result Value Ref Range    Troponin-I, Qt. <0.05 <0.05 ng/mL   GLUCOSE, POC    Collection Time: 07/29/18  4:41 PM   Result Value Ref Range    Glucose (POC) 97 65 - 100 mg/dL    Performed by Rosas Jorge        Radiologic Studies -   XR CHEST PA LAT   Final Result        CT Results  (Last 48 hours)    None        CXR Results  (Last 48 hours)               07/29/18 1152  XR CHEST PA LAT Final result    Impression:  IMPRESSION: No acute intrathoracic disease. Narrative:  CLINICAL HISTORY: Chest pain    INDICATION: Chest pain, hypertension       COMPARISON: 3-16       FINDINGS:    PA and lateral views of the chest are obtained. The cardiopericardial silhouette is within normal limits. There is no pleural   effusion, pneumothorax or focal consolidation present. Medical Decision Making   I am the first provider for this patient. I reviewed the vital signs, available nursing notes, past medical history, past surgical history, family history and social history. Vital Signs-Reviewed the patient's vital signs. Patient Vitals for the past 12 hrs:   Temp Pulse Resp BP SpO2   07/29/18 1644 98 °F (36.7 °C) 98 18 150/68 100 %   07/29/18 1601 - 75 20 121/56 100 %   07/29/18 1530 - 75 12 112/45 99 %   07/29/18 1500 - 67 16 103/45 100 %   07/29/18 1445 - 67 20 114/56 100 %   07/29/18 1430 - 65 18 99/40 98 %   07/29/18 1415 - 66 17 101/51 99 %   07/29/18 1400 - 90 16 110/54 99 %   07/29/18 1330 - 68 16 121/61 100 %   07/29/18 1300 - 69 12 125/61 100 %   07/29/18 1230 - 71 20 110/49 99 %   07/29/18 1052 98.5 °F (36.9 °C) 86 18 118/89 100 %       Pulse Oximetry Analysis - 100% on RA    Cardiac Monitor:   Rate: 90 bpm  Rhythm: Normal Sinus Rhythm      EKG interpretation: (Preliminary) 1042  Rhythm: normal sinus rhythm; and regular . Rate (approx.): 90; Axis: normal; P wave: normal; QRS interval: normal ; ST/T wave: no ST elevation, no ST depression; non ischemic  QRS 78 ms,  ms, QTc 467 ms. Written by Isabell Dumont, as dictated by Ekaterina Jerome DO. Records Reviewed: Nursing Notes, Old Medical Records, Previous electrocardiograms, Previous Radiology Studies and Previous Laboratory Studies    Provider Notes (Medical Decision Making):   Patient presents with CP.   DDx: ACS, Aortic dissection, PNA, PE, PTX, pericarditis, myocarditis, GERD, costochondritis, anxiety. Most likely ACS given the HPI and Physical exam. Will obtain labs, CXR, EKG. ED Course:   Initial assessment performed. The patients presenting problems have been discussed, and they are in agreement with the care plan formulated and outlined with them. I have encouraged them to ask questions as they arise throughout their visit. Consult Note:  2:27 PM  Alberta Buckley DO spoke with Dr. Danie Rand  Specialty: Cardiology  Discussed pts hx, disposition, and available diagnostic and imaging results. Reviewed care plans. Will evaluate pt for admission. 3:17 PM  Dr. Danie Rand will admit. Written by Juana Richard ED Scribe, as dictated by Alberta Buckley DO. Critical Care Time:   none    Disposition:  4:51 PM  Patient is being admitted to cardiology. The results of their tests and reasons for their admission have been discussed with them and/or available family. They convey agreement and understanding for the need to be admitted and for their admission diagnosis. Consultation has been made with the inpatient physician specialist for hospitalization. PLAN:  1. Admit to cardiology. Return to ED if worse     Diagnosis     Clinical Impression:   1. Unstable angina ([address]        Attestations: This note is prepared by Juana Richard, acting as Scribe for Alberta Buckley DO. Alberta Buckley DO: The scribe's documentation has been prepared under my direction and personally reviewed by me in its entirety. I confirm that the note above accurately reflects all work, treatment, procedures, and medical decision making performed by me.

## 2018-07-30 VITALS
HEIGHT: 62 IN | TEMPERATURE: 98.4 F | HEART RATE: 67 BPM | OXYGEN SATURATION: 99 % | RESPIRATION RATE: 16 BRPM | BODY MASS INDEX: 29.44 KG/M2 | SYSTOLIC BLOOD PRESSURE: 99 MMHG | WEIGHT: 160 LBS | DIASTOLIC BLOOD PRESSURE: 53 MMHG

## 2018-07-30 PROBLEM — I20.0 UNSTABLE ANGINA PECTORIS (HCC): Status: RESOLVED | Noted: 2018-07-29 | Resolved: 2018-07-30

## 2018-07-30 PROBLEM — I25.110 CORONARY ARTERY DISEASE INVOLVING NATIVE CORONARY ARTERY WITH UNSTABLE ANGINA PECTORIS (HCC): Status: RESOLVED | Noted: 2018-07-29 | Resolved: 2018-07-30

## 2018-07-30 LAB
ATRIAL RATE: 90 BPM
CALCULATED P AXIS, ECG09: 65 DEGREES
CALCULATED R AXIS, ECG10: 34 DEGREES
CALCULATED T AXIS, ECG11: 54 DEGREES
CHOLEST SERPL-MCNC: 129 MG/DL
DIAGNOSIS, 93000: NORMAL
EST. AVERAGE GLUCOSE BLD GHB EST-MCNC: 143 MG/DL
GLUCOSE BLD STRIP.AUTO-MCNC: 104 MG/DL (ref 65–100)
GLUCOSE BLD STRIP.AUTO-MCNC: 157 MG/DL (ref 65–100)
GLUCOSE BLD STRIP.AUTO-MCNC: 96 MG/DL (ref 65–100)
HBA1C MFR BLD: 6.6 % (ref 4.2–6.3)
HDLC SERPL-MCNC: 58 MG/DL
HDLC SERPL: 2.2 {RATIO} (ref 0–5)
LDLC SERPL CALC-MCNC: 52.4 MG/DL (ref 0–100)
LIPID PROFILE,FLP: NORMAL
P-R INTERVAL, ECG05: 154 MS
Q-T INTERVAL, ECG07: 382 MS
QRS DURATION, ECG06: 78 MS
QTC CALCULATION (BEZET), ECG08: 467 MS
SERVICE CMNT-IMP: ABNORMAL
SERVICE CMNT-IMP: ABNORMAL
SERVICE CMNT-IMP: NORMAL
TRIGL SERPL-MCNC: 93 MG/DL (ref ?–150)
VENTRICULAR RATE, ECG03: 90 BPM
VLDLC SERPL CALC-MCNC: 18.6 MG/DL

## 2018-07-30 PROCEDURE — 74011000250 HC RX REV CODE- 250

## 2018-07-30 PROCEDURE — 36415 COLL VENOUS BLD VENIPUNCTURE: CPT | Performed by: INTERNAL MEDICINE

## 2018-07-30 PROCEDURE — 83036 HEMOGLOBIN GLYCOSYLATED A1C: CPT | Performed by: INTERNAL MEDICINE

## 2018-07-30 PROCEDURE — 74011250636 HC RX REV CODE- 250/636: Performed by: INTERNAL MEDICINE

## 2018-07-30 PROCEDURE — C1769 GUIDE WIRE: HCPCS

## 2018-07-30 PROCEDURE — 74011250637 HC RX REV CODE- 250/637: Performed by: INTERNAL MEDICINE

## 2018-07-30 PROCEDURE — 99218 HC RM OBSERVATION: CPT

## 2018-07-30 PROCEDURE — 77030019698 HC SYR ANGI MDLON MRTM -A

## 2018-07-30 PROCEDURE — 77030010221 HC SPLNT WR POS TELE -B

## 2018-07-30 PROCEDURE — 74011636320 HC RX REV CODE- 636/320

## 2018-07-30 PROCEDURE — 77030019569 HC BND COMPR RAD TERU -B

## 2018-07-30 PROCEDURE — 82962 GLUCOSE BLOOD TEST: CPT

## 2018-07-30 PROCEDURE — 77030015766

## 2018-07-30 PROCEDURE — 74011636637 HC RX REV CODE- 636/637: Performed by: INTERNAL MEDICINE

## 2018-07-30 PROCEDURE — 99153 MOD SED SAME PHYS/QHP EA: CPT

## 2018-07-30 PROCEDURE — 77030008543 HC TBNG MON PRSS MRTM -A

## 2018-07-30 PROCEDURE — 77030004549 HC CATH ANGI DX PRF MRTM -A

## 2018-07-30 PROCEDURE — 77030030195 HC CATH ANGI DX PRF4 MRTM -A

## 2018-07-30 PROCEDURE — 77030004521 HC CATH ANGI DX COOK -B

## 2018-07-30 PROCEDURE — 74011250636 HC RX REV CODE- 250/636

## 2018-07-30 PROCEDURE — 80061 LIPID PANEL: CPT | Performed by: INTERNAL MEDICINE

## 2018-07-30 PROCEDURE — C1894 INTRO/SHEATH, NON-LASER: HCPCS

## 2018-07-30 RX ORDER — FENTANYL CITRATE 50 UG/ML
25-50 INJECTION, SOLUTION INTRAMUSCULAR; INTRAVENOUS
Status: DISCONTINUED | OUTPATIENT
Start: 2018-07-30 | End: 2018-07-30

## 2018-07-30 RX ORDER — HEPARIN SODIUM 200 [USP'U]/100ML
500 INJECTION, SOLUTION INTRAVENOUS ONCE
Status: COMPLETED | OUTPATIENT
Start: 2018-07-30 | End: 2018-07-30

## 2018-07-30 RX ORDER — HEPARIN SODIUM 1000 [USP'U]/ML
INJECTION, SOLUTION INTRAVENOUS; SUBCUTANEOUS
Status: COMPLETED
Start: 2018-07-30 | End: 2018-07-30

## 2018-07-30 RX ORDER — MIDAZOLAM HYDROCHLORIDE 1 MG/ML
INJECTION, SOLUTION INTRAMUSCULAR; INTRAVENOUS
Status: COMPLETED
Start: 2018-07-30 | End: 2018-07-30

## 2018-07-30 RX ORDER — LIDOCAINE HYDROCHLORIDE 10 MG/ML
INJECTION, SOLUTION EPIDURAL; INFILTRATION; INTRACAUDAL; PERINEURAL
Status: COMPLETED
Start: 2018-07-30 | End: 2018-07-30

## 2018-07-30 RX ORDER — MIDAZOLAM HYDROCHLORIDE 1 MG/ML
.5-2 INJECTION, SOLUTION INTRAMUSCULAR; INTRAVENOUS
Status: DISCONTINUED | OUTPATIENT
Start: 2018-07-30 | End: 2018-07-30

## 2018-07-30 RX ORDER — FENTANYL CITRATE 50 UG/ML
INJECTION, SOLUTION INTRAMUSCULAR; INTRAVENOUS
Status: COMPLETED
Start: 2018-07-30 | End: 2018-07-30

## 2018-07-30 RX ORDER — METFORMIN HYDROCHLORIDE 1000 MG/1
TABLET ORAL
Qty: 75 TAB | Refills: 11 | OUTPATIENT
Start: 2018-07-30 | End: 2018-12-21

## 2018-07-30 RX ORDER — HEPARIN SODIUM 1000 [USP'U]/ML
2500 INJECTION, SOLUTION INTRAVENOUS; SUBCUTANEOUS ONCE
Status: COMPLETED | OUTPATIENT
Start: 2018-07-30 | End: 2018-07-30

## 2018-07-30 RX ORDER — VERAPAMIL HYDROCHLORIDE 2.5 MG/ML
2.5 INJECTION, SOLUTION INTRAVENOUS ONCE
Status: COMPLETED | OUTPATIENT
Start: 2018-07-30 | End: 2018-07-30

## 2018-07-30 RX ORDER — LIDOCAINE HYDROCHLORIDE 10 MG/ML
1-30 INJECTION, SOLUTION EPIDURAL; INFILTRATION; INTRACAUDAL; PERINEURAL
Status: DISCONTINUED | OUTPATIENT
Start: 2018-07-30 | End: 2018-07-30

## 2018-07-30 RX ORDER — SODIUM CHLORIDE 9 MG/ML
100 INJECTION, SOLUTION INTRAVENOUS CONTINUOUS
Status: DISCONTINUED | OUTPATIENT
Start: 2018-07-30 | End: 2018-07-30 | Stop reason: HOSPADM

## 2018-07-30 RX ORDER — VERAPAMIL HYDROCHLORIDE 2.5 MG/ML
INJECTION, SOLUTION INTRAVENOUS
Status: COMPLETED
Start: 2018-07-30 | End: 2018-07-30

## 2018-07-30 RX ORDER — HEPARIN SODIUM 200 [USP'U]/100ML
INJECTION, SOLUTION INTRAVENOUS
Status: COMPLETED
Start: 2018-07-30 | End: 2018-07-30

## 2018-07-30 RX ADMIN — NITROGLYCERIN 200 MCG: 5 INJECTION, SOLUTION INTRAVENOUS at 14:28

## 2018-07-30 RX ADMIN — HEPARIN SODIUM 1000 UNITS: 200 INJECTION, SOLUTION INTRAVENOUS at 14:24

## 2018-07-30 RX ADMIN — HEPARIN SODIUM 1000 UNITS: 200 INJECTION, SOLUTION INTRAVENOUS at 14:16

## 2018-07-30 RX ADMIN — MIDAZOLAM 2 MG: 1 INJECTION INTRAMUSCULAR; INTRAVENOUS at 14:07

## 2018-07-30 RX ADMIN — MIDAZOLAM HYDROCHLORIDE 2 MG: 1 INJECTION, SOLUTION INTRAMUSCULAR; INTRAVENOUS at 14:07

## 2018-07-30 RX ADMIN — LIDOCAINE HYDROCHLORIDE 1 ML: 10 INJECTION, SOLUTION EPIDURAL; INFILTRATION; INTRACAUDAL; PERINEURAL at 14:26

## 2018-07-30 RX ADMIN — SODIUM CHLORIDE 100 ML/HR: 900 INJECTION, SOLUTION INTRAVENOUS at 08:00

## 2018-07-30 RX ADMIN — NITROGLYCERIN 0.5 INCH: 20 OINTMENT TOPICAL at 06:43

## 2018-07-30 RX ADMIN — HYDROCHLOROTHIAZIDE 12.5 MG: 25 TABLET ORAL at 15:02

## 2018-07-30 RX ADMIN — FENTANYL CITRATE 50 MCG: 50 INJECTION, SOLUTION INTRAMUSCULAR; INTRAVENOUS at 14:07

## 2018-07-30 RX ADMIN — CITALOPRAM HYDROBROMIDE 20 MG: 20 TABLET ORAL at 08:43

## 2018-07-30 RX ADMIN — IOPAMIDOL 110 ML: 755 INJECTION, SOLUTION INTRAVENOUS at 14:45

## 2018-07-30 RX ADMIN — NITROGLYCERIN 0.5 INCH: 20 OINTMENT TOPICAL at 12:51

## 2018-07-30 RX ADMIN — ASPIRIN 81 MG: 81 TABLET, DELAYED RELEASE ORAL at 08:43

## 2018-07-30 RX ADMIN — INSULIN LISPRO 2 UNITS: 100 INJECTION, SOLUTION INTRAVENOUS; SUBCUTANEOUS at 08:50

## 2018-07-30 RX ADMIN — PANTOPRAZOLE SODIUM 40 MG: 40 TABLET, DELAYED RELEASE ORAL at 06:43

## 2018-07-30 RX ADMIN — METOPROLOL TARTRATE 12.5 MG: 25 TABLET ORAL at 08:43

## 2018-07-30 RX ADMIN — HEPARIN SODIUM 2500 UNITS: 1000 INJECTION, SOLUTION INTRAVENOUS; SUBCUTANEOUS at 14:27

## 2018-07-30 RX ADMIN — VERAPAMIL HYDROCHLORIDE 2.5 MG: 2.5 INJECTION INTRAVENOUS at 14:28

## 2018-07-30 RX ADMIN — MIDAZOLAM HYDROCHLORIDE 1 MG: 1 INJECTION, SOLUTION INTRAMUSCULAR; INTRAVENOUS at 14:31

## 2018-07-30 RX ADMIN — VERAPAMIL HYDROCHLORIDE 2.5 MG: 2.5 INJECTION, SOLUTION INTRAVENOUS at 14:28

## 2018-07-30 NOTE — PROGRESS NOTES
Spiritual Care Assessment/Progress Note  [facility]      NAME: Ghulam Rawls      MRN: [id number]  AGE: 74 y.o.  SEX: female  Sabianism Affiliation: Jewish   Language: English     7/30/2018     Total Time (in minutes): 18     Spiritual Assessment begun in [facility] 2 [facility] CARD[facility] through conversation with:         [x]Patient        [] Family    [] Friend(s)        Reason for Consult: Advance medical directive consult     Spiritual beliefs: (Please include comment if needed)     [x] Identifies with a lencho tradition:         [] Supported by a lencho community:            [] Claims no spiritual orientation:           [] Seeking spiritual identity:                [] Adheres to an individual form of spirituality:           [] Not able to assess:                           Identified resources for coping:      [] Prayer                               [] Music                  [] Guided Imagery     [] Family/friends                 [] Pet visits     [] Devotional reading                         [x] Unknown     [] Other:                                           Interventions offered during this visit: (See comments for more details)    Patient Interventions: Advance medical directive consult, Iconic (affirming the presence of God/Higher Power), Coping skills reviewed/reinforced, Initial/Spiritual assessment, patient floor           Plan of Care:     [] Support spiritual and/or cultural needs    [x] Support AMD and/or advance care planning process      [] Support grieving process   [] Coordinate Rites and/or Rituals    [] Coordination with community clergy   [] No spiritual needs identified at this time   [] Detailed Plan of Care below (See Comments)  [] Make referral to Music Therapy  [] Make referral to Pet Therapy     [] Make referral to Addiction services  [] Make referral to [facility]  [] Make referral to Spiritual Care Partner  [] No future visits requested        [] Follow up visits as needed Responded to AMD in basket consult request for pt in 2156. Upon arrival found pt awake but drowsy from medication following procedure. Patient's RN indicated that pt was not appropriate to fill out AMD at this time due to effects of anasthesia. Provided explanation of document and handed form for pt along with informational booklet to consider. Instructed on steps to take when ready to complete. Pt expressed comprehension. Appeared to be in positive spirits and self-reported the same. Extended blessing. Will follow up as needed. AUGUSTINE Mac. Obdulia Devlin

## 2018-07-30 NOTE — PROGRESS NOTES
7/30/2018 8:39 AM    Admit Date: 7/29/2018    Admit Diagnosis: Unstable angina pectoris ([facility] [address]    Subjective:     Ethyl Herrera   denies chest pain, chest pressure/discomfort, dyspnea, palpitations, irregular heart beats, near-syncope, syncope, orthopnea, paroxysmal nocturnal dyspnea.     Visit Vitals    /48 (BP 1 Location: Left arm, BP Patient Position: At rest)    Pulse (!) 54    Temp 97.9 °F (36.6 °C)    Resp 16    Ht 5' 1.81\" (1.57 m)    Wt 160 lb (72.6 kg)    LMP  (LMP Unknown)    SpO2 98%    Breastfeeding No    BMI 29.44 kg/m2     Current Facility-Administered Medications   Medication Dose Route Frequency    pantoprazole (PROTONIX) tablet 40 mg  40 mg Oral ACB    citalopram (CELEXA) tablet 20 mg  20 mg Oral DAILY    hydroCHLOROthiazide (HYDRODIURIL) tablet 12.5 mg  12.5 mg Oral DAILY    zolpidem (AMBIEN) tablet 10 mg  10 mg Oral QHS PRN    atorvastatin (LIPITOR) tablet 20 mg  20 mg Oral QHS    famotidine (PEPCID) tablet 40 mg  40 mg Oral QHS    sodium chloride (NS) flush 5-10 mL  5-10 mL IntraVENous Q8H    sodium chloride (NS) flush 5-10 mL  5-10 mL IntraVENous PRN    nitroglycerin (NITROBID) 2 % ointment 0.5 Inch  0.5 Inch Topical Q6H    nitroglycerin (NITROSTAT) tablet 0.4 mg  0.4 mg SubLINGual PRN    metoprolol tartrate (LOPRESSOR) tablet 12.5 mg  12.5 mg Oral Q12H    acetaminophen (TYLENOL) solution 650 mg  650 mg Oral Q4H PRN    aspirin delayed-release tablet 81 mg  81 mg Oral DAILY    glucose chewable tablet 16 g  4 Tab Oral PRN    dextrose (D50W) injection syrg 12.5-25 g  12.5-25 g IntraVENous PRN    glucagon (GLUCAGEN) injection 1 mg  1 mg IntraMUSCular PRN    insulin lispro (HUMALOG) injection   SubCUTAneous AC&HS    acetaminophen (TYLENOL) tablet 650 mg  650 mg Oral Q4H PRN         Objective:      Visit Vitals    /48 (BP 1 Location: Left arm, BP Patient Position: At rest)    Pulse (!) 54    Temp 97.9 °F (36.6 °C)    Resp 16    Ht 5' 1.81\" (1.57 m)    Wt 160 lb (72.6 kg)    SpO2 98%    Breastfeeding No    BMI 29.44 kg/m2       Physical Exam:  Abdomen: soft, non-tender. Bowel sounds normal.   Extremities: no cyanosis or edema  Heart: regular rate and rhythm, S1, S2 normal, no murmur, click, rub or gallop  Lungs: clear to auscultation bilaterally  Neurologic: Grossly normal  Pulses: 2+     Data Review:   Labs:    Recent Results (from the past 24 hour(s))   EKG, 12 LEAD, INITIAL    Collection Time: 07/29/18 10:42 AM   Result Value Ref Range    Ventricular Rate 90 BPM    Atrial Rate 90 BPM    P-R Interval 154 ms    QRS Duration 78 ms    Q-T Interval 382 ms    QTC Calculation (Bezet) 467 ms    Calculated P Axis 65 degrees    Calculated R Axis 34 degrees    Calculated T Axis 54 degrees    Diagnosis       Normal sinus rhythm  Possible Left atrial enlargement  Septal infarct , age undetermined  When compared with ECG of 20-APR-2017 10:13,  Septal infarct is now present     GLUCOSE, POC    Collection Time: 07/29/18 10:50 AM   Result Value Ref Range    Glucose (POC) 260 (H) 65 - 100 mg/dL    Performed by Karuna Hoskins    CBC WITH AUTOMATED DIFF    Collection Time: 07/29/18 12:54 PM   Result Value Ref Range    WBC 5.3 3.6 - 11.0 K/uL    RBC 4.41 3.80 - 5.20 M/uL    HGB 12.7 11.5 - 16.0 g/dL    HCT 38.7 35.0 - 47.0 %    MCV 87.8 80.0 - 99.0 FL    MCH 28.8 26.0 - 34.0 PG    MCHC 32.8 30.0 - 36.5 g/dL    RDW 13.7 11.5 - 14.5 %    PLATELET 535 899 - 982 K/uL    MPV 11.7 8.9 - 12.9 FL    NRBC 0.0 0  WBC    ABSOLUTE NRBC 0.00 0.00 - 0.01 K/uL    NEUTROPHILS 67 32 - 75 %    LYMPHOCYTES 23 12 - 49 %    MONOCYTES 9 5 - 13 %    EOSINOPHILS 0 0 - 7 %    BASOPHILS 1 0 - 1 %    IMMATURE GRANULOCYTES 0 0.0 - 0.5 %    ABS. NEUTROPHILS 3.5 1.8 - 8.0 K/UL    ABS. LYMPHOCYTES 1.2 0.8 - 3.5 K/UL    ABS. MONOCYTES 0.5 0.0 - 1.0 K/UL    ABS. EOSINOPHILS 0.0 0.0 - 0.4 K/UL    ABS. BASOPHILS 0.1 0.0 - 0.1 K/UL    ABS. IMM.  GRANS. 0.0 0.00 - 0.04 K/UL    DF AUTOMATED METABOLIC PANEL, COMPREHENSIVE    Collection Time: 07/29/18 12:54 PM   Result Value Ref Range    Sodium 138 136 - 145 mmol/L    Potassium 3.4 (L) 3.5 - 5.1 mmol/L    Chloride 103 97 - 108 mmol/L    CO2 27 21 - 32 mmol/L    Anion gap 8 5 - 15 mmol/L    Glucose 117 (H) 65 - 100 mg/dL    BUN 18 6 - 20 MG/DL    Creatinine 1.33 (H) 0.55 - 1.02 MG/DL    BUN/Creatinine ratio 14 12 - 20      GFR est AA 49 (L) >60 ml/min/1.73m2    GFR est non-AA 40 (L) >60 ml/min/1.73m2    Calcium 9.4 8.5 - 10.1 MG/DL    Bilirubin, total 0.5 0.2 - 1.0 MG/DL    ALT (SGPT) 42 12 - 78 U/L    AST (SGOT) 28 15 - 37 U/L    Alk.  phosphatase 107 45 - 117 U/L    Protein, total 8.2 6.4 - 8.2 g/dL    Albumin 4.1 3.5 - 5.0 g/dL    Globulin 4.1 (H) 2.0 - 4.0 g/dL    A-G Ratio 1.0 (L) 1.1 - 2.2     CK W/ REFLX CKMB    Collection Time: 07/29/18 12:54 PM   Result Value Ref Range     26 - 192 U/L   TROPONIN I    Collection Time: 07/29/18 12:54 PM   Result Value Ref Range    Troponin-I, Qt. <0.05 <0.05 ng/mL   CK W/ CKMB & INDEX    Collection Time: 07/29/18  4:01 PM   Result Value Ref Range     26 - 192 U/L    CK - MB 1.3 <3.6 NG/ML    CK-MB Index 1.1 0 - 2.5     TROPONIN I    Collection Time: 07/29/18  4:01 PM   Result Value Ref Range    Troponin-I, Qt. <0.05 <0.05 ng/mL   GLUCOSE, POC    Collection Time: 07/29/18  4:41 PM   Result Value Ref Range    Glucose (POC) 97 65 - 100 mg/dL    Performed by Kristian Angel, POC    Collection Time: 07/29/18  9:37 PM   Result Value Ref Range    Glucose (POC) 102 (H) 65 - 100 mg/dL    Performed by Haritha Raymond    LIPID PANEL    Collection Time: 07/30/18  3:30 AM   Result Value Ref Range    LIPID PROFILE          Cholesterol, total 129 <200 MG/DL    Triglyceride 93 <150 MG/DL    HDL Cholesterol 58 MG/DL    LDL, calculated 52.4 0 - 100 MG/DL    VLDL, calculated 18.6 MG/DL    CHOL/HDL Ratio 2.2 0 - 5.0     GLUCOSE, POC    Collection Time: 07/30/18  7:19 AM   Result Value Ref Range    Glucose (POC) 157 (H) 65 - 100 mg/dL    Performed by Volodymyr Motta        Telemetry: normal sinus rhythm      Assessment:     Principal Problem:    Coronary artery disease involving native coronary artery with unstable angina pectoris ([facility] (7/29/2018)    Active Problems:    DM (diabetes mellitus) ([facility] (2/17/2011)      HTN (hypertension) (2/17/2011)      Unstable angina pectoris ([facility] (7/29/2018)        Plan:     1. UA: for cardiac cath today. Continue aspirin, statin and BB. I discussed the risks/benefits/alternatives of the procedure with the patient. Risks include (but are not limited to) bleeding, infection, cva/mi/tamponade/death. The patient understands and agrees to proceed. Check Echo.   2. HTN: continue current meds. Monitor. On lower side. 3. FLP reviewed. On statin.

## 2018-07-30 NOTE — ACP (ADVANCE CARE PLANNING)
Responded to AMD in basket consult request for pt in 2156. Upon arrival found pt awake but drowsy from medication following procedure. Patient's RN indicated that pt was not appropriate to fill out AMD at this time due to effects of anasthesia. Provided explanation of document and handed form for pt along with informational booklet to consider. Instructed on steps to take when ready to complete. Pt expressed comprehension. Will follow up as needed. VIRGIL Munguia

## 2018-07-30 NOTE — DISCHARGE INSTRUCTIONS
Patient ID:  Tati Clement  [id number]  74 y.o.  1950    Admit Date: 7/29/2018    Discharge Date: 7/30/2018     Admitting Physician: Amanda Salmeron MD     Discharge Physician: Amanda Salmeron MD    Admission Diagnoses: Unstable angina pectoris [facility])    Discharge Diagnoses: Principal Problem:    Coronary artery disease involving native coronary artery with unstable angina pectoris ([facility] (7/29/2018)    Active Problems:    DM (diabetes mellitus) ([facility] (2/17/2011)      HTN (hypertension) (2/17/2011)      Unstable angina pectoris ([facility] (7/29/2018)        Discharge Condition: Good    Cardiology Procedures this Admission:  Diagnostic left heart catheterization    Disposition: home    Patient Instructions:       Reference discharge instructions provided by nursing for diet and activity. Follow-up with me in 6 wks. Signed: Amanda Salmeron MD  7/30/2018  2:49 PM      Radial Cardiac Catheterization/Angiography Discharge Instructions    It is normal to feel tired the first couple days. Take it easy and follow the physicians instructions. CHECK THE CATHETER INSERTION SITE DAILY:    Remove the wrist dressing 24 hours after the procedure. You may shower 24 hours after the procedure. Wash with soap and water and pat dry. Gentle cleaning of the site with soap and water is sufficient, cover with a dry clean dressing or bandage. Do not apply creams or powders to the area. No soaking the wrist for 3 days. Leave the puncture site open to air after 24 hours post-procedure. CALL THE PHYSICIANS:     If the site becomes red, swollen or feels warm to the touch  If there is bleeding or drainage or if there is unusual pain at the radial site. If there is any minor oozing, you may apply a band-aid and remove after 12 hours.    If the bleeding continues, hold pressure with the middle finger against the puncture site and the thumb against the back of the wrist,call 911 to be transported to the hospital.  DO NOT DRIVE YOURSELF, [facility]. ACTIVITY:   For the first 24 hours do not manipulate the wrist.  No lifting, pushing or pulling over 3-5 pounds with the affected wrist for 7 daysand no straining the insertion site. Do not life grocery bags or the garbage can, do not run the vacuum  or  for 7 days. Start with short walks as in the hospital and gradually increase as tolerated each day. It is recommended to walk 30 minutes 5-7 days per week. Follow your physicians instructions on activity. Avoid walking outside in extremes of heat or cold. Walk inside when it is cold and windy or hot and humid. Things to keep in mind:  No driving for at least 24 hours, or as designated by your physician. Limit the number of times you go up and down the stairs  Take rests and pace yourself with activity. Be careful and do not strain with bowel movements. MEDICATIONS:    Take all medications as prescribed  Call your physician if you have any questions  Keep an updated list of your medications with you at all times and give a list to your physician and pharmacist    SIGNS AND SYMPTOMS:   Be cautious of symptoms of angina or recurrent symptoms such as chest discomfort, unusual shortness of breath or fatigue. These could be symptoms of restenosis, a new blockage or a heart attack. If your symptoms are relieved with rest it is still recommended that you notify your physician of recurrent chest pain or discomfort. For CHEST PAIN or symptoms of angina not relieved with rest:  If the discomfort is not relieved with rest, and you have been prescribed Nitroglycerin, take as directed (taken under the tongue, one at a time 5 minutes apart for a total of 3 doses). If the discomfort is not relieved after the 3rd nitroglycerin, call 911.   If you have not been prescribed Nitroglycerin  and your chest discomfort is not relieved with rest, call 911.     AFTER CARE:   Follow up with your physician as instructed. Follow a heart healthy diet with proper portion control, daily stress management, daily exercise, blood pressure and cholesterol control , and smoking cessation.

## 2018-07-30 NOTE — PROGRESS NOTES
Bedside report received from Tiki Costa RN                  Assessment, Background, Procedure summary, Intake/Output, MAR, and recent results discussed. Care assumed. 1448: TRANSFER - IN REPORT:    Verbal report received from Khurram Michele (name) on Ethyl Herrera  being received from  Cath Lab  (unit) for routine post - op      Report consisted of patients Situation, Background, Assessment and   Recommendations(SBAR). Information from the following report(s) SBAR, Kardex, Recent Results and Cardiac Rhythm NSR was reviewed with the receiving nurse. Opportunity for questions and clarification was provided. Assessment completed upon patients arrival to unit and care assumed. Pt ambulated in hallway. Pt appears steady and has no complaints of pain, shortness of breath, dizziness, or light-headedness. Incision appears clean, dry, and intact with no swelling or hematoma present. Discharge instructions reviewed with patient and family. Allowed adequate time to ask questions, all questions answered. Printed copy of AVS given to patient. All belongings gathered, IV and tele discontinued. Transported via wheelchair by RN to main entrance and into care of family.

## 2018-08-01 RX ORDER — CITALOPRAM 20 MG/1
TABLET, FILM COATED ORAL
Qty: 90 TAB | Refills: 3 | Status: SHIPPED | OUTPATIENT
Start: 2018-08-01 | End: 2019-08-28 | Stop reason: SDUPTHER

## 2018-08-01 NOTE — TELEPHONE ENCOUNTER
Last Visit: 5/29-Barbie  Next Appt: 11/30-Barbie  Previous Refill Encounter: 10/26/17-90+2    Requested Prescriptions     Pending Prescriptions Disp Refills    citalopram (CELEXA) 20 mg tablet 90 Tab 3     Sig: TAKE ONE TABLET BY MOUTH ONE TIME DAILY

## 2018-08-31 RX ORDER — HYDROCHLOROTHIAZIDE 12.5 MG/1
12.5 TABLET ORAL DAILY
Qty: 90 TAB | Refills: 3 | Status: SHIPPED | OUTPATIENT
Start: 2018-08-31 | End: 2019-09-19 | Stop reason: SDUPTHER

## 2018-09-04 ENCOUNTER — HOSPITAL ENCOUNTER (OUTPATIENT)
Dept: GENERAL RADIOLOGY | Age: 68
Discharge: HOME | End: 2018-09-04
Attending: PHYSICIAN ASSISTANT
Payer: MEDICARE

## 2018-09-04 DIAGNOSIS — K21.9 GASTROESOPHAGEAL REFLUX DISEASE: ICD-10-CM

## 2018-09-04 DIAGNOSIS — B37.0 CANDIDIASIS OF MOUTH: ICD-10-CM

## 2018-09-04 DIAGNOSIS — R07.9 CHEST PAIN, UNSPECIFIED: ICD-10-CM

## 2018-09-04 PROCEDURE — 74220 X-RAY XM ESOPHAGUS 1CNTRST: CPT

## 2018-09-10 ENCOUNTER — OFFICE VISIT (OUTPATIENT)
Dept: CARDIOLOGY CLINIC | Age: 68
End: 2018-09-10

## 2018-09-10 VITALS
HEIGHT: 62 IN | SYSTOLIC BLOOD PRESSURE: 110 MMHG | BODY MASS INDEX: 27.31 KG/M2 | OXYGEN SATURATION: 96 % | HEART RATE: 63 BPM | RESPIRATION RATE: 18 BRPM | DIASTOLIC BLOOD PRESSURE: 70 MMHG | WEIGHT: 148.4 LBS

## 2018-09-10 DIAGNOSIS — E11.9 TYPE 2 DIABETES MELLITUS WITHOUT COMPLICATION, WITHOUT LONG-TERM CURRENT USE OF INSULIN (HCC): Chronic | ICD-10-CM

## 2018-09-10 DIAGNOSIS — I10 ESSENTIAL HYPERTENSION: Primary | Chronic | ICD-10-CM

## 2018-09-10 DIAGNOSIS — R07.89 OTHER CHEST PAIN: ICD-10-CM

## 2018-09-10 RX ORDER — NYSTATIN 100000 [USP'U]/ML
5 SUSPENSION ORAL 4 TIMES DAILY
COMMUNITY
Start: 2018-08-29 | End: 2018-12-12 | Stop reason: ALTCHOICE

## 2018-09-10 NOTE — PROGRESS NOTES
1. Have you been to the ER, urgent care clinic since your last visit? Hospitalized since your last visit? YES, HOSPITAL FOLLOW UP.     2. Have you seen or consulted any other health care providers outside of the [facility] since your last visit? Include any pap smears or colon screening. NO    HOSPITAL FOLLOW UP. C/O DULL ACHING CHEST PAIN WITH SOB, DIZZINESS.

## 2018-09-10 NOTE — PROGRESS NOTES
9/10/2018 10:42 AM      Subjective:     Haseeb Vizcarra is seen in office today for f/u visit after recent admission at [facility]. Underwent cardiac cath for chest pain and no significant CAD noted. C/o atypical chest pain. She denies dyspnea, palpitations, irregular heart beats, near-syncope, syncope, fatigue, orthopnea, paroxysmal nocturnal dyspnea, exertional chest pressure/discomfort, claudication, lower extremity edema, tachypnea. Visit Vitals    /70 (BP 1 Location: Right arm, BP Patient Position: Standing)    Pulse 63    Resp 18    Ht 5' 1.6\" (1.565 m)    Wt 148 lb 6.4 oz (67.3 kg)    SpO2 96%    BMI 27.5 kg/m2     Current Outpatient Prescriptions   Medication Sig    nystatin (MYCOSTATIN) 100,000 unit/mL suspension Take 5 mL by mouth four (4) times daily.  citalopram (CELEXA) 20 mg tablet TAKE ONE TABLET BY MOUTH ONE TIME DAILY    metFORMIN (GLUCOPHAGE) 1,000 mg tablet 1  in am and 1 in pm. For diabetes    Resume on Wednesday 08/01/2018.  atorvastatin (LIPITOR) 20 mg tablet Take 20 mg by mouth nightly.  famotidine (PEPCID) 40 mg tablet Take 40 mg by mouth nightly.  polyethylene glycol (MIRALAX) 17 gram packet Take 17 g by mouth daily as needed ('Constipation').  multivit,calc,mins/iron/folic (ONE-A-DAY WOMENS FORMULA PO) Take 1 Tab by mouth daily.  zolpidem (AMBIEN) 10 mg tablet Take 1 Tab by mouth nightly as needed for Sleep. Max Daily Amount: 10 mg.  pantoprazole (PROTONIX) 40 mg tablet Take 40 mg by mouth daily.  hydroCHLOROthiazide (HYDRODIURIL) 12.5 mg tablet Take 1 Tab by mouth daily. For blood pressure     No current facility-administered medications for this visit.           Objective:      Visit Vitals    /70 (BP 1 Location: Right arm, BP Patient Position: Standing)    Pulse 63    Resp 18    Ht 5' 1.6\" (1.565 m)    Wt 148 lb 6.4 oz (67.3 kg)    SpO2 96%    BMI 27.5 kg/m2       Data Review:     EKG: Normal sinus rhythm, no acute st/t changes    Reviewed and/or ordered active problem list, medication list tests    Past Medical History:   Diagnosis Date    Allergic rhinitis due to allergen 10/22/2014    Arthritis     spine    Asthma     Depression     Diabetes ([address]     Type II    GERD (gastroesophageal reflux disease)     Hypercholesterolemia     Hypertension     Kidney stones 1990's    Other ill-defined conditions(799.89)     GERD    Positive PPD 2009    treated with INH      Past Surgical History:   Procedure Laterality Date    BREAST SURGERY PROCEDURE UNLISTED  1990's    abcess bilateral    COLONOSCOPY  4-11    manetas- n ormal    EGD  4-11    manetas- esophageal ring    ENDOSCOPY, COLON, DIAGNOSTIC  12/2007    2 polyps    HX BREAST BIOPSY Bilateral 1980s    all benign cysts    HX CHOLECYSTECTOMY      HX GYN  1978    tubal laparoscopy    HX ORTHOPAEDIC  2016    had an injection in her back to help with leg pain    HX ORTHOPAEDIC Left 12/12/2017    foot surg. bunion removal  \"Put a plate in\"    HX OTHER SURGICAL  1997    bunionectomy    HX TONSIL AND ADENOIDECTOMY      approx 11years old     Allergies   Allergen Reactions    Latex Itching    Lisinopril Swelling    Sulfa (Sulfonamide Antibiotics) Itching      Family History   Problem Relation Age of Onset    Diabetes Mother     Hypertension Mother     Cancer Father      lung    Cancer Paternal Aunt      colon      Social History     Social History    Marital status: SINGLE     Spouse name: N/A    Number of children: N/A    Years of education: N/A     Occupational History    Not on file. Social History Main Topics    Smoking status: Former Smoker     Packs/day: 1.00     Years: 25.00     Quit date: 8/23/2010    Smokeless tobacco: Never Used    Alcohol use No    Drug use: No    Sexual activity: Not on file     Other Topics Concern    Not on file     Social History Narrative    Retired schoolteacher. Raised 2 nieces.           Review of Systems     General: Not Present- Anorexia, Chills, Dietary Changes, Fatigue, Fever, Medication Changes, Night Sweats, Weight Gain > 10lbs. and Weight Loss > 10lbs. .  Skin: Not Present- Bruising and Excessive Sweating. HEENT: Not Present- Headache, Visual Loss and Vertigo. Respiratory: Not Present- Cough, Decreased Exercise Tolerance, Difficulty Breathing, Snoring and Wheezing. Cardiovascular: Not Present- Abnormal Blood Pressure, Claudications, Difficulty Breathing On Exertion, Edema, Fainting / Blacking Out, Irregular Heart Beat, Night Cramps, Orthopnea, Palpitations, Paroxysmal Nocturnal Dyspnea, Rapid Heart Rate, Shortness of Breath and Swelling of Extremities. Gastrointestinal: Not Present- Black, Tarry Stool, Bloody Stool, Diarrhea, Hematemesis, Rectal Bleeding and Vomiting. Musculoskeletal: Not Present- Muscle Pain and Muscle Weakness. Neurological: Not Present- Dizziness. Psychiatric: Not Present- Depression. Endocrine: Not Present- Cold Intolerance, Heat Intolerance and Thyroid Problems. Hematology: Not Present- Abnormal Bleeding, Anemia, Blood Clots and Easy Bruising.       Physical Exam   The physical exam findings are as follows:       General   Mental Status - Alert. General Appearance - Not in acute distress. Chest and Lung Exam   Inspection: Accessory muscles - No use of accessory muscles in breathing. Auscultation:   Breath sounds: - Normal.      Cardiovascular   Inspection: Jugular vein - Bilateral - Inspection Normal.  Palpation/Percussion:   Apical Impulse: - Normal.  Auscultation: Rhythm - Regular. Heart Sounds - S1 WNL and S2 WNL. No S3 or S4. Murmurs & Other Heart Sounds: Auscultation of the heart reveals - No Murmurs. Carotid arteries - No Carotid bruit. Peripheral Vascular   Upper Extremity: Inspection - Bilateral - No Cyanotic nailbeds or Digital clubbing. Lower Extremity:   Palpation: Edema - Bilateral - No edema. Assessment:       ICD-10-CM ICD-9-CM    1. Essential hypertension I10 401.9 AMB POC EKG ROUTINE W/ 12 LEADS, INTER & REP   2. Other chest pain R07.89 786.59    3. Type 2 diabetes mellitus without complication, without long-term current use of insulin (HCC) E11.9 250.00        Plan:     1. No significant CAD on recent cath. No further cardiac work up. 2. HTN: BP controlled. Taking HCTZ. 3. On statin. Last FLP noted. 4. Per pt she had normal GI work up and no MALINI.

## 2018-09-17 RX ORDER — LANCETS
EACH MISCELLANEOUS
Qty: 1 EACH | Refills: 11 | Status: SHIPPED | OUTPATIENT
Start: 2018-09-17 | End: 2018-09-18 | Stop reason: SDUPTHER

## 2018-09-17 NOTE — TELEPHONE ENCOUNTER
Last Visit: 5/29-Barbie  Next Appt: 11/30-Barbie  Previous Refill Encounter: 11/30/16    Requested Prescriptions     Pending Prescriptions Disp Refills    Lancets (ACCU-CHEK MULTICLIX LANCET) misc 1 Each 11     Sig: Test 1-2 times daily as directed

## 2018-09-18 DIAGNOSIS — E11.9 TYPE 2 DIABETES MELLITUS WITHOUT COMPLICATION, UNSPECIFIED WHETHER LONG TERM INSULIN USE (HCC): Primary | ICD-10-CM

## 2018-09-18 RX ORDER — LANCETS
EACH MISCELLANEOUS
Qty: 1 EACH | Refills: 11 | Status: SHIPPED | OUTPATIENT
Start: 2018-09-18 | End: 2018-11-21 | Stop reason: SDUPTHER

## 2018-09-19 ENCOUNTER — HOSPITAL ENCOUNTER (OUTPATIENT)
Dept: MAMMOGRAPHY | Age: 68
Discharge: HOME | End: 2018-09-19
Attending: FAMILY MEDICINE
Payer: MEDICARE

## 2018-09-19 DIAGNOSIS — Z12.39 SCREENING BREAST EXAMINATION: ICD-10-CM

## 2018-09-19 PROCEDURE — 77067 SCR MAMMO BI INCL CAD: CPT

## 2018-09-27 ENCOUNTER — OFFICE VISIT (OUTPATIENT)
Dept: FAMILY MEDICINE CLINIC | Age: 68
End: 2018-09-27

## 2018-09-27 VITALS
HEART RATE: 86 BPM | BODY MASS INDEX: 26.39 KG/M2 | TEMPERATURE: 96.8 F | WEIGHT: 143.4 LBS | OXYGEN SATURATION: 95 % | DIASTOLIC BLOOD PRESSURE: 60 MMHG | SYSTOLIC BLOOD PRESSURE: 110 MMHG | HEIGHT: 62 IN | RESPIRATION RATE: 16 BRPM

## 2018-09-27 DIAGNOSIS — R07.89 OTHER CHEST PAIN: Primary | ICD-10-CM

## 2018-09-27 PROBLEM — E11.21 TYPE 2 DIABETES WITH NEPHROPATHY (HCC): Status: ACTIVE | Noted: 2018-09-27

## 2018-09-27 RX ORDER — NAPROXEN 500 MG/1
500 TABLET ORAL 2 TIMES DAILY WITH MEALS
Qty: 60 TAB | Refills: 12 | Status: SHIPPED | OUTPATIENT
Start: 2018-09-27 | End: 2018-12-12 | Stop reason: ALTCHOICE

## 2018-09-27 NOTE — PROGRESS NOTES
Chief Complaint   Patient presents with   [facility] ED Follow-up     ED [facility]  7/29/18   chest pain     Chest Pain     1. Have you been to the ER, urgent care clinic since your last visit? Hospitalized since your last visit? No    2. Have you seen or consulted any other health care providers outside of the [address] [place] [address] since your last visit? Include any pap smears or colon screening.  No       Health Maintenance Due   Topic Date Due    Shingrix Vaccine Age 49> (1 of 2) 09/10/2002    Bone Densitometry (Dexa) Screening  09/10/2017    EYE EXAM RETINAL OR DILATED Q1  09/11/2018

## 2018-09-27 NOTE — MR AVS SNAPSHOT
[facility] 
 
 
 [address] [address] 
[phone] Patient: Kaylan Ortiz MRN: [id number] VQL:9/08/[birth year]31 Visit Information Date & Time Provider Department Dept. Phone Encounter #  
 9/27/2018  3:45 PM Otis Robles MD [facility] [phone] [id number] Your Appointments 11/30/2018 10:00 AM  
ROUTINE CARE with Otis Robles MD  
[facility] [address] [facility]) Appt Note: f/u  
 [address] [address]  
[phone] [address] [facility] [address] Upcoming Health Maintenance Date Due Shingrix Vaccine Age 50> (1 of 2) 9/10/2002 Bone Densitometry (Dexa) Screening 9/10/2017 EYE EXAM RETINAL OR DILATED Q1 9/11/2018 Influenza Age 5 to Adult 3/29/2019* FOOT EXAM Q1 11/15/2018 Pneumococcal 65+ Low/Medium Risk (2 of 2 - PPSV23) 11/29/2018 HEMOGLOBIN A1C Q6M 1/30/2019 MICROALBUMIN Q1 5/29/2019 MEDICARE YEARLY EXAM 5/30/2019 LIPID PANEL Q1 7/30/2019 GLAUCOMA SCREENING Q2Y 9/11/2019 BREAST CANCER SCRN MAMMOGRAM 9/19/2020 COLONOSCOPY 5/2/2026 DTaP/Tdap/Td series (2 - Td) 8/9/2026 *Topic was postponed. The date shown is not the original due date. Allergies as of 9/27/2018  Review Complete On: 9/27/2018 By: Otis Robles MD  
  
 Severity Noted Reaction Type Reactions Latex Medium 04/20/2017   Topical Itching Lisinopril High 05/27/2015    Swelling Sulfa (Sulfonamide Antibiotics)  06/09/2010    Itching Current Immunizations  Reviewed on 7/29/2018 Name Date Influenza Vaccine 9/20/2018, 10/25/2017, 10/21/2014, 10/21/2013 Influenza Vaccine Mani Push) 10/7/2015 Influenza Vaccine (Quad) PF 11/1/2016 Influenza Vaccine Split 10/25/2012, 10/13/2011, 10/19/2010 Pneumococcal Conjugate (PCV-13) 11/29/2017 Tdap 8/9/2016 Varicella Virus Vaccine 10/17/2012 ZZZ-RETIRED (DO NOT USE) Pneumococcal Vaccine (Unspecified Type) 10/19/2010 Not reviewed this visit You Were Diagnosed With   
  
 Codes Comments Other chest pain    -  Primary ICD-10-CM: R07.89 ICD-9-CM: 786.59 Vitals BP Pulse Temp Resp Height(growth percentile) Weight(growth percentile) 110/60 86 96.8 °F (36 °C) (Oral) 16 5' 1.6\" (1.565 m) 143 lb 6.4 oz (65 kg) LMP SpO2 BMI OB Status Smoking Status (LMP Unknown) 95% 26.57 kg/m2 Postmenopausal Former Smoker Vitals History BMI and BSA Data Body Mass Index Body Surface Area  
 26.57 kg/m 2 1.68 m 2 Preferred Pharmacy Pharmacy Name Phone [address] [phone] Your Updated Medication List  
  
   
This list is accurate as of 9/27/18  4:48 PM.  Always use your most recent med list.  
  
  
  
  
 atorvastatin 20 mg tablet Commonly known as:  LIPITOR Take 20 mg by mouth nightly. citalopram 20 mg tablet Commonly known as:  CELEXA  
TAKE ONE TABLET BY MOUTH ONE TIME DAILY  
  
 famotidine 40 mg tablet Commonly known as:  PEPCID Take 40 mg by mouth nightly. hydroCHLOROthiazide 12.5 mg tablet Commonly known as:  HYDRODIURIL Take 1 Tab by mouth daily. For blood pressure Lancets Misc Commonly known as:  [address] [facility] Test 1-2 times daily as directed (E11.9)  
  
 metFORMIN 1,000 mg tablet Commonly known as:  GLUCOPHAGE  
1  in am and 1 in pm. For diabetes  Resume on Wednesday 08/01/2018. MIRALAX 17 gram packet Generic drug:  polyethylene glycol Take 17 g by mouth daily as needed ('Constipation').  
  
 naproxen 500 mg tablet Commonly known as:  NAPROSYN Take 1 Tab by mouth two (2) times daily (with meals). For chest pain  
  
 nystatin 100,000 unit/mL suspension Commonly known as:  MYCOSTATIN Take 5 mL by mouth four (4) times daily.   
  
 ONE-A-DAY WOMENS FORMULA PO  
 Take 1 Tab by mouth daily. pantoprazole 40 mg tablet Commonly known as:  PROTONIX Take 40 mg by mouth daily. zolpidem 10 mg tablet Commonly known as:  AMBIEN Take 1 Tab by mouth nightly as needed for Sleep. Max Daily Amount: 10 mg.  
  
  
  
  
Prescriptions Sent to Pharmacy Refills  
 naproxen (NAPROSYN) 500 mg tablet 12 Sig: Take 1 Tab by mouth two (2) times daily (with meals). For chest pain  
 Class: Normal  
 Pharmacy: [facility] [address] [place] [address] Ph #: [phone] Route: Oral  
  
We Performed the Following AMB POC EKG ROUTINE W/ 12 LEADS, INTER & REP [75481 CPT(R)] Introducing [facility]! Dear [facility]: Thank you for requesting a Tr[employer] account. Our records indicate that you already have an active Trampoline account. You can access your account anytime at [email or web address] Did you know that you can access your hospital and ER discharge instructions at any time in Trampoline? You can also review all of your test results from your hospital stay or ER visit. Additional Information If you have questions, please visit the Frequently Asked Questions section of the Trampoline website at [email or web address] Remember, Trampoline is NOT to be used for urgent needs. For medical emergencies, dial 911. Now available from your iPhone and Android! Please provide this summary of care documentation to your next provider. Your primary care clinician is listed as Yeny Fernando. If you have any questions after today's visit, please call [phone].

## 2018-09-27 NOTE — PROGRESS NOTES
HISTORY OF PRESENT ILLNESS  Lalita Muñoz is a 79 y.o. female. HPIHas had a L sided chest pain for a few months. Gets pain almost every day, comes on after climbing stairs or walking a long distance. Some assc dyspnea. Will usually sit down when pain comes on. Pain not related to eating. Not taking any meds for pain. Had a normal cardiac cath in July of this year. Has also seen GI, has a hiatal hernia, but they didn't think this was cause of pain. ROS    Physical Exam   Constitutional: She appears well-developed and well-nourished. HENT:   Right Ear: External ear normal.   Left Ear: External ear normal.   Mouth/Throat: Oropharynx is clear and moist.   Neck: No thyromegaly present. Cardiovascular: Normal rate, regular rhythm, normal heart sounds and intact distal pulses. Pulmonary/Chest: Breath sounds normal. She has no wheezes. Abdominal: Soft. Bowel sounds are normal. She exhibits no distension and no mass. There is no tenderness. Musculoskeletal: She exhibits no edema. Lymphadenopathy:     She has no cervical adenopathy. Nursing note and vitals reviewed. ASSESSMENT and PLAN  Orders Placed This Encounter    AMB POC EKG ROUTINE W/ 12 LEADS, INTER & REP    naproxen (NAPROSYN) 500 mg tablet     Diagnoses and all orders for this visit:    1. Other chest pain  -     AMB POC EKG ROUTINE W/ 12 LEADS, INTER & REP    Other orders  -     naproxen (NAPROSYN) 500 mg tablet; Take 1 Tab by mouth two (2) times daily (with meals).  For chest pain      Follow-up Disposition: Not on File

## 2018-11-05 ENCOUNTER — OFFICE VISIT (OUTPATIENT)
Dept: FAMILY MEDICINE CLINIC | Age: 68
End: 2018-11-05

## 2018-11-05 VITALS
DIASTOLIC BLOOD PRESSURE: 54 MMHG | RESPIRATION RATE: 14 BRPM | BODY MASS INDEX: 27.09 KG/M2 | HEIGHT: 62 IN | OXYGEN SATURATION: 93 % | TEMPERATURE: 97.7 F | SYSTOLIC BLOOD PRESSURE: 112 MMHG | HEART RATE: 93 BPM | WEIGHT: 147.2 LBS

## 2018-11-05 DIAGNOSIS — Z01.818 PRE-OP EXAM: ICD-10-CM

## 2018-11-05 DIAGNOSIS — I10 ESSENTIAL HYPERTENSION: Primary | ICD-10-CM

## 2018-11-05 NOTE — PROGRESS NOTES
Chief Complaint   Patient presents with    Pre-op Exam     cataract removal   right eye  Dr. Judith Cheng   11/12/18      left eye to be done  11/26     1. Have you been to the ER, urgent care clinic since your last visit? Hospitalized since your last visit? No    2. Have you seen or consulted any other health care providers outside of the [address] [place] [address] since your last visit? Include any pap smears or colon screening.  No     Health Maintenance Due   Topic Date Due    Shingrix Vaccine Age 49> (1 of 2) 09/10/2002    Bone Densitometry (Dexa) Screening  09/10/2017    FOOT EXAM Q1  11/15/2018

## 2018-11-05 NOTE — PROGRESS NOTES
HISTORY OF PRESENT ILLNESS  Matt Robins is a 79 y.o. female. HPI Pt. Comes in for blood pressure check. Going for cataract surgery bilaterally. Has been having problems with nighttime vision. ROS    Physical Exam   Constitutional: She appears well-developed and well-nourished. HENT:   Right Ear: External ear normal.   Left Ear: External ear normal.   Mouth/Throat: Oropharynx is clear and moist.   Bilateral cataracts   Neck: No thyromegaly present. Cardiovascular: Normal rate, regular rhythm, normal heart sounds and intact distal pulses. Pulmonary/Chest: Breath sounds normal. She has no wheezes. Abdominal: Soft. Bowel sounds are normal. She exhibits no distension and no mass. There is no tenderness. Musculoskeletal: She exhibits no edema. Lymphadenopathy:     She has no cervical adenopathy. Nursing note and vitals reviewed. ASSESSMENT and PLAN  No orders of the defined types were placed in this encounter. Blood pressure, stable. Form filled out for cataract surgery.    Follow-up Disposition: Not on File

## 2018-11-21 DIAGNOSIS — E11.9 TYPE 2 DIABETES MELLITUS WITHOUT COMPLICATION, UNSPECIFIED WHETHER LONG TERM INSULIN USE (HCC): ICD-10-CM

## 2018-11-21 RX ORDER — LANCETS
EACH MISCELLANEOUS
Qty: 100 EACH | Refills: 12 | Status: SHIPPED | OUTPATIENT
Start: 2018-11-21 | End: 2018-12-12 | Stop reason: SDUPTHER

## 2018-11-26 RX ORDER — ATORVASTATIN CALCIUM 20 MG/1
20 TABLET, FILM COATED ORAL DAILY
Qty: 90 TAB | Refills: 3 | Status: SHIPPED | OUTPATIENT
Start: 2018-11-26 | End: 2020-01-27

## 2018-12-12 ENCOUNTER — OFFICE VISIT (OUTPATIENT)
Dept: FAMILY MEDICINE CLINIC | Age: 68
End: 2018-12-12

## 2018-12-12 ENCOUNTER — HOSPITAL ENCOUNTER (OUTPATIENT)
Dept: LAB | Age: 68
Discharge: HOME | End: 2018-12-12
Payer: MEDICARE

## 2018-12-12 VITALS
SYSTOLIC BLOOD PRESSURE: 133 MMHG | WEIGHT: 145.6 LBS | DIASTOLIC BLOOD PRESSURE: 65 MMHG | HEART RATE: 84 BPM | OXYGEN SATURATION: 94 % | HEIGHT: 62 IN | BODY MASS INDEX: 26.79 KG/M2 | RESPIRATION RATE: 14 BRPM | TEMPERATURE: 97.8 F

## 2018-12-12 DIAGNOSIS — M81.0 AGE-RELATED OSTEOPOROSIS WITHOUT CURRENT PATHOLOGICAL FRACTURE: ICD-10-CM

## 2018-12-12 DIAGNOSIS — E78.00 HYPERCHOLESTEROLEMIA: ICD-10-CM

## 2018-12-12 DIAGNOSIS — N95.1 MENOPAUSAL SYMPTOM: ICD-10-CM

## 2018-12-12 DIAGNOSIS — I10 ESSENTIAL HYPERTENSION: Primary | ICD-10-CM

## 2018-12-12 DIAGNOSIS — E11.9 TYPE 2 DIABETES MELLITUS WITHOUT COMPLICATION, UNSPECIFIED WHETHER LONG TERM INSULIN USE (HCC): ICD-10-CM

## 2018-12-12 DIAGNOSIS — Z23 ENCOUNTER FOR IMMUNIZATION: ICD-10-CM

## 2018-12-12 LAB — HBA1C MFR BLD HPLC: 6.8 %

## 2018-12-12 PROCEDURE — 82043 UR ALBUMIN QUANTITATIVE: CPT

## 2018-12-12 PROCEDURE — 36415 COLL VENOUS BLD VENIPUNCTURE: CPT

## 2018-12-12 PROCEDURE — 80053 COMPREHEN METABOLIC PANEL: CPT

## 2018-12-12 PROCEDURE — 80061 LIPID PANEL: CPT

## 2018-12-12 PROCEDURE — 85025 COMPLETE CBC W/AUTO DIFF WBC: CPT

## 2018-12-12 RX ORDER — LANCETS
EACH MISCELLANEOUS
Qty: 100 EACH | Refills: 12 | Status: SHIPPED | OUTPATIENT
Start: 2018-12-12 | End: 2018-12-12 | Stop reason: SDUPTHER

## 2018-12-12 RX ORDER — LANCETS
EACH MISCELLANEOUS
Qty: 200 EACH | Refills: 12 | Status: SHIPPED | OUTPATIENT
Start: 2018-12-12

## 2018-12-12 NOTE — PROGRESS NOTES
HISTORY OF PRESENT ILLNESS  Misti Cristina is a 79 y.o. female. HPI Pt. Comes in for blood pressure, cholesterol, and diabetes check. No complaints of chest pain, shortness of breath, TIAs, claudication or edema. Had recent cataract surgery- bilaterally. 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 ROS    Physical Exam   Constitutional: She appears well-developed and well-nourished. HENT:   Right Ear: External ear normal.   Left Ear: External ear normal.   Mouth/Throat: Oropharynx is clear and moist.   Neck: No thyromegaly present. Cardiovascular: Normal rate, regular rhythm, normal heart sounds and intact distal pulses. Pulmonary/Chest: Breath sounds normal. She has no wheezes. Abdominal: Soft. Bowel sounds are normal. She exhibits no distension and no mass. There is no tenderness. Musculoskeletal: She exhibits no edema. Lymphadenopathy:     She has no cervical adenopathy. Nursing note and vitals reviewed. ASSESSMENT and PLAN  Orders Placed This Encounter    DEXA BONE DENSITY STUDY AXIAL    Pneumococcal polysaccharide vaccine, 23-valent, adult or immunosuppressed pt dose    LIPID PANEL    METABOLIC PANEL, COMPREHENSIVE    CBC WITH AUTOMATED DIFF    MICROALBUMIN, UR, RAND W/ MICROALB/CREAT RATIO    AMB POC HEMOGLOBIN A1C    lancets (ACCU-CHEK SOFTCLIX LANCETS) misc     Diagnoses and all orders for this visit:    1. Essential hypertension  -     METABOLIC PANEL, COMPREHENSIVE  -     CBC WITH AUTOMATED DIFF    2. Type 2 diabetes mellitus without complication, unspecified whether long term insulin use (HCC)  -     lancets (ACCU-CHEK SOFTCLIX LANCETS) misc; TEST 1-2 TIMES DAILY AS DIRECTED  -     AMB POC HEMOGLOBIN A1C  -     MICROALBUMIN, UR, RAND W/ MICROALB/CREAT RATIO    3. Hypercholesterolemia  -     LIPID PANEL    4. Encounter for immunization  -     PNEUMOCOCCAL POLYSACCHARIDE VACCINE, 23-VALENT, ADULT OR IMMUNOSUPPRESSED PT DOSE,    5. Menopausal symptom  -     DEXA BONE DENSITY STUDY AXIAL; Future    6. Age-related osteoporosis without current pathological fracture  -     DEXA BONE DENSITY STUDY AXIAL; Future      Follow-up Disposition:  Return in about 6 months (around 6/12/2019).

## 2018-12-12 NOTE — PROGRESS NOTES
Chief Complaint   Patient presents with    Diabetes    Hypertension    Cholesterol Problem    Depression       1. Have you been to the ER, urgent care clinic since your last visit? Hospitalized since your last visit? No    2. Have you seen or consulted any other health care providers outside of the [facility] since your last visit? Include any pap smears or colon screening. No     Health Maintenance Due   Topic Date Due    Shingrix Vaccine Age 49> (1 of 2) 09/10/2002    Bone Densitometry (Dexa) Screening  09/10/2017    FOOT EXAM Q1  11/15/2018    Pneumococcal 65+ Low/Medium Risk (2 of 2 - PPSV23) 11/29/2018       After obtaining consent, and per orders of Dr. Humphrey Patel, injection of Pneumovax 23 (left deltoid) given by Cat Feng LPN. Patient instructed to remain in clinic for 20 minutes afterwards, and to report any adverse reaction to me immediately. Pt did not complain of any negative side effects.

## 2018-12-13 LAB
ALBUMIN SERPL-MCNC: 4.5 G/DL (ref 3.6–4.8)
ALBUMIN/CREAT UR: 3.5 MG/G CREAT (ref 0–30)
ALBUMIN/GLOB SERPL: 1.6 {RATIO} (ref 1.2–2.2)
ALP SERPL-CCNC: 126 IU/L (ref 39–117)
ALT SERPL-CCNC: 17 IU/L (ref 0–32)
AST SERPL-CCNC: 21 IU/L (ref 0–40)
BASOPHILS # BLD AUTO: 0 X10E3/UL (ref 0–0.2)
BASOPHILS NFR BLD AUTO: 0 %
BILIRUB SERPL-MCNC: 0.2 MG/DL (ref 0–1.2)
BUN SERPL-MCNC: 20 MG/DL (ref 8–27)
BUN/CREAT SERPL: 16 (ref 12–28)
CALCIUM SERPL-MCNC: 9.8 MG/DL (ref 8.7–10.3)
CHLORIDE SERPL-SCNC: 103 MMOL/L (ref 96–106)
CHOLEST SERPL-MCNC: 141 MG/DL (ref 100–199)
CO2 SERPL-SCNC: 25 MMOL/L (ref 20–29)
CREAT SERPL-MCNC: 1.27 MG/DL (ref 0.57–1)
CREAT UR-MCNC: 298.4 MG/DL
EOSINOPHIL # BLD AUTO: 0.1 X10E3/UL (ref 0–0.4)
EOSINOPHIL NFR BLD AUTO: 2 %
ERYTHROCYTE [DISTWIDTH] IN BLOOD BY AUTOMATED COUNT: 14.6 % (ref 12.3–15.4)
GLOBULIN SER CALC-MCNC: 2.8 G/DL (ref 1.5–4.5)
GLUCOSE SERPL-MCNC: 107 MG/DL (ref 65–99)
HCT VFR BLD AUTO: 38.5 % (ref 34–46.6)
HDLC SERPL-MCNC: 70 MG/DL
HGB BLD-MCNC: 12.8 G/DL (ref 11.1–15.9)
IMM GRANULOCYTES # BLD: 0 X10E3/UL (ref 0–0.1)
IMM GRANULOCYTES NFR BLD: 0 %
INTERPRETATION, 910389: NORMAL
INTERPRETATION: NORMAL
LDLC SERPL CALC-MCNC: 51 MG/DL (ref 0–99)
LYMPHOCYTES # BLD AUTO: 1.8 X10E3/UL (ref 0.7–3.1)
LYMPHOCYTES NFR BLD AUTO: 30 %
Lab: NORMAL
MCH RBC QN AUTO: 28.8 PG (ref 26.6–33)
MCHC RBC AUTO-ENTMCNC: 33.2 G/DL (ref 31.5–35.7)
MCV RBC AUTO: 87 FL (ref 79–97)
MICROALBUMIN UR-MCNC: 10.4 UG/ML
MONOCYTES # BLD AUTO: 0.3 X10E3/UL (ref 0.1–0.9)
MONOCYTES NFR BLD AUTO: 6 %
NEUTROPHILS # BLD AUTO: 3.6 X10E3/UL (ref 1.4–7)
NEUTROPHILS NFR BLD AUTO: 62 %
PDF IMAGE, 910387: NORMAL
PLATELET # BLD AUTO: 196 X10E3/UL (ref 150–379)
POTASSIUM SERPL-SCNC: 3.7 MMOL/L (ref 3.5–5.2)
PROT SERPL-MCNC: 7.3 G/DL (ref 6–8.5)
RBC # BLD AUTO: 4.45 X10E6/UL (ref 3.77–5.28)
SODIUM SERPL-SCNC: 144 MMOL/L (ref 134–144)
TRIGL SERPL-MCNC: 101 MG/DL (ref 0–149)
VLDLC SERPL CALC-MCNC: 20 MG/DL (ref 5–40)
WBC # BLD AUTO: 5.8 X10E3/UL (ref 3.4–10.8)

## 2018-12-18 RX ORDER — METFORMIN HYDROCHLORIDE 1000 MG/1
TABLET ORAL
Qty: 75 TAB | Refills: 4 | Status: SHIPPED | OUTPATIENT
Start: 2018-12-18 | End: 2019-07-02 | Stop reason: SDUPTHER

## 2018-12-21 RX ORDER — PREDNISOLONE ACETATE 10 MG/ML
1 SUSPENSION/ DROPS OPHTHALMIC 4 TIMES DAILY
COMMUNITY
End: 2019-12-09 | Stop reason: ALTCHOICE

## 2018-12-26 ENCOUNTER — ANESTHESIA (OUTPATIENT)
Dept: ENDOSCOPY | Age: 68
End: 2018-12-26
Payer: MEDICARE

## 2018-12-26 ENCOUNTER — HOSPITAL ENCOUNTER (OUTPATIENT)
Age: 68
Setting detail: OUTPATIENT SURGERY
Discharge: HOME | End: 2018-12-26
Attending: SPECIALIST | Admitting: SPECIALIST
Payer: MEDICARE

## 2018-12-26 ENCOUNTER — ANESTHESIA EVENT (OUTPATIENT)
Dept: ENDOSCOPY | Age: 68
End: 2018-12-26
Payer: MEDICARE

## 2018-12-26 VITALS
SYSTOLIC BLOOD PRESSURE: 155 MMHG | OXYGEN SATURATION: 98 % | RESPIRATION RATE: 14 BRPM | WEIGHT: 154 LBS | TEMPERATURE: 97.6 F | DIASTOLIC BLOOD PRESSURE: 82 MMHG | HEIGHT: 62 IN | BODY MASS INDEX: 28.34 KG/M2 | HEART RATE: 84 BPM

## 2018-12-26 PROCEDURE — 76040000019: Performed by: SPECIALIST

## 2018-12-26 PROCEDURE — 77030019988 HC FCPS ENDOSC DISP BSC -B: Performed by: SPECIALIST

## 2018-12-26 PROCEDURE — 88305 TISSUE EXAM BY PATHOLOGIST: CPT

## 2018-12-26 PROCEDURE — 76060000031 HC ANESTHESIA FIRST 0.5 HR: Performed by: SPECIALIST

## 2018-12-26 PROCEDURE — 74011250636 HC RX REV CODE- 250/636

## 2018-12-26 PROCEDURE — 74011250636 HC RX REV CODE- 250/636: Performed by: SPECIALIST

## 2018-12-26 RX ORDER — DEXTROMETHORPHAN/PSEUDOEPHED 2.5-7.5/.8
1.2 DROPS ORAL
Status: DISCONTINUED | OUTPATIENT
Start: 2018-12-26 | End: 2018-12-26 | Stop reason: HOSPADM

## 2018-12-26 RX ORDER — NALOXONE HYDROCHLORIDE 0.4 MG/ML
0.4 INJECTION, SOLUTION INTRAMUSCULAR; INTRAVENOUS; SUBCUTANEOUS
Status: DISCONTINUED | OUTPATIENT
Start: 2018-12-26 | End: 2018-12-26 | Stop reason: HOSPADM

## 2018-12-26 RX ORDER — SODIUM CHLORIDE 0.9 % (FLUSH) 0.9 %
5-10 SYRINGE (ML) INJECTION AS NEEDED
Status: DISCONTINUED | OUTPATIENT
Start: 2018-12-26 | End: 2018-12-26 | Stop reason: HOSPADM

## 2018-12-26 RX ORDER — EPINEPHRINE 0.1 MG/ML
1 INJECTION INTRACARDIAC; INTRAVENOUS
Status: DISCONTINUED | OUTPATIENT
Start: 2018-12-26 | End: 2018-12-26 | Stop reason: HOSPADM

## 2018-12-26 RX ORDER — FLUMAZENIL 0.1 MG/ML
0.2 INJECTION INTRAVENOUS
Status: DISCONTINUED | OUTPATIENT
Start: 2018-12-26 | End: 2018-12-26 | Stop reason: HOSPADM

## 2018-12-26 RX ORDER — SODIUM CHLORIDE 0.9 % (FLUSH) 0.9 %
5-10 SYRINGE (ML) INJECTION EVERY 8 HOURS
Status: DISCONTINUED | OUTPATIENT
Start: 2018-12-26 | End: 2018-12-26 | Stop reason: HOSPADM

## 2018-12-26 RX ORDER — ATROPINE SULFATE 0.1 MG/ML
0.5 INJECTION INTRAVENOUS
Status: DISCONTINUED | OUTPATIENT
Start: 2018-12-26 | End: 2018-12-26 | Stop reason: HOSPADM

## 2018-12-26 RX ORDER — LIDOCAINE HYDROCHLORIDE 20 MG/ML
INJECTION, SOLUTION EPIDURAL; INFILTRATION; INTRACAUDAL; PERINEURAL AS NEEDED
Status: DISCONTINUED | OUTPATIENT
Start: 2018-12-26 | End: 2018-12-26

## 2018-12-26 RX ORDER — LIDOCAINE HYDROCHLORIDE 20 MG/ML
INJECTION, SOLUTION EPIDURAL; INFILTRATION; INTRACAUDAL; PERINEURAL AS NEEDED
Status: DISCONTINUED | OUTPATIENT
Start: 2018-12-26 | End: 2018-12-26 | Stop reason: HOSPADM

## 2018-12-26 RX ORDER — SODIUM CHLORIDE 9 MG/ML
50 INJECTION, SOLUTION INTRAVENOUS CONTINUOUS
Status: DISCONTINUED | OUTPATIENT
Start: 2018-12-26 | End: 2018-12-26 | Stop reason: HOSPADM

## 2018-12-26 RX ORDER — PROPOFOL 10 MG/ML
INJECTION, EMULSION INTRAVENOUS AS NEEDED
Status: DISCONTINUED | OUTPATIENT
Start: 2018-12-26 | End: 2018-12-26 | Stop reason: HOSPADM

## 2018-12-26 RX ADMIN — SODIUM CHLORIDE 50 ML/HR: 900 INJECTION, SOLUTION INTRAVENOUS at 11:08

## 2018-12-26 RX ADMIN — LIDOCAINE HYDROCHLORIDE 80 MG: 20 INJECTION, SOLUTION EPIDURAL; INFILTRATION; INTRACAUDAL; PERINEURAL at 11:20

## 2018-12-26 RX ADMIN — PROPOFOL 140 MG: 10 INJECTION, EMULSION INTRAVENOUS at 11:33

## 2018-12-26 NOTE — DISCHARGE INSTRUCTIONS
Oneida Means  [id number]  1950    EGD DISCHARGE INSTRUCTIONS  Discomfort:  Sore throat- throat lozenges or warm salt water gargle  redness at IV site- apply warm compress to area; if redness or soreness persist- contact your physician  Gaseous discomfort- walking, belching will help relieve any discomfort  You may not operate a vehicle for 12 hours  You may not engage in an occupation involving machinery or appliances for rest of today. You may not drink alcoholic beverages for at least 12 hours  Avoid making any critical decisions for at least 24 hour  DIET  You may resume your regular diet - however -  remember your colon is empty and a heavy meal will produce gas. Avoid these foods:  vegetables, fried / greasy foods, carbonated drinks  MEDICATIONS        Regarding Aspirin or Nonsteroidal medications specifically, please see below. ACTIVITY  You may resume your normal daily activities. Spend the remainder of the day resting -  avoid any strenuous activity. CALL M.D. ANY SIGN OF   Increasing pain, nausea, vomiting  Abdominal distension (swelling)  New increased bleeding (oral or rectal)  Fever (chills)  Pain in chest area  Bloody discharge from nose or mouth  Shortness of breath    ONLY  Tylenol as needed for pain. Follow-up Instructions:   Call Dr. Adore Chacon for results of procedure / biopsy in 4-5 days at telephone #  [phone]              Continue your medications for acid reflux.

## 2018-12-26 NOTE — H&P
Gastroenterology Outpatient History and Physical    Patient: Ernestine Zuniga    Physician: Geovanna Jain MD    Vital Signs: Blood pressure 138/69, pulse 65, temperature 98.1 °F (36.7 °C), resp. rate 15, height 5' 2\" (1.575 m), weight 69.9 kg (154 lb), SpO2 99 %, not currently breastfeeding. Allergies: Allergies   Allergen Reactions    Latex Itching    Lisinopril Swelling    Sulfa (Sulfonamide Antibiotics) Itching       Chief Complaint: Chest pain, H/O GERD    History of Present Illness: 78 yo F with h/o GERD and HH now with recent chest pain and negative cardiac evaluation. Justification for Procedure: above    History:  Past Medical History:   Diagnosis Date    Allergic rhinitis due to allergen 10/22/2014    Arthritis     spine    Asthma     Depression     Diabetes ([facility] [address]     Type II    GERD (gastroesophageal reflux disease)     Hypercholesterolemia     Hypertension     Kidney stones 1990's    Other ill-defined conditions(799.89)     GERD    Positive PPD 2009    treated with INH      Past Surgical History:   Procedure Laterality Date    BREAST SURGERY PROCEDURE UNLISTED  1990's    abcess bilateral    COLONOSCOPY  4-11    manetas- n ormal    EGD  4-11    manetas- esophageal ring    ENDOSCOPY, COLON, DIAGNOSTIC  12/2007    2 polyps    HX BREAST BIOPSY Bilateral 1980s    all benign cysts    HX CHOLECYSTECTOMY      HX GYN  1978    tubal laparoscopy    HX HEENT Bilateral 11/2018    cataract extraction    HX ORTHOPAEDIC  2016    had an injection in her back to help with leg pain    HX ORTHOPAEDIC Left 12/12/2017    foot surg.   bunion removal  \"Put a plate in\"    HX OTHER SURGICAL  1997    bunionectomy    HX TONSIL AND ADENOIDECTOMY      approx 11years old      Social History     Socioeconomic History    Marital status: SINGLE     Spouse name: Not on file    Number of children: Not on file    Years of education: Not on file    Highest education level: Not on file   Tobacco Use    Smoking status: Former Smoker     Packs/day: 1.00     Years: 25.00     Pack years: 25.00     Last attempt to quit: 2010     Years since quittin.3    Smokeless tobacco: Never Used   Substance and Sexual Activity    Alcohol use: No    Drug use: No   Social History Narrative    Retired schoolteacher. Raised 2 nieces. Family History   Problem Relation Age of Onset    Diabetes Mother     Hypertension Mother     Cancer Father         lung    Cancer Paternal Aunt         colon       Medications:   Prior to Admission medications    Medication Sig Start Date End Date Taking? Authorizing Provider   prednisoLONE acetate (PRED FORTE) 1 % ophthalmic suspension Administer 1 Drop to both eyes four (4) times daily. Yes Provider, Historical   metFORMIN (GLUCOPHAGE) 1,000 mg tablet 1 IN AM AND 1 IN PM. FOR DIABETES 18  Yes Lu Lechuga MD   lancets (ACCU-CHEK SOFTCLIX LANCETS) misc TEST 2 TIMES DAILY AS DIRECTED (E11.9) 18  Yes Lu Lechuga MD   atorvastatin (LIPITOR) 20 mg tablet TAKE 1 TAB BY MOUTH DAILY. FOR CHOLESTEROL 18  Yes Lu Lechuga MD   hydroCHLOROthiazide (HYDRODIURIL) 12.5 mg tablet Take 1 Tab by mouth daily. For blood pressure 18  Yes Lu Lechuga MD   citalopram (CELEXA) 20 mg tablet TAKE ONE TABLET BY MOUTH ONE TIME DAILY 18  Yes Lu Lechuga MD   famotidine (PEPCID) 40 mg tablet Take 40 mg by mouth nightly. Yes Provider, Historical   polyethylene glycol (MIRALAX) 17 gram packet Take 17 g by mouth daily as needed ('Constipation'). Yes Provider, Historical   multivit,calc,mins/iron/folic (ONE-A-DAY WOMENS FORMULA PO) Take 1 Tab by mouth daily. Yes Provider, Historical   zolpidem (AMBIEN) 10 mg tablet Take 1 Tab by mouth nightly as needed for Sleep. Max Daily Amount: 10 mg. 18  Yes Lu Lechuga MD   pantoprazole (PROTONIX) 40 mg tablet Take 40 mg by mouth daily.  10/28/16  Yes Provider, Historical       Physical Exam:   General: alert, no distress   HEENT: Head: Normocephalic, no lesions, without obvious abnormality.    Heart: regular rate and rhythm, S1, S2 normal, no murmur, click, rub or gallop   Lungs: chest clear, no wheezing, rales, normal symmetric air entry   Abdominal: soft, nontender, nondistended, + BS   Neurological: Grossly normal   Extremities: extremities normal, atraumatic, no cyanosis or edema     Findings/Diagnosis: Chest pain    Plan of Care/Planned Procedure: EGD    Signed By: Geovanna Jain MD     December 26, 2018

## 2018-12-26 NOTE — ANESTHESIA POSTPROCEDURE EVALUATION
Procedure(s):  ESOPHAGOGASTRODUODENOSCOPY (EGD)  ESOPHAGOGASTRODUODENAL (EGD) BIOPSY. Anesthesia Post Evaluation        Patient location during evaluation: PACU  Note status: Adequate. Level of consciousness: responsive to verbal stimuli and sleepy but conscious  Pain management: satisfactory to patient  Airway patency: patent  Anesthetic complications: no  Cardiovascular status: acceptable  Respiratory status: acceptable  Hydration status: acceptable  Comments: +Post-Anesthesia Evaluation and Assessment    Patient: Micha Olmedo MRN: [id number]  SSN: [id number]   YOB: 1950  Age: 79 y.o. Sex: female      Cardiovascular Function/Vital Signs    /82   Pulse 84   Temp 36.4 °C (97.6 °F)   Resp 14   Ht 5' 2\" (1.575 m)   Wt 69.9 kg (154 lb)   SpO2 98%   Breastfeeding? No   BMI 28.17 kg/m²     Patient is status post Procedure(s):  ESOPHAGOGASTRODUODENOSCOPY (EGD)  ESOPHAGOGASTRODUODENAL (EGD) BIOPSY. Nausea/Vomiting: Controlled. Postoperative hydration reviewed and adequate. Pain:  Pain Scale 1: Numeric (0 - 10) (12/26/18 1159)  Pain Intensity 1: 0 (12/26/18 1159)   Managed. Neurological Status: At baseline. Mental Status and Level of Consciousness: Arousable. Pulmonary Status:   O2 Device: Room air (12/26/18 1159)   Adequate oxygenation and airway patent. Complications related to anesthesia: None    Post-anesthesia assessment completed. No concerns. Signed By: Faith Maharaj DO    12/26/2018  Post anesthesia nausea and vomiting:  controlled      Visit Vitals  /82   Pulse 84   Temp 36.4 °C (97.6 °F)   Resp 14   Ht 5' 2\" (1.575 m)   Wt 69.9 kg (154 lb)   SpO2 98%   Breastfeeding?  No   BMI 28.17 kg/m²

## 2018-12-26 NOTE — ANESTHESIA PREPROCEDURE EVALUATION
Anesthetic History   No history of anesthetic complications            Review of Systems / Medical History  Patient summary reviewed, nursing notes reviewed and pertinent labs reviewed    Pulmonary            Asthma : well controlled    Comments: Former smoker - Quit 2010 - 25 pack years   Neuro/Psych         Psychiatric history    Comments: Depression Cardiovascular    Hypertension: well controlled              Exercise tolerance: >4 METS  Comments: 9-27-18 EKG: NSR  Cath in September was Neg for pt   GI/Hepatic/Renal     GERD: well controlled    Renal disease: stones      Comments: Biliary Dyskinesia Endo/Other    Diabetes: well controlled, type 2    Arthritis     Other Findings              Physical Exam    Airway  Mallampati: II  TM Distance: > 6 cm  Neck ROM: normal range of motion   Mouth opening: Normal     Cardiovascular  Regular rate and rhythm,  S1 and S2 normal,  no murmur, click, rub, or gallop             Dental  No notable dental hx       Pulmonary  Breath sounds clear to auscultation               Abdominal  GI exam deferred       Other Findings            Anesthetic Plan    ASA: 3  Anesthesia type: total IV anesthesia          Induction: Intravenous  Anesthetic plan and risks discussed with: Patient

## 2019-01-04 ENCOUNTER — HOSPITAL ENCOUNTER (OUTPATIENT)
Dept: BONE DENSITY | Age: 69
Discharge: HOME | End: 2019-01-04
Attending: FAMILY MEDICINE
Payer: MEDICARE

## 2019-01-04 DIAGNOSIS — N95.1 MENOPAUSAL SYMPTOM: ICD-10-CM

## 2019-01-04 DIAGNOSIS — M81.0 AGE-RELATED OSTEOPOROSIS WITHOUT CURRENT PATHOLOGICAL FRACTURE: ICD-10-CM

## 2019-01-04 PROCEDURE — 77080 DXA BONE DENSITY AXIAL: CPT

## 2019-01-07 RX ORDER — ALENDRONATE SODIUM 35 MG/1
35 TABLET ORAL
Qty: 13 TAB | Refills: 3 | Status: SHIPPED | OUTPATIENT
Start: 2019-01-07 | End: 2019-12-11 | Stop reason: SDUPTHER

## 2019-01-21 DIAGNOSIS — F51.01 PRIMARY INSOMNIA: ICD-10-CM

## 2019-01-21 RX ORDER — ZOLPIDEM TARTRATE 10 MG/1
TABLET ORAL
Qty: 30 TAB | Refills: 5 | Status: SHIPPED | OUTPATIENT
Start: 2019-01-21 | End: 2019-08-20 | Stop reason: SDUPTHER

## 2019-01-22 ENCOUNTER — DOCUMENTATION ONLY (OUTPATIENT)
Dept: FAMILY MEDICINE CLINIC | Age: 69
End: 2019-01-22

## 2019-07-01 ENCOUNTER — HOSPITAL ENCOUNTER (EMERGENCY)
Age: 69
Discharge: HOME | End: 2019-07-01
Attending: EMERGENCY MEDICINE
Payer: MEDICARE

## 2019-07-01 ENCOUNTER — APPOINTMENT (OUTPATIENT)
Dept: CT IMAGING | Age: 69
End: 2019-07-01
Attending: EMERGENCY MEDICINE
Payer: MEDICARE

## 2019-07-01 VITALS
DIASTOLIC BLOOD PRESSURE: 53 MMHG | OXYGEN SATURATION: 100 % | SYSTOLIC BLOOD PRESSURE: 118 MMHG | WEIGHT: 146.39 LBS | RESPIRATION RATE: 17 BRPM | HEIGHT: 62 IN | TEMPERATURE: 98 F | BODY MASS INDEX: 26.94 KG/M2 | HEART RATE: 63 BPM

## 2019-07-01 DIAGNOSIS — R10.9 FLANK PAIN, ACUTE: Primary | ICD-10-CM

## 2019-07-01 LAB
ALBUMIN SERPL-MCNC: 4 G/DL (ref 3.5–5)
ALBUMIN/GLOB SERPL: 1 {RATIO} (ref 1.1–2.2)
ALP SERPL-CCNC: 105 U/L (ref 45–117)
ALT SERPL-CCNC: 35 U/L (ref 12–78)
ANION GAP SERPL CALC-SCNC: 10 MMOL/L (ref 5–15)
APPEARANCE UR: CLEAR
AST SERPL-CCNC: 33 U/L (ref 15–37)
BACTERIA URNS QL MICRO: ABNORMAL /HPF
BASOPHILS # BLD: 0 K/UL (ref 0–0.1)
BASOPHILS NFR BLD: 1 % (ref 0–1)
BILIRUB SERPL-MCNC: 0.4 MG/DL (ref 0.2–1)
BILIRUB UR QL: NEGATIVE
BUN SERPL-MCNC: 15 MG/DL (ref 6–20)
BUN/CREAT SERPL: 13 (ref 12–20)
CALCIUM SERPL-MCNC: 9.3 MG/DL (ref 8.5–10.1)
CHLORIDE SERPL-SCNC: 107 MMOL/L (ref 97–108)
CO2 SERPL-SCNC: 24 MMOL/L (ref 21–32)
COLOR UR: ABNORMAL
CREAT SERPL-MCNC: 1.12 MG/DL (ref 0.55–1.02)
DIFFERENTIAL METHOD BLD: ABNORMAL
EOSINOPHIL # BLD: 0.2 K/UL (ref 0–0.4)
EOSINOPHIL NFR BLD: 3 % (ref 0–7)
EPITH CASTS URNS QL MICRO: ABNORMAL /LPF
ERYTHROCYTE [DISTWIDTH] IN BLOOD BY AUTOMATED COUNT: 14.7 % (ref 11.5–14.5)
GLOBULIN SER CALC-MCNC: 4.2 G/DL (ref 2–4)
GLUCOSE SERPL-MCNC: 86 MG/DL (ref 65–100)
GLUCOSE UR STRIP.AUTO-MCNC: NEGATIVE MG/DL
HCT VFR BLD AUTO: 40.2 % (ref 35–47)
HGB BLD-MCNC: 13.2 G/DL (ref 11.5–16)
HGB UR QL STRIP: NEGATIVE
HYALINE CASTS URNS QL MICRO: ABNORMAL /LPF (ref 0–5)
IMM GRANULOCYTES # BLD AUTO: 0 K/UL (ref 0–0.04)
IMM GRANULOCYTES NFR BLD AUTO: 0 % (ref 0–0.5)
KETONES UR QL STRIP.AUTO: NEGATIVE MG/DL
LEUKOCYTE ESTERASE UR QL STRIP.AUTO: ABNORMAL
LIPASE SERPL-CCNC: 172 U/L (ref 73–393)
LYMPHOCYTES # BLD: 2 K/UL (ref 0.8–3.5)
LYMPHOCYTES NFR BLD: 39 % (ref 12–49)
MCH RBC QN AUTO: 29.1 PG (ref 26–34)
MCHC RBC AUTO-ENTMCNC: 32.8 G/DL (ref 30–36.5)
MCV RBC AUTO: 88.7 FL (ref 80–99)
MONOCYTES # BLD: 0.4 K/UL (ref 0–1)
MONOCYTES NFR BLD: 8 % (ref 5–13)
NEUTS SEG # BLD: 2.6 K/UL (ref 1.8–8)
NEUTS SEG NFR BLD: 49 % (ref 32–75)
NITRITE UR QL STRIP.AUTO: NEGATIVE
NRBC # BLD: 0 K/UL (ref 0–0.01)
NRBC BLD-RTO: 0 PER 100 WBC
PH UR STRIP: 7.5 [PH] (ref 5–8)
PLATELET # BLD AUTO: 202 K/UL (ref 150–400)
PMV BLD AUTO: 11.7 FL (ref 8.9–12.9)
POTASSIUM SERPL-SCNC: 4 MMOL/L (ref 3.5–5.1)
PROT SERPL-MCNC: 8.2 G/DL (ref 6.4–8.2)
PROT UR STRIP-MCNC: NEGATIVE MG/DL
RBC # BLD AUTO: 4.53 M/UL (ref 3.8–5.2)
RBC #/AREA URNS HPF: ABNORMAL /HPF (ref 0–5)
SODIUM SERPL-SCNC: 141 MMOL/L (ref 136–145)
SP GR UR REFRACTOMETRY: 1.02 (ref 1–1.03)
UA: UC IF INDICATED,UAUC: ABNORMAL
UROBILINOGEN UR QL STRIP.AUTO: 1 EU/DL (ref 0.2–1)
WBC # BLD AUTO: 5.3 K/UL (ref 3.6–11)
WBC URNS QL MICRO: ABNORMAL /HPF (ref 0–4)

## 2019-07-01 PROCEDURE — 80053 COMPREHEN METABOLIC PANEL: CPT

## 2019-07-01 PROCEDURE — 74176 CT ABD & PELVIS W/O CONTRAST: CPT

## 2019-07-01 PROCEDURE — 87086 URINE CULTURE/COLONY COUNT: CPT

## 2019-07-01 PROCEDURE — 85025 COMPLETE CBC W/AUTO DIFF WBC: CPT

## 2019-07-01 PROCEDURE — 99283 EMERGENCY DEPT VISIT LOW MDM: CPT

## 2019-07-01 PROCEDURE — 36415 COLL VENOUS BLD VENIPUNCTURE: CPT

## 2019-07-01 PROCEDURE — 96361 HYDRATE IV INFUSION ADD-ON: CPT

## 2019-07-01 PROCEDURE — 96374 THER/PROPH/DIAG INJ IV PUSH: CPT

## 2019-07-01 PROCEDURE — 74011250636 HC RX REV CODE- 250/636: Performed by: EMERGENCY MEDICINE

## 2019-07-01 PROCEDURE — 83690 ASSAY OF LIPASE: CPT

## 2019-07-01 PROCEDURE — 87077 CULTURE AEROBIC IDENTIFY: CPT

## 2019-07-01 PROCEDURE — 81001 URINALYSIS AUTO W/SCOPE: CPT

## 2019-07-01 PROCEDURE — 87186 SC STD MICRODIL/AGAR DIL: CPT

## 2019-07-01 RX ORDER — CEPHALEXIN 500 MG/1
500 CAPSULE ORAL 4 TIMES DAILY
Qty: 28 CAP | Refills: 0 | Status: SHIPPED | OUTPATIENT
Start: 2019-07-01 | End: 2019-07-08

## 2019-07-01 RX ORDER — METHOCARBAMOL 750 MG/1
750 TABLET, FILM COATED ORAL 4 TIMES DAILY
Qty: 12 TAB | Refills: 0 | Status: SHIPPED | OUTPATIENT
Start: 2019-07-01 | End: 2019-07-08 | Stop reason: ALTCHOICE

## 2019-07-01 RX ORDER — KETOROLAC TROMETHAMINE 30 MG/ML
15 INJECTION, SOLUTION INTRAMUSCULAR; INTRAVENOUS
Status: COMPLETED | OUTPATIENT
Start: 2019-07-01 | End: 2019-07-01

## 2019-07-01 RX ORDER — CEPHALEXIN 500 MG/1
500 CAPSULE ORAL 4 TIMES DAILY
Qty: 28 CAP | Refills: 0 | Status: SHIPPED | OUTPATIENT
Start: 2019-07-01 | End: 2019-07-01

## 2019-07-01 RX ADMIN — KETOROLAC TROMETHAMINE 15 MG: 30 INJECTION, SOLUTION INTRAMUSCULAR at 18:35

## 2019-07-01 RX ADMIN — SODIUM CHLORIDE 1000 ML: 900 INJECTION, SOLUTION INTRAVENOUS at 18:35

## 2019-07-01 NOTE — ED NOTES
Patient discharged by MD, [place] and education provided. IV removed from [facility] w/o difficulty, catheter intact, no redness/swelling. Patient ambulated off unit w/o difficulty, home via private car.

## 2019-07-01 NOTE — ED TRIAGE NOTES
Patient arrives with c/o R sided flank pain that is constant since yesterday. She states the pain is nonradiating, denies dysuria or hematuria, no nausea/vomiting. Patient does have a hx of chronic back pan and kidney stones. Patient denies abdominal pain. Dr. Juliann Morrison at bedside, call light in reach, will continue to monitor.

## 2019-07-01 NOTE — ED PROVIDER NOTES
EMERGENCY DEPARTMENT HISTORY AND PHYSICAL EXAM      Date: 7/1/2019  Patient Name: Malika Thomas    Please note that this dictation was completed with [employer], the computer voice recognition software. Quite often unanticipated grammatical, syntax, homophones, and other interpretive errors are inadvertently transcribed by the computer software. Please disregard these errors. Please excuse any errors that have escaped final proofreading. History of Presenting Illness     Chief Complaint   Patient presents with    Flank Pain     rt flank pain since yesterday, intermittent       History Provided By: Patient    HPI: Malika Thomas, 79 y.o. female, presenting the emergency department complaining of right-sided flank pain that began last night. Pain is described as severe, 9 out of 10, radiates down to the groin. Feels like prior kidney stones. Denies any nausea or vomiting. Denies any change in stool. Denies any dysuria or hematuria. PCP: Nicci Montelongo MD    No current facility-administered medications on file prior to encounter. Current Outpatient Medications on File Prior to Encounter   Medication Sig Dispense Refill    zolpidem (AMBIEN) 10 mg tablet TAKE 1 TABLET BY MOUTH NIGHTLY AS NEEDED FOR SLEEP 30 Tab 5    glucose blood VI test strips (ACCU-CHEK SOFIA PLUS TEST STRP) strip TEST BLOOD SUGAR 4 TIMES DAILY. DX CODE E11.9 100 Strip 4    alendronate (FOSAMAX) 35 mg tablet Take 1 Tab by mouth every seven (7) days. Take with 8 ounces of water on empty stomach. Remain upright for 30 minutes afterwards. For bones. 13 Tab 3    prednisoLONE acetate (PRED FORTE) 1 % ophthalmic suspension Administer 1 Drop to both eyes four (4) times daily.  metFORMIN (GLUCOPHAGE) 1,000 mg tablet 1 IN AM AND 1 IN PM. FOR DIABETES 75 Tab 4    lancets (ACCU-CHEK SOFTCLIX LANCETS) misc TEST 2 TIMES DAILY AS DIRECTED (E11.9) 200 Each 12    atorvastatin (LIPITOR) 20 mg tablet TAKE 1 TAB BY MOUTH DAILY.  FOR CHOLESTEROL 90 Tab 3    hydroCHLOROthiazide (HYDRODIURIL) 12.5 mg tablet Take 1 Tab by mouth daily. For blood pressure 90 Tab 3    citalopram (CELEXA) 20 mg tablet TAKE ONE TABLET BY MOUTH ONE TIME DAILY 90 Tab 3    famotidine (PEPCID) 40 mg tablet Take 40 mg by mouth nightly.  polyethylene glycol (MIRALAX) 17 gram packet Take 17 g by mouth daily as needed ('Constipation').  multivit,calc,mins/iron/folic (ONE-A-DAY WOMENS FORMULA PO) Take 1 Tab by mouth daily.  pantoprazole (PROTONIX) 40 mg tablet Take 40 mg by mouth daily. Past History     Past Medical History:  Past Medical History:   Diagnosis Date    Allergic rhinitis due to allergen 10/22/2014    Arthritis     spine    Asthma     Depression     Diabetes ([facility] [address]     Type II    GERD (gastroesophageal reflux disease)     Hypercholesterolemia     Hypertension     Kidney stones 1990's    Other ill-defined conditions(799.89)     GERD    Positive PPD 2009    treated with INH       Past Surgical History:  Past Surgical History:   Procedure Laterality Date    BREAST SURGERY PROCEDURE UNLISTED  1990's    abcess bilateral    COLONOSCOPY  4-11    manetas- n ormal    EGD  4-11    manetas- esophageal ring    ENDOSCOPY, COLON, DIAGNOSTIC  12/2007    2 polyps    HX BREAST BIOPSY Bilateral 1980s    all benign cysts    HX CHOLECYSTECTOMY      HX GYN  1978    tubal laparoscopy    HX HEENT Bilateral 11/2018    cataract extraction    HX ORTHOPAEDIC  2016    had an injection in her back to help with leg pain    HX ORTHOPAEDIC Left 12/12/2017    foot surg.   bunion removal  \"Put a plate in\"    HX OTHER SURGICAL  1997    bunionectomy    HX TONSIL AND ADENOIDECTOMY      approx 11years old       Family History:  Family History   Problem Relation Age of Onset    Diabetes Mother     Hypertension Mother     Cancer Father         lung    Cancer Paternal Aunt         colon       Social History:  Social History     Tobacco Use    Smoking status: Former Smoker     Packs/day: 1.00     Years: 25.00     Pack years: 25.00     Last attempt to quit: 2010     Years since quittin.8    Smokeless tobacco: Never Used   Substance Use Topics    Alcohol use: No    Drug use: No       Allergies: Allergies   Allergen Reactions    Latex Itching    Lisinopril Swelling    Sulfa (Sulfonamide Antibiotics) Itching         Review of Systems   Review of Systems   Constitutional: Negative for chills and fever. HENT: Negative for congestion and sore throat. Eyes: Negative for visual disturbance. Respiratory: Negative for cough and shortness of breath. Cardiovascular: Negative for chest pain and leg swelling. Gastrointestinal: Positive for abdominal pain. Negative for blood in stool, diarrhea and nausea. Endocrine: Negative for polyuria. Genitourinary: Positive for flank pain. Negative for dysuria, vaginal bleeding and vaginal discharge. Musculoskeletal: Negative for myalgias. Skin: Negative for rash. Allergic/Immunologic: Negative for immunocompromised state. Neurological: Negative for weakness and headaches. Psychiatric/Behavioral: Negative for confusion. Physical Exam   Physical Exam   Constitutional: oriented to person, place, and time. appears well-developed and well-nourished. HENT:   Head: Normocephalic and atraumatic. Moist mucous membranes   Eyes: Pupils are equal, round, and reactive to light. Conjunctivae are normal. Right eye exhibits no discharge. Left eye exhibits no discharge. Neck: Normal range of motion. Neck supple. No tracheal deviation present. Cardiovascular: Normal rate, regular rhythm and normal heart sounds. No murmur heard. Pulmonary/Chest: Effort normal and breath sounds normal. No respiratory distress. no wheezes. no rales. Abdominal: Soft. Bowel sounds are normal. There is no tenderness. There is no rebound and no guarding. Right-sided CVA tenderness.     Musculoskeletal: Normal range of motion. exhibits no edema, tenderness or deformity. Neurological: alert and oriented to person, place, and time. Skin: Skin is warm and dry. No rash noted. No erythema. Psychiatric: behavior is normal.   Nursing note and vitals reviewed. Diagnostic Study Results     Labs -     Recent Results (from the past 12 hour(s))   CBC WITH AUTOMATED DIFF    Collection Time: 07/01/19  6:40 PM   Result Value Ref Range    WBC 5.3 3.6 - 11.0 K/uL    RBC 4.53 3.80 - 5.20 M/uL    HGB 13.2 11.5 - 16.0 g/dL    HCT 40.2 35.0 - 47.0 %    MCV 88.7 80.0 - 99.0 FL    MCH 29.1 26.0 - 34.0 PG    MCHC 32.8 30.0 - 36.5 g/dL    RDW 14.7 (H) 11.5 - 14.5 %    PLATELET 674 264 - 535 K/uL    MPV 11.7 8.9 - 12.9 FL    NRBC 0.0 0  WBC    ABSOLUTE NRBC 0.00 0.00 - 0.01 K/uL    NEUTROPHILS 49 32 - 75 %    LYMPHOCYTES 39 12 - 49 %    MONOCYTES 8 5 - 13 %    EOSINOPHILS 3 0 - 7 %    BASOPHILS 1 0 - 1 %    IMMATURE GRANULOCYTES 0 0.0 - 0.5 %    ABS. NEUTROPHILS 2.6 1.8 - 8.0 K/UL    ABS. LYMPHOCYTES 2.0 0.8 - 3.5 K/UL    ABS. MONOCYTES 0.4 0.0 - 1.0 K/UL    ABS. EOSINOPHILS 0.2 0.0 - 0.4 K/UL    ABS. BASOPHILS 0.0 0.0 - 0.1 K/UL    ABS. IMM. GRANS. 0.0 0.00 - 0.04 K/UL    DF AUTOMATED     METABOLIC PANEL, COMPREHENSIVE    Collection Time: 07/01/19  6:40 PM   Result Value Ref Range    Sodium 141 136 - 145 mmol/L    Potassium 4.0 3.5 - 5.1 mmol/L    Chloride 107 97 - 108 mmol/L    CO2 24 21 - 32 mmol/L    Anion gap 10 5 - 15 mmol/L    Glucose 86 65 - 100 mg/dL    BUN 15 6 - 20 MG/DL    Creatinine 1.12 (H) 0.55 - 1.02 MG/DL    BUN/Creatinine ratio 13 12 - 20      GFR est AA 59 (L) >60 ml/min/1.73m2    GFR est non-AA 49 (L) >60 ml/min/1.73m2    Calcium 9.3 8.5 - 10.1 MG/DL    Bilirubin, total 0.4 0.2 - 1.0 MG/DL    ALT (SGPT) 35 12 - 78 U/L    AST (SGOT) 33 15 - 37 U/L    Alk.  phosphatase 105 45 - 117 U/L    Protein, total 8.2 6.4 - 8.2 g/dL    Albumin 4.0 3.5 - 5.0 g/dL    Globulin 4.2 (H) 2.0 - 4.0 g/dL    A-G Ratio 1.0 (L) 1.1 - 2.2 LIPASE    Collection Time: 07/01/19  6:40 PM   Result Value Ref Range    Lipase 172 73 - 393 U/L   URINALYSIS W/ REFLEX CULTURE    Collection Time: 07/01/19  6:40 PM   Result Value Ref Range    Color YELLOW/STRAW      Appearance CLEAR CLEAR      Specific gravity 1.020 1.003 - 1.030      pH (UA) 7.5 5.0 - 8.0      Protein NEGATIVE  NEG mg/dL    Glucose NEGATIVE  NEG mg/dL    Ketone NEGATIVE  NEG mg/dL    Bilirubin NEGATIVE  NEG      Blood NEGATIVE  NEG      Urobilinogen 1.0 0.2 - 1.0 EU/dL    Nitrites NEGATIVE  NEG      Leukocyte Esterase TRACE (A) NEG      WBC 0-4 0 - 4 /hpf    RBC 0-5 0 - 5 /hpf    Epithelial cells FEW FEW /lpf    Bacteria 2+ (A) NEG /hpf    UA:UC IF INDICATED URINE CULTURE ORDERED (A) CNI      Hyaline cast 0-2 0 - 5 /lpf       Radiologic Studies -   CT ABD PELV WO CONT   Final Result   IMPRESSION: Bilateral nonobstructing nephrolithiasis. Hepatic steatosis No acute   findings. CT Results  (Last 48 hours)               07/01/19 1852  CT ABD PELV WO CONT Final result    Impression:  IMPRESSION: Bilateral nonobstructing nephrolithiasis. Hepatic steatosis No acute   findings. Narrative:  EXAM: CT ABD PELV WO CONT       INDICATION: Flank pain, recurrent stone disease suspected       COMPARISON: CT 4/8/2017. CONTRAST:  None. TECHNIQUE:    Thin axial images were obtained through the abdomen and pelvis. Coronal and   sagittal reconstructions were generated. Oral contrast was not administered. CT   dose reduction was achieved through use of a standardized protocol tailored for   this examination and automatic exposure control for dose modulation. The absence of intravenous contrast material reduces the sensitivity for   evaluation of the solid parenchymal organs of the abdomen. FINDINGS:    LUNG BASES: Clear. INCIDENTALLY IMAGED HEART AND MEDIASTINUM: Unremarkable. LIVER: Diffusely hypodense with no focal lesion or biliary ductal dilation.    GALLBLADDER: Surgically absent. SPLEEN: No mass. PANCREAS: No mass or ductal dilatation. ADRENALS: Unremarkable. KIDNEYS/URETERS: Bilateral intrarenal nephrolithiasis measuring up to 4 mm on   the right and 5 mm on the left. No hydronephrosis or hydroureter. No ureteral   stones. STOMACH: Unremarkable. SMALL BOWEL: No dilatation or wall thickening. COLON: No dilatation or wall thickening. APPENDIX: Unremarkable. PERITONEUM: No ascites or pneumoperitoneum. RETROPERITONEUM: Atherosclerotic calcifications without aneurysm. No enlarged   lymphadenopathy. REPRODUCTIVE ORGANS: Uterus and ovaries appear unremarkable. URINARY BLADDER: No mass or calculus. BONES: No destructive bone lesion. ADDITIONAL COMMENTS: N/A               CXR Results  (Last 48 hours)    None            Medical Decision Making   I am the first provider for this patient. I reviewed the vital signs, available nursing notes, past medical history, past surgical history, family history and social history. Vital Signs-Reviewed the patient's vital signs. Patient Vitals for the past 12 hrs:   Temp Pulse Resp BP SpO2   07/01/19 1930 -- 63 17 118/53 100 %   07/01/19 1815 98 °F (36.7 °C) 76 16 140/61 99 %             Records Reviewed: Nursing Notes and Old Medical Records    Provider Notes (Medical Decision Making):   Patient presents with flank pain. DDx: renal stone, pyelonephritis, muscular strain, testicular/ovarian pathology. Unlikely AAA given the story. Will get UA to evaluate for blood and infection and possibly imaging to evaluate for hydro. ED Course:   Initial assessment performed. The patients presenting problems have been discussed, and they are in agreement with the care plan formulated and outlined with them. I have encouraged them to ask questions as they arise throughout their visit. Critical Care Time:   none    Disposition:      PLAN:  1.    Discharge Medication List as of 7/1/2019  7:27 PM      START taking these medications    Details   cephALEXin (KEFLEX) 500 mg capsule Take 1 Cap by mouth four (4) times daily for 7 days. , Normal, Disp-28 Cap, R-0         CONTINUE these medications which have NOT CHANGED    Details   zolpidem (AMBIEN) 10 mg tablet TAKE 1 TABLET BY MOUTH NIGHTLY AS NEEDED FOR SLEEP, PrintThis request is for a new prescription for a controlled substance as required by Federal/State law. Disp-30 Tab, R-5      glucose blood VI test strips (ACCU-CHEK SOFIA PLUS TEST STRP) strip TEST BLOOD SUGAR 4 TIMES DAILY. DX CODE E11.9, Normal, Disp-100 Strip, R-4      alendronate (FOSAMAX) 35 mg tablet Take 1 Tab by mouth every seven (7) days. Take with 8 ounces of water on empty stomach. Remain upright for 30 minutes afterwards. For bones. , Normal, Disp-13 Tab, R-3      prednisoLONE acetate (PRED FORTE) 1 % ophthalmic suspension Administer 1 Drop to both eyes four (4) times daily. , Historical Med      metFORMIN (GLUCOPHAGE) 1,000 mg tablet 1 IN AM AND 1 IN PM. FOR DIABETES, Normal, Disp-75 Tab, R-4      lancets (ACCU-CHEK SOFTCLIX LANCETS) misc TEST 2 TIMES DAILY AS DIRECTED (E11.9), Normal, Disp-200 Each, R-12      atorvastatin (LIPITOR) 20 mg tablet TAKE 1 TAB BY MOUTH DAILY. FOR CHOLESTEROL, Normal, Disp-90 Tab, R-3      hydroCHLOROthiazide (HYDRODIURIL) 12.5 mg tablet Take 1 Tab by mouth daily. For blood pressure, Normal, Disp-90 Tab, R-3      citalopram (CELEXA) 20 mg tablet TAKE ONE TABLET BY MOUTH ONE TIME DAILY, Normal, Disp-90 Tab, R-3      famotidine (PEPCID) 40 mg tablet Take 40 mg by mouth nightly., Historical Med      polyethylene glycol (MIRALAX) 17 gram packet Take 17 g by mouth daily as needed ('Constipation'). , Historical Med      multivit,calc,mins/iron/folic (ONE-A-DAY WOMENS FORMULA PO) Take 1 Tab by mouth daily. , Historical Med      pantoprazole (PROTONIX) 40 mg tablet Take 40 mg by mouth daily. , Historical Med           2.    Follow-up Information     Follow up With Specialties Details Why Contact Info    Isa Bauer MD [facility] Schedule an appointment as soon as possible for a visit  [address] [facility]  [address]  [phone]      [facility] EMERGENCY DEPT Emergency Medicine  If symptoms worsen [address] [place] [address]  [phone]          Return to ED if worse     Diagnosis     Clinical Impression:   1. Flank pain, acute        Attestations:   This note was completed by Kamryn Guthrie DO

## 2019-07-01 NOTE — DISCHARGE INSTRUCTIONS

## 2019-07-02 RX ORDER — METFORMIN HYDROCHLORIDE 1000 MG/1
TABLET ORAL
Qty: 180 TAB | Refills: 3 | Status: SHIPPED | OUTPATIENT
Start: 2019-07-02 | End: 2020-06-24 | Stop reason: SDUPTHER

## 2019-07-02 NOTE — TELEPHONE ENCOUNTER
Last visit:12/12/18  Next visit:7/08/19  Previous refill 12/18/18(75 +4R)    Requested Prescriptions     Pending Prescriptions Disp Refills    metFORMIN (GLUCOPHAGE) 1,000 mg tablet 180 Tab 3     Sig: Take 1 tablet in AM and 1 tablet in PM. For Diabetes

## 2019-07-03 LAB
BACTERIA SPEC CULT: ABNORMAL
CC UR VC: ABNORMAL
SERVICE CMNT-IMP: ABNORMAL

## 2019-07-08 ENCOUNTER — OFFICE VISIT (OUTPATIENT)
Dept: FAMILY MEDICINE CLINIC | Age: 69
End: 2019-07-08

## 2019-07-08 ENCOUNTER — HOSPITAL ENCOUNTER (OUTPATIENT)
Dept: LAB | Age: 69
Discharge: HOME | End: 2019-07-08
Payer: MEDICARE

## 2019-07-08 VITALS
SYSTOLIC BLOOD PRESSURE: 119 MMHG | RESPIRATION RATE: 18 BRPM | BODY MASS INDEX: 26.94 KG/M2 | OXYGEN SATURATION: 98 % | HEIGHT: 62 IN | DIASTOLIC BLOOD PRESSURE: 75 MMHG | HEART RATE: 96 BPM | WEIGHT: 146.4 LBS | TEMPERATURE: 98.4 F

## 2019-07-08 DIAGNOSIS — R10.9 ACUTE RIGHT FLANK PAIN: ICD-10-CM

## 2019-07-08 DIAGNOSIS — E11.9 TYPE 2 DIABETES MELLITUS WITHOUT COMPLICATION, UNSPECIFIED WHETHER LONG TERM INSULIN USE (HCC): Primary | ICD-10-CM

## 2019-07-08 DIAGNOSIS — I10 ESSENTIAL HYPERTENSION: ICD-10-CM

## 2019-07-08 DIAGNOSIS — E78.00 HYPERCHOLESTEROLEMIA: ICD-10-CM

## 2019-07-08 LAB — HBA1C MFR BLD HPLC: 7 %

## 2019-07-08 PROCEDURE — 80061 LIPID PANEL: CPT

## 2019-07-08 PROCEDURE — 82043 UR ALBUMIN QUANTITATIVE: CPT

## 2019-07-08 PROCEDURE — 36415 COLL VENOUS BLD VENIPUNCTURE: CPT

## 2019-07-08 RX ORDER — NAPROXEN 500 MG/1
500 TABLET ORAL 2 TIMES DAILY WITH MEALS
Qty: 60 TAB | Refills: 2 | Status: SHIPPED | OUTPATIENT
Start: 2019-07-08 | End: 2019-07-31 | Stop reason: SDUPTHER

## 2019-07-08 RX ORDER — NAPROXEN 500 MG/1
500 TABLET ORAL
Qty: 60 TAB | Refills: 2 | Status: SHIPPED | OUTPATIENT
Start: 2019-07-08 | End: 2020-02-13

## 2019-07-08 NOTE — PROGRESS NOTES
Chief Complaint   Patient presents with    Hypertension    Diabetes     Pt concerned with ride side pain x 1 week. 1. Have you been to the ER, urgent care clinic since your last visit? Hospitalized since your last visit? No    2. Have you seen or consulted any other health care providers outside of the [address] [place] [address] since your last visit? Include any pap smears or colon screening.  No    Health Maintenance Due   Topic Date Due    Shingrix Vaccine Age 50> (1 of 2) 09/10/2002    FOOT EXAM Q1  11/15/2018    MEDICARE YEARLY EXAM  05/30/2019    HEMOGLOBIN A1C Q6M  06/12/2019

## 2019-07-08 NOTE — PROGRESS NOTES
HISTORY OF PRESENT ILLNESS  Evelyn Bravo is a 79 y.o. female. HPI Pt. Comes in for blood pressure, cholesterol, and diabetes check. Has been having some pain in R posterior thoracic area since last week. Pain is constant. No aggravating or alleviating factors. Went to ER, was given a muscle relaxer that didn't help that much. No hematuria, cough. Pain nonpleuritic. Muscle relaxer didn't help the pain that much. No bowel problems. ROS    Physical Exam   Constitutional: She appears well-developed and well-nourished. HENT:   Right Ear: External ear normal.   Left Ear: External ear normal.   Mouth/Throat: Oropharynx is clear and moist.   Neck: No thyromegaly present. Cardiovascular: Normal rate, regular rhythm, normal heart sounds and intact distal pulses. Pulmonary/Chest: Breath sounds normal. She has no wheezes. Abdominal: Soft. Bowel sounds are normal. She exhibits no distension and no mass. There is no tenderness. Musculoskeletal: She exhibits no edema. Tenderness R posterior thoracic area   Lymphadenopathy:     She has no cervical adenopathy. Nursing note and vitals reviewed. ASSESSMENT and PLAN  Orders Placed This Encounter    XR CHEST PA LAT    LIPID PANEL    MICROALBUMIN, UR, RAND W/ MICROALB/CREAT RATIO    AMB POC HEMOGLOBIN A1C    naproxen (NAPROSYN) 500 mg tablet    naproxen (NAPROSYN) 500 mg tablet     Diagnoses and all orders for this visit:    1. Type 2 diabetes mellitus without complication, unspecified whether long term insulin use (HCC)  -     MICROALBUMIN, UR, RAND W/ MICROALB/CREAT RATIO  -     AMB POC HEMOGLOBIN A1C    2. Essential hypertension    3. Hypercholesterolemia  -     LIPID PANEL    4. Acute right flank pain  -     XR CHEST PA LAT; Future    Other orders  -     naproxen (NAPROSYN) 500 mg tablet; Take 1 Tab by mouth two (2) times daily (with meals). As needed for back pain  -     naproxen (NAPROSYN) 500 mg tablet;  Take 1 Tab by mouth every twelve (12) hours as needed for Pain. Probable musculoskeletal pain.

## 2019-07-09 LAB
ALBUMIN/CREAT UR: 5.9 MG/G CREAT (ref 0–30)
CHOLEST SERPL-MCNC: 146 MG/DL (ref 100–199)
CREAT UR-MCNC: 149.9 MG/DL
HDLC SERPL-MCNC: 67 MG/DL
INTERPRETATION, 910389: NORMAL
LDLC SERPL CALC-MCNC: 55 MG/DL (ref 0–99)
Lab: NORMAL
MICROALBUMIN UR-MCNC: 8.9 UG/ML
TRIGL SERPL-MCNC: 122 MG/DL (ref 0–149)
VLDLC SERPL CALC-MCNC: 24 MG/DL (ref 5–40)

## 2019-07-31 ENCOUNTER — OFFICE VISIT (OUTPATIENT)
Dept: FAMILY MEDICINE CLINIC | Age: 69
End: 2019-07-31

## 2019-07-31 VITALS
HEIGHT: 62 IN | HEART RATE: 77 BPM | OXYGEN SATURATION: 97 % | WEIGHT: 147 LBS | TEMPERATURE: 98.5 F | SYSTOLIC BLOOD PRESSURE: 120 MMHG | RESPIRATION RATE: 14 BRPM | DIASTOLIC BLOOD PRESSURE: 59 MMHG | BODY MASS INDEX: 27.05 KG/M2

## 2019-07-31 DIAGNOSIS — H61.23 BILATERAL IMPACTED CERUMEN: Primary | ICD-10-CM

## 2019-07-31 RX ORDER — OMEPRAZOLE 40 MG/1
CAPSULE, DELAYED RELEASE ORAL
COMMUNITY
Start: 2019-05-15 | End: 2020-05-18

## 2019-07-31 NOTE — PROGRESS NOTES
Chief Complaint   Patient presents with    Ear Fullness     bilateral    Annual Wellness Visit     1. Have you been to the ER, urgent care clinic since your last visit? Hospitalized since your last visit? No    2. Have you seen or consulted any other health care providers outside of the [address] [place] [address] since your last visit? Include any pap smears or colon screening.  No     Health Maintenance Due   Topic Date Due    Shingrix Vaccine Age 50> (1 of 2) 09/10/2002    FOOT EXAM Q1  11/15/2018    MEDICARE YEARLY EXAM  05/30/2019

## 2019-07-31 NOTE — PROGRESS NOTES
HISTORY OF PRESENT ILLNESS  Isac Fisher is a 79 y.o. female. HPI co ears being stopped up. Thinks they are both stopped up. Some post nasal drainage. ROS    Physical Exam   Constitutional: She appears well-developed and well-nourished. HENT:   Mouth/Throat: Oropharynx is clear and moist.   Cerumen impaction bilaterally   Neck: No thyromegaly present. Cardiovascular: Normal rate, regular rhythm, normal heart sounds and intact distal pulses. Pulmonary/Chest: Breath sounds normal. She has no wheezes. Abdominal: Soft. Bowel sounds are normal. She exhibits no distension and no mass. There is no tenderness. Musculoskeletal: She exhibits no edema. Lymphadenopathy:     She has no cervical adenopathy. Nursing note and vitals reviewed. ASSESSMENT and PLAN  No orders of the defined types were placed in this encounter. Diagnoses and all orders for this visit:    1.  Bilateral impacted cerumen

## 2019-08-20 DIAGNOSIS — F51.01 PRIMARY INSOMNIA: ICD-10-CM

## 2019-08-20 RX ORDER — ZOLPIDEM TARTRATE 10 MG/1
TABLET ORAL
Qty: 30 TAB | Refills: 5 | OUTPATIENT
Start: 2019-08-20 | End: 2019-08-28 | Stop reason: SDUPTHER

## 2019-08-20 NOTE — TELEPHONE ENCOUNTER
Controlled Substance Refill Request for Ambien. Please check  before prescribing.      Thanks,   Yoselin    Last visit: 7/31/19  Next visit:1/08/20  Previous refill 1/21/19(30+5R)    Requested Prescriptions     Pending Prescriptions Disp Refills    zolpidem (AMBIEN) 10 mg tablet 30 Tab 5     Sig: TAKE 1 TABLET BY MOUTH NIGHTLY AS NEEDED FOR SLEEP

## 2019-08-28 DIAGNOSIS — F51.01 PRIMARY INSOMNIA: ICD-10-CM

## 2019-08-28 RX ORDER — CITALOPRAM 20 MG/1
TABLET, FILM COATED ORAL
Qty: 90 TAB | Refills: 3 | Status: SHIPPED | OUTPATIENT
Start: 2019-08-28 | End: 2020-09-29 | Stop reason: SDUPTHER

## 2019-08-28 RX ORDER — ZOLPIDEM TARTRATE 10 MG/1
TABLET ORAL
Qty: 30 TAB | Refills: 5 | OUTPATIENT
Start: 2019-08-28 | End: 2020-05-18

## 2019-08-28 NOTE — TELEPHONE ENCOUNTER
1) zolpidem (AMBIEN) 10 mg tablet  2) citalopram (CELEXA) 20 mg tablet        Patient needs the above prescriptions refilled. She ran out of refills from her pharmacy . She is still receiving her prescpritions through [facility] on [facility].

## 2019-09-19 RX ORDER — HYDROCHLOROTHIAZIDE 12.5 MG/1
12.5 TABLET ORAL DAILY
Qty: 90 TAB | Refills: 3 | Status: SHIPPED | OUTPATIENT
Start: 2019-09-19 | End: 2020-09-10 | Stop reason: SDUPTHER

## 2019-09-19 NOTE — TELEPHONE ENCOUNTER
Last visit:7/31/19  Next visit: 1/8/20  Previous refill 8/31/18(90+3R)    Requested Prescriptions     Pending Prescriptions Disp Refills    hydroCHLOROthiazide (HYDRODIURIL) 12.5 mg tablet 90 Tab 3     Sig: Take 1 Tab by mouth daily.  For blood pressure

## 2019-10-31 ENCOUNTER — HOSPITAL ENCOUNTER (EMERGENCY)
Age: 69
Discharge: HOME | End: 2019-10-31
Attending: EMERGENCY MEDICINE
Payer: MEDICARE

## 2019-10-31 ENCOUNTER — APPOINTMENT (OUTPATIENT)
Dept: CT IMAGING | Age: 69
End: 2019-10-31
Attending: EMERGENCY MEDICINE
Payer: MEDICARE

## 2019-10-31 VITALS
BODY MASS INDEX: 28.34 KG/M2 | HEIGHT: 62 IN | DIASTOLIC BLOOD PRESSURE: 66 MMHG | SYSTOLIC BLOOD PRESSURE: 158 MMHG | WEIGHT: 154 LBS | OXYGEN SATURATION: 99 % | TEMPERATURE: 99 F | RESPIRATION RATE: 18 BRPM | HEART RATE: 88 BPM

## 2019-10-31 DIAGNOSIS — N20.1 CALCULUS OF URETEROVESICAL JUNCTION (UVJ): Primary | ICD-10-CM

## 2019-10-31 LAB
ANION GAP SERPL CALC-SCNC: 11 MMOL/L (ref 5–15)
APPEARANCE UR: CLEAR
BACTERIA URNS QL MICRO: NEGATIVE /HPF
BILIRUB UR QL: NEGATIVE
BUN SERPL-MCNC: 12 MG/DL (ref 6–20)
BUN/CREAT SERPL: 9 (ref 12–20)
CALCIUM SERPL-MCNC: 9.1 MG/DL (ref 8.5–10.1)
CHLORIDE SERPL-SCNC: 108 MMOL/L (ref 97–108)
CO2 SERPL-SCNC: 24 MMOL/L (ref 21–32)
COLOR UR: ABNORMAL
CREAT SERPL-MCNC: 1.33 MG/DL (ref 0.55–1.02)
EPITH CASTS URNS QL MICRO: ABNORMAL /LPF
GLUCOSE SERPL-MCNC: 180 MG/DL (ref 65–100)
GLUCOSE UR STRIP.AUTO-MCNC: NEGATIVE MG/DL
HGB UR QL STRIP: ABNORMAL
HYALINE CASTS URNS QL MICRO: ABNORMAL /LPF (ref 0–5)
KETONES UR QL STRIP.AUTO: NEGATIVE MG/DL
LEUKOCYTE ESTERASE UR QL STRIP.AUTO: ABNORMAL
NITRITE UR QL STRIP.AUTO: NEGATIVE
PH UR STRIP: 6 [PH] (ref 5–8)
POTASSIUM SERPL-SCNC: 3.3 MMOL/L (ref 3.5–5.1)
PROT UR STRIP-MCNC: NEGATIVE MG/DL
RBC #/AREA URNS HPF: >100 /HPF (ref 0–5)
SODIUM SERPL-SCNC: 143 MMOL/L (ref 136–145)
SP GR UR REFRACTOMETRY: 1.01 (ref 1–1.03)
UA: UC IF INDICATED,UAUC: ABNORMAL
UROBILINOGEN UR QL STRIP.AUTO: 0.2 EU/DL (ref 0.2–1)
WBC URNS QL MICRO: ABNORMAL /HPF (ref 0–4)

## 2019-10-31 PROCEDURE — 74011250636 HC RX REV CODE- 250/636: Performed by: EMERGENCY MEDICINE

## 2019-10-31 PROCEDURE — 74176 CT ABD & PELVIS W/O CONTRAST: CPT

## 2019-10-31 PROCEDURE — 81001 URINALYSIS AUTO W/SCOPE: CPT

## 2019-10-31 PROCEDURE — 80048 BASIC METABOLIC PNL TOTAL CA: CPT

## 2019-10-31 PROCEDURE — 99284 EMERGENCY DEPT VISIT MOD MDM: CPT

## 2019-10-31 PROCEDURE — 36415 COLL VENOUS BLD VENIPUNCTURE: CPT

## 2019-10-31 PROCEDURE — 96374 THER/PROPH/DIAG INJ IV PUSH: CPT

## 2019-10-31 RX ORDER — KETOROLAC TROMETHAMINE 10 MG/1
10 TABLET, FILM COATED ORAL
Qty: 20 TAB | Refills: 0 | Status: ON HOLD | OUTPATIENT
Start: 2019-10-31 | End: 2020-01-21

## 2019-10-31 RX ORDER — TAMSULOSIN HYDROCHLORIDE 0.4 MG/1
0.4 CAPSULE ORAL DAILY
Qty: 7 CAP | Refills: 0 | Status: SHIPPED | OUTPATIENT
Start: 2019-10-31 | End: 2019-11-07

## 2019-10-31 RX ORDER — KETOROLAC TROMETHAMINE 30 MG/ML
15 INJECTION, SOLUTION INTRAMUSCULAR; INTRAVENOUS
Status: COMPLETED | OUTPATIENT
Start: 2019-10-31 | End: 2019-10-31

## 2019-10-31 RX ORDER — OXYCODONE AND ACETAMINOPHEN 5; 325 MG/1; MG/1
1 TABLET ORAL
Qty: 10 TAB | Refills: 0 | Status: SHIPPED | OUTPATIENT
Start: 2019-10-31 | End: 2019-11-03

## 2019-10-31 RX ORDER — AMOXICILLIN AND CLAVULANATE POTASSIUM 875; 125 MG/1; MG/1
TABLET, FILM COATED ORAL
Refills: 0 | COMMUNITY
Start: 2019-10-21 | End: 2019-12-09 | Stop reason: ALTCHOICE

## 2019-10-31 RX ADMIN — SODIUM CHLORIDE 500 ML: 900 INJECTION, SOLUTION INTRAVENOUS at 04:14

## 2019-10-31 RX ADMIN — KETOROLAC TROMETHAMINE 15 MG: 30 INJECTION, SOLUTION INTRAMUSCULAR at 04:16

## 2019-10-31 NOTE — DISCHARGE INSTRUCTIONS
Patient Education      Call the Kidney stone hotline at [phone] with any questions. Please take NSAIDs (Ibuprofen 600mg every 6 hours OR Naproxen/Aleve 500mg every 12 hours) for pain. NSAIDs reduce the inflammation of the ureter due to the kidney stone that is passing. Also take Flomax, a medication proved to help relax the ureter to help the stone pass. For breakthrough, severe pain please take Norco as prescribed. Because it has Tylenol in it please do not take Tylenol in addition to this medication. Opioids can sometimes make patient very sleepy, so do not drive or operate heavy machinery while on it. Opoids can also cause constipation so we recommend being on Colace (stool softener) while on this medication. Kidney Stone: Care Instructions  Your Care Instructions    Kidney stones are formed when salts, minerals, and other substances normally found in the urine clump together. They can be as small as grains of sand or, rarely, as large as golf balls. While the stone is traveling through the ureter, which is the tube that carries urine from the kidney to the bladder, you will probably feel pain. The pain may be mild or very severe. You may also have some blood in your urine. As soon as the stone reaches the bladder, any intense pain should go away. If a stone is too large to pass on its own, you may need a medical procedure to help you pass the stone. The doctor has checked you carefully, but problems can develop later. If you notice any problems or new symptoms, get medical treatment right away. Follow-up care is a key part of your treatment and safety. Be sure to make and go to all appointments, and call your doctor if you are having problems. It's also a good idea to know your test results and keep a list of the medicines you take. How can you care for yourself at home? · Drink plenty of fluids, enough so that your urine is light yellow or clear like water.  If you have kidney, heart, or liver disease and have to limit fluids, talk with your doctor before you increase the amount of fluids you drink. · Take pain medicines exactly as directed. Call your doctor if you think you are having a problem with your medicine. ? If the doctor gave you a prescription medicine for pain, take it as prescribed. ? If you are not taking a prescription pain medicine, ask your doctor if you can take an over-the-counter medicine. Read and follow all instructions on the label. · Your doctor may ask you to strain your urine so that you can collect your kidney stone when it passes. You can use a kitchen strainer or a tea strainer to catch the stone. Store it in a plastic bag until you see your doctor again. Preventing future kidney stones  Some changes in your diet may help prevent kidney stones. Depending on the cause of your stones, your doctor may recommend that you:  · Drink plenty of fluids, enough so that your urine is light yellow or clear like water. If you have kidney, heart, or liver disease and have to limit fluids, talk with your doctor before you increase the amount of fluids you drink. · Limit coffee, tea, and alcohol. Also avoid grapefruit juice. · Do not take more than the recommended daily dose of vitamins C and D.  · Avoid antacids such as Gaviscon, Maalox, Mylanta, or Tums. · Limit the amount of salt (sodium) in your diet. · Eat a balanced diet that is not too high in protein. · Limit foods that are high in a substance called oxalate, which can cause kidney stones. These foods include dark green vegetables, rhubarb, chocolate, wheat bran, nuts, cranberries, and beans. When should you call for help? Call your doctor now or seek immediate medical care if:    · You cannot keep down fluids.     · Your pain gets worse.     · You have a fever or chills.     · You have new or worse pain in your back just below your rib cage (the flank area).     · You have new or more blood in your urine.  Watch closely for changes in your health, and be sure to contact your doctor if:    · You do not get better as expected. Where can you learn more? Go to http://dolly-juani.info/. Enter P087 in the search box to learn more about \"Kidney Stone: Care Instructions. \"  Current as of: October 31, 2018  Content Version: 12.2  © 5284-5908 [employer]. Care instructions adapted under license by [employer] (which disclaims liability or warranty for this information). If you have questions about a medical condition or this instruction, always ask your healthcare professional. [place] [address] any warranty or liability for your use of this information.

## 2019-10-31 NOTE — LETTER
[address] 
[facility] EMERGENCY DEPT 
[address] [place] [address] [id number] [phone] Work/School Note Date: 10/31/2019 To Whom It May concern: 
 
Noreen Tang was seen and treated today in the emergency room by the following Dr. Aliya Horan. Noreen Tang {Return to school/sport/work:11/2/2019 Sincerely, Petr Wiseman RN

## 2019-10-31 NOTE — ED NOTES
Patient presents to the ED via EMS complaining of left flank pain. Patient reports the left flank pain started on 10/30/2019 around 2200. Patient reports she has been unable to void since 2000. Patient reports a history of kidney stones and states her last \"event was in [place]". Patient states at that time there were stones left. Patient acknowledges nausea and vomiting since the pain started. Patient placed on the monitor x3 and provided with her call bell. Patient verbalized an understanding of instructions for usage.

## 2019-10-31 NOTE — ED PROVIDER NOTES
EMERGENCY DEPARTMENT HISTORY AND PHYSICAL EXAM      Date: 10/31/2019  Patient Name: Steven Rinaldi  Patient Age and Sex: 81 y.o. female     History of Presenting Illness     Chief Complaint   Patient presents with    Flank Pain       History Provided By: Patient    HPI: Steven Rinaldi is a 61-year-old female with a history of kidney stones presenting with left flank pain. Patient states that around 10 PM tonight started having left flank pain radiating to her left groin. Patient states that the pain comes and goes in intensity and at its worse is an 8 out of 10. It is associated with nausea but denies any vomiting. Denies any constipation, diarrhea but has been having some decreased urination. Denies any dysuria or hematuria. States that it does feel like her prior kidney stone. There are no other complaints, changes, or physical findings at this time. PCP: Randi Govea MD    No current facility-administered medications on file prior to encounter. Current Outpatient Medications on File Prior to Encounter   Medication Sig Dispense Refill    amoxicillin-clavulanate (AUGMENTIN) 875-125 mg per tablet TAKE 1 TABLET BY MOUTH TWICE A DAY FOR 7 DAYS  0    hydroCHLOROthiazide (HYDRODIURIL) 12.5 mg tablet Take 1 Tab by mouth daily. For blood pressure 90 Tab 3    citalopram (CELEXA) 20 mg tablet TAKE ONE TABLET BY MOUTH ONE TIME DAILY 90 Tab 3    zolpidem (AMBIEN) 10 mg tablet TAKE 1 TABLET BY MOUTH NIGHTLY AS NEEDED FOR SLEEP 30 Tab 5    omeprazole (PRILOSEC) 40 mg capsule TAKE 1 CAPSULE BY MOUTH EVERY MORNING BEFORE MEALS FOR 30 DAYS      naproxen (NAPROSYN) 500 mg tablet Take 1 Tab by mouth every twelve (12) hours as needed for Pain. 60 Tab 2    metFORMIN (GLUCOPHAGE) 1,000 mg tablet Take 1 tablet in AM and 1 tablet in PM. For Diabetes 180 Tab 3    glucose blood VI test strips (ACCU-CHEK SOFIA PLUS TEST STRP) strip TEST BLOOD SUGAR 4 TIMES DAILY.  DX CODE E11.9 100 Strip 4    alendronate (FOSAMAX) 35 mg tablet Take 1 Tab by mouth every seven (7) days. Take with 8 ounces of water on empty stomach. Remain upright for 30 minutes afterwards. For bones. 13 Tab 3    prednisoLONE acetate (PRED FORTE) 1 % ophthalmic suspension Administer 1 Drop to both eyes four (4) times daily.  lancets (ACCU-CHEK SOFTCLIX LANCETS) misc TEST 2 TIMES DAILY AS DIRECTED (E11.9) 200 Each 12    atorvastatin (LIPITOR) 20 mg tablet TAKE 1 TAB BY MOUTH DAILY. FOR CHOLESTEROL 90 Tab 3    famotidine (PEPCID) 40 mg tablet Take 40 mg by mouth nightly.  polyethylene glycol (MIRALAX) 17 gram packet Take 17 g by mouth daily as needed ('Constipation').  multivit,calc,mins/iron/folic (ONE-A-DAY WOMENS FORMULA PO) Take 1 Tab by mouth daily. Past History     Past Medical History:  Past Medical History:   Diagnosis Date    Allergic rhinitis due to allergen 10/22/2014    Arthritis     spine    Asthma     Depression     Diabetes ([facility] [address]     Type II    GERD (gastroesophageal reflux disease)     Hypercholesterolemia     Hypertension     Kidney stones 1990's    Other ill-defined conditions(799.89)     GERD    Positive PPD 2009    treated with INH       Past Surgical History:  Past Surgical History:   Procedure Laterality Date    BREAST SURGERY PROCEDURE UNLISTED  1990's    abcess bilateral    COLONOSCOPY  4-11    manetas- n ormal    EGD  4-11    manetas- esophageal ring    ENDOSCOPY, COLON, DIAGNOSTIC  12/2007    2 polyps    HX BREAST BIOPSY Bilateral 1980s    all benign cysts    HX CHOLECYSTECTOMY      HX GYN  1978    tubal laparoscopy    HX HEENT Bilateral 11/2018    cataract extraction    HX ORTHOPAEDIC  2016    had an injection in her back to help with leg pain    HX ORTHOPAEDIC Left 12/12/2017    foot surg.   bunion removal  \"Put a plate in\"    HX OTHER SURGICAL  1997    bunionectomy    HX TONSIL AND ADENOIDECTOMY      approx 11years old       Family History:  Family History   Problem Relation Age of Onset    Diabetes Mother     Hypertension Mother     Cancer Father         lung    Cancer Paternal Aunt         colon       Social History:  Social History     Tobacco Use    Smoking status: Former Smoker     Packs/day: 1.00     Years: 25.00     Pack years: 25.00     Last attempt to quit: 2010     Years since quittin.1    Smokeless tobacco: Never Used   Substance Use Topics    Alcohol use: No    Drug use: No       Allergies: Allergies   Allergen Reactions    Latex Itching    Lisinopril Swelling    Sulfa (Sulfonamide Antibiotics) Itching         Review of Systems   Review of Systems   Constitutional: Negative for chills and fever. Respiratory: Negative for cough and shortness of breath. Cardiovascular: Negative for chest pain. Gastrointestinal: Positive for nausea. Negative for abdominal pain, constipation, diarrhea and vomiting. Genitourinary: Positive for decreased urine volume, difficulty urinating and flank pain. Negative for dysuria, frequency, hematuria and urgency. Neurological: Negative for weakness and numbness. All other systems reviewed and are negative. Physical Exam   Physical Exam   Constitutional: She is oriented to person, place, and time. She appears well-developed and well-nourished. She appears distressed. HENT:   Head: Normocephalic and atraumatic. Eyes: Conjunctivae and EOM are normal.   Neck: Normal range of motion. Neck supple. Cardiovascular: Normal rate and regular rhythm. Pulmonary/Chest: Effort normal and breath sounds normal. No respiratory distress. Abdominal: Soft. She exhibits no distension. There is no tenderness. Minimal CVA tenderness   Musculoskeletal: Normal range of motion. Neurological: She is alert and oriented to person, place, and time. Skin: Skin is warm and dry. Psychiatric: She has a normal mood and affect. Nursing note and vitals reviewed.        Diagnostic Study Results     Labs -     Recent Results (from the past 12 hour(s))   METABOLIC PANEL, BASIC    Collection Time: 10/31/19  4:00 AM   Result Value Ref Range    Sodium 143 136 - 145 mmol/L    Potassium 3.3 (L) 3.5 - 5.1 mmol/L    Chloride 108 97 - 108 mmol/L    CO2 24 21 - 32 mmol/L    Anion gap 11 5 - 15 mmol/L    Glucose 180 (H) 65 - 100 mg/dL    BUN 12 6 - 20 MG/DL    Creatinine 1.33 (H) 0.55 - 1.02 MG/DL    BUN/Creatinine ratio 9 (L) 12 - 20      GFR est AA 48 (L) >60 ml/min/1.73m2    GFR est non-AA 40 (L) >60 ml/min/1.73m2    Calcium 9.1 8.5 - 10.1 MG/DL   URINALYSIS W/ REFLEX CULTURE    Collection Time: 10/31/19  4:12 AM   Result Value Ref Range    Color YELLOW/STRAW      Appearance CLEAR CLEAR      Specific gravity 1.011 1.003 - 1.030      pH (UA) 6.0 5.0 - 8.0      Protein NEGATIVE  NEG mg/dL    Glucose NEGATIVE  NEG mg/dL    Ketone NEGATIVE  NEG mg/dL    Bilirubin NEGATIVE  NEG      Blood LARGE (A) NEG      Urobilinogen 0.2 0.2 - 1.0 EU/dL    Nitrites NEGATIVE  NEG      Leukocyte Esterase TRACE (A) NEG      WBC 0-4 0 - 4 /hpf    RBC >100 (H) 0 - 5 /hpf    Epithelial cells FEW FEW /lpf    Bacteria NEGATIVE  NEG /hpf    UA:UC IF INDICATED CULTURE NOT INDICATED BY UA RESULT CNI      Hyaline cast 0-2 0 - 5 /lpf       Radiologic Studies -   CT ABD PELV WO CONT   Final Result   IMPRESSION:   4 mm calculus at the left ureterovesicular junction produces mild left-sided   hydronephrosis. Multiple small bilateral nonobstructing renal calculi. Hepatic   steatosis. Small hiatal hernia. CT Results  (Last 48 hours)               10/31/19 0346  CT ABD PELV WO CONT Final result    Impression:  IMPRESSION:   4 mm calculus at the left ureterovesicular junction produces mild left-sided   hydronephrosis. Multiple small bilateral nonobstructing renal calculi. Hepatic   steatosis. Small hiatal hernia. Narrative:  EXAM: CT ABD PELV WO CONT       INDICATION: Flank pain, stone disease suspected; left        COMPARISON: July 1, 2019       CONTRAST:  None.        TECHNIQUE: Thin axial images were obtained through the abdomen and pelvis. Coronal and   sagittal reconstructions were generated. Oral contrast was not administered. CT   dose reduction was achieved through use of a standardized protocol tailored for   this examination and automatic exposure control for dose modulation. The absence of intravenous contrast material reduces the sensitivity for   evaluation of the solid parenchymal organs of the abdomen. FINDINGS:    LUNG BASES: Clear. INCIDENTALLY IMAGED HEART AND MEDIASTINUM: Mild coronary artery calcifications. LIVER: Diffuse hepatic steatosis. GALLBLADDER: Surgically absent. SPLEEN: No mass. PANCREAS: No mass or ductal dilatation. ADRENALS: Unremarkable. KIDNEYS/URETERS: Mild left-sided hydronephrosis secondary to a 4 mm calculus at   the left ureterovesicular junction. Multiple small bilateral renal calculi. STOMACH: Small hiatal hernia. SMALL BOWEL: No dilatation or wall thickening. COLON: No dilatation or wall thickening. APPENDIX: Normal.   PERITONEUM: No ascites or pneumoperitoneum. RETROPERITONEUM: No lymphadenopathy or aortic aneurysm. REPRODUCTIVE ORGANS: Normal uterus and ovaries. URINARY BLADDER: No mass or wall thickening. BONES: No destructive bone lesion. Degenerative disc disease is present at   L5-S1. ADDITIONAL COMMENTS: N/A               CXR Results  (Last 48 hours)    None            Medical Decision Making   I am the first provider for this patient. I reviewed the vital signs, available nursing notes, past medical history, past surgical history, family history and social history. Vital Signs-Reviewed the patient's vital signs.   Patient Vitals for the past 12 hrs:   Temp Pulse Resp BP SpO2   10/31/19 0450 99 °F (37.2 °C) 88 18 158/66 99 %   10/31/19 0314 99.2 °F (37.3 °C) -- 21 168/62 97 %       Records Reviewed: Nursing Notes and Old Medical Records    Provider Notes (Medical Decision Making):   Patient presents with flank pain. DDx: renal stone, pyelonephritis, muscular strain, testicular/ovarian pathology. Unlikely AAA given the story and prior hx of stones. Will get UA to evaluate for blood and infection and possibly imaging. For the probable kidney stone, I recommended drinking plenty of fluids, 2 quarts daily, straining the urine (and bring in the stone to urologist if it passes) and analgesics and antiemetics per orders. Telephone number for the [address] [place] [address],[address] was provided. Call or return to the ED if severe pain, fever, vomiting, gross hematuria or dysuria occurs. ED Course:   Initial assessment performed. The patients presenting problems have been discussed, and they are in agreement with the care plan formulated and outlined with them. I have encouraged them to ask questions as they arise throughout their visit. Critical Care Time:   0    Disposition:  Discharge Note:  The patient has been re-evaluated and is ready for discharge. Reviewed available results with patient. Counseled patient on diagnosis and care plan. Patient has expressed understanding, and all questions have been answered. Patient agrees with plan and agrees to follow up as recommended, or to return to the ED if their symptoms worsen. Discharge instructions have been provided and explained to the patient, along with reasons to return to the ED. PLAN:  Discharge Medication List as of 10/31/2019  4:40 AM      START taking these medications    Details   ketorolac (TORADOL) 10 mg tablet Take 1 Tab by mouth every six (6) hours as needed for Pain., Normal, Disp-20 Tab, R-0      oxyCODONE-acetaminophen (PERCOCET) 5-325 mg per tablet Take 1 Tab by mouth every eight (8) hours as needed for Pain for up to 3 days. Max Daily Amount: 3 Tabs. Indications: pain, Normal, Disp-10 Tab, R-0      tamsulosin (FLOMAX) 0.4 mg capsule Take 1 Cap by mouth daily for 7 doses. , Normal, Disp-7 Cap, R-0         CONTINUE these medications which have NOT CHANGED    Details   amoxicillin-clavulanate (AUGMENTIN) 875-125 mg per tablet TAKE 1 TABLET BY MOUTH TWICE A DAY FOR 7 DAYS, Historical Med, R-0      hydroCHLOROthiazide (HYDRODIURIL) 12.5 mg tablet Take 1 Tab by mouth daily. For blood pressure, Normal, Disp-90 Tab, R-3      citalopram (CELEXA) 20 mg tablet TAKE ONE TABLET BY MOUTH ONE TIME DAILY, Normal, Disp-90 Tab, R-3      zolpidem (AMBIEN) 10 mg tablet TAKE 1 TABLET BY MOUTH NIGHTLY AS NEEDED FOR SLEEP, Phone InThis request is for a new prescription for a controlled substance as required by Federal/State law. Disp-30 Tab, R-5      omeprazole (PRILOSEC) 40 mg capsule TAKE 1 CAPSULE BY MOUTH EVERY MORNING BEFORE MEALS FOR 30 DAYS, Historical Med      naproxen (NAPROSYN) 500 mg tablet Take 1 Tab by mouth every twelve (12) hours as needed for Pain., Normal, Disp-60 Tab, R-2      metFORMIN (GLUCOPHAGE) 1,000 mg tablet Take 1 tablet in AM and 1 tablet in PM. For Diabetes, Normal, Disp-180 Tab, R-3      glucose blood VI test strips (ACCU-CHEK SOFIA PLUS TEST STRP) strip TEST BLOOD SUGAR 4 TIMES DAILY. DX CODE E11.9, Normal, Disp-100 Strip, R-4      alendronate (FOSAMAX) 35 mg tablet Take 1 Tab by mouth every seven (7) days. Take with 8 ounces of water on empty stomach. Remain upright for 30 minutes afterwards. For bones. , Normal, Disp-13 Tab, R-3      prednisoLONE acetate (PRED FORTE) 1 % ophthalmic suspension Administer 1 Drop to both eyes four (4) times daily. , Historical Med      lancets (ACCU-CHEK SOFTCLIX LANCETS) misc TEST 2 TIMES DAILY AS DIRECTED (E11.9), Normal, Disp-200 Each, R-12      atorvastatin (LIPITOR) 20 mg tablet TAKE 1 TAB BY MOUTH DAILY. FOR CHOLESTEROL, Normal, Disp-90 Tab, R-3      famotidine (PEPCID) 40 mg tablet Take 40 mg by mouth nightly., Historical Med      polyethylene glycol (MIRALAX) 17 gram packet Take 17 g by mouth daily as needed ('Constipation'). , Historical Med      multivit,calc,mins/iron/folic (ONE-A-DAY WOMENS FORMULA PO) Take 1 Tab by mouth daily. , Historical Med           2. Follow-up Information     Follow up With Specialties Details Why Contact Info    Gianluca Jiménez MD [facility]  As needed [address] [place] [address]  [phone]          3. Return to ED if worse     Diagnosis     Clinical Impression:   1. Calculus of ureterovesical junction (UVJ)        Attestations:    Jovanni Dumas M.D. Please note that this dictation was completed with [email or web address], the computer voice recognition software. Quite often unanticipated grammatical, syntax, homophones, and other interpretive errors are inadvertently transcribed by the computer software. Please disregard these errors. Please excuse any errors that have escaped final proofreading. Thank you.

## 2019-12-09 ENCOUNTER — OFFICE VISIT (OUTPATIENT)
Dept: FAMILY MEDICINE CLINIC | Age: 69
End: 2019-12-09

## 2019-12-09 ENCOUNTER — HOSPITAL ENCOUNTER (OUTPATIENT)
Dept: LAB | Age: 69
Discharge: HOME | End: 2019-12-09
Payer: MEDICARE

## 2019-12-09 VITALS
HEIGHT: 62 IN | WEIGHT: 147 LBS | DIASTOLIC BLOOD PRESSURE: 56 MMHG | RESPIRATION RATE: 16 BRPM | SYSTOLIC BLOOD PRESSURE: 119 MMHG | OXYGEN SATURATION: 97 % | BODY MASS INDEX: 27.05 KG/M2 | TEMPERATURE: 97.3 F | HEART RATE: 71 BPM

## 2019-12-09 DIAGNOSIS — R53.1 WEAKNESS: Primary | ICD-10-CM

## 2019-12-09 DIAGNOSIS — R06.02 SHORTNESS OF BREATH: ICD-10-CM

## 2019-12-09 DIAGNOSIS — E11.9 CONTROLLED TYPE 2 DIABETES MELLITUS WITHOUT COMPLICATION, WITHOUT LONG-TERM CURRENT USE OF INSULIN (HCC): ICD-10-CM

## 2019-12-09 LAB
BILIRUB UR QL STRIP: NEGATIVE
GLUCOSE UR-MCNC: NEGATIVE MG/DL
HBA1C MFR BLD HPLC: 6.8 %
KETONES P FAST UR STRIP-MCNC: NORMAL MG/DL
PH UR STRIP: 5 [PH] (ref 4.6–8)
PROT UR QL STRIP: NEGATIVE
SP GR UR STRIP: 1.02 (ref 1–1.03)
UA UROBILINOGEN AMB POC: NORMAL (ref 0.2–1)
URINALYSIS CLARITY POC: CLEAR
URINALYSIS COLOR POC: YELLOW
URINE BLOOD POC: NEGATIVE
URINE LEUKOCYTES POC: NORMAL
URINE NITRITES POC: NEGATIVE

## 2019-12-09 PROCEDURE — 84443 ASSAY THYROID STIM HORMONE: CPT

## 2019-12-09 PROCEDURE — 36415 COLL VENOUS BLD VENIPUNCTURE: CPT

## 2019-12-09 PROCEDURE — 80053 COMPREHEN METABOLIC PANEL: CPT

## 2019-12-09 NOTE — PROGRESS NOTES
HISTORY OF PRESENT ILLNESS  Jyothi Alarcon is a 81 y.o. female. HPI SEvere fatigue for 2 months. Symptoms made worse by exertion. 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 Does some walking on job. Standing up at work makes her very tired. Sleeps for 3-4 hours when gets home from work. Does get a funny feeling in L side of chest when goes up stairs. NO prior hx heart problems. Had a normal cardiac cath 16 months ago. NO abdominal pains, melena, bloody stools. No diarrhea. Denies any stress. No cough. ROS    Physical Exam  Vitals signs and nursing note reviewed. Constitutional:       Appearance: She is well-developed. HENT:      Right Ear: External ear normal.      Left Ear: External ear normal.   Neck:      Thyroid: No thyromegaly. Cardiovascular:      Rate and Rhythm: Normal rate and regular rhythm. Heart sounds: Normal heart sounds. Pulmonary:      Breath sounds: Normal breath sounds. No wheezing. Abdominal:      General: Bowel sounds are normal. There is no distension. Palpations: Abdomen is soft. There is no mass. Tenderness: There is no tenderness. Lymphadenopathy:      Cervical: No cervical adenopathy. ASSESSMENT and PLAN  Orders Placed This Encounter    XR CHEST PA LAT    METABOLIC PANEL, COMPREHENSIVE    TSH 3RD GENERATION    AMB POC URINALYSIS DIP STICK AUTO W/ MICRO    AMB POC COMPLETE CBC, AUTOMATED    AMB POC HEMOGLOBIN A1C     Diagnoses and all orders for this visit:    1. Weakness  -     METABOLIC PANEL, COMPREHENSIVE  -     AMB POC URINALYSIS DIP STICK AUTO W/ MICRO  -     TSH 3RD GENERATION    2. Shortness of breath  -     XR CHEST PA LAT; Future  -     AMB POC COMPLETE CBC, AUTOMATED    3. Controlled type 2 diabetes mellitus without complication, without long-term current use of insulin ([facility])  -     AMB POC HEMOGLOBIN A1C      Follow-up and Dispositions    · Return in about 3 months (around 3/9/2020).

## 2019-12-09 NOTE — PROGRESS NOTES
Chief Complaint   Patient presents with    Fatigue    Decreased Appetite         1. Have you been to the ER, urgent care clinic since your last visit? Hospitalized since your last visit? Yes ER [facility]    2. Have you seen or consulted any other health care providers outside of the [address] [place] [address] since your last visit? Include any pap smears or colon screening.  no

## 2019-12-10 LAB
ALBUMIN SERPL-MCNC: 4.4 G/DL (ref 3.6–4.8)
ALBUMIN/GLOB SERPL: 1.6 {RATIO} (ref 1.2–2.2)
ALP SERPL-CCNC: 72 IU/L (ref 39–117)
ALT SERPL-CCNC: 21 IU/L (ref 0–32)
AST SERPL-CCNC: 22 IU/L (ref 0–40)
BILIRUB SERPL-MCNC: 0.3 MG/DL (ref 0–1.2)
BUN SERPL-MCNC: 16 MG/DL (ref 8–27)
BUN/CREAT SERPL: 16 (ref 12–28)
CALCIUM SERPL-MCNC: 9.9 MG/DL (ref 8.7–10.3)
CHLORIDE SERPL-SCNC: 104 MMOL/L (ref 96–106)
CO2 SERPL-SCNC: 23 MMOL/L (ref 20–29)
CREAT SERPL-MCNC: 1.03 MG/DL (ref 0.57–1)
GLOBULIN SER CALC-MCNC: 2.7 G/DL (ref 1.5–4.5)
GLUCOSE SERPL-MCNC: 77 MG/DL (ref 65–99)
INTERPRETATION: NORMAL
POTASSIUM SERPL-SCNC: 4.3 MMOL/L (ref 3.5–5.2)
PROT SERPL-MCNC: 7.1 G/DL (ref 6–8.5)
SODIUM SERPL-SCNC: 144 MMOL/L (ref 134–144)
TSH SERPL DL<=0.005 MIU/L-ACNC: 0.66 UIU/ML (ref 0.45–4.5)

## 2019-12-11 RX ORDER — ALENDRONATE SODIUM 35 MG/1
TABLET ORAL
Qty: 12 TAB | Refills: 3 | Status: SHIPPED | OUTPATIENT
Start: 2019-12-11 | End: 2021-06-15 | Stop reason: SDUPTHER

## 2020-01-17 ENCOUNTER — HOSPITAL ENCOUNTER (OUTPATIENT)
Dept: NUCLEAR MEDICINE | Age: 70
Discharge: HOME | End: 2020-01-17
Attending: SPECIALIST
Payer: MEDICARE

## 2020-01-17 DIAGNOSIS — K21.00 GASTRO-ESOPHAGEAL REFLUX DISEASE WITH ESOPHAGITIS: ICD-10-CM

## 2020-01-17 DIAGNOSIS — K21.9 GASTROESOPHAGEAL REFLUX DISEASE WITH HIATAL HERNIA: ICD-10-CM

## 2020-01-17 DIAGNOSIS — R07.9 CHEST PAIN, UNSPECIFIED: ICD-10-CM

## 2020-01-17 DIAGNOSIS — K44.9 GASTROESOPHAGEAL REFLUX DISEASE WITH HIATAL HERNIA: ICD-10-CM

## 2020-01-17 DIAGNOSIS — R68.81 EARLY SATIETY: ICD-10-CM

## 2020-01-17 PROCEDURE — 78264 GASTRIC EMPTYING IMG STUDY: CPT

## 2020-01-21 ENCOUNTER — HOSPITAL ENCOUNTER (OUTPATIENT)
Age: 70
Setting detail: OUTPATIENT SURGERY
Discharge: HOME | End: 2020-01-21
Attending: SPECIALIST | Admitting: SPECIALIST
Payer: MEDICARE

## 2020-01-21 VITALS
HEART RATE: 84 BPM | RESPIRATION RATE: 12 BRPM | SYSTOLIC BLOOD PRESSURE: 134 MMHG | HEIGHT: 62 IN | DIASTOLIC BLOOD PRESSURE: 69 MMHG | WEIGHT: 147 LBS | BODY MASS INDEX: 27.05 KG/M2 | OXYGEN SATURATION: 99 %

## 2020-01-21 PROCEDURE — 74011000250 HC RX REV CODE- 250: Performed by: SPECIALIST

## 2020-01-21 PROCEDURE — 76040000007: Performed by: SPECIALIST

## 2020-01-21 PROCEDURE — 77030007009 HC CATH PH VRSFLX ALPN -C: Performed by: SPECIALIST

## 2020-01-21 RX ORDER — LIDOCAINE HYDROCHLORIDE 20 MG/ML
JELLY TOPICAL ONCE
Status: COMPLETED | OUTPATIENT
Start: 2020-01-21 | End: 2020-01-21

## 2020-01-21 RX ADMIN — LIDOCAINE HYDROCHLORIDE 10 ML: 20 JELLY TOPICAL at 08:12

## 2020-01-21 NOTE — DISCHARGE INSTRUCTIONS
Lala Cyr  [id number]  1950      MANOMETRY DISCHARGE INSTRUCTION    You may resume your regular diet as tolerated. You may resume your normal daily activities. If you develop a sore throat- throat lozenges or warm salt water gargles will help. Call your Physician if you have any complications or questions. tabulate Activation    Thank you for requesting access to tabulate. Please follow the instructions below to securely access and download your online medical record. tabulate allows you to send messages to your doctor, view your test results, renew your prescriptions, schedule appointments, and more. How Do I Sign Up? 1. In your internet browser, go to [email or web address]  2. Click on the First Time User? Click Here link in the Sign In box. You will be redirect to the New Member Sign Up page. 3. Enter your tabulate Access Code exactly as it appears below. You will not need to use this code after youve completed the sign-up process. If you do not sign up before the expiration date, you must request a new code. tabulate Access Code: Activation code not generated  Current tabulate Status: Active (This is the date your tabulate access code will )    4. Enter the last four digits of your Social Security Number (xxxx) and Date of Birth (mm/dd/yyyy) as indicated and click Submit. You will be taken to the next sign-up page. 5. Create a tabulate ID. This will be your tabulate login ID and cannot be changed, so think of one that is secure and easy to remember. 6. Create a tabulate password. You can change your password at any time. 7. Enter your Password Reset Question and Answer. This can be used at a later time if you forget your password. 8. Enter your e-mail address. You will receive e-mail notification when new information is available in [address] 9. Click Sign Up. You can now view and download portions of your medical record.   10. Click the Download Summary menu link to download a portable copy of your medical information. Additional Information    If you have questions, please visit the Frequently Asked Questions section of the Usentric website at [email or web address] Remember, Usentric is NOT to be used for urgent needs. For medical emergencies, dial 911.

## 2020-01-21 NOTE — PROGRESS NOTES
10cc viscous lidocaine inhaled into left nare per MD orders. Probe inserted into  left nare without difficulty. Pt tolerated procedure well. PH inserted into left 5 cms proximal to the LES at 35.5cm with a little difficulty. Pt could not tolerate probe once placed. Kept in for about 10 to 15 mins but coughed and gagged throughout. Prayed back of throat with hurricane spray and she still was unable to tolerate. Will inform Dr. Thuy Riddle.

## 2020-01-27 RX ORDER — ATORVASTATIN CALCIUM 20 MG/1
20 TABLET, FILM COATED ORAL DAILY
Qty: 90 TAB | Refills: 3 | Status: SHIPPED | OUTPATIENT
Start: 2020-01-27 | End: 2021-01-20

## 2020-01-27 NOTE — PROCEDURES
[address]  PROCEDURE NOTE    Name:  Sebas Meza  MR#:  [id number]  :  1950  ACCOUNT #:  [de-identified]  DATE OF SERVICE:  2020    PREPROCEDURE DIAGNOSES:  1. Atypical chest pain. 2.  Hiatal hernia. POSTPROCEDURE DIAGNOSES:  1. No [place] diagnoses. 2.  20% incomplete bolus clearance. PROCEDURES PLANNED:  1. Esophageal manometry. 2.  Impedance esophageal manometry. SURGEON:  Chucky Pereyra MD    ASSISTANT:  Rosaline Paulino RN    ANESTHESIA:  Topical.    ESTIMATED BLOOD LOSS:  None. SPECIMENS REMOVED:  None. COMPLICATIONS:  None. IMPLANTS:  None. DESCRIPTION OF PROCEDURE:  High-resolution esophageal manometry was performed by the nursing staff with subsequent interpretation by Dr. Katheryn Garcias. The lower esophageal sphincter is at 39.8 cm and for a distance of 3.6 cm distally. Lower esophageal sphincter pressures are normal:  Respiratory minimum 7.2, respiratory mean 15.9, residual after swallowing 9.4. Wet swallows were administered. By standard criteria, 8 are peristaltic and 2 are failed. The amplitude in the distal esophagus is normal at 97.6 mmHg. The wave duration is 3 seconds. The velocity is 3.9 cm/sec, all normal.  High-resolution scoring is normal.  [id number], contractile front velocity 3.2, intrabolus pressure at LES 1.3, intrabolus pressure average max body of the esophagus 12.8. [place] scoring is as follows:  Distal latency 6.5%, failed 20. All other [place] scores zero (percent panesophageal pressurization, percent premature, percent rapid, percent large breaks, percent small breaks all zero). Impedance manometry is performed with a flavored electrolyte solution. Two of 10 impedance boluses failed to clear completely.       Arsh Crystal MD      [id number]  D:  2020 15:30  T:  2020 23:18  JOB #:  [id number]  CC:  MD Ian Cherry MD Valerio Chihuahua, MD

## 2020-02-07 ENCOUNTER — TELEPHONE (OUTPATIENT)
Dept: FAMILY MEDICINE CLINIC | Age: 70
End: 2020-02-07

## 2020-02-07 DIAGNOSIS — E11.9 CONTROLLED TYPE 2 DIABETES MELLITUS WITHOUT COMPLICATION, WITHOUT LONG-TERM CURRENT USE OF INSULIN (HCC): Primary | ICD-10-CM

## 2020-02-07 RX ORDER — BLOOD-GLUCOSE METER
EACH MISCELLANEOUS
Status: CANCELLED | OUTPATIENT
Start: 2020-02-07

## 2020-02-07 NOTE — TELEPHONE ENCOUNTER
She needs to get a glucose meter and strips as well She has been trying to get this for a while.     Mrs. Radha Gonzales   [phone]

## 2020-02-11 NOTE — ANESTHESIA POSTPROCEDURE EVALUATION
Post-Anesthesia Evaluation and Assessment    Patient: Palomo Khan MRN: [id number]  SSN: [id number]    YOB: 1950  Age: 74 y.o. Sex: female       Cardiovascular Function/Vital Signs  Visit Vitals    /46 (BP 1 Location: Left arm, BP Patient Position: At rest)    Pulse 65    Temp 36.7 °C (98.1 °F)    Resp 16    Ht 5' 2\" (1.575 m)    Wt 68 kg (150 lb)    SpO2 98%    BMI 27.44 kg/m2       Patient is status post general anesthesia for Procedure(s):  BUNIONECTOMY WITH MIDTARSAL FUSION LEFT FOOT  . Nausea/Vomiting: None    Postoperative hydration reviewed and adequate. Pain:  Pain Scale 1: Numeric (0 - 10) (12/12/17 1150)  Pain Intensity 1: 0 (12/12/17 1150)   Managed    Neurological Status:   Neuro (WDL): Within Defined Limits (12/12/17 1228)   At baseline    Mental Status and Level of Consciousness: Arousable    Pulmonary Status:   O2 Device: Room air (12/12/17 1150)   Adequate oxygenation and airway patent    Complications related to anesthesia: None    Post-anesthesia assessment completed.  No concerns    Signed By: Jerry Ulloa DO     December 12, 2017 - - -

## 2020-02-13 RX ORDER — NAPROXEN 500 MG/1
500 TABLET ORAL
Qty: 60 TAB | Refills: 2 | Status: SHIPPED | OUTPATIENT
Start: 2020-02-13 | End: 2020-04-09

## 2020-02-14 RX ORDER — BLOOD-GLUCOSE METER
EACH MISCELLANEOUS
Qty: 1 EACH | Refills: 0 | Status: SHIPPED | OUTPATIENT
Start: 2020-02-14 | End: 2022-05-27 | Stop reason: SDUPTHER

## 2020-02-14 RX ORDER — LANCETS
EACH MISCELLANEOUS
Qty: 100 EACH | Refills: 3 | Status: SHIPPED | OUTPATIENT
Start: 2020-02-14 | End: 2020-06-24 | Stop reason: SDUPTHER

## 2020-02-14 NOTE — TELEPHONE ENCOUNTER
[facility] sent fax stating that insurance will only cover patient's test strips for 3x daily. Also states that patient needs a meter and lancets. Thanks,  Bea Whittaker      I have attached scripts for meter and lancets if you approve.

## 2020-02-24 ENCOUNTER — OFFICE VISIT (OUTPATIENT)
Dept: CARDIOLOGY CLINIC | Age: 70
End: 2020-02-24

## 2020-02-24 VITALS
HEART RATE: 60 BPM | HEIGHT: 62 IN | RESPIRATION RATE: 18 BRPM | OXYGEN SATURATION: 98 % | SYSTOLIC BLOOD PRESSURE: 140 MMHG | DIASTOLIC BLOOD PRESSURE: 60 MMHG | WEIGHT: 148 LBS | BODY MASS INDEX: 27.23 KG/M2

## 2020-02-24 DIAGNOSIS — E11.21 TYPE 2 DIABETES WITH NEPHROPATHY (HCC): ICD-10-CM

## 2020-02-24 DIAGNOSIS — E78.2 MIXED HYPERLIPIDEMIA: ICD-10-CM

## 2020-02-24 DIAGNOSIS — R07.89 OTHER CHEST PAIN: ICD-10-CM

## 2020-02-24 DIAGNOSIS — R06.09 DOE (DYSPNEA ON EXERTION): Primary | ICD-10-CM

## 2020-02-24 DIAGNOSIS — I10 ESSENTIAL HYPERTENSION: Chronic | ICD-10-CM

## 2020-02-24 NOTE — PROGRESS NOTES
[address]  [phone]     Subjective:      Nanette Pierson is a 81 y.o. female presents for follow up. Last seen by us in 9/18. She has done well since but noticed that her left sided chest dicomfort progressing with morgan. She has the symptoms when walking fast or walking up steps or carrying packages. Has been evaluated by DR Enoch Roy and was referred back to us. The patient denies orthopnea, PND, LE edema, palpitations, syncope, or presyncope. Patient Active Problem List    Diagnosis Date Noted    Type 2 diabetes with nephropathy ([facility] [address] 09/27/2018    Biliary sludge determined by ultrasound 04/19/2017    Suppurative otitis media 10/22/2014    Cerumen impaction 10/22/2014    Allergic rhinitis due to allergen 10/22/2014    DM (diabetes mellitus) ([facility] [address] 02/17/2011    HTN (hypertension) 02/17/2011    Depression 02/17/2011    Hypokalemia 02/17/2011      Reba Garcia MD  Past Medical History:   Diagnosis Date    Allergic rhinitis due to allergen 10/22/2014    Arthritis     spine    Asthma     Depression     Diabetes ([facility] [address]     Type II    GERD (gastroesophageal reflux disease)     Hypercholesterolemia     Hypertension     Kidney stones 1990's    Other ill-defined conditions(799.89)     GERD    Positive PPD 2009    treated with INH      Past Surgical History:   Procedure Laterality Date    BREAST SURGERY PROCEDURE UNLISTED  1990's    abcess bilateral    COLONOSCOPY  4-11    manetas- n ormal    EGD  4-11    manetas- esophageal ring    ENDOSCOPY, COLON, DIAGNOSTIC  12/2007    2 polyps    GERD TST W/ NASAL IMPEDENCE ELECTROD  1/27/2020    HX BREAST BIOPSY Bilateral 1980s    all benign cysts    HX CHOLECYSTECTOMY      HX GYN  1978    tubal laparoscopy    HX HEENT Bilateral 11/2018    cataract extraction    HX ORTHOPAEDIC  2016    had an injection in her back to help with leg pain    HX ORTHOPAEDIC Left 12/12/2017    foot surg. bunion removal  \"Put a plate in\"    HX OTHER SURGICAL  1997    bunionectomy    HX TONSIL AND ADENOIDECTOMY      approx 11years old   [address] W/INTERP&RPT  2020     Allergies   Allergen Reactions    Latex Itching    Lisinopril Swelling    Sulfa (Sulfonamide Antibiotics) Itching      Family History   Problem Relation Age of Onset    Diabetes Mother     Hypertension Mother     Cancer Father         lung    Cancer Paternal Aunt         colon      Social History     Socioeconomic History    Marital status: SINGLE     Spouse name: Not on file    Number of children: Not on file    Years of education: Not on file    Highest education level: Not on file   Occupational History    Not on file   Social Needs    Financial resource strain: Not on file    Food insecurity:     Worry: Not on file     Inability: Not on file    Transportation needs:     Medical: Not on file     Non-medical: Not on file   Tobacco Use    Smoking status: Former Smoker     Packs/day: 1.00     Years: 25.00     Pack years: 25.00     Last attempt to quit: 2010     Years since quittin.5    Smokeless tobacco: Never Used   Substance and Sexual Activity    Alcohol use: No    Drug use: No    Sexual activity: Not on file   Lifestyle    Physical activity:     Days per week: Not on file     Minutes per session: Not on file    Stress: Not on file   Relationships    Social connections:     Talks on phone: Not on file     Gets together: Not on file     Attends Rastafarian service: Not on file     Active member of club or organization: Not on file     Attends meetings of clubs or organizations: Not on file     Relationship status: Not on file    Intimate partner violence:     Fear of current or ex partner: Not on file     Emotionally abused: Not on file     Physically abused: Not on file     Forced sexual activity: Not on file   Other Topics Concern    Not on file   Social History Narrative    Retired schoolteacher. Raised 2 nieces. Current Outpatient Medications   Medication Sig    Blood-Glucose Meter (ACCU-CHEK SOFIA PLUS METER) misc Use as directed. E11.9    lancets (ACCU-CHEK SOFTCLIX LANCETS) misc Use as directed. E11.9    naproxen (NAPROSYN) 500 mg tablet TAKE 1 TAB BY MOUTH EVERY TWELVE (12) HOURS AS NEEDED FOR PAIN.  atorvastatin (LIPITOR) 20 mg tablet TAKE 1 TAB BY MOUTH DAILY. FOR CHOLESTEROL    glucose blood VI test strips (ACCU-CHEK SOFIA PLUS TEST STRP) strip TEST BLOOD SUGAR 4 TIMES DAILY. DX CODE E11.9    alendronate (FOSAMAX) 35 mg tablet PLEASE SEE ATTACHED FOR DETAILED DIRECTIONS (Patient taking differently: Take 35 mg by mouth every seven (7) days.)    hydroCHLOROthiazide (HYDRODIURIL) 12.5 mg tablet Take 1 Tab by mouth daily. For blood pressure    citalopram (CELEXA) 20 mg tablet TAKE ONE TABLET BY MOUTH ONE TIME DAILY    zolpidem (AMBIEN) 10 mg tablet TAKE 1 TABLET BY MOUTH NIGHTLY AS NEEDED FOR SLEEP    omeprazole (PRILOSEC) 40 mg capsule TAKE 1 CAPSULE BY MOUTH EVERY MORNING BEFORE MEALS FOR 30 DAYS    metFORMIN (GLUCOPHAGE) 1,000 mg tablet Take 1 tablet in AM and 1 tablet in PM. For Diabetes    lancets (ACCU-CHEK SOFTCLIX LANCETS) misc TEST 2 TIMES DAILY AS DIRECTED (E11.9)    famotidine (PEPCID) 40 mg tablet Take 40 mg by mouth nightly.  polyethylene glycol (MIRALAX) 17 gram packet Take 17 g by mouth daily as needed ('Constipation').  multivit,calc,mins/iron/folic (ONE-A-DAY WOMENS FORMULA PO) Take 1 Tab by mouth daily. No current facility-administered medications for this visit. Review of Symptoms:  11 systems reviewed, negative other than as stated in the HPI    Physical ExamPhysical Exam:    Vitals:    02/24/20 0918 02/24/20 0926   BP: 140/70 140/60   Pulse: 60    Resp: 18    SpO2: 98%    Weight: 148 lb (67.1 kg)    Height: 5' 2\" (1.575 m)      Body mass index is 27.07 kg/m². General PE  Gen:  NAD  Mental Status - Alert.  General Appearance - Not in acute distress. HEENT:  PERRL, no carotid bruits or JVD  Chest and Lung Exam   Inspection: Accessory muscles - No use of accessory muscles in breathing. Auscultation:   Breath sounds: - Normal.   Cardiovascular   Inspection: Jugular vein - Bilateral - Inspection Normal.   Palpation/Percussion:   Apical Impulse: - Normal.   Auscultation: Rhythm - Regular. Heart Sounds - S1 WNL and S2 WNL. No S3 or S4. Murmurs & Other Heart Sounds: Auscultation of the heart reveals - No Murmurs. Peripheral Vascular   Upper Extremity: Inspection - Bilateral - No Cyanotic nailbeds or Digital clubbing. Lower Extremity:   Palpation: Edema - Bilateral - No edema. Abdomen:   Soft, non-tender, bowel sounds are active.   Neuro: A&O times 3, CN and motor grossly WNL    Labs:   Lab Results   Component Value Date/Time    Cholesterol, total 146 07/08/2019 03:50 PM    Cholesterol, total 141 12/12/2018 03:21 PM    Cholesterol, total 129 07/30/2018 03:30 AM    Cholesterol, total 133 05/29/2018 10:36 AM    Cholesterol, total 135 11/15/2017 03:35 PM    HDL Cholesterol 67 07/08/2019 03:50 PM    HDL Cholesterol 70 12/12/2018 03:21 PM    HDL Cholesterol 58 07/30/2018 03:30 AM    HDL Cholesterol 63 05/29/2018 10:36 AM    HDL Cholesterol 62 11/15/2017 03:35 PM    LDL, calculated 55 07/08/2019 03:50 PM    LDL, calculated 51 12/12/2018 03:21 PM    LDL, calculated 52.4 07/30/2018 03:30 AM    LDL, calculated 49 05/29/2018 10:36 AM    LDL, calculated 43 11/15/2017 03:35 PM    Triglyceride 122 07/08/2019 03:50 PM    Triglyceride 101 12/12/2018 03:21 PM    Triglyceride 93 07/30/2018 03:30 AM    Triglyceride 103 05/29/2018 10:36 AM    Triglyceride 148 11/15/2017 03:35 PM    CHOL/HDL Ratio 2.2 07/30/2018 03:30 AM    CHOL/HDL Ratio 3.8 10/18/2010 11:35 AM     Lab Results   Component Value Date/Time     07/29/2018 04:01 PM     Lab Results   Component Value Date/Time    Sodium 144 12/09/2019 02:00 PM    Potassium 4.3 12/09/2019 02:00 PM    Chloride 104 12/09/2019 02:00 PM    CO2 23 12/09/2019 02:00 PM    Anion gap 11 10/31/2019 04:00 AM    Glucose 77 12/09/2019 02:00 PM    BUN 16 12/09/2019 02:00 PM    Creatinine 1.03 (H) 12/09/2019 02:00 PM    BUN/Creatinine ratio 16 12/09/2019 02:00 PM    GFR est AA 65 12/09/2019 02:00 PM    GFR est non-AA 56 (L) 12/09/2019 02:00 PM    Calcium 9.9 12/09/2019 02:00 PM    Bilirubin, total 0.3 12/09/2019 02:00 PM    AST (SGOT) 22 12/09/2019 02:00 PM    Alk. phosphatase 72 12/09/2019 02:00 PM    Protein, total 7.1 12/09/2019 02:00 PM    Albumin 4.4 12/09/2019 02:00 PM    Globulin 4.2 (H) 07/01/2019 06:40 PM    A-G Ratio 1.6 12/09/2019 02:00 PM    ALT (SGPT) 21 12/09/2019 02:00 PM       EKG:  NSR      Assessment:     Assessment:      1. PIKE (dyspnea on exertion)    2. Essential hypertension    3. Type 2 diabetes with nephropathy (HCC)    4. Other chest pain    5. Mixed hyperlipidemia        Orders Placed This Encounter    AMB POC EKG ROUTINE W/ 12 LEADS, INTER & REP     Order Specific Question:   Reason for Exam:     Answer:   routine        Plan:     Patient presents for follow up. Last seen by us in 9/18. She has done well since but noticed that her left sided chest dicomfort progressing with pike. She has the symptoms when walking fast or walking up steps or carrying packages. Has been evaluated by DR Eonch Roy and was referred back to us. 1. Atypical cp, pike. No significant CAD per cardiac cath 7/18. Obtain stress echo  2. HTN: Mildly up, has not taken HCTZ yet. Stable kidney fxn 12/19  3. HLD: 7/19 LDL at 55 On statin Labs and lipids per PCP  4. DM: On oral agent    Continue current care and f/u in 6 mos unless testing abnormal    Leta Bright NP       Patient seen and examined by me with nurse practitioner. I personally performed all components of the history, physical, and medical decision making and agree with the assessment and plan as noted.  If cardiac work up is normal, then consider pulmonary and/or GI evaluation, if clinically indicated. D/w pt.      Mima Tubbs MD

## 2020-02-24 NOTE — PROGRESS NOTES
1. Have you been to the ER, urgent care clinic since your last visit? Hospitalized since your last visit? ED [facility] 1- esophageal manotrey. Oct 2019 ED [facility] ER kidney stones. 2. Have you seen or consulted any other health care providers outside of the [address] [place] [address] since your last visit? Include any pap smears or colon screening. No    Chief Complaint   Patient presents with    Chest Pain     C/O CP and SOB with exertion. C/O palps.

## 2020-03-02 ENCOUNTER — TELEPHONE (OUTPATIENT)
Dept: CARDIOLOGY CLINIC | Age: 70
End: 2020-03-02

## 2020-03-02 NOTE — TELEPHONE ENCOUNTER
----- Message from Franck Nunez MD sent at 2/28/2020  3:18 PM EST -----  Inform her stress test is k      Called pt,verified pt with two pt identifiers, advised pt her stress test is okay, normal. Pt verbalized understanding and had no further questions.

## 2020-04-09 RX ORDER — NAPROXEN 500 MG/1
TABLET ORAL
Qty: 60 TAB | Refills: 2 | Status: SHIPPED | OUTPATIENT
Start: 2020-04-09 | End: 2020-05-18

## 2020-05-18 ENCOUNTER — ANESTHESIA EVENT (OUTPATIENT)
Dept: ENDOSCOPY | Age: 70
End: 2020-05-18
Payer: MEDICARE

## 2020-05-18 RX ORDER — NABUMETONE 750 MG/1
750 TABLET, FILM COATED ORAL 2 TIMES DAILY
COMMUNITY
End: 2020-06-24 | Stop reason: ALTCHOICE

## 2020-05-18 RX ORDER — OMEPRAZOLE 40 MG/1
40 CAPSULE, DELAYED RELEASE ORAL
COMMUNITY
End: 2021-06-15 | Stop reason: SDUPTHER

## 2020-05-18 NOTE — ANESTHESIA PREPROCEDURE EVALUATION
Anesthetic History   No history of anesthetic complications            Review of Systems / Medical History  Patient summary reviewed, nursing notes reviewed and pertinent labs reviewed    Pulmonary            Asthma : well controlled    Comments: Former smoker - Quit 2010 - 25 pack years   Neuro/Psych         Psychiatric history    Comments: Depression Cardiovascular    Hypertension: well controlled              Exercise tolerance: >4 METS  Comments: 2-  EKG: NSR    2- ECHO: /chest discomfort with exercise, normal stress echo images        Cath in September was Neg per pt   GI/Hepatic/Renal     GERD: well controlled    Renal disease: stones      Comments: Noncardiac chest pain, GERD, Gastroparesis    1-6-2020 Slight delay of gastric emptying at 2 and 4 hours on Swallow Test Endo/Other    Diabetes: well controlled, type 2    Arthritis     Other Findings            Physical Exam    Airway  Mallampati: II  TM Distance: > 6 cm  Neck ROM: normal range of motion   Mouth opening: Normal     Cardiovascular  Regular rate and rhythm,  S1 and S2 normal,  no murmur, click, rub, or gallop             Dental  No notable dental hx       Pulmonary  Breath sounds clear to auscultation               Abdominal  GI exam deferred       Other Findings            Anesthetic Plan    ASA: 3  Anesthesia type: total IV anesthesia          Induction: Intravenous  Anesthetic plan and risks discussed with: Patient

## 2020-05-19 ENCOUNTER — HOSPITAL ENCOUNTER (OUTPATIENT)
Age: 70
Setting detail: OUTPATIENT SURGERY
Discharge: HOME | End: 2020-05-19
Attending: SPECIALIST | Admitting: SPECIALIST
Payer: MEDICARE

## 2020-05-19 ENCOUNTER — ANESTHESIA (OUTPATIENT)
Dept: ENDOSCOPY | Age: 70
End: 2020-05-19
Payer: MEDICARE

## 2020-05-19 VITALS
DIASTOLIC BLOOD PRESSURE: 70 MMHG | BODY MASS INDEX: 26.13 KG/M2 | HEART RATE: 72 BPM | WEIGHT: 142 LBS | OXYGEN SATURATION: 100 % | SYSTOLIC BLOOD PRESSURE: 156 MMHG | RESPIRATION RATE: 16 BRPM | TEMPERATURE: 97.7 F | HEIGHT: 62 IN

## 2020-05-19 PROCEDURE — 77030034675 HC CAP BRAVO PH W DEL SYS GVIM -C: Performed by: SPECIALIST

## 2020-05-19 PROCEDURE — 88305 TISSUE EXAM BY PATHOLOGIST: CPT

## 2020-05-19 PROCEDURE — 76040000007: Performed by: SPECIALIST

## 2020-05-19 PROCEDURE — 74011250636 HC RX REV CODE- 250/636: Performed by: NURSE ANESTHETIST, CERTIFIED REGISTERED

## 2020-05-19 PROCEDURE — 74011000250 HC RX REV CODE- 250: Performed by: NURSE ANESTHETIST, CERTIFIED REGISTERED

## 2020-05-19 PROCEDURE — 77030019988 HC FCPS ENDOSC DISP BSC -B: Performed by: SPECIALIST

## 2020-05-19 PROCEDURE — 74011250636 HC RX REV CODE- 250/636: Performed by: SPECIALIST

## 2020-05-19 PROCEDURE — 76060000032 HC ANESTHESIA 0.5 TO 1 HR: Performed by: SPECIALIST

## 2020-05-19 RX ORDER — SODIUM CHLORIDE 0.9 % (FLUSH) 0.9 %
5-40 SYRINGE (ML) INJECTION EVERY 8 HOURS
Status: DISCONTINUED | OUTPATIENT
Start: 2020-05-19 | End: 2020-05-19 | Stop reason: HOSPADM

## 2020-05-19 RX ORDER — DEXTROMETHORPHAN/PSEUDOEPHED 2.5-7.5/.8
1.2 DROPS ORAL
Status: DISCONTINUED | OUTPATIENT
Start: 2020-05-19 | End: 2020-05-19 | Stop reason: HOSPADM

## 2020-05-19 RX ORDER — PROPOFOL 10 MG/ML
INJECTION, EMULSION INTRAVENOUS AS NEEDED
Status: DISCONTINUED | OUTPATIENT
Start: 2020-05-19 | End: 2020-05-19 | Stop reason: HOSPADM

## 2020-05-19 RX ORDER — SODIUM CHLORIDE 0.9 % (FLUSH) 0.9 %
5-40 SYRINGE (ML) INJECTION AS NEEDED
Status: DISCONTINUED | OUTPATIENT
Start: 2020-05-19 | End: 2020-05-19 | Stop reason: HOSPADM

## 2020-05-19 RX ORDER — LIDOCAINE HYDROCHLORIDE 20 MG/ML
INJECTION, SOLUTION EPIDURAL; INFILTRATION; INTRACAUDAL; PERINEURAL AS NEEDED
Status: DISCONTINUED | OUTPATIENT
Start: 2020-05-19 | End: 2020-05-19 | Stop reason: HOSPADM

## 2020-05-19 RX ORDER — GLYCOPYRROLATE 0.2 MG/ML
INJECTION INTRAMUSCULAR; INTRAVENOUS AS NEEDED
Status: DISCONTINUED | OUTPATIENT
Start: 2020-05-19 | End: 2020-05-19 | Stop reason: HOSPADM

## 2020-05-19 RX ORDER — SODIUM CHLORIDE 9 MG/ML
50 INJECTION, SOLUTION INTRAVENOUS CONTINUOUS
Status: DISCONTINUED | OUTPATIENT
Start: 2020-05-19 | End: 2020-05-19 | Stop reason: HOSPADM

## 2020-05-19 RX ADMIN — LIDOCAINE HYDROCHLORIDE 50 MG: 20 INJECTION, SOLUTION EPIDURAL; INFILTRATION; INTRACAUDAL; PERINEURAL at 08:36

## 2020-05-19 RX ADMIN — LIDOCAINE HYDROCHLORIDE 50 MG: 20 INJECTION, SOLUTION EPIDURAL; INFILTRATION; INTRACAUDAL; PERINEURAL at 08:31

## 2020-05-19 RX ADMIN — PROPOFOL 80 MG: 10 INJECTION, EMULSION INTRAVENOUS at 08:29

## 2020-05-19 RX ADMIN — GLYCOPYRROLATE 0.2 MG: 0.2 INJECTION, SOLUTION INTRAMUSCULAR; INTRAVENOUS at 08:24

## 2020-05-19 RX ADMIN — SODIUM CHLORIDE 50 ML/HR: 900 INJECTION, SOLUTION INTRAVENOUS at 07:34

## 2020-05-19 RX ADMIN — LIDOCAINE HYDROCHLORIDE 100 MG: 20 INJECTION, SOLUTION EPIDURAL; INFILTRATION; INTRACAUDAL; PERINEURAL at 08:24

## 2020-05-19 NOTE — PROGRESS NOTES
Endoscope was pre-cleaned at bedside immediately following procedure by Abhishek Naik    Anesthesia reports 200mg Propofol, 100mg Lidocaine and 200mL NS and 0.2 Robinol given during procedure. Received report from anesthesia staff on vital signs and status of patient.

## 2020-05-19 NOTE — ANESTHESIA POSTPROCEDURE EVALUATION
Procedure(s):  ESOPHAGOGASTRODUODENOSCOPY (EGD)  BRAVO CLIP PLACEMENT  ESOPHAGOGASTRODUODENAL (EGD) BIOPSY. total IV anesthesia    Anesthesia Post Evaluation        Patient location during evaluation: PACU  Note status: Adequate. Level of consciousness: responsive to verbal stimuli and sleepy but conscious  Pain management: satisfactory to patient  Airway patency: patent  Anesthetic complications: no  Cardiovascular status: acceptable  Respiratory status: acceptable  Hydration status: acceptable  Comments: +Post-Anesthesia Evaluation and Assessment    Patient: Asuncion Felty MRN: [id number]  SSN: [id number]   YOB: 1950  Age: 81 y.o. Sex: female      Cardiovascular Function/Vital Signs    /70   Pulse 72   Temp 36.5 °C (97.7 °F)   Resp 16   Ht 5' 2\" (1.575 m)   Wt 64.4 kg (142 lb)   SpO2 100%   Breastfeeding No   BMI 25.97 kg/m²     Patient is status post Procedure(s):  ESOPHAGOGASTRODUODENOSCOPY (EGD)  BRAVO CLIP PLACEMENT  ESOPHAGOGASTRODUODENAL (EGD) BIOPSY. Nausea/Vomiting: Controlled. Postoperative hydration reviewed and adequate. Pain:  Pain Scale 1: Numeric (0 - 10) (05/19/20 5001)  Pain Intensity 1: 3 (05/19/20 3912)   Managed. Neurological Status: At baseline. Mental Status and Level of Consciousness: Arousable. Pulmonary Status:   O2 Device: Room air (05/19/20 9895)   Adequate oxygenation and airway patent. Complications related to anesthesia: None    Post-anesthesia assessment completed. No concerns.     Signed By: Blayne Dickens DO    5/19/2020  Post anesthesia nausea and vomiting:  controlled      Vitals Value Taken Time   /70 5/19/2020  9:22 AM   Temp 36.5 °C (97.7 °F) 5/19/2020  8:57 AM   Pulse 72 5/19/2020  9:22 AM   Resp 16 5/19/2020  9:22 AM   SpO2 100 % 5/19/2020  9:22 AM

## 2020-05-19 NOTE — DISCHARGE INSTRUCTIONS
Ana Cristina Anthony  [id number]  1950    EGD DISCHARGE INSTRUCTIONS  Discomfort:  Sore throat- throat lozenges or warm salt water gargle  redness at IV site- apply warm compress to area; if redness or soreness persist- contact your physician  Gaseous discomfort- walking, belching will help relieve any discomfort  You may not operate a vehicle for 12 hours  You may not engage in an occupation involving machinery or appliances for rest of today. You may not drink alcoholic beverages for at least 12 hours  Avoid making any critical decisions for at least 24 hour  DIET  You may resume your regular diet - however -  remember your colon is empty and a heavy meal will produce gas. Avoid these foods:  vegetables, fried / greasy foods, carbonated drinks  MEDICATIONS        Regarding Aspirin or Nonsteroidal medications specifically, please see below. ACTIVITY  You may resume your normal daily activities. Spend the remainder of the day resting -  avoid any strenuous activity. CALL M.D. ANY SIGN OF   Increasing pain, nausea, vomiting  Abdominal distension (swelling)  New increased bleeding (oral or rectal)  Fever (chills)  Pain in chest area  Bloody discharge from nose or mouth  Shortness of breath  ONLY  Tylenol as needed for pain. Follow-up Instructions:   Call Dr. Mariajose Coleman for results of procedure / biopsy in 4-5 days at telephone #  [phone]              Resume Famotidine and Omeprazole today. Hold your next Alendronate (once a week medication for osteoporosis) dosage              Call Dr. Mariajose Coleman' office in 14 days for Bravo pH study results.

## 2020-05-19 NOTE — PROCEDURES
Esophagogastroduodenoscopy Procedure Note      Xiang Mark  1950  [id number]    Indication:  Atypical chest pain, persistent GERD sxs on PPI and H2 blocker (last dose of PPI 24 hr ago, last dose H2 was 12 hr ago)     Endoscopist: Karla Prather MD    Referring Provider:  Osiris Calloway MD    Sedation:  MAC anesthesia Propofol    Procedure Details:  After infomed consent was obtained for the procedure, with all risks and benefits of procedure explained the patient was taken to the endoscopy suite and placed in the left lateral decubitus position. Following sequential administration of sedation as per above, the endoscope was inserted into the mouth and advanced under direct vision to second portion of the duodenum. A careful inspection was made as the gastroscope was withdrawn, including a retroflexed view of the proximal stomach; findings and interventions are described below. Findings:     Esophagus:   ++ There was slight partial web like tissue at the UES. Then the esophagus showed some increased contractions c/w possible motility disorder. ++ Z line normal at 37 cm from incisors with a 3 cm HH and diaphragmatic hiatus at 40 cm from incisors. ++ HARPER CLIP successfully deployed at 31 cm from insiors (photo documented position)    Stomach:   ++ Mild erythema in the antrum with a linear erosion seen s/p Bx. Duodenum:   The bulb and post bulbar mucosa is normal in appearance to the second portion. The duodenal folds appeared normal.      Therapies:  See above    Specimen: Specimens were collected as described and send to the laboratory. Complications:   None were encountered during the procedure. EBL:  < 20 ml.           Recommendations:   -f/u path  -resume reflux meds today  -data available for interpretation after 48 hours    Karla Prather MD  5/19/2020  8:46 AM

## 2020-05-19 NOTE — H&P
Gastroenterology Outpatient History and Physical    Patient: Chris Pettys    Physician: Aidan Han MD    Vital Signs: Blood pressure 125/65, pulse 70, temperature 98 °F (36.7 °C), resp. rate 16, height 5' 2\" (1.575 m), weight 64.4 kg (142 lb), SpO2 99 %, not currently breastfeeding. Allergies: Allergies   Allergen Reactions    Latex Itching    Lisinopril Swelling    Sulfa (Sulfonamide Antibiotics) Itching       Chief Complaint: Persistent GERD sxs with noncardiac chest pain while on anti reflux therapy. History of Present Illness: 82 yo BF for EGD with Bravo pH placement due to persistence of atypical reflux sxs. Justification for Procedure: above    History:  Past Medical History:   Diagnosis Date    Allergic rhinitis due to allergen 10/22/2014    Arthritis     spine    Asthma     Chronic pain     back pain    Depression     Diabetes ([address]     Type II    GERD (gastroesophageal reflux disease)     Hypercholesterolemia     Hypertension     Kidney stones 1990's    Other ill-defined conditions(799.89)     GERD    Positive PPD 2009    treated with INH      Past Surgical History:   Procedure Laterality Date    BREAST SURGERY PROCEDURE UNLISTED  1990's    abcess bilateral    COLONOSCOPY  4-11    manetas- n ormal    EGD  4-11    manetas- esophageal ring    ENDOSCOPY, COLON, DIAGNOSTIC  12/2007    2 polyps    GERD TST W/ NASAL IMPEDENCE ELECTROD  1/27/2020    HX BREAST BIOPSY Bilateral 1980s    all benign cysts    HX CHOLECYSTECTOMY      HX GYN  1978    tubal laparoscopy    HX HEART CATHETERIZATION      negatve    HX HEENT Bilateral 11/2018    cataract extraction    HX ORTHOPAEDIC  2016    had an injection in her back to help with leg pain    HX ORTHOPAEDIC Left 12/12/2017    foot surg.   bunion removal  \"Put a plate in\"    HX OTHER SURGICAL  1997    bunionectomy    HX TONSIL AND ADENOIDECTOMY      approx 11years old   [address] W/INTERP&RPT  1/27/2020 Social History     Socioeconomic History    Marital status: SINGLE     Spouse name: Not on file    Number of children: Not on file    Years of education: Not on file    Highest education level: Not on file   Tobacco Use    Smoking status: Former Smoker     Packs/day: 1.00     Years: 25.00     Pack years: 25.00     Last attempt to quit: 2010     Years since quittin.7    Smokeless tobacco: Never Used   Substance and Sexual Activity    Alcohol use: No    Drug use: No   Social History Narrative    Retired schoolteacher. Raised 2 nieces. Family History   Problem Relation Age of Onset    Diabetes Mother     Hypertension Mother     Cancer Father         lung    Cancer Paternal Aunt         colon       Medications:   Prior to Admission medications    Medication Sig Start Date End Date Taking? Authorizing Provider   nabumetone (RELAFEN) 750 mg tablet Take 750 mg by mouth two (2) times a day. Yes Provider, Historical   omeprazole (PRILOSEC) 40 mg capsule Take 40 mg by mouth every morning. Yes Provider, Historical   glucose blood VI test strips (ACCU-CHEK SOFIA PLUS TEST STRP) strip TEST BLOOD SUGAR 1 TIMES DAILY. DX CODE E11.9 20  Yes Rayray Hernandez MD   Blood-Glucose Meter (ACCU-CHEK SOFIA PLUS METER) misc Use as directed. E11.9 20  Yes Rayray Hernandez MD   lancets (ACCU-CHEK SOFTCLIX LANCETS) misc Use as directed. E11.9 20  Yes Rayray Hernandez MD   atorvastatin (LIPITOR) 20 mg tablet TAKE 1 TAB BY MOUTH DAILY. FOR CHOLESTEROL 20  Yes Rayray Hernandez MD   alendronate (FOSAMAX) 35 mg tablet PLEASE SEE ATTACHED FOR DETAILED DIRECTIONS  Patient taking differently: Take 35 mg by mouth every seven (7) days. 19  Yes Rayray Hernandez MD   hydroCHLOROthiazide (HYDRODIURIL) 12.5 mg tablet Take 1 Tab by mouth daily.  For blood pressure 19  Yes Rayray Hernandez MD   citalopram (CELEXA) 20 mg tablet TAKE ONE TABLET BY MOUTH ONE TIME DAILY 19  Yes Breonna Farris Kristen Youngblood MD   metFORMIN (GLUCOPHAGE) 1,000 mg tablet Take 1 tablet in AM and 1 tablet in PM. For Diabetes 7/2/19  Yes Marlen Sidhu MD   lancets (ACCU-CHEK SOFTCLIX LANCETS) misc TEST 2 TIMES DAILY AS DIRECTED (E11.9) 12/12/18  Yes Marlen Sidhu MD   famotidine (PEPCID) 40 mg tablet Take 40 mg by mouth nightly. Yes Provider, Historical   polyethylene glycol (MIRALAX) 17 gram packet Take 17 g by mouth daily as needed ('Constipation'). Yes Provider, Historical   multivit,calc,mins/iron/folic (ONE-A-DAY WOMENS FORMULA PO) Take 1 Tab by mouth daily. Yes Provider, Historical   ALBUTEROL IN Take  by inhalation as needed. Not used in years    Provider, Historical       Physical Exam:   General: alert, no distress   HEENT: Head: Normocephalic, no lesions, without obvious abnormality. Heart: regular rate and rhythm, S1, S2 normal, no murmur, click, rub or gallop   Lungs: chest clear, no wheezing, rales, normal symmetric air entry   Abdominal: soft, NT/ND + BS   Neurological: Grossly normal   Extremities: extremities normal, atraumatic, no cyanosis or edema     Findings/Diagnosis: GERD with atypical chest pain    Plan of Care/Planned Procedure: EGD with Bravo pH placement. The patient was counseled at length about the risks of ifeoma Covid-19 during their perioperative period and any recovery window from their procedure. The patient was made aware that ifeoma Covid-19  may worsen their prognosis for recovering from their procedure and lend to a higher morbidity and/or mortality risk. All material risks, benefits, and reasonable alternatives including postponing the procedure were discussed. The patient does  wish to proceed with the procedure at this time.

## 2020-06-05 NOTE — OP NOTES
[address]  OPERATIVE REPORT    Name:  Mitchell Bates  MR#:  [id number]  :  1950  ACCOUNT #:  [de-identified]  DATE OF SERVICE:  2020      PREPROCEDURE DIAGNOSIS:  Chest pain. POSTPROCEDURE DIAGNOSES:  1.  24-hour pH Vinay-DeMeester score of 13.1, normal less than 14.72.  2.  No excess gastroesophageal reflux. 3.  Day 1 and day 2 scores are different and will be reported separately. There is modest to excess reflux on day 1.  4.  A symptom analysis would be described below. PROCEDURE PLANNED:  48-hour pH Bravo clip. PROCEDURE PERFORMED:  48-hour pH Bravo clip. SURGEON:  William Davis MD    ASSISTANT:  Zane Mcginnis RN    ANESTHESIA:  See separate endoscopy note for placement of Bravo clip    COMPLICATIONS:  None. SPECIMENS REMOVED:  None. IMPLANTS:  Bravo clip was placed at endoscopy . It will fall out on its own   . ESTIMATED BLOOD LOSS:  none. DESCRIPTION OF PROCEDURE:  48-hour pH Bravo clip was placed by Dr. Eagle Moore on 2020 and the patient is monitored for 48 hours. During the 48-hour study, there is 4.3% total reflux, which is normal.  There is no supine reflux, which is normal and there is 4.4% upright reflux, which is normal.  Vinay-DeMeester score noted above. Day 1 analysis shows 6.8% total reflux (about 5.5% is upper limit of normal) and Vinay-DeMeester score 17.7, this is mild excess reflux. Once again there was no supine reflux. On day 2, total reflux was 1.9% all of which was scored as upright and Vinay-DeMeester score 5.6, which is well within normal limits. I reviewed the diary, which consists of marking of meal times and getting up to go to bathroom. No symptoms were recorded. Therefore, symptoms index and symptom associated probably are not calculated. In summary, there was mild excess reflux day 1, no excess reflux day 2, and for the total study there was no excess reflux.   All recorded reflux is upright.         Augusto Ennis MD      [id number]  D:  06/05/2020 6:58  T:  06/05/2020 10:10  JOB #:  [id number]  CC:  MD Charlee Jeffrey MD

## 2020-06-24 ENCOUNTER — OFFICE VISIT (OUTPATIENT)
Dept: FAMILY MEDICINE CLINIC | Age: 70
End: 2020-06-24

## 2020-06-24 ENCOUNTER — HOSPITAL ENCOUNTER (OUTPATIENT)
Dept: LAB | Age: 70
Discharge: HOME | End: 2020-06-24
Payer: MEDICARE

## 2020-06-24 VITALS
OXYGEN SATURATION: 97 % | RESPIRATION RATE: 16 BRPM | HEART RATE: 74 BPM | TEMPERATURE: 99.1 F | SYSTOLIC BLOOD PRESSURE: 112 MMHG | HEIGHT: 62 IN | DIASTOLIC BLOOD PRESSURE: 57 MMHG | BODY MASS INDEX: 26.06 KG/M2 | WEIGHT: 141.6 LBS

## 2020-06-24 DIAGNOSIS — I10 ESSENTIAL HYPERTENSION: ICD-10-CM

## 2020-06-24 DIAGNOSIS — E11.9 CONTROLLED TYPE 2 DIABETES MELLITUS WITHOUT COMPLICATION, WITHOUT LONG-TERM CURRENT USE OF INSULIN (HCC): Primary | ICD-10-CM

## 2020-06-24 DIAGNOSIS — E78.00 HYPERCHOLESTEROLEMIA: ICD-10-CM

## 2020-06-24 DIAGNOSIS — Z00.00 ENCOUNTER FOR MEDICARE ANNUAL WELLNESS EXAM: ICD-10-CM

## 2020-06-24 LAB — HBA1C MFR BLD HPLC: 6.9 %

## 2020-06-24 PROCEDURE — 80061 LIPID PANEL: CPT

## 2020-06-24 PROCEDURE — 80053 COMPREHEN METABOLIC PANEL: CPT

## 2020-06-24 PROCEDURE — 36415 COLL VENOUS BLD VENIPUNCTURE: CPT

## 2020-06-24 PROCEDURE — 85025 COMPLETE CBC W/AUTO DIFF WBC: CPT

## 2020-06-24 RX ORDER — METFORMIN HYDROCHLORIDE 1000 MG/1
TABLET ORAL
Qty: 180 TAB | Refills: 3 | Status: SHIPPED | OUTPATIENT
Start: 2020-06-24 | End: 2021-06-15 | Stop reason: SDUPTHER

## 2020-06-24 RX ORDER — GABAPENTIN 100 MG/1
300 CAPSULE ORAL 3 TIMES DAILY
COMMUNITY
Start: 2020-06-08 | End: 2021-05-05 | Stop reason: ALTCHOICE

## 2020-06-24 NOTE — PROGRESS NOTES
HISTORY OF PRESENT ILLNESS  Master Vyas is a 81 y.o. female. HPI Pt. Comes in for blood pressure, cholesterol, and diabetes check. Has been doing ok. No complaints of chest pain, shortness of breath, TIAs, claudication or edema. Blood sugars have been in the 100s. 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 Keeps busy sewing and making things. Sees Dr. Rodney Castano (ortho), Dr. Smitha Klein (spine), Dr. Miladys Treviño (gastro), nutritionist. UTD on mammograms and colonoscopy. UTD on immunizations. Review of Systems   HENT: Positive for hearing loss (mild hearing loss. ). Neurological:        No falls, canes, walkers. Independent in all adls. Psychiatric/Behavioral: Negative for depression. No alcohol. Physical Exam  Vitals signs and nursing note reviewed. Constitutional:       Appearance: She is well-developed. HENT:      Right Ear: External ear normal.      Left Ear: External ear normal.   Neck:      Thyroid: No thyromegaly. Cardiovascular:      Rate and Rhythm: Normal rate and regular rhythm. Heart sounds: Normal heart sounds. Pulmonary:      Breath sounds: Normal breath sounds. No wheezing. Abdominal:      General: Bowel sounds are normal. There is no distension. Palpations: Abdomen is soft. There is no mass. Tenderness: There is no abdominal tenderness. Lymphadenopathy:      Cervical: No cervical adenopathy. ASSESSMENT and PLAN  Orders Placed This Encounter    CBC WITH AUTOMATED DIFF    METABOLIC PANEL, COMPREHENSIVE    LIPID PANEL    AMB POC HEMOGLOBIN A1C    metFORMIN (GLUCOPHAGE) 1,000 mg tablet     Diagnoses and all orders for this visit:    1. Controlled type 2 diabetes mellitus without complication, without long-term current use of insulin (HCC)  -     AMB POC HEMOGLOBIN A1C    2. Essential hypertension  -     CBC WITH AUTOMATED DIFF  -     METABOLIC PANEL, COMPREHENSIVE    3.  Hypercholesterolemia  -     LIPID PANEL    4. Encounter for Medicare annual wellness exam    Other orders  -     metFORMIN (GLUCOPHAGE) 1,000 mg tablet; Take 1 tablet in AM and 1 tablet in PM. For Diabetes      Follow-up and Dispositions    · Return in about 6 months (around 12/24/2020).

## 2020-06-24 NOTE — PROGRESS NOTES
Chief Complaint   Patient presents with    Follow Up Chronic Condition     1. Have you been to the ER, urgent care clinic since your last visit? Hospitalized since your last visit? No    2. Have you seen or consulted any other health care providers outside of the [address] [place] [address] since your last visit? Include any pap smears or colon screening.  Yes Reason for visit: Efrain Julian, Spinal Surgeon 6/1,  Dr. Radha Laird -6/8/20

## 2020-06-25 LAB
ALBUMIN SERPL-MCNC: 4.2 G/DL (ref 3.8–4.8)
ALBUMIN/GLOB SERPL: 1.7 {RATIO} (ref 1.2–2.2)
ALP SERPL-CCNC: 71 IU/L (ref 39–117)
ALT SERPL-CCNC: 27 IU/L (ref 0–32)
AST SERPL-CCNC: 26 IU/L (ref 0–40)
BASOPHILS # BLD AUTO: 0 X10E3/UL (ref 0–0.2)
BASOPHILS NFR BLD AUTO: 1 %
BILIRUB SERPL-MCNC: 0.3 MG/DL (ref 0–1.2)
BUN SERPL-MCNC: 15 MG/DL (ref 8–27)
BUN/CREAT SERPL: 16 (ref 12–28)
CALCIUM SERPL-MCNC: 9.8 MG/DL (ref 8.7–10.3)
CHLORIDE SERPL-SCNC: 105 MMOL/L (ref 96–106)
CHOLEST SERPL-MCNC: 170 MG/DL (ref 100–199)
CO2 SERPL-SCNC: 24 MMOL/L (ref 20–29)
CREAT SERPL-MCNC: 0.95 MG/DL (ref 0.57–1)
EOSINOPHIL # BLD AUTO: 0.1 X10E3/UL (ref 0–0.4)
EOSINOPHIL NFR BLD AUTO: 3 %
ERYTHROCYTE [DISTWIDTH] IN BLOOD BY AUTOMATED COUNT: 14.4 % (ref 11.7–15.4)
GLOBULIN SER CALC-MCNC: 2.5 G/DL (ref 1.5–4.5)
GLUCOSE SERPL-MCNC: 131 MG/DL (ref 65–99)
HCT VFR BLD AUTO: 37.6 % (ref 34–46.6)
HDLC SERPL-MCNC: 72 MG/DL
HGB BLD-MCNC: 12.2 G/DL (ref 11.1–15.9)
IMM GRANULOCYTES # BLD AUTO: 0 X10E3/UL (ref 0–0.1)
IMM GRANULOCYTES NFR BLD AUTO: 0 %
INTERPRETATION, 910389: NORMAL
LDLC SERPL CALC-MCNC: 81 MG/DL (ref 0–99)
LYMPHOCYTES # BLD AUTO: 1.4 X10E3/UL (ref 0.7–3.1)
LYMPHOCYTES NFR BLD AUTO: 38 %
MCH RBC QN AUTO: 29.2 PG (ref 26.6–33)
MCHC RBC AUTO-ENTMCNC: 32.4 G/DL (ref 31.5–35.7)
MCV RBC AUTO: 90 FL (ref 79–97)
MONOCYTES # BLD AUTO: 0.3 X10E3/UL (ref 0.1–0.9)
MONOCYTES NFR BLD AUTO: 8 %
NEUTROPHILS # BLD AUTO: 1.8 X10E3/UL (ref 1.4–7)
NEUTROPHILS NFR BLD AUTO: 50 %
PLATELET # BLD AUTO: 190 X10E3/UL (ref 150–450)
POTASSIUM SERPL-SCNC: 4.1 MMOL/L (ref 3.5–5.2)
PROT SERPL-MCNC: 6.7 G/DL (ref 6–8.5)
RBC # BLD AUTO: 4.18 X10E6/UL (ref 3.77–5.28)
SODIUM SERPL-SCNC: 141 MMOL/L (ref 134–144)
TRIGL SERPL-MCNC: 84 MG/DL (ref 0–149)
VLDLC SERPL CALC-MCNC: 17 MG/DL (ref 5–40)
WBC # BLD AUTO: 3.6 X10E3/UL (ref 3.4–10.8)

## 2020-07-24 ENCOUNTER — HOSPITAL ENCOUNTER (OUTPATIENT)
Dept: MRI IMAGING | Age: 70
Discharge: HOME | End: 2020-07-24
Attending: ORTHOPAEDIC SURGERY

## 2020-07-24 DIAGNOSIS — M47.816 LUMBAR SPONDYLOSIS: ICD-10-CM

## 2020-07-24 DIAGNOSIS — M54.31 BILATERAL SCIATICA: ICD-10-CM

## 2020-07-24 DIAGNOSIS — M54.50 LUMBAR PAIN: ICD-10-CM

## 2020-07-24 DIAGNOSIS — M54.32 BILATERAL SCIATICA: ICD-10-CM

## 2020-07-24 DIAGNOSIS — M48.062 SPINAL STENOSIS, LUMBAR REGION, WITH NEUROGENIC CLAUDICATION: ICD-10-CM

## 2020-08-21 ENCOUNTER — APPOINTMENT (OUTPATIENT)
Dept: GENERAL RADIOLOGY | Age: 70
End: 2020-08-21
Attending: STUDENT IN AN ORGANIZED HEALTH CARE EDUCATION/TRAINING PROGRAM
Payer: MEDICARE

## 2020-08-21 ENCOUNTER — HOSPITAL ENCOUNTER (EMERGENCY)
Age: 70
Discharge: HOME | End: 2020-08-21
Attending: STUDENT IN AN ORGANIZED HEALTH CARE EDUCATION/TRAINING PROGRAM
Payer: MEDICARE

## 2020-08-21 VITALS
RESPIRATION RATE: 18 BRPM | OXYGEN SATURATION: 98 % | DIASTOLIC BLOOD PRESSURE: 78 MMHG | BODY MASS INDEX: 25.4 KG/M2 | HEIGHT: 62 IN | SYSTOLIC BLOOD PRESSURE: 135 MMHG | HEART RATE: 76 BPM | TEMPERATURE: 99.1 F | WEIGHT: 138 LBS

## 2020-08-21 DIAGNOSIS — M79.671 PAIN OF RIGHT HEEL: Primary | ICD-10-CM

## 2020-08-21 PROCEDURE — 99283 EMERGENCY DEPT VISIT LOW MDM: CPT

## 2020-08-21 PROCEDURE — 74011250637 HC RX REV CODE- 250/637: Performed by: STUDENT IN AN ORGANIZED HEALTH CARE EDUCATION/TRAINING PROGRAM

## 2020-08-21 PROCEDURE — 73630 X-RAY EXAM OF FOOT: CPT

## 2020-08-21 RX ORDER — NAPROXEN 500 MG/1
500 TABLET ORAL
Qty: 20 TAB | Refills: 0 | Status: SHIPPED | OUTPATIENT
Start: 2020-08-21 | End: 2021-05-05 | Stop reason: ALTCHOICE

## 2020-08-21 RX ORDER — IBUPROFEN 600 MG/1
600 TABLET ORAL ONCE
Status: COMPLETED | OUTPATIENT
Start: 2020-08-21 | End: 2020-08-21

## 2020-08-21 RX ADMIN — IBUPROFEN 600 MG: 600 TABLET, FILM COATED ORAL at 15:07

## 2020-08-21 NOTE — ED PROVIDER NOTES
EMERGENCY DEPARTMENT HISTORY AND PHYSICAL EXAM      Date: 8/21/2020  Patient Name: Lorenza Polo    History of Presenting Illness     Chief Complaint   Patient presents with    Foot Pain     c/o right heel pain x 3 days; denies trauma         HPI: Lorenza Polo, 81 y.o. female presents to the ED with cc of right foot pain. For the past 2 days she reports pain at the back of her right heel. No trauma, no redness, no swelling, no changes in activity. This has never happened before. This morning it hurt so bad that it was hard to walk. There are no other complaints, changes, or physical findings at this time. PCP: Penelope Wong MD    No current facility-administered medications on file prior to encounter. Current Outpatient Medications on File Prior to Encounter   Medication Sig Dispense Refill    gabapentin (NEURONTIN) 100 mg capsule Take 100 mg by mouth three (3) times daily.  metFORMIN (GLUCOPHAGE) 1,000 mg tablet Take 1 tablet in AM and 1 tablet in PM. For Diabetes 180 Tab 3    omeprazole (PRILOSEC) 40 mg capsule Take 40 mg by mouth every morning.  glucose blood VI test strips (ACCU-CHEK SOFIA PLUS TEST STRP) strip TEST BLOOD SUGAR 1 TIMES DAILY. DX CODE E11.9 100 Strip 4    Blood-Glucose Meter (ACCU-CHEK SOFIA PLUS METER) misc Use as directed. E11.9 1 Each 0    atorvastatin (LIPITOR) 20 mg tablet TAKE 1 TAB BY MOUTH DAILY. FOR CHOLESTEROL 90 Tab 3    alendronate (FOSAMAX) 35 mg tablet PLEASE SEE ATTACHED FOR DETAILED DIRECTIONS (Patient taking differently: Take 35 mg by mouth every seven (7) days.) 12 Tab 3    hydroCHLOROthiazide (HYDRODIURIL) 12.5 mg tablet Take 1 Tab by mouth daily. For blood pressure 90 Tab 3    citalopram (CELEXA) 20 mg tablet TAKE ONE TABLET BY MOUTH ONE TIME DAILY 90 Tab 3    lancets (ACCU-CHEK SOFTCLIX LANCETS) misc TEST 2 TIMES DAILY AS DIRECTED (E11.9) 200 Each 12    famotidine (PEPCID) 40 mg tablet Take 40 mg by mouth nightly.       polyethylene glycol (MIRALAX) 17 gram packet Take 17 g by mouth daily as needed ('Constipation').  multivit,calc,mins/iron/folic (ONE-A-DAY WOMENS FORMULA PO) Take 1 Tab by mouth daily. Past History     Past Medical History:  Past Medical History:   Diagnosis Date    Allergic rhinitis due to allergen 10/22/2014    Arthritis     spine    Asthma     Chronic pain     back pain    Depression     Diabetes ([address]     Type II    GERD (gastroesophageal reflux disease)     Hypercholesterolemia     Hypertension     Kidney stones 1990's    Other ill-defined conditions(799.89)     GERD    Positive PPD 2009    treated with INH       Past Surgical History:  Past Surgical History:   Procedure Laterality Date    BREAST SURGERY PROCEDURE UNLISTED  1990's    abcess bilateral    COLONOSCOPY  4-11    manetas- n ormal    EGD  4-11    manetas- esophageal ring    ENDOSCOPY, COLON, DIAGNOSTIC  12/2007    2 polyps    GERD TST W/ NASAL IMPEDENCE ELECTROD  1/27/2020    HX BREAST BIOPSY Bilateral 1980s    all benign cysts    HX CHOLECYSTECTOMY      HX GYN  1978    tubal laparoscopy    HX HEART CATHETERIZATION      negatve    HX HEENT Bilateral 11/2018    cataract extraction    HX ORTHOPAEDIC  2016    had an injection in her back to help with leg pain    HX ORTHOPAEDIC Left 12/12/2017    foot surg.   bunion removal  \"Put a plate in\"    HX OTHER SURGICAL  1997    bunionectomy    HX TONSIL AND ADENOIDECTOMY      approx 11years old   [address] W/INTERP&RPT  1/27/2020       Family History:  Family History   Problem Relation Age of Onset    Diabetes Mother     Hypertension Mother     Cancer Father         lung    Cancer Paternal Aunt         colon       Social History:  Social History     Tobacco Use    Smoking status: Former Smoker     Packs/day: 1.00     Years: 25.00     Pack years: 25.00     Last attempt to quit: 8/23/2010     Years since quitting: 10.0    Smokeless tobacco: Never Used   Substance Use Topics    Alcohol use: No    Drug use: No       Allergies: Allergies   Allergen Reactions    Latex Itching    Lisinopril Swelling    Sulfa (Sulfonamide Antibiotics) Itching         Review of Systems   no fever  no eye pain  no ear pain  no shortness of breath  no chest pain  no abdominal pain    Physical Exam   Physical Exam  Constitutional:       Appearance: Normal appearance. HENT:      Head: Normocephalic and atraumatic. Nose: No congestion. Mouth/Throat:      Mouth: Mucous membranes are moist.   Eyes:      Extraocular Movements: Extraocular movements intact. Neck:      Musculoskeletal: Normal range of motion. Cardiovascular:      Rate and Rhythm: Normal rate. Pulmonary:      Effort: Pulmonary effort is normal.   Abdominal:      General: Abdomen is flat. Musculoskeletal:      Comments: Right foot with tenderness to palpation over the distal insertion of the Achilles tendon region on the posterior heel. No erythema, warmth or swelling. Full range of motion of the ankle with flexion extension and inversion eversion. No other bony tenderness over the foot or ankle. DP pulses strong, sensation to light touch intact diffusely   Skin:     General: Skin is warm. Neurological:      General: No focal deficit present. Mental Status: She is alert. Psychiatric:         Mood and Affect: Mood normal.         Behavior: Behavior normal.         Diagnostic Study Results     Labs -   No results found for this or any previous visit (from the past 24 hour(s)). Radiologic Studies -   XR FOOT RT MIN 3 V    (Results Pending)     CT Results  (Last 48 hours)    None        CXR Results  (Last 48 hours)    None            Medical Decision Making   I am the first provider for this patient. I reviewed the vital signs, available nursing notes, past medical history, past surgical history, family history and social history.     Vital Signs-Reviewed the patient's vital signs. Patient Vitals for the past 24 hrs:   Temp Pulse Resp BP SpO2   08/21/20 1255 99.1 °F (37.3 °C) 76 18 135/78 98 %         Provider Notes (Medical Decision Making):   Patient presents with right foot pain. She has focal tenderness over the posterior heel, slightly over the area of the distal Achilles tendon. Differential includes tendinitis, enthesitis, less likely fracture. X-rays obtained. No redness or swelling, no evidence of infection, warm and well-perfused. ED Course:     Initial assessment performed. The patients presenting problems have been discussed, and they are in agreement with the care plan formulated and outlined with them. I have encouraged them to ask questions as they arise throughout their visit. X-rays unremarkable. Patient is counseled on supportive care and return precautions. Will return to the ED for any worsening pain, redness, swelling. Will followup with primary care doctor within 5-7 days. Critical Care Time:         Disposition:  Home    PLAN:  1. Current Discharge Medication List        2.    Follow-up Information    None       Return to ED if worse     Diagnosis     Clinical Impression: Acute right heel pain

## 2020-08-21 NOTE — ED NOTES
Patient given printed discharge instructions reviewed by the MD. Patient understands instructions/follow up recommendations. Patient taken to waiting room to call family for a ride home. Patient stated that she drove herself in using her left foot. Okay with calling family/friend for a ride home for safety.

## 2020-08-24 ENCOUNTER — PATIENT OUTREACH (OUTPATIENT)
Dept: CASE MANAGEMENT | Age: 70
End: 2020-08-24

## 2020-08-24 NOTE — PROGRESS NOTES
Patient contacted regarding recent discharge and COVID-19 risk. Discussed COVID-19 related testing which was not done at this time. Test results were not done. Patient informed of results, if available? N/A      Care Transition Nurse/ Ambulatory Care Manager/ LPN Care Coordinator contacted the patient by telephone to perform post discharge assessment; lvm requesting a return phone call to this ACM. Patient has following risk factors of: asthma and diabetes. Rx - naproxen. Pt was advised to f/u with PCP (Dr. Luciano Mckinney Provider) post-discharge.

## 2020-08-25 NOTE — PROGRESS NOTES
2020  12:56 PM    Patient contacted regarding recent discharge and COVID-19 risk. Discussed COVID-19 related testing which was not done at this time. Test results were not done. Patient informed of results, if available? N/A      Care Transition Nurse/ Ambulatory Care Manager/ LPN Care Coordinator contacted the patient by telephone to perform post discharge assessment. Verified name and  with patient as identifiers. Patient has following risk factors of: asthma and diabetes. CTN/ACM/LPN reviewed discharge instructions, medical action plan and red flags related to discharge diagnosis. Reviewed and educated them on any new and changed medications related to discharge diagnosis; Rx - naproxen. Advised obtaining a 90-day supply of all daily and as-needed medications. Advance Care Planning:   Does patient have an Advance Directive: yes, reviewed and current     Education provided regarding infection prevention, and signs and symptoms of COVID-19 and when to seek medical attention with patient who verbalized understanding. Discussed exposure protocols and quarantine from [address] you at higher risk for severe illness  and given an opportunity for questions and concerns. The patient agrees to contact the COVID-19 hotline [phone] or PCP office for questions related to their healthcare. CTN/ACM/LPN provided contact information for future reference. From CDC: Are you at higher risk for severe illness?  Wash your hands often.  Avoid close contact (6 feet, which is about two arm lengths) with people who are sick.  Put distance between yourself and other people if COVID-19 is spreading in your community.  Clean and disinfect frequently touched surfaces.  Avoid all cruise travel and non-essential air travel.  Call your healthcare professional if you have concerns about COVID-19 and your underlying condition or if you are sick.     For more information on steps you can take to protect yourself, see CDC's How to Protect Yourself      Patient/family/caregiver given information for GetWell Loop and agrees to enroll yes  Patient's preferred e-mail:  [email or web address]  Patient's preferred phone number: [phone]  Based on Loop alert triggers, patient will be contacted by nurse care manager for worsening symptoms. Pt was advised to f/u with PCP (Dr. Tay Favor Provider) post-discharge. Patient declines ACM assistance today with scheduling of PCP follow-up. Pt will be further monitored by COVID Loop Team based on severity of symptoms and risk factors.

## 2020-09-10 RX ORDER — HYDROCHLOROTHIAZIDE 12.5 MG/1
12.5 TABLET ORAL DAILY
Qty: 90 TAB | Refills: 3 | Status: SHIPPED | OUTPATIENT
Start: 2020-09-10 | End: 2020-12-15 | Stop reason: ALTCHOICE

## 2020-09-10 NOTE — TELEPHONE ENCOUNTER
Last visit:6/24/20  Next visit:12/15/20  Previous refill 9/19/19(90+3R)    Requested Prescriptions     Pending Prescriptions Disp Refills    hydroCHLOROthiazide (HYDRODIURIL) 12.5 mg tablet 90 Tab 3     Sig: Take 1 Tab by mouth daily.  For blood pressure

## 2020-09-29 RX ORDER — CITALOPRAM 20 MG/1
TABLET, FILM COATED ORAL
Qty: 90 TAB | Refills: 3 | Status: SHIPPED | OUTPATIENT
Start: 2020-09-29 | End: 2021-07-21 | Stop reason: ALTCHOICE

## 2020-09-29 NOTE — TELEPHONE ENCOUNTER
Last visit:6/24/20  Next visit:12/15/20  Previous refill 8/28/19(90+3R)    Requested Prescriptions     Pending Prescriptions Disp Refills    citalopram (CELEXA) 20 mg tablet 90 Tab 3     Sig: TAKE ONE TABLET BY MOUTH ONE TIME DAILY

## 2020-11-17 ENCOUNTER — DOCUMENTATION ONLY (OUTPATIENT)
Dept: FAMILY MEDICINE CLINIC | Age: 70
End: 2020-11-17

## 2020-12-15 ENCOUNTER — OFFICE VISIT (OUTPATIENT)
Dept: FAMILY MEDICINE CLINIC | Age: 70
End: 2020-12-15
Payer: MEDICARE

## 2020-12-15 ENCOUNTER — HOSPITAL ENCOUNTER (OUTPATIENT)
Dept: LAB | Age: 70
Discharge: HOME | End: 2020-12-15
Payer: MEDICARE

## 2020-12-15 VITALS
HEART RATE: 69 BPM | DIASTOLIC BLOOD PRESSURE: 56 MMHG | HEIGHT: 62 IN | WEIGHT: 139.8 LBS | OXYGEN SATURATION: 98 % | RESPIRATION RATE: 16 BRPM | TEMPERATURE: 96.9 F | SYSTOLIC BLOOD PRESSURE: 113 MMHG | BODY MASS INDEX: 25.73 KG/M2

## 2020-12-15 DIAGNOSIS — I10 ESSENTIAL HYPERTENSION: ICD-10-CM

## 2020-12-15 DIAGNOSIS — R42 DIZZINESS: ICD-10-CM

## 2020-12-15 DIAGNOSIS — E78.00 HYPERCHOLESTEROLEMIA: ICD-10-CM

## 2020-12-15 DIAGNOSIS — R25.1 TREMOR: Primary | ICD-10-CM

## 2020-12-15 DIAGNOSIS — E11.9 CONTROLLED TYPE 2 DIABETES MELLITUS WITHOUT COMPLICATION, WITHOUT LONG-TERM CURRENT USE OF INSULIN (HCC): ICD-10-CM

## 2020-12-15 PROBLEM — E11.40 TYPE 2 DIABETES MELLITUS WITH DIABETIC NEUROPATHY (HCC): Status: ACTIVE | Noted: 2020-12-15

## 2020-12-15 PROCEDURE — G9717 DOC PT DX DEP/BP F/U NT REQ: HCPCS | Performed by: FAMILY MEDICINE

## 2020-12-15 PROCEDURE — 99214 OFFICE O/P EST MOD 30 MIN: CPT | Performed by: FAMILY MEDICINE

## 2020-12-15 PROCEDURE — 80053 COMPREHEN METABOLIC PANEL: CPT

## 2020-12-15 PROCEDURE — 1101F PT FALLS ASSESS-DOCD LE1/YR: CPT | Performed by: FAMILY MEDICINE

## 2020-12-15 PROCEDURE — 3017F COLORECTAL CA SCREEN DOC REV: CPT | Performed by: FAMILY MEDICINE

## 2020-12-15 PROCEDURE — 1090F PRES/ABSN URINE INCON ASSESS: CPT | Performed by: FAMILY MEDICINE

## 2020-12-15 PROCEDURE — 85025 COMPLETE CBC W/AUTO DIFF WBC: CPT

## 2020-12-15 PROCEDURE — G0463 HOSPITAL OUTPT CLINIC VISIT: HCPCS | Performed by: FAMILY MEDICINE

## 2020-12-15 PROCEDURE — 2022F DILAT RTA XM EVC RTNOPTHY: CPT | Performed by: FAMILY MEDICINE

## 2020-12-15 PROCEDURE — 93010 ELECTROCARDIOGRAM REPORT: CPT | Performed by: FAMILY MEDICINE

## 2020-12-15 PROCEDURE — 93005 ELECTROCARDIOGRAM TRACING: CPT | Performed by: FAMILY MEDICINE

## 2020-12-15 PROCEDURE — G8399 PT W/DXA RESULTS DOCUMENT: HCPCS | Performed by: FAMILY MEDICINE

## 2020-12-15 PROCEDURE — G8536 NO DOC ELDER MAL SCRN: HCPCS | Performed by: FAMILY MEDICINE

## 2020-12-15 PROCEDURE — 3044F HG A1C LEVEL LT 7.0%: CPT | Performed by: FAMILY MEDICINE

## 2020-12-15 PROCEDURE — G8419 CALC BMI OUT NRM PARAM NOF/U: HCPCS | Performed by: FAMILY MEDICINE

## 2020-12-15 PROCEDURE — 36415 COLL VENOUS BLD VENIPUNCTURE: CPT

## 2020-12-15 PROCEDURE — 84439 ASSAY OF FREE THYROXINE: CPT

## 2020-12-15 PROCEDURE — G8754 DIAS BP LESS 90: HCPCS | Performed by: FAMILY MEDICINE

## 2020-12-15 PROCEDURE — 84443 ASSAY THYROID STIM HORMONE: CPT

## 2020-12-15 PROCEDURE — G8427 DOCREV CUR MEDS BY ELIG CLIN: HCPCS | Performed by: FAMILY MEDICINE

## 2020-12-15 PROCEDURE — 83036 HEMOGLOBIN GLYCOSYLATED A1C: CPT

## 2020-12-15 PROCEDURE — 80061 LIPID PANEL: CPT

## 2020-12-15 PROCEDURE — G8752 SYS BP LESS 140: HCPCS | Performed by: FAMILY MEDICINE

## 2020-12-15 RX ORDER — GABAPENTIN 300 MG/1
CAPSULE ORAL
COMMUNITY
Start: 2020-10-21 | End: 2021-07-21 | Stop reason: ALTCHOICE

## 2020-12-15 RX ORDER — DOXYLAMINE SUCCINATE 25 MG
TABLET ORAL 2 TIMES DAILY
Qty: 15 G | Refills: 1 | Status: SHIPPED | OUTPATIENT
Start: 2020-12-15 | End: 2021-06-15

## 2020-12-15 RX ORDER — MUPIROCIN 20 MG/G
OINTMENT TOPICAL DAILY
Qty: 22 G | Refills: 1 | Status: SHIPPED | OUTPATIENT
Start: 2020-12-15 | End: 2021-06-15

## 2020-12-15 NOTE — PROGRESS NOTES
Chief Complaint   Patient presents with    Diabetes    Hypertension     1. Have you been to the ER, urgent care clinic since your last visit? Hospitalized since your last visit? No    2. Have you seen or consulted any other health care providers outside of the [address] [place] [address] since your last visit? Include any pap smears or colon screening.  No

## 2020-12-15 NOTE — PROGRESS NOTES
HISTORY OF PRESENT ILLNESS  Florentina Claude is a 78 y.o. female. HPI in for diabetes and blood pressure check. Has also had a dry mouth and cracking on the corners of mouth for the last month. Has been using some vaseline. hasnt helped that much. Has also had a tremor in both hands, worse on the right. No family hx of similar problem. Spills food at times. 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 No assc dyspnea or chest tightness. Had a normal cardiac cath in 7-2018. continues to teach adult education, on [employer]. ROS    Physical Exam  Vitals signs and nursing note reviewed. Constitutional:       Appearance: She is well-developed. HENT:      Right Ear: External ear normal.      Left Ear: External ear normal.   Neck:      Thyroid: No thyromegaly. Cardiovascular:      Rate and Rhythm: Normal rate and regular rhythm. Heart sounds: Normal heart sounds. Pulmonary:      Breath sounds: Normal breath sounds. No wheezing. Abdominal:      General: Bowel sounds are normal. There is no distension. Palpations: Abdomen is soft. There is no mass. Tenderness: There is no abdominal tenderness. Lymphadenopathy:      Cervical: No cervical adenopathy. Neurological:      Comments: Mild resting tremor. No real cogwheel rigidity          ASSESSMENT and PLAN  Orders Placed This Encounter    TSH 3RD GENERATION    T4,FREE(DIRECT)    CBC WITH AUTOMATED DIFF    METABOLIC PANEL, COMPREHENSIVE    LIPID PANEL    HEMOGLOBIN A1C WITH EAG    [facility] [id number]    AMB POC EKG ROUTINE W/ 12 LEADS, INTER & REP    miconazole (MICOTIN) 2 % topical cream    mupirocin (BACTROBAN) 2 % ointment     Diagnoses and all orders for this visit:    1. Tremor  -     REFERRAL TO NEUROLOGY  -     TSH 3RD GENERATION;  Future  - T4,FREE(DIRECT); Future    2. Dizziness  -     AMB POC EKG ROUTINE W/ 12 LEADS, INTER & REP    3. Hypercholesterolemia  -     LIPID PANEL; Future    4. Essential hypertension  -     CBC WITH AUTOMATED DIFF; Future  -     METABOLIC PANEL, COMPREHENSIVE; Future    5. Controlled type 2 diabetes mellitus without complication, without long-term current use of insulin (HCC)  -     HEMOGLOBIN A1C WITH EAG; Future    Other orders  -     miconazole (MICOTIN) 2 % topical cream; Apply  to affected area two (2) times a day. -     mupirocin (BACTROBAN) 2 % ointment; Apply  to affected area daily. Follow-up and Dispositions    · Return in about 6 months (around 6/15/2021). bp somewhat low.  Will dc hctz

## 2020-12-16 LAB
ALBUMIN SERPL-MCNC: 4.1 G/DL (ref 3.8–4.8)
ALBUMIN/GLOB SERPL: 1.6 {RATIO} (ref 1.2–2.2)
ALP SERPL-CCNC: 76 IU/L (ref 39–117)
ALT SERPL-CCNC: 13 IU/L (ref 0–32)
AST SERPL-CCNC: 20 IU/L (ref 0–40)
BASOPHILS # BLD AUTO: 0 X10E3/UL (ref 0–0.2)
BASOPHILS NFR BLD AUTO: 1 %
BILIRUB SERPL-MCNC: 0.4 MG/DL (ref 0–1.2)
BUN SERPL-MCNC: 11 MG/DL (ref 8–27)
BUN/CREAT SERPL: 10 (ref 12–28)
CALCIUM SERPL-MCNC: 9.2 MG/DL (ref 8.7–10.3)
CHLORIDE SERPL-SCNC: 100 MMOL/L (ref 96–106)
CHOLEST SERPL-MCNC: 109 MG/DL (ref 100–199)
CO2 SERPL-SCNC: 25 MMOL/L (ref 20–29)
CREAT SERPL-MCNC: 1.13 MG/DL (ref 0.57–1)
EOSINOPHIL # BLD AUTO: 0.1 X10E3/UL (ref 0–0.4)
EOSINOPHIL NFR BLD AUTO: 2 %
ERYTHROCYTE [DISTWIDTH] IN BLOOD BY AUTOMATED COUNT: 13.3 % (ref 11.7–15.4)
EST. AVERAGE GLUCOSE BLD GHB EST-MCNC: 134 MG/DL
GLOBULIN SER CALC-MCNC: 2.6 G/DL (ref 1.5–4.5)
GLUCOSE SERPL-MCNC: 141 MG/DL (ref 65–99)
HBA1C MFR BLD: 6.3 % (ref 4.8–5.6)
HCT VFR BLD AUTO: 38.1 % (ref 34–46.6)
HDLC SERPL-MCNC: 63 MG/DL
HGB BLD-MCNC: 12.1 G/DL (ref 11.1–15.9)
IMM GRANULOCYTES # BLD AUTO: 0 X10E3/UL (ref 0–0.1)
IMM GRANULOCYTES NFR BLD AUTO: 0 %
INTERPRETATION, 910389: NORMAL
INTERPRETATION: NORMAL
LDLC SERPL CALC-MCNC: 31 MG/DL (ref 0–99)
LYMPHOCYTES # BLD AUTO: 1.4 X10E3/UL (ref 0.7–3.1)
LYMPHOCYTES NFR BLD AUTO: 37 %
Lab: NORMAL
MCH RBC QN AUTO: 28.9 PG (ref 26.6–33)
MCHC RBC AUTO-ENTMCNC: 31.8 G/DL (ref 31.5–35.7)
MCV RBC AUTO: 91 FL (ref 79–97)
MONOCYTES # BLD AUTO: 0.3 X10E3/UL (ref 0.1–0.9)
MONOCYTES NFR BLD AUTO: 8 %
NEUTROPHILS # BLD AUTO: 2 X10E3/UL (ref 1.4–7)
NEUTROPHILS NFR BLD AUTO: 52 %
PDF IMAGE, 910387: NORMAL
PLATELET # BLD AUTO: 169 X10E3/UL (ref 150–450)
POTASSIUM SERPL-SCNC: 4.5 MMOL/L (ref 3.5–5.2)
PROT SERPL-MCNC: 6.7 G/DL (ref 6–8.5)
RBC # BLD AUTO: 4.18 X10E6/UL (ref 3.77–5.28)
SODIUM SERPL-SCNC: 141 MMOL/L (ref 134–144)
T4 FREE SERPL-MCNC: 1.11 NG/DL (ref 0.82–1.77)
TRIGL SERPL-MCNC: 72 MG/DL (ref 0–149)
TSH SERPL DL<=0.005 MIU/L-ACNC: 0.72 UIU/ML (ref 0.45–4.5)
VLDLC SERPL CALC-MCNC: 15 MG/DL (ref 5–40)
WBC # BLD AUTO: 3.8 X10E3/UL (ref 3.4–10.8)

## 2021-01-20 RX ORDER — ATORVASTATIN CALCIUM 20 MG/1
TABLET, FILM COATED ORAL
Qty: 90 TAB | Refills: 3 | Status: SHIPPED | OUTPATIENT
Start: 2021-01-20 | End: 2021-08-11

## 2021-02-15 ENCOUNTER — APPOINTMENT (OUTPATIENT)
Dept: CT IMAGING | Age: 71
End: 2021-02-15
Attending: EMERGENCY MEDICINE
Payer: MEDICARE

## 2021-02-15 ENCOUNTER — HOSPITAL ENCOUNTER (EMERGENCY)
Age: 71
Discharge: HOME | End: 2021-02-15
Attending: EMERGENCY MEDICINE
Payer: MEDICARE

## 2021-02-15 VITALS
DIASTOLIC BLOOD PRESSURE: 76 MMHG | BODY MASS INDEX: 26.57 KG/M2 | WEIGHT: 144.4 LBS | OXYGEN SATURATION: 98 % | RESPIRATION RATE: 16 BRPM | TEMPERATURE: 98.1 F | SYSTOLIC BLOOD PRESSURE: 148 MMHG | HEIGHT: 62 IN | HEART RATE: 72 BPM

## 2021-02-15 DIAGNOSIS — R51.9 NONINTRACTABLE HEADACHE, UNSPECIFIED CHRONICITY PATTERN, UNSPECIFIED HEADACHE TYPE: Primary | ICD-10-CM

## 2021-02-15 LAB — CREAT BLD-MCNC: 1.1 MG/DL (ref 0.6–1.3)

## 2021-02-15 PROCEDURE — 96374 THER/PROPH/DIAG INJ IV PUSH: CPT

## 2021-02-15 PROCEDURE — 70450 CT HEAD/BRAIN W/O DYE: CPT

## 2021-02-15 PROCEDURE — 74011000636 HC RX REV CODE- 636: Performed by: EMERGENCY MEDICINE

## 2021-02-15 PROCEDURE — 96375 TX/PRO/DX INJ NEW DRUG ADDON: CPT

## 2021-02-15 PROCEDURE — 70496 CT ANGIOGRAPHY HEAD: CPT

## 2021-02-15 PROCEDURE — 74011250636 HC RX REV CODE- 250/636: Performed by: EMERGENCY MEDICINE

## 2021-02-15 PROCEDURE — 99283 EMERGENCY DEPT VISIT LOW MDM: CPT

## 2021-02-15 PROCEDURE — 82565 ASSAY OF CREATININE: CPT

## 2021-02-15 PROCEDURE — 74011000250 HC RX REV CODE- 250: Performed by: EMERGENCY MEDICINE

## 2021-02-15 RX ORDER — NAPROXEN 500 MG/1
500 TABLET ORAL
Qty: 10 TAB | Refills: 0 | Status: SHIPPED | OUTPATIENT
Start: 2021-02-15 | End: 2021-03-18 | Stop reason: SDUPTHER

## 2021-02-15 RX ORDER — ONDANSETRON 4 MG/1
4 TABLET, ORALLY DISINTEGRATING ORAL
Qty: 20 TAB | Refills: 0 | Status: SHIPPED | OUTPATIENT
Start: 2021-02-15 | End: 2021-07-21 | Stop reason: SDUPTHER

## 2021-02-15 RX ORDER — KETOROLAC TROMETHAMINE 30 MG/ML
30 INJECTION, SOLUTION INTRAMUSCULAR; INTRAVENOUS
Status: COMPLETED | OUTPATIENT
Start: 2021-02-15 | End: 2021-02-15

## 2021-02-15 RX ORDER — DIPHENHYDRAMINE HYDROCHLORIDE 50 MG/ML
25 INJECTION, SOLUTION INTRAMUSCULAR; INTRAVENOUS
Status: COMPLETED | OUTPATIENT
Start: 2021-02-15 | End: 2021-02-15

## 2021-02-15 RX ADMIN — DIPHENHYDRAMINE HYDROCHLORIDE 25 MG: 50 INJECTION, SOLUTION INTRAMUSCULAR; INTRAVENOUS at 11:32

## 2021-02-15 RX ADMIN — IOPAMIDOL 100 ML: 755 INJECTION, SOLUTION INTRAVENOUS at 12:26

## 2021-02-15 RX ADMIN — PROCHLORPERAZINE EDISYLATE 10 MG: 5 INJECTION INTRAMUSCULAR; INTRAVENOUS at 11:32

## 2021-02-15 RX ADMIN — KETOROLAC TROMETHAMINE 30 MG: 30 INJECTION, SOLUTION INTRAMUSCULAR at 11:32

## 2021-02-15 NOTE — ED NOTES
Patient discharged by Dr. Jannie Haro. Patient provided with discharge instructions Rx and instructions on follow up care. Patient out of ED ambulatory accompanied by family.

## 2021-02-15 NOTE — ED NOTES
Pt. Presents to ED today for complaints of a headache that has been present since Saturday. Pt. Reports a history of migraines years ago. Pt. Alert and oriented x4. Pt. Placed in position of comfort with call bell in reach.

## 2021-02-15 NOTE — ED PROVIDER NOTES
EMERGENCY DEPARTMENT HISTORY AND PHYSICAL EXAM      Date: 2/15/2021  Patient Name: Sigifredo Carl    History of Presenting Illness     Chief Complaint   Patient presents with    Headache     rt anterior headache x 3 days with nausea but no vomiting or photo/phonophobia. pt has remote hx of migraines. History Provided By: Patient    HPI: Sigifredo Carl, 78 y.o. female presents to the ED with cc of headache. Patient is a 53-year-old female with a history of diabetes, allergic rhinitis and migraines who presents emergency department with headache. Patient reports headache began Saturday. Patient reports headache began in the morning upon awakening. Patient reports episodic headache that lasts 1 to 2 hours and then resolves. Pain is sharp, located over her right frontal scalp, radiates through and is associated with tearing of her right eye. Denies visual changes. Does report some nausea, no vomiting. Reports no phono photophobia. Patient denies any chest pain or shortness of breath. Denies abdominal pain, nausea, vomiting or diarrhea. Does have history of migraines as a teenager, but states it has been \"years\" symptoms have been an issue. No fevers or chills. Not on blood thinners. There are no other complaints, changes, or physical findings at this time. PCP: Devonte Frye MD    No current facility-administered medications on file prior to encounter. Current Outpatient Medications on File Prior to Encounter   Medication Sig Dispense Refill    atorvastatin (LIPITOR) 20 mg tablet TAKE 1 TABLET BY MOUTH EVERY DAY FOR CHOLESTEROL 90 Tab 3    gabapentin (NEURONTIN) 300 mg capsule TAKE 1 CAPSULE BY MOUTH THREE TIMES A DAY      miconazole (MICOTIN) 2 % topical cream Apply  to affected area two (2) times a day. 15 g 1    mupirocin (BACTROBAN) 2 % ointment Apply  to affected area daily.  22 g 1    citalopram (CELEXA) 20 mg tablet TAKE ONE TABLET BY MOUTH ONE TIME DAILY 90 Tab 3  naproxen (NAPROSYN) 500 mg tablet Take 1 Tab by mouth every twelve (12) hours as needed for Pain. 20 Tab 0    gabapentin (NEURONTIN) 100 mg capsule Take 300 mg by mouth three (3) times daily.  metFORMIN (GLUCOPHAGE) 1,000 mg tablet Take 1 tablet in AM and 1 tablet in PM. For Diabetes 180 Tab 3    omeprazole (PRILOSEC) 40 mg capsule Take 40 mg by mouth every morning.  glucose blood VI test strips (ACCU-CHEK SOFIA PLUS TEST STRP) strip TEST BLOOD SUGAR 1 TIMES DAILY. DX CODE E11.9 100 Strip 4    Blood-Glucose Meter (ACCU-CHEK SOFIA PLUS METER) misc Use as directed. E11.9 1 Each 0    alendronate (FOSAMAX) 35 mg tablet PLEASE SEE ATTACHED FOR DETAILED DIRECTIONS (Patient taking differently: Take 35 mg by mouth every seven (7) days.) 12 Tab 3    lancets (ACCU-CHEK SOFTCLIX LANCETS) misc TEST 2 TIMES DAILY AS DIRECTED (E11.9) 200 Each 12    famotidine (PEPCID) 40 mg tablet Take 40 mg by mouth nightly.  polyethylene glycol (MIRALAX) 17 gram packet Take 17 g by mouth daily as needed ('Constipation').  multivit,calc,mins/iron/folic (ONE-A-DAY WOMENS FORMULA PO) Take 1 Tab by mouth daily.          Past History     Past Medical History:  Past Medical History:   Diagnosis Date    Allergic rhinitis due to allergen 10/22/2014    Arthritis     spine    Asthma     Chronic pain     back pain    Depression     Diabetes ([address]     Type II    GERD (gastroesophageal reflux disease)     Hypercholesterolemia     Hypertension     Kidney stones 1990's    Other ill-defined conditions(799.89)     GERD    Positive PPD 2009    treated with INH       Past Surgical History:  Past Surgical History:   Procedure Laterality Date    COLONOSCOPY  4-11    manetas- n ormal    EGD  4-11    manetas- esophageal ring    ENDOSCOPY, COLON, DIAGNOSTIC  12/2007    2 polyps    GERD TST W/ NASAL IMPEDENCE ELECTROD  1/27/2020    HX BREAST BIOPSY Bilateral 1980s    all benign cysts    HX CHOLECYSTECTOMY      HX GYN 1978    tubal laparoscopy    HX HEART CATHETERIZATION      negatve    HX HEENT Bilateral 11/2018    cataract extraction    HX ORTHOPAEDIC  2016    had an injection in her back to help with leg pain    HX ORTHOPAEDIC Left 12/12/2017    foot surg. bunion removal  \"Put a plate in\"    HX OTHER SURGICAL  1997    bunionectomy    HX TONSIL AND ADENOIDECTOMY      approx 11years old   [address]  1990's    abcess bilateral    [place] ESOPHAGEAL MOTILITY STUDY W/INTERP&RPT  1/27/2020       Family History:  Family History   Problem Relation Age of Onset    Diabetes Mother     Hypertension Mother     Cancer Father         lung    Cancer Paternal Aunt         colon       Social History:  Social History     Tobacco Use    Smoking status: Former Smoker     Packs/day: 1.00     Years: 25.00     Pack years: 25.00     Quit date: 8/23/2010     Years since quitting: 10.4    Smokeless tobacco: Never Used   Substance Use Topics    Alcohol use: No    Drug use: No       Allergies: Allergies   Allergen Reactions    Latex Itching    Lisinopril Swelling    Sulfa (Sulfonamide Antibiotics) Itching         Review of Systems   Review of Systems   Constitutional: Negative for activity change, chills and fever. HENT: Negative for facial swelling and voice change. Eyes: Negative for redness. Respiratory: Negative for cough, shortness of breath and wheezing. Cardiovascular: Negative for chest pain and leg swelling. Gastrointestinal: Positive for nausea. Negative for abdominal pain, diarrhea and vomiting. Genitourinary: Negative for decreased urine volume. Musculoskeletal: Negative for gait problem. Skin: Negative for pallor and rash. Neurological: Positive for headaches. Negative for dizziness, tremors and facial asymmetry. Psychiatric/Behavioral: Negative for agitation. All other systems reviewed and are negative.       Physical Exam   Physical Exam  Vitals signs and nursing note reviewed. Constitutional:       Comments: 59-year-old female, resting bed, no distress   HENT:      Head: Normocephalic and atraumatic. Neck:      Musculoskeletal: Normal range of motion. Cardiovascular:      Rate and Rhythm: Normal rate and regular rhythm. Heart sounds: No murmur. No friction rub. No gallop. Pulmonary:      Effort: Pulmonary effort is normal.      Breath sounds: Normal breath sounds. Abdominal:      Palpations: Abdomen is soft. Tenderness: There is no abdominal tenderness. Musculoskeletal: Normal range of motion. General: No swelling. Skin:     General: Skin is warm. Capillary Refill: Capillary refill takes less than 2 seconds. Neurological:      General: No focal deficit present. Mental Status: She is alert. Mental status is at baseline. Cranial Nerves: No cranial nerve deficit. Sensory: No sensory deficit. Motor: No weakness. Psychiatric:         Mood and Affect: Mood normal.         Diagnostic Study Results     Labs -     Recent Results (from the past 12 hour(s))   CREATININE, POC    Collection Time: 02/15/21 11:49 AM   Result Value Ref Range    Creatinine (POC) 1.1 0.6 - 1.3 mg/dL    GFRAA, POC 60 (L) >60 ml/min/1.73m2    GFRNA, POC 49 (L) >60 ml/min/1.73m2       Radiologic Studies -   CT HEAD WO CONT   Final Result   Negative and no change. CTA HEAD NECK W CONT   Final Result   Negative and no change. CT Results  (Last 48 hours)               02/15/21 1227  CT HEAD WO CONT Final result    Impression:  Negative and no change. Narrative:  EXAM: CT HEAD WO CONT       INDICATION: headache       COMPARISON: November 2015       CONTRAST: None. TECHNIQUE: Unenhanced CT of the head was performed using 5 mm images. Brain and   bone windows were generated. Coronal and sagittal reformats.  CT dose reduction   was achieved through use of a standardized protocol tailored for this   examination and automatic exposure control for dose modulation. FINDINGS:   Incidental polyp or cyst inferior right maxillary sinus. There is no extra-axial fluid collection, hemorrhage shift or masses. 02/15/21 1227  CTA HEAD NECK W CONT Final result    Impression:  Negative and no change. Narrative:  EXAM: CTA HEAD NECK W CONT       INDICATION: headache       COMPARISON: None. CONTRAST: 100 mL of Isovue-370. TECHNIQUE:  Unenhanced  images were obtained to localize the volume for   acquisition. Multislice helical axial CT angiography was performed from the   aortic arch to the top of the head during uneventful rapid bolus intravenous   contrast administration. Coronal and sagittal reformations and 3D post   processing was performed. CT dose reduction was achieved through use of a   standardized protocol tailored for this examination and automatic exposure   control for dose modulation. This study was analyzed by the [address] ai algorithm. FINDINGS:   Head CT post intravenous contrast show no enhancing lesion or masses . NASCET method was utilized for calculating stenosis. Origins of the great vessels from the aortic arch show no stenosis. The vertebral arteries in the neck of equal size and patent. The carotid bifurcations show no significant abnormalities. Intracranially there is no evidence for filling defect, significant stenosis   vascular malformation or aneurysm. CXR Results  (Last 48 hours)    None          Medical Decision Making   I am the first provider for this patient. I reviewed the vital signs, available nursing notes, past medical history, past surgical history, family history and social history. Vital Signs-Reviewed the patient's vital signs.   Patient Vitals for the past 12 hrs:   Temp Pulse Resp BP SpO2   02/15/21 1003 98.1 °F (36.7 °C) 72 16 (!) 148/76 98 %     Records Reviewed: Nursing Notes    Provider Notes (Medical Decision Making):     59-year-old female presents with headache x3 days. Vital signs stable. Neuro exam intact. Afebrile, doubt meningitis/encephalitis. Given headache in the morning/age, we will CT head to rule out mass lesion. Doubt subarachnoid hemorrhage given history and physical, complete imaging with CTA. Doubt temporal arteritis. No pupillary abnormalities, R eye exam WNL, doubt acute angle-closure glaucoma. ED Course:   Initial assessment performed. The patients presenting problems have been discussed, and they are in agreement with the care plan formulated and outlined with them. I have encouraged them to ask questions as they arise throughout their visit. ED Course as of Feb 15 1810   Mon Feb 15, 2021   1152 Patient reports improvement after Toradol migraine cocktail. Patient's right pupil was 3 mm and reactive,    [MB]   1352 CTA is negative for aneurysm, CT head is negative. [MB]   1357 Patient with significant improvement in headache. Will discharge with naproxen as needed and neurology follow-up. [MB]      ED Course User Index  [MB] MD Jose Juan Martin MD      Disposition:    Discharged    DISCHARGE PLAN:  1. Discharge Medication List as of 2/15/2021  2:14 PM      START taking these medications    Details   !! naproxen (Naprosyn) 500 mg tablet Take 1 Tab by mouth two (2) times daily as needed for Pain for up to 10 doses. , Normal, Disp-10 Tab, R-0      ondansetron (Zofran ODT) 4 mg disintegrating tablet 1 Tab by SubLINGual route every eight (8) hours as needed for Nausea or Vomiting., Normal, Disp-20 Tab, R-0       !! - Potential duplicate medications found. Please discuss with provider.       CONTINUE these medications which have NOT CHANGED    Details   atorvastatin (LIPITOR) 20 mg tablet TAKE 1 TABLET BY MOUTH EVERY DAY FOR CHOLESTEROL, Normal, Disp-90 Tab, R-3      !! gabapentin (NEURONTIN) 300 mg capsule TAKE 1 CAPSULE BY MOUTH THREE TIMES A DAY, Historical Med      miconazole (MICOTIN) 2 % topical cream Apply  to affected area two (2) times a day., Normal, Disp-15 g,R-1      mupirocin (BACTROBAN) 2 % ointment Apply  to affected area daily. , Normal, Disp-22 g,R-1      citalopram (CELEXA) 20 mg tablet TAKE ONE TABLET BY MOUTH ONE TIME DAILY, Normal, Disp-90 Tab,R-3      !! naproxen (NAPROSYN) 500 mg tablet Take 1 Tab by mouth every twelve (12) hours as needed for Pain., Normal, Disp-20 Tab,R-0      !! gabapentin (NEURONTIN) 100 mg capsule Take 300 mg by mouth three (3) times daily. , Historical Med      metFORMIN (GLUCOPHAGE) 1,000 mg tablet Take 1 tablet in AM and 1 tablet in PM. For Diabetes, Normal, Disp-180 Tab, R-3      omeprazole (PRILOSEC) 40 mg capsule Take 40 mg by mouth every morning., Historical Med      glucose blood VI test strips (ACCU-CHEK SOFIA PLUS TEST STRP) strip TEST BLOOD SUGAR 1 TIMES DAILY. DX CODE E11.9, Normal, Disp-100 Strip, R-4      Blood-Glucose Meter (ACCU-CHEK SOFIA PLUS METER) misc Use as directed. E11.9, Normal, Disp-1 Each, R-0      alendronate (FOSAMAX) 35 mg tablet PLEASE SEE ATTACHED FOR DETAILED DIRECTIONS, Normal, Disp-12 Tab, R-3      lancets (ACCU-CHEK SOFTCLIX LANCETS) misc TEST 2 TIMES DAILY AS DIRECTED (E11.9), Normal, Disp-200 Each, R-12      famotidine (PEPCID) 40 mg tablet Take 40 mg by mouth nightly., Historical Med      polyethylene glycol (MIRALAX) 17 gram packet Take 17 g by mouth daily as needed ('Constipation'). , Historical Med      multivit,calc,mins/iron/folic (ONE-A-DAY WOMENS FORMULA PO) Take 1 Tab by mouth daily. , Historical Med       !! - Potential duplicate medications found. Please discuss with provider.         2.   Follow-up Information     Follow up With Specialties Details Why Contact Info    Devonte Frye MD Family Medicine In 3 days  [address]  [phone]      Savannah Colindres, DO Neurology In 1 week As needed [address] [facility] [address]  [phone]          3. Return to ED if worse     Diagnosis     Clinical Impression:   1. Nonintractable headache, unspecified chronicity pattern, unspecified headache type        Attestations:    Radha Olson MD    Please note that this dictation was completed with [employer], the computer voice recognition software. Quite often unanticipated grammatical, syntax, homophones, and other interpretive errors are inadvertently transcribed by the computer software. Please disregard these errors. Please excuse any errors that have escaped final proofreading. Thank you.

## 2021-02-16 ENCOUNTER — PATIENT OUTREACH (OUTPATIENT)
Dept: CASE MANAGEMENT | Age: 71
End: 2021-02-16

## 2021-02-16 NOTE — ACP (ADVANCE CARE PLANNING)
ACP document on file in medical record is not valid- no witness signatures.  Entered request for correction

## 2021-02-17 ENCOUNTER — PATIENT OUTREACH (OUTPATIENT)
Dept: CASE MANAGEMENT | Age: 71
End: 2021-02-17

## 2021-03-02 ENCOUNTER — TELEPHONE (OUTPATIENT)
Dept: FAMILY MEDICINE CLINIC | Age: 71
End: 2021-03-02

## 2021-03-02 NOTE — TELEPHONE ENCOUNTER
Patient requesting appointment for chest pain when exerting her self.  Advised if she has chest pain, dizziness, SOB, patient needs to go to ER, made her the next available appointment 3/18/21

## 2021-03-02 NOTE — TELEPHONE ENCOUNTER
Patient is calling, because she is having chest pain when she exerts herself. Please call @[phone].

## 2021-03-18 ENCOUNTER — OFFICE VISIT (OUTPATIENT)
Dept: FAMILY MEDICINE CLINIC | Age: 71
End: 2021-03-18
Payer: MEDICARE

## 2021-03-18 VITALS
HEART RATE: 75 BPM | DIASTOLIC BLOOD PRESSURE: 71 MMHG | HEIGHT: 62 IN | TEMPERATURE: 97.7 F | OXYGEN SATURATION: 100 % | BODY MASS INDEX: 27.68 KG/M2 | SYSTOLIC BLOOD PRESSURE: 134 MMHG | WEIGHT: 150.4 LBS | RESPIRATION RATE: 16 BRPM

## 2021-03-18 DIAGNOSIS — R06.09 DYSPNEA ON EXERTION: Primary | ICD-10-CM

## 2021-03-18 DIAGNOSIS — R07.89 ATYPICAL CHEST PAIN: ICD-10-CM

## 2021-03-18 PROCEDURE — 3017F COLORECTAL CA SCREEN DOC REV: CPT | Performed by: FAMILY MEDICINE

## 2021-03-18 PROCEDURE — G9717 DOC PT DX DEP/BP F/U NT REQ: HCPCS | Performed by: FAMILY MEDICINE

## 2021-03-18 PROCEDURE — G8427 DOCREV CUR MEDS BY ELIG CLIN: HCPCS | Performed by: FAMILY MEDICINE

## 2021-03-18 PROCEDURE — G8754 DIAS BP LESS 90: HCPCS | Performed by: FAMILY MEDICINE

## 2021-03-18 PROCEDURE — G0463 HOSPITAL OUTPT CLINIC VISIT: HCPCS | Performed by: FAMILY MEDICINE

## 2021-03-18 PROCEDURE — G8536 NO DOC ELDER MAL SCRN: HCPCS | Performed by: FAMILY MEDICINE

## 2021-03-18 PROCEDURE — G8752 SYS BP LESS 140: HCPCS | Performed by: FAMILY MEDICINE

## 2021-03-18 PROCEDURE — 1090F PRES/ABSN URINE INCON ASSESS: CPT | Performed by: FAMILY MEDICINE

## 2021-03-18 PROCEDURE — 1101F PT FALLS ASSESS-DOCD LE1/YR: CPT | Performed by: FAMILY MEDICINE

## 2021-03-18 PROCEDURE — G8419 CALC BMI OUT NRM PARAM NOF/U: HCPCS | Performed by: FAMILY MEDICINE

## 2021-03-18 PROCEDURE — 99213 OFFICE O/P EST LOW 20 MIN: CPT | Performed by: FAMILY MEDICINE

## 2021-03-18 PROCEDURE — G8399 PT W/DXA RESULTS DOCUMENT: HCPCS | Performed by: FAMILY MEDICINE

## 2021-03-18 RX ORDER — NYSTATIN AND TRIAMCINOLONE ACETONIDE 100000; 1 [USP'U]/G; MG/G
CREAM TOPICAL
COMMUNITY
End: 2021-07-13 | Stop reason: ALTCHOICE

## 2021-03-18 RX ORDER — HYDROCHLOROTHIAZIDE 12.5 MG/1
TABLET ORAL
COMMUNITY
Start: 2021-01-29 | End: 2021-05-05 | Stop reason: ALTCHOICE

## 2021-03-18 NOTE — PROGRESS NOTES
HISTORY OF PRESENT ILLNESS  Camelia Camacho is a 78 y.o. female. HPI still getting an annoying L sided chest pain when is carrying packages, walking quickly, or going up steps. Pain will last 10-15 minutes. Some assc dyspnea, will sit down to catch her breath. Otherwise doing ok. Needs cholesterol and blood pressure and diabetes checked. Quit smoking in 2010, used to smoke 1/2 ppd prior to this. ROS    Physical Exam  Vitals signs and nursing note reviewed. Constitutional:       Appearance: She is well-developed. HENT:      Right Ear: External ear normal.      Left Ear: External ear normal.   Neck:      Thyroid: No thyromegaly. Cardiovascular:      Rate and Rhythm: Normal rate and regular rhythm. Heart sounds: Normal heart sounds. Pulmonary:      Breath sounds: Normal breath sounds. No wheezing. Abdominal:      General: Bowel sounds are normal. There is no distension. Palpations: Abdomen is soft. There is no mass. Tenderness: There is no abdominal tenderness. Lymphadenopathy:      Cervical: No cervical adenopathy. ASSESSMENT and PLAN  Orders Placed This Encounter    XR CHEST PA LAT     Diagnoses and all orders for this visit:    1. Dyspnea on exertion  -     XR CHEST PA LAT; Future    2. Atypical chest pain      Follow-up and Dispositions    · Return in about 4 months (around 7/18/2021).        Reassured that Ididnt think pt had coronary artery disease  Based on normal  Cath and stress test.

## 2021-03-18 NOTE — PROGRESS NOTES
Chief Complaint   Patient presents with    Follow Up Chronic Condition     re occurent chest discomfort with exertion only     1. Have you been to the ER, urgent care clinic since your last visit? Hospitalized since your last visit? Yes 2/15/21    2. Have you seen or consulted any other health care providers outside of the [address] [place] [address] since your last visit? Include any pap smears or colon screening.  No

## 2021-05-05 ENCOUNTER — OFFICE VISIT (OUTPATIENT)
Dept: NEUROLOGY | Age: 71
End: 2021-05-05
Payer: MEDICARE

## 2021-05-05 VITALS
DIASTOLIC BLOOD PRESSURE: 66 MMHG | RESPIRATION RATE: 16 BRPM | OXYGEN SATURATION: 97 % | HEART RATE: 78 BPM | SYSTOLIC BLOOD PRESSURE: 120 MMHG | HEIGHT: 62 IN | BODY MASS INDEX: 28.16 KG/M2 | WEIGHT: 153 LBS

## 2021-05-05 DIAGNOSIS — G25.0 ESSENTIAL TREMOR: Primary | ICD-10-CM

## 2021-05-05 PROCEDURE — G8752 SYS BP LESS 140: HCPCS | Performed by: PSYCHIATRY & NEUROLOGY

## 2021-05-05 PROCEDURE — G9717 DOC PT DX DEP/BP F/U NT REQ: HCPCS | Performed by: PSYCHIATRY & NEUROLOGY

## 2021-05-05 PROCEDURE — 99204 OFFICE O/P NEW MOD 45 MIN: CPT | Performed by: PSYCHIATRY & NEUROLOGY

## 2021-05-05 PROCEDURE — 3017F COLORECTAL CA SCREEN DOC REV: CPT | Performed by: PSYCHIATRY & NEUROLOGY

## 2021-05-05 PROCEDURE — 1101F PT FALLS ASSESS-DOCD LE1/YR: CPT | Performed by: PSYCHIATRY & NEUROLOGY

## 2021-05-05 PROCEDURE — G8419 CALC BMI OUT NRM PARAM NOF/U: HCPCS | Performed by: PSYCHIATRY & NEUROLOGY

## 2021-05-05 PROCEDURE — G8754 DIAS BP LESS 90: HCPCS | Performed by: PSYCHIATRY & NEUROLOGY

## 2021-05-05 PROCEDURE — G8427 DOCREV CUR MEDS BY ELIG CLIN: HCPCS | Performed by: PSYCHIATRY & NEUROLOGY

## 2021-05-05 PROCEDURE — G8536 NO DOC ELDER MAL SCRN: HCPCS | Performed by: PSYCHIATRY & NEUROLOGY

## 2021-05-05 PROCEDURE — 1090F PRES/ABSN URINE INCON ASSESS: CPT | Performed by: PSYCHIATRY & NEUROLOGY

## 2021-05-05 PROCEDURE — G8399 PT W/DXA RESULTS DOCUMENT: HCPCS | Performed by: PSYCHIATRY & NEUROLOGY

## 2021-05-05 RX ORDER — MELOXICAM 15 MG/1
TABLET ORAL
COMMUNITY
Start: 2021-05-03 | End: 2021-05-05 | Stop reason: ALTCHOICE

## 2021-05-05 RX ORDER — PRIMIDONE 50 MG/1
50 TABLET ORAL 3 TIMES DAILY
Qty: 90 TAB | Refills: 0 | Status: SHIPPED | OUTPATIENT
Start: 2021-05-05 | End: 2021-06-01

## 2021-05-05 RX ORDER — PRIMIDONE 50 MG/1
50 TABLET ORAL 3 TIMES DAILY
Qty: 90 TAB | Refills: 0 | Status: SHIPPED | OUTPATIENT
Start: 2021-05-05 | End: 2021-05-05

## 2021-05-05 NOTE — PROGRESS NOTES
Neurology Note    Chief Complaint   Patient presents with    New Patient     going on for 1 year     Tremors       HPI/Subjective  Joi Porras is a 78 y.o. female who presented with tremors. Patient states that her tremors started around 1 year ago and it has been gradually progressive. Initially more just at nighttime but it has progressed to the left hand as well. Right hand tremors that more in comparison to the left. These of action tremors and does not have a resting component. She does use a mouse and that interferes with her use. She does also sometimes has to be more careful with eating and holding a cup or glass. There is no family history of tremors. Patient denies any alcohol use. The patient denies any bradykinesia, imbalance or rigidity. The patient has been referred here for further management. Current Outpatient Medications   Medication Sig    primidone (MYSOLINE) 50 mg tablet Take 1 Tab by mouth three (3) times daily. Wk 1: 50 mg qhs Wk 2: 50 mg twice a day Wk 3: 50 mg 3 times a day    nystatin-triamcinolone (MYCOLOG II) topical cream nystatin-triamcinolone 100,000 unit/g-0.1 % topical cream   APPLY TO THE AFFECTED AREA(S) BY TOPICAL ROUTE 2 TIMES PER DAY IN THE MORNING AND EVENING    ondansetron (Zofran ODT) 4 mg disintegrating tablet 1 Tab by SubLINGual route every eight (8) hours as needed for Nausea or Vomiting.  atorvastatin (LIPITOR) 20 mg tablet TAKE 1 TABLET BY MOUTH EVERY DAY FOR CHOLESTEROL    gabapentin (NEURONTIN) 300 mg capsule TAKE 1 CAPSULE BY MOUTH THREE TIMES A DAY    miconazole (MICOTIN) 2 % topical cream Apply  to affected area two (2) times a day.  mupirocin (BACTROBAN) 2 % ointment Apply  to affected area daily.  (Patient taking differently: Apply  to affected area daily as needed.)    citalopram (CELEXA) 20 mg tablet TAKE ONE TABLET BY MOUTH ONE TIME DAILY    metFORMIN (GLUCOPHAGE) 1,000 mg tablet Take 1 tablet in AM and 1 tablet in PM. For Diabetes    omeprazole (PRILOSEC) 40 mg capsule Take 40 mg by mouth every morning.  glucose blood VI test strips (ACCU-CHEK SOFIA PLUS TEST STRP) strip TEST BLOOD SUGAR 1 TIMES DAILY. DX CODE E11.9    Blood-Glucose Meter (ACCU-CHEK SOFIA PLUS METER) [facility] [place]. E11.9    alendronate (FOSAMAX) 35 mg tablet PLEASE SEE ATTACHED FOR DETAILED DIRECTIONS (Patient taking differently: Take 35 mg by mouth every seven (7) days.)    lancets (ACCU-CHEK SOFTCLIX LANCETS) misc TEST 2 TIMES DAILY AS DIRECTED (E11.9)    polyethylene glycol (MIRALAX) 17 gram packet Take 17 g by mouth daily as needed ('Constipation').  multivit,calc,mins/iron/folic (ONE-A-DAY WOMENS FORMULA PO) Take 1 Tab by mouth daily. No current facility-administered medications for this visit. EXAMINATION:   Visit Vitals  /66 (BP 1 Location: Left arm, BP Patient Position: Sitting, BP Cuff Size: Adult)   Pulse 78   Resp 16   Ht 5' 2\" (1.575 m)   Wt 153 lb (69.4 kg)   LMP  (LMP Unknown)   SpO2 97%   BMI 27.98 kg/m²        General:   General appearance: Pt is in no acute distress   Distal pulses are preserved    Neurological Examination:   Mental Status: AAO x3. Speech is fluent. Follows commands, has normal fund of knowledge, attention, short term recall, comprehension and insight. Cranial Nerves: Visual fields are full. PERRL, Extraocular movements are full. Facial sensation intact. Facial movement intact. Hearing intact to conversation. Palate elevates symmetrically. Shoulder shrug symmetric. Tongue midline. Motor: Strength is 5/5 in all 4 ext. No atrophy. Tone: Normal    Sensation: Light touch - Normal    Reflexes: DTRs 2+ throughout. Coordination/Cerebellar: Intact to finger-nose-finger     Gait: Casual gait is normal.     Skin: No significant bruising or lacerations.     Postural tremors present    Laboratory review:   Results for orders placed or performed during the hospital encounter of 02/15/21 CREATININE, POC   Result Value Ref Range    Creatinine (POC) 1.1 0.6 - 1.3 mg/dL    GFRAA, POC 60 (L) >60 ml/min/1.73m2    GFRNA, POC 49 (L) >60 ml/min/1.73m2       Imaging review:  None    Documentation review:  None    Assessment/Plan:   Cristino Tijerina is a 78 y.o. female who presented with tremors. Patient does have postural tremors bilaterally which is slightly worse on the right in comparison to the left. No family history present. There is minimal cogwheel rigidity and bradykinesia on examination. I am not convinced that she has Parkinson's disease at this time. We will continue to monitor. I suspect that she has essential tremors. We will start her on primidone 25 mg p.o. nightly for 1 week and then 50 mg p.o. nightly. Follow-up in 2 to 3 months    I spent 45 minutes today in reviewing her chart, office appointment and with documentation. ICD-10-CM ICD-9-CM    1. Essential tremor  G25.0 333.1 primidone (MYSOLINE) 50 mg tablet      Thank you for allowing me to participate in the care of Ms. Jorge Rosas. Please feel free to contact me if you have any questions. Electronically signed. Melissa Mackay MD  Neurologist    CC: Geovany Menendez MD  Fax: [phone]    This note was created using voice recognition software. Despite editing, there may be syntax errors.

## 2021-05-05 NOTE — PROGRESS NOTES
Chief Complaint   Patient presents with    New Patient     going on for 1 year     Tremors      Affects when she is eating or when she uses a computer mouse.

## 2021-06-15 ENCOUNTER — OFFICE VISIT (OUTPATIENT)
Dept: FAMILY MEDICINE CLINIC | Age: 71
End: 2021-06-15
Payer: MEDICARE

## 2021-06-15 VITALS
OXYGEN SATURATION: 96 % | HEIGHT: 62 IN | HEART RATE: 73 BPM | TEMPERATURE: 97.8 F | BODY MASS INDEX: 28.89 KG/M2 | WEIGHT: 157 LBS | SYSTOLIC BLOOD PRESSURE: 107 MMHG | RESPIRATION RATE: 20 BRPM | DIASTOLIC BLOOD PRESSURE: 60 MMHG

## 2021-06-15 DIAGNOSIS — R73.03 PREDIABETES: ICD-10-CM

## 2021-06-15 DIAGNOSIS — I10 ESSENTIAL HYPERTENSION: ICD-10-CM

## 2021-06-15 DIAGNOSIS — E78.00 HYPERCHOLESTEROLEMIA: Primary | ICD-10-CM

## 2021-06-15 DIAGNOSIS — F19.90 DRUG USE: ICD-10-CM

## 2021-06-15 LAB
ALBUMIN SERPL-MCNC: 3.7 G/DL (ref 3.5–5)
ALBUMIN/GLOB SERPL: 1.1 {RATIO} (ref 1.1–2.2)
ALP SERPL-CCNC: 99 U/L (ref 45–117)
ALT SERPL-CCNC: 31 U/L (ref 12–78)
ANION GAP SERPL CALC-SCNC: 6 MMOL/L (ref 5–15)
AST SERPL-CCNC: 22 U/L (ref 15–37)
BASOPHILS # BLD: 0 K/UL (ref 0–0.1)
BASOPHILS NFR BLD: 1 % (ref 0–1)
BILIRUB SERPL-MCNC: 0.4 MG/DL (ref 0.2–1)
BUN SERPL-MCNC: 16 MG/DL (ref 6–20)
BUN/CREAT SERPL: 14 (ref 12–20)
CALCIUM SERPL-MCNC: 9.8 MG/DL (ref 8.5–10.1)
CHLORIDE SERPL-SCNC: 104 MMOL/L (ref 97–108)
CHOLEST SERPL-MCNC: 142 MG/DL
CO2 SERPL-SCNC: 29 MMOL/L (ref 21–32)
CREAT SERPL-MCNC: 1.11 MG/DL (ref 0.55–1.02)
DIFFERENTIAL METHOD BLD: ABNORMAL
EOSINOPHIL # BLD: 0.1 K/UL (ref 0–0.4)
EOSINOPHIL NFR BLD: 1 % (ref 0–7)
ERYTHROCYTE [DISTWIDTH] IN BLOOD BY AUTOMATED COUNT: 14.6 % (ref 11.5–14.5)
GLOBULIN SER CALC-MCNC: 3.4 G/DL (ref 2–4)
GLUCOSE SERPL-MCNC: 106 MG/DL (ref 65–100)
HBA1C MFR BLD HPLC: 6.7 %
HCT VFR BLD AUTO: 39.5 % (ref 35–47)
HDLC SERPL-MCNC: 92 MG/DL
HDLC SERPL: 1.5 {RATIO} (ref 0–5)
HGB BLD-MCNC: 12.1 G/DL (ref 11.5–16)
IMM GRANULOCYTES # BLD AUTO: 0 K/UL (ref 0–0.04)
IMM GRANULOCYTES NFR BLD AUTO: 0 % (ref 0–0.5)
LDLC SERPL CALC-MCNC: 37.6 MG/DL (ref 0–100)
LYMPHOCYTES # BLD: 1.4 K/UL (ref 0.8–3.5)
LYMPHOCYTES NFR BLD: 25 % (ref 12–49)
MCH RBC QN AUTO: 28.8 PG (ref 26–34)
MCHC RBC AUTO-ENTMCNC: 30.6 G/DL (ref 30–36.5)
MCV RBC AUTO: 94 FL (ref 80–99)
MONOCYTES # BLD: 0.4 K/UL (ref 0–1)
MONOCYTES NFR BLD: 7 % (ref 5–13)
NEUTS SEG # BLD: 3.8 K/UL (ref 1.8–8)
NEUTS SEG NFR BLD: 66 % (ref 32–75)
NRBC # BLD: 0 K/UL (ref 0–0.01)
NRBC BLD-RTO: 0 PER 100 WBC
PLATELET # BLD AUTO: 162 K/UL (ref 150–400)
PMV BLD AUTO: 12.3 FL (ref 8.9–12.9)
POTASSIUM SERPL-SCNC: 4.2 MMOL/L (ref 3.5–5.1)
PROT SERPL-MCNC: 7.1 G/DL (ref 6.4–8.2)
RBC # BLD AUTO: 4.2 M/UL (ref 3.8–5.2)
SODIUM SERPL-SCNC: 139 MMOL/L (ref 136–145)
TRIGL SERPL-MCNC: 62 MG/DL (ref ?–150)
VLDLC SERPL CALC-MCNC: 12.4 MG/DL
WBC # BLD AUTO: 5.7 K/UL (ref 3.6–11)

## 2021-06-15 PROCEDURE — 83036 HEMOGLOBIN GLYCOSYLATED A1C: CPT | Performed by: FAMILY MEDICINE

## 2021-06-15 PROCEDURE — G9717 DOC PT DX DEP/BP F/U NT REQ: HCPCS | Performed by: FAMILY MEDICINE

## 2021-06-15 PROCEDURE — G8419 CALC BMI OUT NRM PARAM NOF/U: HCPCS | Performed by: FAMILY MEDICINE

## 2021-06-15 PROCEDURE — G8752 SYS BP LESS 140: HCPCS | Performed by: FAMILY MEDICINE

## 2021-06-15 PROCEDURE — 1101F PT FALLS ASSESS-DOCD LE1/YR: CPT | Performed by: FAMILY MEDICINE

## 2021-06-15 PROCEDURE — 3017F COLORECTAL CA SCREEN DOC REV: CPT | Performed by: FAMILY MEDICINE

## 2021-06-15 PROCEDURE — G8536 NO DOC ELDER MAL SCRN: HCPCS | Performed by: FAMILY MEDICINE

## 2021-06-15 PROCEDURE — G8427 DOCREV CUR MEDS BY ELIG CLIN: HCPCS | Performed by: FAMILY MEDICINE

## 2021-06-15 PROCEDURE — G8754 DIAS BP LESS 90: HCPCS | Performed by: FAMILY MEDICINE

## 2021-06-15 PROCEDURE — G8399 PT W/DXA RESULTS DOCUMENT: HCPCS | Performed by: FAMILY MEDICINE

## 2021-06-15 PROCEDURE — G0463 HOSPITAL OUTPT CLINIC VISIT: HCPCS | Performed by: FAMILY MEDICINE

## 2021-06-15 PROCEDURE — 99214 OFFICE O/P EST MOD 30 MIN: CPT | Performed by: FAMILY MEDICINE

## 2021-06-15 PROCEDURE — 1090F PRES/ABSN URINE INCON ASSESS: CPT | Performed by: FAMILY MEDICINE

## 2021-06-15 RX ORDER — OMEPRAZOLE 40 MG/1
40 CAPSULE, DELAYED RELEASE ORAL
Qty: 90 CAPSULE | Refills: 3 | Status: SHIPPED | OUTPATIENT
Start: 2021-06-15

## 2021-06-15 RX ORDER — ALENDRONATE SODIUM 35 MG/1
TABLET ORAL
Qty: 12 TABLET | Refills: 3 | Status: SHIPPED | OUTPATIENT
Start: 2021-06-15 | End: 2022-09-22

## 2021-06-15 RX ORDER — METFORMIN HYDROCHLORIDE 1000 MG/1
TABLET ORAL
Qty: 180 TABLET | Refills: 3 | Status: SHIPPED | OUTPATIENT
Start: 2021-06-15 | End: 2022-07-18

## 2021-06-15 NOTE — PROGRESS NOTES
HISTORY OF PRESENT ILLNESS  Sade Ly is a 78 y.o. female. HPI In for diabetes and cholesterol check. No complaints of chest pain, shortness of breath, TIAs, claudication or edema. 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 Got a new dog (abilio)    ROS    Physical Exam  Vitals and nursing note reviewed. Constitutional:       Appearance: She is well-developed. HENT:      Right Ear: External ear normal.      Left Ear: External ear normal.   Neck:      Thyroid: No thyromegaly. Cardiovascular:      Rate and Rhythm: Normal rate and regular rhythm. Heart sounds: Normal heart sounds. Pulmonary:      Breath sounds: Normal breath sounds. No wheezing. Abdominal:      General: Bowel sounds are normal. There is no distension. Palpations: Abdomen is soft. There is no mass. Tenderness: There is no abdominal tenderness. Lymphadenopathy:      Cervical: No cervical adenopathy. ASSESSMENT and PLAN  Orders Placed This Encounter    10-DRUG SCREEN, URINE W RFLX CONFIRMATION    CBC WITH AUTOMATED DIFF    METABOLIC PANEL, COMPREHENSIVE    LIPID PANEL    AMB POC HEMOGLOBIN A1C    metFORMIN (GLUCOPHAGE) 1,000 mg tablet    omeprazole (PRILOSEC) 40 mg capsule    alendronate (FOSAMAX) 35 mg tablet     Diagnoses and all orders for this visit:    1. Hypercholesterolemia  -     LIPID PANEL; Future    2. Essential hypertension  -     CBC WITH AUTOMATED DIFF; Future  -     METABOLIC PANEL, COMPREHENSIVE; Future    3. Prediabetes  -     AMB POC HEMOGLOBIN A1C    4. Drug use  -     10-DRUG SCREEN, URINE W RFLX CONFIRMATION; Future    Other orders  -     metFORMIN (GLUCOPHAGE) 1,000 mg tablet; Take 1 tablet in AM and 1 tablet in PM. For Diabetes  -     omeprazole (PRILOSEC) 40 mg capsule;  Take 1 Capsule by mouth every morning.  -     alendronate (FOSAMAX) 35 mg tablet; PLEASE SEE ATTACHED FOR DETAILED DIRECTIONS      Follow-up and Dispositions    · Return in about 6 months (around 12/15/2021).

## 2021-06-15 NOTE — LETTER
CONTROLLED SUBSTANCE MEDICATION AGREEMENT  Patient Name: Elba Rangel  Patient YOB: 1950     I understand, that controlled substance medications may be used to help better manage my symptoms and to improve my ability to function at home, work and in social settings. However, I also understand that these medications do have risks, which have been discussed with me, including possible development of physical or psychological dependence. I understand that successful treatment requires mutual trust and honesty between me and my provider. I understand and agree that following this Medication Agreement is necessary in continuing my provider-patient relationship and the success of my treatment plan. Explanation from my Provider: Benefits and Goals of Controlled Substance Medications: There are two potential goals for your treatment: (1) decreased pain and suffering (2) improved daily life functions. There are many possible treatments for your chronic condition(s). Alternatives such as physical therapy, yoga, massage, home daily exercise, meditation, relaxation techniques, injections, chiropractic manipulations, surgery, cognitive therapy, hypnosis and many medications that are not habit-forming may be used. Use of controlled substance medications may be helpful, but they are unlikely to resolve all symptoms or restore all function. Explanation from my Provider: Risks of Controlled Substance Medications:   Opioid pain medications: These medications can lead to problems such as addiction/dependence, sedation, lightheadedness/dizziness, memory issues, falls, constipation, nausea, or vomiting. They may also impair the ability to drive or operate machinery. Additionally, these medications may lower testosterone levels, leading to loss of bone strength, stamina and sex drive.   They may cause problems with breathing, sleep apnea and reduced coughing, which is especially dangerous for patients with lung disease. Overdose or dangerous interactions with alcohol and other medications may occur, leading to death. Hyperalgesia may develop, which means patients receiving opioids for the treatment of pain may become more sensitive to certain painful stimuli, and in some cases, experience pain from ordinarily non-painful stimuli. Women between the ages of 14-53 who could become pregnant should carefully weigh the risks and benefits of opioids with their physicians, as these medications increase the risk of pregnancy complications, including miscarriage,  delivery and stillbirth. It is also possible for babies to be born addicted to opioids. Opioid dependence withdrawal symptoms may include; feelings of uneasiness, increased pain, irritability, belly pain, diarrhea, sweats and goose-flesh. Testosterone replacement therapy:  Potential side effects include increased risk of stroke and heart attack, blood clots, increased blood pressure, increased cholesterol, enlarged prostate, sleep apnea, irritability/aggression and other mood disorders, and decreased fertility. Lily Berger (1950)             Page 1 of 4    Initials:_______    Benzodiazepines and non-benzodiazepine sleep medications: These medications can lead to problems such as addiction/dependence, sedation, fatigue, lightheadedness, dizziness, incoordination, falls, depression, hallucinations, and impaired judgment, memory and concentration. The ability to drive and operate machinery may also be affected. Abnormal sleep-related behaviors have been reported, including sleepwalking, driving, making telephone calls, eating, or having sex while not fully awake. These medications can suppress breathing and worsen sleep apnea, particularly when combined with alcohol or other sedating medications, potentially leading to death. Dependence withdrawal symptoms may include tremors, anxiety, hallucinations and seizures.    Stimulants:  Common adverse effects include addiction/dependence, increased blood pressure and heart rate, decreased appetite, nausea, involuntary weight loss, insomnia,  irritability, and headaches. These risks may increase when these medications are combined with other stimulants, such as caffeine pills or energy drinks, certain weight loss supplements and oral decongestants. Dependence withdrawal symptoms may include depressed mood, loss of interest, suicidal thoughts, anxiety, fatigue, appetite changes and agitation. I agree and understand that I and my prescriber have the following rights and responsibilities regarding my treatment plan:   1. MY RIGHTS:  To be informed of my treatment and medication plan. To be an active participant in my health and wellbeing. 2. MY RESPONSIBILITY AND UNDERSTANDING FOR USE OF MEDICATIONS   I will take medications at the dose and frequency as directed. For my safety, I will not increase or change how I take my medications without the recommendation of my healthcare provider.  I will actively participate in any program recommended by my provider which may improve function, including social, physical, psychological programs.  I will not take my medications with alcohol or other drugs not prescribed to me. I understand that drinking alcohol with my medications increases the chances of side effects, including reduced breathing rate and could lead to personal injury when operating machinery.  I understand that if I have a history of substance use disorders, including alcohol or other illicit drugs, that I may be at increased risk of addiction to my medications.  I agree to notify my provider immediately if I should become pregnant so that my treatment plan can be adjusted.    I agree and understand that I shall only receive controlled substance medications from the prescriber that signed this agreement unless there is written agreement among other prescribers of controlled substances outlining the responsibility of the medications being prescribed.  I understand that the if the controlled medication is not helping to achieve goals, the dosage may be tapered and no longer prescribed. 3. MY RESPONSIBILITY FOR COMMUNICATION / PRESCRIPTION RENEWALS   I agree that all controlled substance medications that I take will be prescribed only by my provider. If another healthcare provider prescribes me medication in an emergency, I will notify my provider within seventy-two (72) hours. Rhea Miller (1950)             Page 2 of 4    Initials:_______  Polly Cushing I will arrange for refills at the prescribed interval ONLY during regular office hours. I will not ask for refills earlier than agreed, after-hours, on holidays or weekends. Refills may take up to 72 hours for processing and prescriptions to reach the pharmacy.  I will inform my other health care providers that I am taking these medications and of the existence of this [id number]. In the event of an emergency, I will provide the same information to the emergency department prescribers.  I will keep my provider updated on the pharmacy I am using for controlled medication prescription filling. 4. MY RESPONSIBILITY FOR PROTECTING MEDICATIONS   I will protect my prescriptions and medications. I understand that lost or misplaced prescriptions will not be replaced.  I will keep medications only for my own use and will not share them with others. I will keep all medications away from children.  I agree that if my medications are adjusted or discontinued, I will properly dispose of any remaining medications. I understand that I will be required to dispose of any remaining controlled medications as, directed by my prescriber, prior to being provided with any prescriptions for other controlled medications.   Medication drop box locations can be found at: [employer]  5. MY RESPONSIBILITY WITH ILLEGAL DRUGS    I will not use illegal or street drugs or another person's prescription medications not prescribed to me.  If there are identified addiction type symptoms, then referral to a program may be provided by my provider and I agree to follow through with this recommendation. 6. MY RESPONSIBILITY FOR COOPERATION WITH INVESTIGATIONS   I understand that my provider will comply with any applicable law and may discuss my use and/or possible misuse/abuse of controlled substances and alcohol, as appropriate, with any health care provider involved in my care, pharmacist, or legal authority.  I authorize my provider and pharmacy to cooperate fully with law enforcement agencies (as permitted by law) in the investigation of any possible misuse, sale, or other diversion of my controlled substances.  I agree to waive any applicable privilege or right of privacy or confidentiality with respect to these authorizations. 7. PROVIDERS RIGHT TO MONITOR FOR SAFETY: PRESCRIPTION MONITORING / DRUG TESTING   I consent to drug/toxicology screening and will submit to a drug screen upon my providers request to assure I am only taking the prescribed drugs for my safety monitoring. I understand that a drug screen is a laboratory test in which a sample of my urine, blood or saliva is checked to see what drugs I have been taking. This may entail an observed urine specimen, which means that a nurse or other health care provider may watch me provide urine, and I will cooperate if I am asked to provide an observed specimen. Lily Berger (1950)             Page 3 of 4    Initials:_______  Maximilian Teixeira I understand that my provider will check a copy of my State Prescription Monitoring Program () Report in order to safely prescribe medications.      Pill Counts: I consent to pill counts when requested. I may be asked to bring all my prescribed controlled substance medications, in their original bottles, to all of my scheduled appointments. In addition, my provider may ask me to come to the practice at any time for a random pill count. 8. TERMINATION OF THIS AGREEMENT   For my safety, my prescriber has the right to stop prescribing controlled substance medications and may end this agreement.  Conditions that may result in termination of this agreement:  a. I do not show any improvement in pain, or my activity has not improved. b. I develop rapid tolerance or loss of improvement, as described in my treatment plan.  c. I develop significant side effects from the medication. d. My behavior is not consistent with the responsibilities outlined above, thereby causing safety concerns to continue prescribing controlled substance medications. e. I fail to follow the terms of this agreement. f. Other:____________________________     UNDERSTANDING THIS MEDICATION AGREEMENT:    I have read the above and have had all my questions answered. For chronic disease management, I know that my symptoms can be managed with many types of treatments. A chronic medication trial may be part of my treatment, but I must be an active participant in my care. Medication therapy is only one part of my symptom management plan. In some cases, there may be limited scientific evidence to support the chronic use of certain medications to improve symptoms and daily function. Furthermore, in certain circumstances, there may be scientific information that suggests that the use of chronic controlled substances may worsen my symptoms and increase my risk of unintentional death directly related to this medication therapy. I know that if my provider feels my risk from controlled medications is greater than my benefit, I will have my controlled substance medication(s) compassionately lowered or removed altogether. I further agree to allow this office to contact my HIPAA contact if there are concerns about my safety and use of the controlled medications. I have agreed to use the prescribed controlled substance medications to me as instructed by my provider and as stated in this Medication Agreement. My initial on each page and my signature below shows that I have read each page and I have had the opportunity to ask questions with answers provided by my provider.       Patient Name (Printed): _____________________________________    Patient Signature:  ______________________   Date: _____________      Prescriber Name (Printed): ___________________________________    Prescriber Signature: _____________________  Date: _____________     Crissy Ribeiro (1950)             Page 4 of 4

## 2021-06-19 LAB
ALPRAZ UR QL: NEGATIVE
AMOBARBITAL, 737699: NEGATIVE
AMPHETAMINES UR QL SCN: NEGATIVE NG/ML
BARBITURATES UR QL SCN: NORMAL NG/ML
BARBITURATES, 737698: POSITIVE
BENZODIAZ UR QL: NEGATIVE NG/ML
BUTALBITAL, 737704: NEGATIVE
BZE UR QL SCN: NEGATIVE NG/ML
CANNABINOIDS UR QL SCN: NEGATIVE NG/ML
CLONAZEPAM UR QL: NEGATIVE
CREAT UR-MCNC: 130.8 MG/DL (ref 20–300)
FLURAZEPAM UR QL: NEGATIVE
LORAZEPAM UR QL: NEGATIVE
Lab: 1110 NG/ML
METHADONE UR QL SCN: NEGATIVE NG/ML
MIDAZOLAM UR QL CFM: NEGATIVE
NORDIAZEPAM UR QL: NEGATIVE
OPIATES UR QL SCN: NEGATIVE NG/ML
OXAZEPAM UR QL: NEGATIVE
OXYCODONE+OXYMORPHONE UR QL SCN: NEGATIVE NG/ML
PCP UR QL: NEGATIVE NG/ML
PENTOBARBITAL, 737706: NEGATIVE
PH UR: 7.8 [PH] (ref 4.5–8.9)
PHENOBARBITAL, 737708: POSITIVE
PLEASE NOTE:, 733157: NORMAL
PROPOXYPH UR QL SCN: NEGATIVE NG/ML
SECOBARBITAL, 737702: NEGATIVE
SP GR UR: 1.02
TEMAZEPAM UR QL CFM: NEGATIVE
TRIAZOLAM UR QL: NEGATIVE

## 2021-07-12 RX ORDER — BLOOD SUGAR DIAGNOSTIC
STRIP MISCELLANEOUS
Qty: 100 STRIP | Refills: 4 | Status: SHIPPED | OUTPATIENT
Start: 2021-07-12 | End: 2021-07-16 | Stop reason: SDUPTHER

## 2021-07-13 ENCOUNTER — OFFICE VISIT (OUTPATIENT)
Dept: NEUROLOGY | Age: 71
End: 2021-07-13
Payer: MEDICARE

## 2021-07-13 VITALS
BODY MASS INDEX: 28.26 KG/M2 | SYSTOLIC BLOOD PRESSURE: 116 MMHG | HEART RATE: 67 BPM | DIASTOLIC BLOOD PRESSURE: 72 MMHG | WEIGHT: 153.6 LBS | RESPIRATION RATE: 16 BRPM | HEIGHT: 62 IN | OXYGEN SATURATION: 98 %

## 2021-07-13 DIAGNOSIS — G25.0 ESSENTIAL TREMOR: Primary | ICD-10-CM

## 2021-07-13 PROCEDURE — 1101F PT FALLS ASSESS-DOCD LE1/YR: CPT | Performed by: PSYCHIATRY & NEUROLOGY

## 2021-07-13 PROCEDURE — 3017F COLORECTAL CA SCREEN DOC REV: CPT | Performed by: PSYCHIATRY & NEUROLOGY

## 2021-07-13 PROCEDURE — G8419 CALC BMI OUT NRM PARAM NOF/U: HCPCS | Performed by: PSYCHIATRY & NEUROLOGY

## 2021-07-13 PROCEDURE — G8427 DOCREV CUR MEDS BY ELIG CLIN: HCPCS | Performed by: PSYCHIATRY & NEUROLOGY

## 2021-07-13 PROCEDURE — G8399 PT W/DXA RESULTS DOCUMENT: HCPCS | Performed by: PSYCHIATRY & NEUROLOGY

## 2021-07-13 PROCEDURE — G9717 DOC PT DX DEP/BP F/U NT REQ: HCPCS | Performed by: PSYCHIATRY & NEUROLOGY

## 2021-07-13 PROCEDURE — 99214 OFFICE O/P EST MOD 30 MIN: CPT | Performed by: PSYCHIATRY & NEUROLOGY

## 2021-07-13 PROCEDURE — G8752 SYS BP LESS 140: HCPCS | Performed by: PSYCHIATRY & NEUROLOGY

## 2021-07-13 PROCEDURE — 1090F PRES/ABSN URINE INCON ASSESS: CPT | Performed by: PSYCHIATRY & NEUROLOGY

## 2021-07-13 PROCEDURE — G8754 DIAS BP LESS 90: HCPCS | Performed by: PSYCHIATRY & NEUROLOGY

## 2021-07-13 PROCEDURE — G8536 NO DOC ELDER MAL SCRN: HCPCS | Performed by: PSYCHIATRY & NEUROLOGY

## 2021-07-13 RX ORDER — TRIAMCINOLONE ACETONIDE 1 MG/G
CREAM TOPICAL
COMMUNITY
Start: 2021-07-12 | End: 2022-02-03

## 2021-07-13 RX ORDER — PRIMIDONE 50 MG/1
50 TABLET ORAL 2 TIMES DAILY
Qty: 180 TABLET | Refills: 1 | Status: SHIPPED | OUTPATIENT
Start: 2021-07-13 | End: 2022-01-13 | Stop reason: SDUPTHER

## 2021-07-13 NOTE — PROGRESS NOTES
Neurology Note    Chief Complaint   Patient presents with    Follow-up     doing well        HPI/Subjective  Mary Lau is a 78 y.o. female who presented with tremors. Patient states that her tremors started around 1 year ago and it has been gradually progressive. Initially more just at nighttime but it has progressed to the left hand as well. Right hand tremors that more in comparison to the left. These of action tremors and does not have a resting component. She does use a mouse and that interferes with her use. She does also sometimes has to be more careful with eating and holding a cup or glass. There is no family history of tremors. Patient denies any alcohol use. The patient denies any bradykinesia, imbalance or rigidity. The patient has been referred here for further management. Interval history:  Patient is on primidone 25 mg in the morning and 50 mg at night and states that she did have initial improvement in her tremors but recently she has noticed increased tremors in her hands. Current Outpatient Medications   Medication Sig    triamcinolone acetonide (KENALOG) 0.1 % topical cream     glucose blood VI test strips (Accu-Chek Wendy Plus test strp) strip USE TO TEST BLOOD SUGAR ONCE DAILY    metFORMIN (GLUCOPHAGE) 1,000 mg tablet Take 1 tablet in AM and 1 tablet in PM. For Diabetes    omeprazole (PRILOSEC) 40 mg capsule Take 1 Capsule by mouth every morning.  alendronate (FOSAMAX) 35 mg tablet PLEASE SEE ATTACHED FOR DETAILED DIRECTIONS    primidone (MYSOLINE) 50 mg tablet Take 1 Tablet by mouth three (3) times daily.  ondansetron (Zofran ODT) 4 mg disintegrating tablet 1 Tab by SubLINGual route every eight (8) hours as needed for Nausea or Vomiting.     atorvastatin (LIPITOR) 20 mg tablet TAKE 1 TABLET BY MOUTH EVERY DAY FOR CHOLESTEROL    gabapentin (NEURONTIN) 300 mg capsule TAKE 1 CAPSULE BY MOUTH THREE TIMES A DAY    citalopram (CELEXA) 20 mg tablet TAKE ONE TABLET BY MOUTH ONE TIME DAILY    Blood-Glucose Meter (ACCU-CHEK SOFIA PLUS METER) misc Use as directed. E11.9    lancets (ACCU-CHEK SOFTCLIX LANCETS) misc TEST 2 TIMES DAILY AS DIRECTED (E11.9)    polyethylene glycol (MIRALAX) 17 gram packet Take 17 g by mouth daily as needed ('Constipation').  multivit,calc,mins/iron/folic (ONE-A-DAY WOMENS FORMULA PO) Take 1 Tab by mouth daily. No current facility-administered medications for this visit. EXAMINATION:   Visit Vitals  /72 (BP 1 Location: Left arm, BP Patient Position: Sitting, BP Cuff Size: Adult)   Pulse 67   Resp 16   Ht 5' 2\" (1.575 m)   Wt 153 lb 9.6 oz (69.7 kg)   LMP  (LMP Unknown)   SpO2 98%   BMI 28.09 kg/m²        General:   General appearance: Pt is in no acute distress   Distal pulses are preserved    Neurological Examination:   Mental Status: AAO x3. Speech is fluent. Follows commands, has normal fund of knowledge, attention, short term recall, comprehension and insight. Cranial Nerves: Visual fields are full. PERRL, Extraocular movements are full. Facial sensation intact. Facial movement intact. Hearing intact to conversation. Palate elevates symmetrically. Shoulder shrug symmetric. Tongue midline. Motor: Strength is 5/5 in all 4 ext. No atrophy. Tone: Normal    Sensation: Light touch - Normal    Reflexes: DTRs 2+ throughout. Coordination/Cerebellar: Intact to finger-nose-finger     Gait: Casual gait is normal.     Skin: No significant bruising or lacerations.     Postural tremors present    Laboratory review:   Results for orders placed or performed in visit on 06/15/21   LIPID PANEL   Result Value Ref Range    Cholesterol, total 142 <200 MG/DL    Triglyceride 62 <150 MG/DL    HDL Cholesterol 92 MG/DL    LDL, calculated 37.6 0 - 100 MG/DL    VLDL, calculated 12.4 MG/DL    CHOL/HDL Ratio 1.5 0.0 - 5.0     METABOLIC PANEL, COMPREHENSIVE   Result Value Ref Range    Sodium 139 136 - 145 mmol/L    Potassium 4.2 3.5 - 5.1 mmol/L    Chloride 104 97 - 108 mmol/L    CO2 29 21 - 32 mmol/L    Anion gap 6 5 - 15 mmol/L    Glucose 106 (H) 65 - 100 mg/dL    BUN 16 6 - 20 MG/DL    Creatinine 1.11 (H) 0.55 - 1.02 MG/DL    BUN/Creatinine ratio 14 12 - 20      GFR est AA 59 (L) >60 ml/min/1.73m2    GFR est non-AA 49 (L) >60 ml/min/1.73m2    Calcium 9.8 8.5 - 10.1 MG/DL    Bilirubin, total 0.4 0.2 - 1.0 MG/DL    ALT (SGPT) 31 12 - 78 U/L    AST (SGOT) 22 15 - 37 U/L    Alk. phosphatase 99 45 - 117 U/L    Protein, total 7.1 6.4 - 8.2 g/dL    Albumin 3.7 3.5 - 5.0 g/dL    Globulin 3.4 2.0 - 4.0 g/dL    A-G Ratio 1.1 1.1 - 2.2     10-DRUG SCREEN, URINE W RFLX CONFIRMATION   Result Value Ref Range    Amphetamine Screen, urine Negative Jneekr=3744 ng/mL    Barbiturates Screen, urine See Final Results Pkihxp=517 ng/mL    Benzodiazepines Negative Tijcam=480 ng/mL    Nordiazepam Negative Tcowiy=416      Oxazepam Negative Oazxyt=973      Flurazepam Negative Fadhhz=818      Lorazepam Negative Iuvmzo=996      Alprazolam Negative Htytjj=163      Clonazepam Negative Ksxkhv=513      Temazepam Negative Liyevn=808      Triazolam Negative Dmkxxl=323      Midazolam Negative Twxybk=457      Please Note Comment      Cannabinoid Screen, urine Negative Cutoff=20 ng/mL    Cocaine Metab.  Screen, urine Negative Hzpdwv=831 ng/mL    Opiate Screen, urine Negative Zfwmmw=054 ng/mL    Oxycodone/Oxymorphone, urine Negative Pdqmgs=375 ng/mL    Phencyclidine Screen, urine Negative Cutoff=25 ng/mL    Methadone Screen, urine Negative Aswqca=437 ng/mL    Propoxyphene Screen, urine Negative Yljnds=757 ng/mL    Creatinine, urine 130.8 20.0 - 300.0 mg/dL    Specific Gravity 1.018      pH, urine 7.8 4.5 - 8.9     CBC WITH AUTOMATED DIFF   Result Value Ref Range    WBC 5.7 3.6 - 11.0 K/uL    RBC 4.20 3.80 - 5.20 M/uL    HGB 12.1 11.5 - 16.0 g/dL    HCT 39.5 35.0 - 47.0 %    MCV 94.0 80.0 - 99.0 FL    MCH 28.8 26.0 - 34.0 PG    MCHC 30.6 30.0 - 36.5 g/dL    RDW 14.6 (H) 11.5 - 14.5 % PLATELET 923 560 - 827 K/uL    MPV 12.3 8.9 - 12.9 FL    NRBC 0.0 0  WBC    ABSOLUTE NRBC 0.00 0.00 - 0.01 K/uL    NEUTROPHILS 66 32 - 75 %    LYMPHOCYTES 25 12 - 49 %    MONOCYTES 7 5 - 13 %    EOSINOPHILS 1 0 - 7 %    BASOPHILS 1 0 - 1 %    IMMATURE GRANULOCYTES 0 0.0 - 0.5 %    ABS. NEUTROPHILS 3.8 1.8 - 8.0 K/UL    ABS. LYMPHOCYTES 1.4 0.8 - 3.5 K/UL    ABS. MONOCYTES 0.4 0.0 - 1.0 K/UL    ABS. EOSINOPHILS 0.1 0.0 - 0.4 K/UL    ABS. BASOPHILS 0.0 0.0 - 0.1 K/UL    ABS. IMM. GRANS. 0.0 0.00 - 0.04 K/UL    DF AUTOMATED     BARBITURATES, CONFIRM   Result Value Ref Range    Barbiturates Positive (A) Zkzcdc=038      Amobarbital Negative Qsnfrl=727      Secobarbital Negative Bolmpw=724      Butalbital Negative Xddeyi=911      Pentobarbital Negative Uksihx=557      Phenobarbital Positive (A)      Phenobarbital confirm 1,110 Owxcml=792 ng/mL   AMB POC HEMOGLOBIN A1C   Result Value Ref Range    Hemoglobin A1c (POC) 6.7 %       Imaging review:  None    Documentation review:  None    Assessment/Plan:   Rodo Santana is a 78 y.o. female who presented with essential tremors. Patient is taking primidone 25 mg in the morning and 50 mg at night. I do suspect she is having essential tremors. I do plan to increase the dosage to 50 mg p.o. twice daily. No significant symptoms of Parkinson's disease at this time. Follow-up in 6 months        ICD-10-CM ICD-9-CM    1. Essential tremor  G25.0 333.1       Thank you for allowing me to participate in the care of Ms. Patrick Castellano. Please feel free to contact me if you have any questions. Electronically signed. Yumiko Oneal MD  Neurologist    CC: Calos Kahn MD  Fax: [phone]    This note was created using voice recognition software. Despite editing, there may be syntax errors.

## 2021-07-16 NOTE — TELEPHONE ENCOUNTER
[facility] sent fax stating that a diagnosis code needs to be on a new script written for Accuchek king plus strip.       Thanks,  [employer]

## 2021-07-19 RX ORDER — BLOOD SUGAR DIAGNOSTIC
STRIP MISCELLANEOUS
Qty: 100 STRIP | Refills: 4 | Status: SHIPPED | OUTPATIENT
Start: 2021-07-19 | End: 2022-07-18

## 2021-07-21 ENCOUNTER — OFFICE VISIT (OUTPATIENT)
Dept: FAMILY MEDICINE CLINIC | Age: 71
End: 2021-07-21
Payer: MEDICARE

## 2021-07-21 VITALS
OXYGEN SATURATION: 97 % | HEART RATE: 67 BPM | WEIGHT: 154.2 LBS | SYSTOLIC BLOOD PRESSURE: 129 MMHG | DIASTOLIC BLOOD PRESSURE: 64 MMHG | BODY MASS INDEX: 28.37 KG/M2 | HEIGHT: 62 IN | RESPIRATION RATE: 16 BRPM

## 2021-07-21 DIAGNOSIS — F33.1 MAJOR DEPRESSIVE DISORDER, RECURRENT, MODERATE (HCC): ICD-10-CM

## 2021-07-21 DIAGNOSIS — R25.2 MUSCLE CRAMPING: Primary | ICD-10-CM

## 2021-07-21 DIAGNOSIS — F33.0 MAJOR DEPRESSIVE DISORDER, RECURRENT, MILD (HCC): ICD-10-CM

## 2021-07-21 DIAGNOSIS — F33.41 RECURRENT MAJOR DEPRESSIVE DISORDER, IN PARTIAL REMISSION (HCC): ICD-10-CM

## 2021-07-21 PROBLEM — F33.9 MAJOR DEPRESSIVE DISORDER, RECURRENT, UNSPECIFIED (HCC): Status: ACTIVE | Noted: 2021-07-21

## 2021-07-21 PROCEDURE — G8754 DIAS BP LESS 90: HCPCS | Performed by: FAMILY MEDICINE

## 2021-07-21 PROCEDURE — G0463 HOSPITAL OUTPT CLINIC VISIT: HCPCS | Performed by: FAMILY MEDICINE

## 2021-07-21 PROCEDURE — 3017F COLORECTAL CA SCREEN DOC REV: CPT | Performed by: FAMILY MEDICINE

## 2021-07-21 PROCEDURE — G8427 DOCREV CUR MEDS BY ELIG CLIN: HCPCS | Performed by: FAMILY MEDICINE

## 2021-07-21 PROCEDURE — 99213 OFFICE O/P EST LOW 20 MIN: CPT | Performed by: FAMILY MEDICINE

## 2021-07-21 PROCEDURE — 1101F PT FALLS ASSESS-DOCD LE1/YR: CPT | Performed by: FAMILY MEDICINE

## 2021-07-21 PROCEDURE — 1090F PRES/ABSN URINE INCON ASSESS: CPT | Performed by: FAMILY MEDICINE

## 2021-07-21 PROCEDURE — G8399 PT W/DXA RESULTS DOCUMENT: HCPCS | Performed by: FAMILY MEDICINE

## 2021-07-21 PROCEDURE — G8419 CALC BMI OUT NRM PARAM NOF/U: HCPCS | Performed by: FAMILY MEDICINE

## 2021-07-21 PROCEDURE — G9717 DOC PT DX DEP/BP F/U NT REQ: HCPCS | Performed by: FAMILY MEDICINE

## 2021-07-21 PROCEDURE — G8752 SYS BP LESS 140: HCPCS | Performed by: FAMILY MEDICINE

## 2021-07-21 PROCEDURE — G8536 NO DOC ELDER MAL SCRN: HCPCS | Performed by: FAMILY MEDICINE

## 2021-07-21 RX ORDER — ESCITALOPRAM OXALATE 20 MG/1
20 TABLET ORAL DAILY
Qty: 90 TABLET | Refills: 3 | Status: ON HOLD | OUTPATIENT
Start: 2021-07-21 | End: 2021-11-04

## 2021-07-21 RX ORDER — ONDANSETRON 4 MG/1
4 TABLET, ORALLY DISINTEGRATING ORAL
Qty: 20 TABLET | Refills: 0 | Status: SHIPPED | OUTPATIENT
Start: 2021-07-21 | End: 2021-12-15 | Stop reason: ALTCHOICE

## 2021-07-21 NOTE — PROGRESS NOTES
1. Have you been to the ER, urgent care clinic since your last visit? Hospitalized since your last visit? Yes [facility]. 2. Have you seen or consulted any other health care providers outside of the [address] [place] [address] since your last visit? Include any pap smears or colon screening. No.      Chief Complaint   Patient presents with    Follow-up     Pt presents for a f/u. No complaints. Patient identity verified with two types of identifiers.
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
## 2021-07-22 LAB
ALBUMIN SERPL-MCNC: 3.8 G/DL (ref 3.5–5)
ALBUMIN/GLOB SERPL: 1.1 {RATIO} (ref 1.1–2.2)
ALP SERPL-CCNC: 100 U/L (ref 45–117)
ALT SERPL-CCNC: 40 U/L (ref 12–78)
ANION GAP SERPL CALC-SCNC: 7 MMOL/L (ref 5–15)
AST SERPL-CCNC: 21 U/L (ref 15–37)
BILIRUB SERPL-MCNC: 0.2 MG/DL (ref 0.2–1)
BUN SERPL-MCNC: 12 MG/DL (ref 6–20)
BUN/CREAT SERPL: 11 (ref 12–20)
CALCIUM SERPL-MCNC: 9.3 MG/DL (ref 8.5–10.1)
CHLORIDE SERPL-SCNC: 108 MMOL/L (ref 97–108)
CK SERPL-CCNC: 79 U/L (ref 26–192)
CO2 SERPL-SCNC: 26 MMOL/L (ref 21–32)
COMMENT, HOLDF: NORMAL
CREAT SERPL-MCNC: 1.07 MG/DL (ref 0.55–1.02)
GLOBULIN SER CALC-MCNC: 3.4 G/DL (ref 2–4)
GLUCOSE SERPL-MCNC: 136 MG/DL (ref 65–100)
MAGNESIUM SERPL-MCNC: 1.9 MG/DL (ref 1.6–2.4)
POTASSIUM SERPL-SCNC: 4.3 MMOL/L (ref 3.5–5.1)
PROT SERPL-MCNC: 7.2 G/DL (ref 6.4–8.2)
SAMPLES BEING HELD,HOLD: NORMAL
SODIUM SERPL-SCNC: 141 MMOL/L (ref 136–145)

## 2021-08-11 ENCOUNTER — OFFICE VISIT (OUTPATIENT)
Dept: FAMILY MEDICINE CLINIC | Age: 71
End: 2021-08-11
Payer: MEDICARE

## 2021-08-11 VITALS
HEART RATE: 76 BPM | BODY MASS INDEX: 28.19 KG/M2 | SYSTOLIC BLOOD PRESSURE: 142 MMHG | DIASTOLIC BLOOD PRESSURE: 63 MMHG | HEIGHT: 62 IN | WEIGHT: 153.2 LBS | RESPIRATION RATE: 16 BRPM | OXYGEN SATURATION: 96 % | TEMPERATURE: 96.7 F

## 2021-08-11 DIAGNOSIS — E78.00 HYPERCHOLESTEROLEMIA: ICD-10-CM

## 2021-08-11 DIAGNOSIS — Z00.00 ENCOUNTER FOR MEDICARE ANNUAL WELLNESS EXAM: Primary | ICD-10-CM

## 2021-08-11 PROCEDURE — G0463 HOSPITAL OUTPT CLINIC VISIT: HCPCS | Performed by: FAMILY MEDICINE

## 2021-08-11 PROCEDURE — G8754 DIAS BP LESS 90: HCPCS | Performed by: FAMILY MEDICINE

## 2021-08-11 PROCEDURE — 1090F PRES/ABSN URINE INCON ASSESS: CPT | Performed by: FAMILY MEDICINE

## 2021-08-11 PROCEDURE — G8399 PT W/DXA RESULTS DOCUMENT: HCPCS | Performed by: FAMILY MEDICINE

## 2021-08-11 PROCEDURE — 3017F COLORECTAL CA SCREEN DOC REV: CPT | Performed by: FAMILY MEDICINE

## 2021-08-11 PROCEDURE — G8427 DOCREV CUR MEDS BY ELIG CLIN: HCPCS | Performed by: FAMILY MEDICINE

## 2021-08-11 PROCEDURE — G8753 SYS BP > OR = 140: HCPCS | Performed by: FAMILY MEDICINE

## 2021-08-11 PROCEDURE — G9717 DOC PT DX DEP/BP F/U NT REQ: HCPCS | Performed by: FAMILY MEDICINE

## 2021-08-11 PROCEDURE — G8536 NO DOC ELDER MAL SCRN: HCPCS | Performed by: FAMILY MEDICINE

## 2021-08-11 PROCEDURE — 99213 OFFICE O/P EST LOW 20 MIN: CPT | Performed by: FAMILY MEDICINE

## 2021-08-11 PROCEDURE — G8419 CALC BMI OUT NRM PARAM NOF/U: HCPCS | Performed by: FAMILY MEDICINE

## 2021-08-11 PROCEDURE — G0439 PPPS, SUBSEQ VISIT: HCPCS | Performed by: FAMILY MEDICINE

## 2021-08-11 PROCEDURE — 1101F PT FALLS ASSESS-DOCD LE1/YR: CPT | Performed by: FAMILY MEDICINE

## 2021-08-11 RX ORDER — ATORVASTATIN CALCIUM 10 MG/1
10 TABLET, FILM COATED ORAL DAILY
Qty: 90 TABLET | Refills: 12 | Status: SHIPPED | OUTPATIENT
Start: 2021-08-11

## 2021-08-11 NOTE — PROGRESS NOTES
HISTORY OF PRESENT ILLNESS  Helio Mccall is a 78 y.o. female. HPI In for recheck. Feels better on lexapro than Celexa- more upbeat. Still not sleeping very well at night. Stays up until 2-3 every am. Planning on returning to work on Monday. Plans on doing mostly live teaching. Says that cramps in hands are doing much better since holdint the lipitor. Also needs medicare wellness exam. Sees Dr. Alycia Reilly (gastro) Marilu Christine (psychologist). UTD on mammograms and colonoscopy. Has a colonoscopy in Nov of this year. UTD on immunizations. Would want suhas Schmidt to be her mPOA if she became incapacitated. Review of Systems   HENT: Positive for hearing loss. Neurological:        No falls, canes. Independent in all adls. Memory ok   Psychiatric/Behavioral: Positive for depression. No alcohol. Physical Exam  Vitals and nursing note reviewed. Constitutional:       Appearance: She is well-developed. HENT:      Right Ear: External ear normal.      Left Ear: External ear normal.   Neck:      Thyroid: No thyromegaly. Cardiovascular:      Rate and Rhythm: Normal rate and regular rhythm. Heart sounds: Normal heart sounds. Pulmonary:      Breath sounds: Normal breath sounds. No wheezing. Abdominal:      General: Bowel sounds are normal. There is no distension. Palpations: Abdomen is soft. There is no mass. Tenderness: There is no abdominal tenderness. Lymphadenopathy:      Cervical: No cervical adenopathy. ASSESSMENT and PLAN  Orders Placed This Encounter    atorvastatin (LIPITOR) 10 mg tablet     Diagnoses and all orders for this visit:    1. Encounter for Medicare annual wellness exam    2. Hypercholesterolemia  -     atorvastatin (LIPITOR) 10 mg tablet; Take 1 Tablet by mouth daily. For cholesterol      Follow-up and Dispositions    · Return in about 6 months (around 2/11/2022).

## 2021-08-11 NOTE — PROGRESS NOTES
Chief Complaint   Patient presents with   [address] [place] [address] Annual Wellness Visit     1. Have you been to the ER, urgent care clinic since your last visit? Hospitalized since your last visit? [facility] - insect bite - 2-3 weeks ago    2. Have you seen or consulted any other health care providers outside of the [address] [place] [address] since your last visit? Include any pap smears or colon screening.  Yes GI-Dr. Leila Riggs

## 2021-09-01 ENCOUNTER — APPOINTMENT (OUTPATIENT)
Dept: GENERAL RADIOLOGY | Age: 71
End: 2021-09-01
Attending: EMERGENCY MEDICINE
Payer: MEDICARE

## 2021-09-01 ENCOUNTER — HOSPITAL ENCOUNTER (EMERGENCY)
Age: 71
Discharge: HOME | End: 2021-09-01
Attending: EMERGENCY MEDICINE
Payer: MEDICARE

## 2021-09-01 VITALS
OXYGEN SATURATION: 97 % | HEART RATE: 61 BPM | HEIGHT: 62 IN | RESPIRATION RATE: 19 BRPM | DIASTOLIC BLOOD PRESSURE: 76 MMHG | WEIGHT: 156.31 LBS | BODY MASS INDEX: 28.76 KG/M2 | TEMPERATURE: 98.4 F | SYSTOLIC BLOOD PRESSURE: 165 MMHG

## 2021-09-01 DIAGNOSIS — I16.0 HYPERTENSIVE URGENCY: Primary | ICD-10-CM

## 2021-09-01 LAB
ALBUMIN SERPL-MCNC: 3.1 G/DL (ref 3.5–5)
ALBUMIN/GLOB SERPL: 0.8 {RATIO} (ref 1.1–2.2)
ALP SERPL-CCNC: 78 U/L (ref 45–117)
ALT SERPL-CCNC: 25 U/L (ref 12–78)
ANION GAP SERPL CALC-SCNC: 5 MMOL/L (ref 5–15)
AST SERPL-CCNC: 19 U/L (ref 15–37)
ATRIAL RATE: 70 BPM
BASOPHILS # BLD: 0 K/UL (ref 0–0.1)
BASOPHILS NFR BLD: 1 % (ref 0–1)
BILIRUB SERPL-MCNC: 0.3 MG/DL (ref 0.2–1)
BNP SERPL-MCNC: 87 PG/ML
BUN SERPL-MCNC: 9 MG/DL (ref 6–20)
BUN/CREAT SERPL: 9 (ref 12–20)
CALCIUM SERPL-MCNC: 8.9 MG/DL (ref 8.5–10.1)
CALCULATED P AXIS, ECG09: 62 DEGREES
CALCULATED R AXIS, ECG10: 13 DEGREES
CALCULATED T AXIS, ECG11: 39 DEGREES
CHLORIDE SERPL-SCNC: 109 MMOL/L (ref 97–108)
CK SERPL-CCNC: 98 U/L (ref 26–192)
CO2 SERPL-SCNC: 28 MMOL/L (ref 21–32)
CREAT SERPL-MCNC: 0.97 MG/DL (ref 0.55–1.02)
DIAGNOSIS, 93000: NORMAL
DIFFERENTIAL METHOD BLD: ABNORMAL
EOSINOPHIL # BLD: 0.2 K/UL (ref 0–0.4)
EOSINOPHIL NFR BLD: 3 % (ref 0–7)
ERYTHROCYTE [DISTWIDTH] IN BLOOD BY AUTOMATED COUNT: 15.5 % (ref 11.5–14.5)
GLOBULIN SER CALC-MCNC: 3.8 G/DL (ref 2–4)
GLUCOSE SERPL-MCNC: 91 MG/DL (ref 65–100)
HCT VFR BLD AUTO: 35.1 % (ref 35–47)
HGB BLD-MCNC: 11.4 G/DL (ref 11.5–16)
IMM GRANULOCYTES # BLD AUTO: 0 K/UL (ref 0–0.04)
IMM GRANULOCYTES NFR BLD AUTO: 0 % (ref 0–0.5)
LYMPHOCYTES # BLD: 1.4 K/UL (ref 0.8–3.5)
LYMPHOCYTES NFR BLD: 29 % (ref 12–49)
MCH RBC QN AUTO: 30 PG (ref 26–34)
MCHC RBC AUTO-ENTMCNC: 32.5 G/DL (ref 30–36.5)
MCV RBC AUTO: 92.4 FL (ref 80–99)
MONOCYTES # BLD: 0.4 K/UL (ref 0–1)
MONOCYTES NFR BLD: 9 % (ref 5–13)
NEUTS SEG # BLD: 2.8 K/UL (ref 1.8–8)
NEUTS SEG NFR BLD: 58 % (ref 32–75)
NRBC # BLD: 0 K/UL (ref 0–0.01)
NRBC BLD-RTO: 0 PER 100 WBC
P-R INTERVAL, ECG05: 154 MS
PLATELET # BLD AUTO: 174 K/UL (ref 150–400)
PMV BLD AUTO: 12.2 FL (ref 8.9–12.9)
POTASSIUM SERPL-SCNC: 3.1 MMOL/L (ref 3.5–5.1)
PROT SERPL-MCNC: 6.9 G/DL (ref 6.4–8.2)
Q-T INTERVAL, ECG07: 420 MS
QRS DURATION, ECG06: 82 MS
QTC CALCULATION (BEZET), ECG08: 453 MS
RBC # BLD AUTO: 3.8 M/UL (ref 3.8–5.2)
SODIUM SERPL-SCNC: 142 MMOL/L (ref 136–145)
TROPONIN I SERPL-MCNC: <0.05 NG/ML
VENTRICULAR RATE, ECG03: 70 BPM
WBC # BLD AUTO: 4.7 K/UL (ref 3.6–11)

## 2021-09-01 PROCEDURE — 84484 ASSAY OF TROPONIN QUANT: CPT

## 2021-09-01 PROCEDURE — 99285 EMERGENCY DEPT VISIT HI MDM: CPT

## 2021-09-01 PROCEDURE — 93005 ELECTROCARDIOGRAM TRACING: CPT

## 2021-09-01 PROCEDURE — 36415 COLL VENOUS BLD VENIPUNCTURE: CPT

## 2021-09-01 PROCEDURE — 80053 COMPREHEN METABOLIC PANEL: CPT

## 2021-09-01 PROCEDURE — 74011250637 HC RX REV CODE- 250/637: Performed by: EMERGENCY MEDICINE

## 2021-09-01 PROCEDURE — 83880 ASSAY OF NATRIURETIC PEPTIDE: CPT

## 2021-09-01 PROCEDURE — 71045 X-RAY EXAM CHEST 1 VIEW: CPT

## 2021-09-01 PROCEDURE — 82550 ASSAY OF CK (CPK): CPT

## 2021-09-01 PROCEDURE — 85025 COMPLETE CBC W/AUTO DIFF WBC: CPT

## 2021-09-01 RX ORDER — AMLODIPINE BESYLATE 5 MG/1
10 TABLET ORAL
Status: COMPLETED | OUTPATIENT
Start: 2021-09-01 | End: 2021-09-01

## 2021-09-01 RX ORDER — AMLODIPINE BESYLATE 5 MG/1
5 TABLET ORAL DAILY
Qty: 30 TABLET | Refills: 1 | Status: SHIPPED | OUTPATIENT
Start: 2021-09-01 | End: 2021-10-01

## 2021-09-01 RX ADMIN — AMLODIPINE BESYLATE 10 MG: 5 TABLET ORAL at 19:00

## 2021-09-01 NOTE — ED NOTES
LUIS DANIEL Tong reviewed discharge instructions with the patient. The patient verbalized understanding. All questions and concerns were addressed. The patient declined a wheelchair and is discharged ambulatory in the care of family members with instructions and prescriptions in hand. Pt is alert and oriented x 4. Respirations are clear and unlabored.

## 2021-09-01 NOTE — DISCHARGE INSTRUCTIONS
Start taking Amlodipine 5 mg daily. If blood pressures stay above 140/90 range, start taking 10 mg daily. Follow up with your doctor. It was a pleasure taking care of you at [facility] Emergency Department today. We know that when you come to [facility], you are entrusting us with your health, comfort, and safety. Our physicians and nurses honor that trust, and we truly appreciate the opportunity to care for you and your loved ones. We also value your feedback. If you receive a survey about your Emergency Department experience today, please fill it out. We care about our patients' feedback, and we listen to what you have to say. Thank you!

## 2021-09-02 NOTE — ED PROVIDER NOTES
EMERGENCY DEPARTMENT HISTORY AND PHYSICAL EXAM      Date: 9/1/2021  Patient Name: Annmarie Wilkins  Patient Age and Sex: 78 y.o. female  MRN:  [id number]  CSN:  [id number]    History of Presenting Illness     Chief Complaint   Patient presents with    Shortness of Breath     Pt has been expriencing sob worse with exertion, swollen feet and elevated BP for about 3 weeks now       History Provided By: Patient    Ability to gather history was limited by:     HPI: Annmarie Wilkins, 78 y.o. female with history of hypertension, who used to be on hydrochlorothiazide but was taken off of it by her physician, complaining of mild vague fatigue and shortness of breath symptoms. She has noticed her blood pressure is increasing for at least a few weeks. Takes her blood pressure at home frequently. No significant chest pain symptoms. Location:    Quality:      Severity:    Duration:   Timing:      Context:    Modifying factors:   Associated symptoms:     Past History      The patient's medical, surgical, and social history on file were reviewed by me today.      The family history was reviewed by me today and was non-contributory, unless otherwise specified below:    Past Medical History:  Past Medical History:   Diagnosis Date    Allergic rhinitis due to allergen 10/22/2014    Arthritis     spine    Asthma     Chronic pain     back pain    Depression     Diabetes ([address]     Type II    GERD (gastroesophageal reflux disease)     Hypercholesterolemia     Hypertension     Kidney stones 1990's    Other ill-defined conditions(799.89)     GERD    Positive PPD 2009    treated with INH       Past Surgical History:  Past Surgical History:   Procedure Laterality Date    COLONOSCOPY  4-11    manetas- n ormal    EGD  4-11    manetas- esophageal ring    ENDOSCOPY, COLON, DIAGNOSTIC  12/2007    2 polyps    GERD TST W/ NASAL IMPEDENCE ELECTROD  1/27/2020    HX BREAST BIOPSY Bilateral 1980s    all benign cysts    HX CHOLECYSTECTOMY      HX GYN      tubal laparoscopy    HX HEART CATHETERIZATION      negatve    HX HEENT Bilateral 2018    cataract extraction    HX ORTHOPAEDIC  2016    had an injection in her back to help with leg pain    HX ORTHOPAEDIC Left 2017    foot surg. bunion removal  \"Put a plate in\"    HX OTHER SURGICAL      bunionectomy    HX TONSIL AND ADENOIDECTOMY      approx 11years old   [address]  's    abcess bilateral    NM ESOPHAGEAL MOTILITY STUDY W/INTERP&RPT  2020       Family History:  Family History   Problem Relation Age of Onset    Diabetes Mother     Hypertension Mother     Cancer Father         lung    Cancer Paternal Aunt         colon       Social History:  Social History     Tobacco Use    Smoking status: Former Smoker     Packs/day: 1.00     Years: 25.00     Pack years: 25.00     Quit date: 2010     Years since quittin.0    Smokeless tobacco: Never Used   Vaping Use    Vaping Use: Never used   Substance Use Topics    Alcohol use: No    Drug use: No       Current Medications:  No current facility-administered medications on file prior to encounter. Current Outpatient Medications on File Prior to Encounter   Medication Sig Dispense Refill    atorvastatin (LIPITOR) 10 mg tablet Take 1 Tablet by mouth daily. For cholesterol 90 Tablet 12    ondansetron (Zofran ODT) 4 mg disintegrating tablet 1 Tablet by SubLINGual route every eight (8) hours as needed for Nausea or Vomiting. 20 Tablet 0    escitalopram oxalate (LEXAPRO) 20 mg tablet Take 1 Tablet by mouth daily. 90 Tablet 3    glucose blood VI test strips (Accu-Chek Wendy Plus test strp) strip USE TO TEST BLOOD SUGAR ONCE DAILY. DIAGNOSIS E11.40 100 Strip 4    triamcinolone acetonide (KENALOG) 0.1 % topical cream Apply  to affected area two (2) times daily as needed.  primidone (MYSOLINE) 50 mg tablet Take 1 Tablet by mouth two (2) times a day.  180 Tablet 1  metFORMIN (GLUCOPHAGE) 1,000 mg tablet Take 1 tablet in AM and 1 tablet in PM. For Diabetes 180 Tablet 3    omeprazole (PRILOSEC) 40 mg capsule Take 1 Capsule by mouth every morning. 90 Capsule 3    alendronate (FOSAMAX) 35 mg tablet PLEASE SEE ATTACHED FOR DETAILED DIRECTIONS 12 Tablet 3    Blood-Glucose Meter (ACCU-CHEK SOFIA PLUS METER) misc Use as directed. E11.9 1 Each 0    lancets (ACCU-CHEK SOFTCLIX LANCETS) misc TEST 2 TIMES DAILY AS DIRECTED (E11.9) 200 Each 12    polyethylene glycol (MIRALAX) 17 gram packet Take 17 g by mouth daily as needed ('Constipation').  multivit,calc,mins/iron/folic (ONE-A-DAY WOMENS FORMULA PO) Take 1 Tab by mouth daily. Allergies: Allergies   Allergen Reactions    Latex Itching    Lisinopril Swelling    Atorvastatin Other (comments)     20 mg causes muscle cramps    Sulfa (Sulfonamide Antibiotics) Itching     Review of Systems    A complete ROS was reviewed by me today and was negative, unless otherwise specified below:    Review of Systems   Constitutional: Positive for fatigue. Negative for fever. Respiratory: Positive for chest tightness and shortness of breath. Cardiovascular: Positive for leg swelling. Negative for chest pain and palpitations. Gastrointestinal: Negative for abdominal pain. All other systems reviewed and are negative. Physical Exam   Vital Signs  Patient Vitals for the past 8 hrs:   Pulse Resp BP SpO2   09/01/21 1900 61 -- (!) 165/76 --   09/01/21 1830 61 19 (!) 163/77 97 %   09/01/21 1800 60 16 (!) 170/79 97 %   09/01/21 1732 -- -- -- 98 %   09/01/21 1730 69 14 (!) 179/86 98 %          Physical Exam  Vitals and nursing note reviewed. Constitutional:       General: She is not in acute distress. Appearance: Normal appearance. She is well-developed. She is not ill-appearing. HENT:      Head: Normocephalic and atraumatic.       Mouth/Throat:      Mouth: Mucous membranes are moist.   Eyes:      General: Right eye: No discharge. Left eye: No discharge. Conjunctiva/sclera: Conjunctivae normal.   Cardiovascular:      Rate and Rhythm: Normal rate and regular rhythm. Heart sounds: Normal heart sounds. No murmur heard. Pulmonary:      Effort: Pulmonary effort is normal. No respiratory distress. Breath sounds: Normal breath sounds. No wheezing. Abdominal:      General: There is no distension. Palpations: Abdomen is soft. Tenderness: There is no abdominal tenderness. Musculoskeletal:         General: No deformity. Normal range of motion. Cervical back: Normal range of motion and neck supple. Right lower leg: No edema. Left lower leg: No edema. Skin:     General: Skin is warm and dry. Findings: No rash. Neurological:      General: No focal deficit present. Mental Status: She is alert and oriented to person, place, and time.    Psychiatric:         Speech: Speech normal.         Behavior: Behavior normal.         Cognition and Memory: Cognition normal.         Diagnostic Study Results   Labs  Recent Results (from the past 24 hour(s))   EKG, 12 LEAD, INITIAL    Collection Time: 09/01/21  3:20 PM   Result Value Ref Range    Ventricular Rate 70 BPM    Atrial Rate 70 BPM    P-R Interval 154 ms    QRS Duration 82 ms    Q-T Interval 420 ms    QTC Calculation (Bezet) 453 ms    Calculated P Axis 62 degrees    Calculated R Axis 13 degrees    Calculated T Axis 39 degrees    Diagnosis       Normal sinus rhythm  Possible Left atrial enlargement  Septal infarct (cited on or before 01-SEP-2021)  When compared with ECG of 29-JUL-2018 10:42,  No significant change was found  Confirmed by Kemi Ocasio ([id number]) on 9/1/2021 9:09:59 PM     CBC WITH AUTOMATED DIFF    Collection Time: 09/01/21  3:43 PM   Result Value Ref Range    WBC 4.7 3.6 - 11.0 K/uL    RBC 3.80 3.80 - 5.20 M/uL    HGB 11.4 (L) 11.5 - 16.0 g/dL    HCT 35.1 35.0 - 47.0 %    MCV 92.4 80.0 - 99.0 FL    MCH 30.0 26.0 - 34.0 PG    MCHC 32.5 30.0 - 36.5 g/dL    RDW 15.5 (H) 11.5 - 14.5 %    PLATELET 283 492 - 294 K/uL    MPV 12.2 8.9 - 12.9 FL    NRBC 0.0 0  WBC    ABSOLUTE NRBC 0.00 0.00 - 0.01 K/uL    NEUTROPHILS 58 32 - 75 %    LYMPHOCYTES 29 12 - 49 %    MONOCYTES 9 5 - 13 %    EOSINOPHILS 3 0 - 7 %    BASOPHILS 1 0 - 1 %    IMMATURE GRANULOCYTES 0 0.0 - 0.5 %    ABS. NEUTROPHILS 2.8 1.8 - 8.0 K/UL    ABS. LYMPHOCYTES 1.4 0.8 - 3.5 K/UL    ABS. MONOCYTES 0.4 0.0 - 1.0 K/UL    ABS. EOSINOPHILS 0.2 0.0 - 0.4 K/UL    ABS. BASOPHILS 0.0 0.0 - 0.1 K/UL    ABS. IMM. GRANS. 0.0 0.00 - 0.04 K/UL    DF AUTOMATED     METABOLIC PANEL, COMPREHENSIVE    Collection Time: 09/01/21  3:43 PM   Result Value Ref Range    Sodium 142 136 - 145 mmol/L    Potassium 3.1 (L) 3.5 - 5.1 mmol/L    Chloride 109 (H) 97 - 108 mmol/L    CO2 28 21 - 32 mmol/L    Anion gap 5 5 - 15 mmol/L    Glucose 91 65 - 100 mg/dL    BUN 9 6 - 20 MG/DL    Creatinine 0.97 0.55 - 1.02 MG/DL    BUN/Creatinine ratio 9 (L) 12 - 20      GFR est AA >60 >60 ml/min/1.73m2    GFR est non-AA 57 (L) >60 ml/min/1.73m2    Calcium 8.9 8.5 - 10.1 MG/DL    Bilirubin, total 0.3 0.2 - 1.0 MG/DL    ALT (SGPT) 25 12 - 78 U/L    AST (SGOT) 19 15 - 37 U/L    Alk. phosphatase 78 45 - 117 U/L    Protein, total 6.9 6.4 - 8.2 g/dL    Albumin 3.1 (L) 3.5 - 5.0 g/dL    Globulin 3.8 2.0 - 4.0 g/dL    A-G Ratio 0.8 (L) 1.1 - 2.2     CK W/ REFLX CKMB    Collection Time: 09/01/21  3:43 PM   Result Value Ref Range    CK 98 26 - 192 U/L   TROPONIN I    Collection Time: 09/01/21  3:43 PM   Result Value Ref Range    Troponin-I, Qt. <0.05 <0.05 ng/mL   NT-PRO BNP    Collection Time: 09/01/21  3:43 PM   Result Value Ref Range    NT pro-BNP 87 <125 PG/ML       Radiologic Studies  XR CHEST PORT   Final Result   Clear lungs. CT Results  (Last 48 hours)    None        CXR Results  (Last 48 hours)               09/01/21 1647  XR CHEST PORT Final result    Impression:  Clear lungs.        Narrative: PORTABLE CHEST RADIOGRAPH/S: 9/1/2021 4:47 PM       INDICATION: Dyspnea. COMPARISON: 3/18/2021, 12/9/2019. TECHNIQUE: Portable frontal upright radiograph/s of the chest.       FINDINGS:    The lungs are clear. The central airways are patent. No pneumothorax or pleural   effusion. Incidental note is made of bilateral hemidiaphragm eventration. Cholecystectomy clips in the right upper quadrant. Billable Procedures   EKG    Date/Time: 9/1/2021 11:42 PM  Performed by: Rina Garcia MD  Authorized by: Rina Garcia MD     ECG reviewed by ED Physician in the absence of a cardiologist: yes    Interpretation:     Interpretation: non-specific    Rate:     ECG rate assessment: normal    Rhythm:     Rhythm: sinus rhythm    Ectopy:     Ectopy: none    QRS:     QRS axis:  Normal  ST segments:     ST segments:  Normal  T waves:     T waves: normal          Cardiac monitoring was not ordered for this patient. Medical Decision Making     I reviewed the patient's most recent Emergency Dept notes and diagnostic tests in formulating my MDM on today's visit. Provider Notes (Medical Decision Making):   30-year-old female presenting with mild vague shortness of breath and fatigue for a few weeks in the setting of rising blood pressures. No longer taking hydrochlorothiazide as she used to. Patient is clinically very well-appearing, no apparent distress or dyspnea. Normal vital signs. Normal cardiopulmonary exam.  She does not have concerning appreciable edema. Laboratories are all reassuring. Normal electrolytes and kidney function. Undetectable troponin. Nonischemic EKG. Chest x-ray is clear. No concern for ACS, MI, PE, pulmonary edema, CHF, or pulmonary embolism or infectious process. Seems to be uncomplicated hypertensive urgency causing her symptoms.   She preferred to try different medication other than hydrochlorothiazide, and I prescribed a trial of amlodipine and she will continue to monitor her blood pressures and follow-up with primary care. Javier Gilmore MD  11:42 PM  2021     Consults:    Social History     Tobacco Use    Smoking status: Former Smoker     Packs/day: 1.00     Years: 25.00     Pack years: 25.00     Quit date: 2010     Years since quittin.0    Smokeless tobacco: Never Used   Vaping Use    Vaping Use: Never used   Substance Use Topics    Alcohol use: No    Drug use: No       Medications Administered during ED course:  Medications   amLODIPine (NORVASC) tablet 10 mg (10 mg Oral Given 21 1900)          Prescriptions from today's ED visit:  Discharge Medication List as of 2021  5:49 PM      START taking these medications    Details   amLODIPine (NORVASC) 5 mg tablet Take 1 Tablet by mouth daily for 30 days. , Normal, Disp-30 Tablet, R-1         CONTINUE these medications which have NOT CHANGED    Details   atorvastatin (LIPITOR) 10 mg tablet Take 1 Tablet by mouth daily. For cholesterol, Normal, Disp-90 Tablet, R-12      ondansetron (Zofran ODT) 4 mg disintegrating tablet 1 Tablet by SubLINGual route every eight (8) hours as needed for Nausea or Vomiting., Normal, Disp-20 Tablet, R-0      escitalopram oxalate (LEXAPRO) 20 mg tablet Take 1 Tablet by mouth daily. , Normal, Disp-90 Tablet, R-3      glucose blood VI test strips (Accu-Chek Wendy Plus test strp) strip USE TO TEST BLOOD SUGAR ONCE DAILY.   DIAGNOSIS E11.40, Normal, Disp-100 Strip, R-4DX E11.40  PT REQUEST PRESCRIPTION FOR TEST STRIPS   WENDY PLUS   TEST TID.      triamcinolone acetonide (KENALOG) 0.1 % topical cream Apply  to affected area two (2) times daily as needed., Historical Med      primidone (MYSOLINE) 50 mg tablet Take 1 Tablet by mouth two (2) times a day., Normal, Disp-180 Tablet, R-1      metFORMIN (GLUCOPHAGE) 1,000 mg tablet Take 1 tablet in AM and 1 tablet in PM. For Diabetes, Normal, Disp-180 Tablet, R-3      omeprazole (PRILOSEC) 40 mg capsule Take 1 Capsule by mouth every morning., Normal, Disp-90 Capsule, R-3      alendronate (FOSAMAX) 35 mg tablet PLEASE SEE ATTACHED FOR DETAILED DIRECTIONS, Normal, Disp-12 Tablet, R-3Take with 8 ounces of water on empty stomach. Remain upright for 30 minutes afterwards. Take once weekly      Blood-Glucose Meter (ACCU-CHEK SOFIA PLUS METER) misc Use as directed. E11.9, Normal, Disp-1 Each, R-0      lancets (ACCU-CHEK SOFTCLIX LANCETS) misc TEST 2 TIMES DAILY AS DIRECTED (E11.9), Normal, Disp-200 Each, R-12      polyethylene glycol (MIRALAX) 17 gram packet Take 17 g by mouth daily as needed ('Constipation'). , Historical Med      multivit,calc,mins/iron/folic (ONE-A-DAY WOMENS FORMULA PO) Take 1 Tab by mouth daily. , Historical Med            Diagnosis and Disposition     Disposition:  Discharged    Clinical Impression:   1. Hypertensive urgency        Attestation:  I personally performed the services described in this documentation on this date 9/1/2021 for patient Lorna Mcclain. Paul Baker MD        I was the first provider for this patient on this visit. To the best of my ability I reviewed relevant prior medical records, electrocardiograms, laboratories, and radiologic studies. The patient's presenting problems were discussed, and the patient was in agreement with the care plan formulated and outlined with them. Paul Baker MD    Please note that this dictation was completed with Dragon voice recognition software. Quite often unanticipated grammatical, syntax, homophones, and other interpretive errors are inadvertently transcribed by the computer software. Please disregard these errors and excuse any errors that have escaped final proofreading.

## 2021-10-12 RX ORDER — CITALOPRAM 20 MG/1
TABLET, FILM COATED ORAL
Qty: 90 TABLET | Refills: 3 | Status: SHIPPED | OUTPATIENT
Start: 2021-10-12 | End: 2021-10-25

## 2021-10-25 NOTE — PERIOP NOTES
[facility]  Ambulatory Surgery Unit  Pre-operative Instructions for Endo Procedures    Procedure Date  11/4            Tentative Arrival Time tbd      1. On the day of your procedure, please report to the Ambulatory Surgery Unit Registration Desk and sign in at your designated time. The Ambulatory Surgery Unit is located in [facility] on the [facility] side of the [facility] across from the [facility]. Please have all of your health insurance cards and a photo ID. 2. You must have someone with you to drive you home, as you should not drive a car for 24 hours following anesthesia. Please make arrangements for a responsible adult friend or family member to stay with you for at least the first 24 hours after your procedure. 3. Do not have anything to eat or drink (including water, gum, mints, coffee, juice) after 11:59 PM 11/3. This may not apply to medications prescribed by your physician. (Please note below the special instructions with medications to take the morning of your procedure.)    4. If applicable, follow the clear liquid diet and bowel prep instructions provided by your physician's office. If you do not have this information, or have any questions, please contact your physician's office. 5. We recommend you do not drink any alcoholic beverages for 24 hours before and after your procedure. 6. Contact your surgeons office for instructions on the following medications: non-steroidal anti-inflammatory drugs (i.e. Advil, Aleve), vitamins, and supplements. (Some surgeons will want you to stop these medications prior to surgery and others may allow you to take them)   **If you are currently taking Plavix, Coumadin, Aspirin and/or other blood-thinning agents, contact your surgeon for instructions. ** Your surgeon will partner with the physician prescribing these medications to determine if it is safe to stop or if you need to continue taking.  Please do not stop taking these medications without instructions from your surgeon. 7. In an effort to help prevent surgical site infection, we ask that you shower with an anti-bacterial soap (i.e. Dial or Safeguard) on the morning of your procedure. Do not apply any lotions, powders, or deodorants after showering. 8. Wear comfortable clothes. Wear glasses instead of contacts. Do not bring any jewelry or money (other than copays or fees as instructed). Do not wear make-up, particularly mascara, the morning of your procedure. Wear your hair loose or down, no ponytails, buns, afshin pins or clips. All body piercings must be removed. 9. You should understand that if you do not follow these instructions your procedure may be cancelled. If your physical condition changes (i.e. fever, cold or flu) please contact your surgeon as soon as possible. 10. It is important that you be on time. If a situation occurs where you may be late, or if you have any questions or problems, please call [phone]. 11. Your procedure time may be subject to change. You will receive a phone call the day prior to confirm your arrival time. Special Instructions: Take all medications and inhalers, as prescribed, on the morning of surgery with a sip of water EXCEPT: diabetic medications      Insulin Dependent Diabetic patients: Take your diabetic medications as prescribed the day before surgery. Hold all diabetic medications the day of surgery. If you are scheduled to arrive for surgery after 8:00 AM, and your AM blood sugar is >200, please call Ambulatory Surgery. I understand a pre-operative phone call will be made to verify my procedure time. In the event that I am not available, I give permission for a message to be left on my answering service and/or with another person? yes    Reviewed by phone with pt, verbalized understanding.   covid testing mon 11/1 7-1145am   ___________________      ___________________ ___________________  (Signature of Patient)          (Witness)                   (Date and Time)

## 2021-11-01 ENCOUNTER — HOSPITAL ENCOUNTER (OUTPATIENT)
Dept: PREADMISSION TESTING | Age: 71
Discharge: HOME | End: 2021-11-01

## 2021-11-01 ENCOUNTER — HOSPITAL ENCOUNTER (OUTPATIENT)
Dept: PREADMISSION TESTING | Age: 71
Discharge: HOME | End: 2021-11-01
Payer: MEDICARE

## 2021-11-01 ENCOUNTER — TRANSCRIBE ORDER (OUTPATIENT)
Dept: REGISTRATION | Age: 71
End: 2021-11-01

## 2021-11-01 DIAGNOSIS — Z01.812 PRE-PROCEDURE LAB EXAM: Primary | ICD-10-CM

## 2021-11-01 DIAGNOSIS — Z01.812 PRE-PROCEDURE LAB EXAM: ICD-10-CM

## 2021-11-01 PROCEDURE — U0005 INFEC AGEN DETEC AMPLI PROBE: HCPCS

## 2021-11-01 NOTE — PERIOP NOTES
no show for COVID test.  Called pt. Plans to go to [facility] by 3pm today for testing. Notified office.

## 2021-11-03 ENCOUNTER — ANESTHESIA EVENT (OUTPATIENT)
Dept: SURGERY | Age: 71
End: 2021-11-03
Payer: MEDICARE

## 2021-11-03 LAB
SARS-COV-2, XPLCVT: NOT DETECTED
SOURCE, COVRS: NORMAL

## 2021-11-04 ENCOUNTER — ANESTHESIA (OUTPATIENT)
Dept: SURGERY | Age: 71
End: 2021-11-04
Payer: MEDICARE

## 2021-11-04 ENCOUNTER — HOSPITAL ENCOUNTER (OUTPATIENT)
Age: 71
Setting detail: OUTPATIENT SURGERY
Discharge: HOME | End: 2021-11-04
Attending: SPECIALIST | Admitting: SPECIALIST
Payer: MEDICARE

## 2021-11-04 VITALS
RESPIRATION RATE: 20 BRPM | TEMPERATURE: 97.5 F | HEIGHT: 62 IN | BODY MASS INDEX: 27.23 KG/M2 | WEIGHT: 148 LBS | OXYGEN SATURATION: 98 % | HEART RATE: 75 BPM | DIASTOLIC BLOOD PRESSURE: 67 MMHG | SYSTOLIC BLOOD PRESSURE: 132 MMHG

## 2021-11-04 LAB
GLUCOSE BLD STRIP.AUTO-MCNC: 102 MG/DL (ref 65–117)
GLUCOSE BLD STRIP.AUTO-MCNC: 104 MG/DL (ref 65–117)
SERVICE CMNT-IMP: NORMAL
SERVICE CMNT-IMP: NORMAL

## 2021-11-04 PROCEDURE — 88305 TISSUE EXAM BY PATHOLOGIST: CPT

## 2021-11-04 PROCEDURE — 77030013992 HC SNR POLYP ENDOSC BSC -B: Performed by: SPECIALIST

## 2021-11-04 PROCEDURE — 2709999900 HC NON-CHARGEABLE SUPPLY: Performed by: SPECIALIST

## 2021-11-04 PROCEDURE — 74011250636 HC RX REV CODE- 250/636: Performed by: ANESTHESIOLOGY

## 2021-11-04 PROCEDURE — 74011250637 HC RX REV CODE- 250/637: Performed by: SPECIALIST

## 2021-11-04 PROCEDURE — 77030021593 HC FCPS BIOP ENDOSC BSC -A: Performed by: SPECIALIST

## 2021-11-04 PROCEDURE — 74011250636 HC RX REV CODE- 250/636: Performed by: NURSE ANESTHETIST, CERTIFIED REGISTERED

## 2021-11-04 PROCEDURE — 76210000046 HC AMBSU PH II REC FIRST 0.5 HR: Performed by: SPECIALIST

## 2021-11-04 PROCEDURE — 74011000250 HC RX REV CODE- 250: Performed by: NURSE ANESTHETIST, CERTIFIED REGISTERED

## 2021-11-04 PROCEDURE — 76060000061 HC AMB SURG ANES 0.5 TO 1 HR: Performed by: SPECIALIST

## 2021-11-04 PROCEDURE — 76030000000 HC AMB SURG OR TIME 0.5 TO 1: Performed by: SPECIALIST

## 2021-11-04 PROCEDURE — 82962 GLUCOSE BLOOD TEST: CPT

## 2021-11-04 PROCEDURE — 76210000040 HC AMBSU PH I REC FIRST 0.5 HR: Performed by: SPECIALIST

## 2021-11-04 PROCEDURE — 77030021352 HC CBL LD SYS DISP COVD -B: Performed by: SPECIALIST

## 2021-11-04 RX ORDER — PROPOFOL 10 MG/ML
INJECTION, EMULSION INTRAVENOUS AS NEEDED
Status: DISCONTINUED | OUTPATIENT
Start: 2021-11-04 | End: 2021-11-04 | Stop reason: HOSPADM

## 2021-11-04 RX ORDER — SODIUM CHLORIDE 0.9 % (FLUSH) 0.9 %
5-40 SYRINGE (ML) INJECTION EVERY 8 HOURS
Status: DISCONTINUED | OUTPATIENT
Start: 2021-11-04 | End: 2021-11-04 | Stop reason: HOSPADM

## 2021-11-04 RX ORDER — SODIUM CHLORIDE 0.9 % (FLUSH) 0.9 %
5-40 SYRINGE (ML) INJECTION AS NEEDED
Status: DISCONTINUED | OUTPATIENT
Start: 2021-11-04 | End: 2021-11-04 | Stop reason: HOSPADM

## 2021-11-04 RX ORDER — SODIUM CHLORIDE, SODIUM LACTATE, POTASSIUM CHLORIDE, CALCIUM CHLORIDE 600; 310; 30; 20 MG/100ML; MG/100ML; MG/100ML; MG/100ML
25 INJECTION, SOLUTION INTRAVENOUS CONTINUOUS
Status: DISCONTINUED | OUTPATIENT
Start: 2021-11-04 | End: 2021-11-04 | Stop reason: HOSPADM

## 2021-11-04 RX ORDER — HYDROMORPHONE HYDROCHLORIDE 1 MG/ML
.2-.5 INJECTION, SOLUTION INTRAMUSCULAR; INTRAVENOUS; SUBCUTANEOUS
Status: DISCONTINUED | OUTPATIENT
Start: 2021-11-04 | End: 2021-11-04 | Stop reason: HOSPADM

## 2021-11-04 RX ORDER — SODIUM CHLORIDE 9 MG/ML
50 INJECTION, SOLUTION INTRAVENOUS CONTINUOUS
Status: DISCONTINUED | OUTPATIENT
Start: 2021-11-04 | End: 2021-11-04 | Stop reason: HOSPADM

## 2021-11-04 RX ORDER — LIDOCAINE HYDROCHLORIDE 20 MG/ML
INJECTION, SOLUTION EPIDURAL; INFILTRATION; INTRACAUDAL; PERINEURAL AS NEEDED
Status: DISCONTINUED | OUTPATIENT
Start: 2021-11-04 | End: 2021-11-04 | Stop reason: HOSPADM

## 2021-11-04 RX ORDER — MORPHINE SULFATE 10 MG/ML
2 INJECTION, SOLUTION INTRAMUSCULAR; INTRAVENOUS
Status: DISCONTINUED | OUTPATIENT
Start: 2021-11-04 | End: 2021-11-04 | Stop reason: HOSPADM

## 2021-11-04 RX ORDER — FENTANYL CITRATE 50 UG/ML
25 INJECTION, SOLUTION INTRAMUSCULAR; INTRAVENOUS
Status: DISCONTINUED | OUTPATIENT
Start: 2021-11-04 | End: 2021-11-04 | Stop reason: HOSPADM

## 2021-11-04 RX ORDER — ONDANSETRON 2 MG/ML
4 INJECTION INTRAMUSCULAR; INTRAVENOUS AS NEEDED
Status: DISCONTINUED | OUTPATIENT
Start: 2021-11-04 | End: 2021-11-04 | Stop reason: HOSPADM

## 2021-11-04 RX ORDER — DIPHENHYDRAMINE HYDROCHLORIDE 50 MG/ML
12.5 INJECTION, SOLUTION INTRAMUSCULAR; INTRAVENOUS AS NEEDED
Status: DISCONTINUED | OUTPATIENT
Start: 2021-11-04 | End: 2021-11-04 | Stop reason: HOSPADM

## 2021-11-04 RX ORDER — DEXTROMETHORPHAN/PSEUDOEPHED 2.5-7.5/.8
1.2 DROPS ORAL
Status: DISCONTINUED | OUTPATIENT
Start: 2021-11-04 | End: 2021-11-04 | Stop reason: HOSPADM

## 2021-11-04 RX ORDER — LIDOCAINE HYDROCHLORIDE 10 MG/ML
0.1 INJECTION, SOLUTION EPIDURAL; INFILTRATION; INTRACAUDAL; PERINEURAL AS NEEDED
Status: DISCONTINUED | OUTPATIENT
Start: 2021-11-04 | End: 2021-11-04 | Stop reason: HOSPADM

## 2021-11-04 RX ADMIN — PROPOFOL INJECTABLE EMULSION 100 MG: 10 INJECTION, EMULSION INTRAVENOUS at 09:43

## 2021-11-04 RX ADMIN — PROPOFOL INJECTABLE EMULSION 50 MG: 10 INJECTION, EMULSION INTRAVENOUS at 09:51

## 2021-11-04 RX ADMIN — PROPOFOL INJECTABLE EMULSION 100 MG: 10 INJECTION, EMULSION INTRAVENOUS at 09:47

## 2021-11-04 RX ADMIN — LIDOCAINE HYDROCHLORIDE 60 MG: 20 INJECTION, SOLUTION EPIDURAL; INFILTRATION; INTRACAUDAL; PERINEURAL at 09:32

## 2021-11-04 RX ADMIN — PROPOFOL INJECTABLE EMULSION 100 MG: 10 INJECTION, EMULSION INTRAVENOUS at 09:36

## 2021-11-04 RX ADMIN — BENZOCAINE 1 SPRAY: 200 SPRAY DENTAL; ORAL; PERIODONTAL at 09:31

## 2021-11-04 RX ADMIN — SODIUM CHLORIDE, POTASSIUM CHLORIDE, SODIUM LACTATE AND CALCIUM CHLORIDE: 600; 310; 30; 20 INJECTION, SOLUTION INTRAVENOUS at 09:25

## 2021-11-04 RX ADMIN — PROPOFOL INJECTABLE EMULSION 50 MG: 10 INJECTION, EMULSION INTRAVENOUS at 09:56

## 2021-11-04 RX ADMIN — PROPOFOL INJECTABLE EMULSION 50 MG: 10 INJECTION, EMULSION INTRAVENOUS at 09:32

## 2021-11-04 NOTE — PERIOP NOTES
Permission received to review discharge instructions and discuss private health information with niece Enzo Bence  and will have someone with them after discharge

## 2021-11-04 NOTE — PROCEDURES
Colonoscopy Procedure Note    Indications:   Personal history of colon polyps (screening only)    Referring Physician: Alcides Smith MD  Anesthesia/Sedation: MAC anesthesia Propofol  Endoscopist:  Dr. Mike Castillo    Procedure in Detail:  Informed consent was obtained for the procedure, including sedation. Risks of perforation, hemorrhage, adverse drug reaction, and aspiration were discussed. The patient was placed in the left lateral decubitus position. Based on the pre-procedure assessment, including review of the patient's medical history, medications, allergies, and review of systems, she had been deemed to be an appropriate candidate for moderate sedation; she was therefore sedated with the medications listed above. The patient was monitored continuously with ECG tracing, pulse oximetry, blood pressure monitoring, and direct observations. A rectal examination was performed. The [id number] was inserted into the rectum and advanced under direct vision to the terminal ileum. The quality of the colonic preparation was adequate. A careful inspection was made as the colonoscope was withdrawn, including a retroflexed view of the rectum; findings and interventions are described below. Appropriate photodocumentation was obtained. Findings:   1. Scope advanced to the cecum and terminal ileum. 2.  There was a sessile polyp located in the transverse colon s/p cold snare removal in 2 pieces. 3.  Normal mucosa was visualized throughout. 4.  Small internal hemorrhoids. Therapies:  See above    Specimen: Specimens were collected as described above and sent to pathology. Complications: None were encountered during the procedure. EBL: < 10 ml.     Recommendations:   -f/u path  -repeat colonoscopy in 5 years    Signed By: Bessy Hamilton MD                        November 4, 2021

## 2021-11-04 NOTE — ANESTHESIA PREPROCEDURE EVALUATION
Relevant Problems   NEUROLOGY   (+) Depression   (+) Major depressive disorder, recurrent, mild   (+) Major depressive disorder, recurrent, moderate   (+) Major depressive disorder, recurrent, unspecified      CARDIOVASCULAR   (+) HTN (hypertension)      ENDOCRINE   (+) DM (diabetes mellitus) (HCC)   (+) Type 2 diabetes mellitus with diabetic neuropathy (HCC)   (+) Type 2 diabetes with nephropathy (HCC)       Anesthetic History   No history of anesthetic complications            Review of Systems / Medical History  Patient summary reviewed, nursing notes reviewed and pertinent labs reviewed    Pulmonary            Asthma : well controlled       Neuro/Psych         Psychiatric history     Cardiovascular    Hypertension              Exercise tolerance: >4 METS     GI/Hepatic/Renal     GERD: well controlled           Endo/Other    Diabetes: type 2    Arthritis     Other Findings              Physical Exam    Airway  Mallampati: II  TM Distance: 4 - 6 cm  Neck ROM: normal range of motion   Mouth opening: Normal     Cardiovascular  Regular rate and rhythm,  S1 and S2 normal,  no murmur, click, rub, or gallop  Rhythm: regular  Rate: normal         Dental  No notable dental hx       Pulmonary  Breath sounds clear to auscultation               Abdominal  GI exam deferred       Other Findings            Anesthetic Plan    ASA: 2  Anesthesia type: MAC          Induction: Intravenous  Anesthetic plan and risks discussed with: Patient

## 2021-11-04 NOTE — PROCEDURES
Esophagogastroduodenoscopy Procedure Note      Yosvany Rehman  1950  [id number]    Indication:  GERD     Endoscopist: Marifer Yanez MD    Referring Provider:  Tasia Ramos MD    Sedation:  MAC anesthesia Propofol    Procedure Details:  After infomed consent was obtained for the procedure, with all risks and benefits of procedure explained the patient was taken to the endoscopy suite and placed in the left lateral decubitus position. Following sequential administration of sedation as per above, the endoscope was inserted into the mouth and advanced under direct vision to second portion of the duodenum. A careful inspection was made as the gastroscope was withdrawn, including a retroflexed view of the proximal stomach; findings and interventions are described below. Findings:     Esophagus:   + There was normal mucosa in proximal, mid and distal esophagus.  + Irregular Z line located at 38 cm from the incisors s/p Bx. Stomach:   + There was mild erythema in gastric antrum without any erosions s/p bx. Duodenum:   - The bulb and post bulbar mucosa is normal in appearance to the second portion. The duodenal folds appeared normal.  Cold forceps biopsies to r/o celiac. Therapies:  As above    Specimen: Specimens were collected as described and send to the laboratory. Complications:   None were encountered during the procedure. EBL: < 10 ml.           Recommendations:   -f/u path  -continue acid suppression, gastroparesis diet      Marifer Yanez MD  11/4/2021  10:02 AM

## 2021-11-04 NOTE — H&P
Gastroenterology Outpatient History and Physical    Patient: Reagan Been    Physician: Mckinley Kirby MD    Vital Signs: Blood pressure 131/61, pulse 68, temperature 97.8 °F (36.6 °C), resp. rate 17, height 5' 2\" (1.575 m), weight 67.1 kg (148 lb), SpO2 100 %, not currently breastfeeding. Allergies: Allergies   Allergen Reactions    Latex Itching    Lisinopril Swelling    Atorvastatin Other (comments)     20 mg causes muscle cramps    Sulfa (Sulfonamide Antibiotics) Itching       Chief Complaint: GERD, H/O Polyps    History of Present Illness: 74 yo BF for EGD for GERD, RUQ pain. Colonoscopy for h/o polyps. Justification for Procedure: above    History:  Past Medical History:   Diagnosis Date    Allergic rhinitis due to allergen 10/22/2014    Arthritis     spine    Asthma     Chronic pain     back pain    Depression     Diabetes ([address]     Type II    GERD (gastroesophageal reflux disease)     Hypercholesterolemia     Hypertension     Kidney stones 1990's    Positive PPD 2009    treated with INH      Past Surgical History:   Procedure Laterality Date    COLONOSCOPY  4-11    manetas- n ormal    EGD  4-11    manetas- esophageal ring    ENDOSCOPY, COLON, DIAGNOSTIC  12/2007    2 polyps    GERD TST W/ NASAL IMPEDENCE ELECTROD  1/27/2020    HX BREAST BIOPSY Bilateral 1980s    all benign cysts    HX BUNIONECTOMY Bilateral 1990s    HX CATARACT REMOVAL Bilateral 11/2018    HX CHOLECYSTECTOMY      HX GYN  1978    tubal laparoscopy    HX HEART CATHETERIZATION      negatve    HX ORTHOPAEDIC  2016    had an injection in her back to help with leg pain    HX ORTHOPAEDIC Left 12/12/2017    foot surg.   bunion removal  \"Put a plate in\"    HX OTHER SURGICAL  1997    bunionectomy    HX TONSIL AND ADENOIDECTOMY      approx 11years old    DC BREAST SURGERY PROCEDURE UNLISTED  1990's    abcess bilateral    DC ESOPHAGEAL MOTILITY STUDY W/INTERP&RPT  1/27/2020      Social History Socioeconomic History    Marital status: SINGLE   Tobacco Use    Smoking status: Former Smoker     Packs/day: 1.00     Years: 25.00     Pack years: 25.00     Quit date: 2010     Years since quittin.2    Smokeless tobacco: Never Used   Vaping Use    Vaping Use: Never used   Substance and Sexual Activity    Alcohol use: No    Drug use: No   Social History Narrative    Retired schoolteacher. Raised 2 nieces. Family History   Problem Relation Age of Onset    Diabetes Mother     Hypertension Mother     Cancer Father         lung    Cancer Paternal Aunt         colon       Medications:   Prior to Admission medications    Medication Sig Start Date End Date Taking? Authorizing Provider   atorvastatin (LIPITOR) 10 mg tablet Take 1 Tablet by mouth daily. For cholesterol 21  Yes Charlee Botello MD   triamcinolone acetonide (KENALOG) 0.1 % topical cream Apply  to affected area two (2) times daily as needed. 21  Yes Provider, Historical   primidone (MYSOLINE) 50 mg tablet Take 1 Tablet by mouth two (2) times a day. 21  Yes Helga Silva MD   metFORMIN (GLUCOPHAGE) 1,000 mg tablet Take 1 tablet in AM and 1 tablet in PM. For Diabetes 6/15/21  Yes Charlee Botello MD   omeprazole (PRILOSEC) 40 mg capsule Take 1 Capsule by mouth every morning. 6/15/21  Yes Charlee Botello MD   alendronate (FOSAMAX) 35 mg tablet PLEASE SEE ATTACHED FOR DETAILED DIRECTIONS 6/15/21  Yes Charlee Botello MD   polyethylene glycol (MIRALAX) 17 gram packet Take 17 g by mouth daily as needed ('Constipation'). Yes Provider, Historical   multivit,calc,mins/iron/folic (ONE-A-DAY WOMENS FORMULA PO) Take 1 Tab by mouth daily. Yes Provider, Historical   ondansetron (Zofran ODT) 4 mg disintegrating tablet 1 Tablet by SubLINGual route every eight (8) hours as needed for Nausea or Vomiting.   Patient not taking: Reported on 10/25/2021 7/21/21   Charlee Botello MD   glucose blood VI test strips (Accu-Chek Wendy Plus test strp) strip USE TO TEST BLOOD SUGAR ONCE DAILY. DIAGNOSIS E11.40 7/19/21   Trino Jeff MD   Blood-Glucose Meter (ACCU-CHEK WENDY PLUS METER) misc Use as directed. E11.9 2/14/20   Trino Jeff MD   lancets (ACCU-CHEK SOFTCLIX LANCETS) misc TEST 2 TIMES DAILY AS DIRECTED (E11.9) 12/12/18   Trino Jeff MD       Physical Exam:   General: alert, no distress   HEENT: Head: Normocephalic, no lesions, without obvious abnormality.    Heart: regular rate and rhythm, S1, S2 normal, no murmur, click, rub or gallop   Lungs: chest clear, no wheezing, rales, normal symmetric air entry   Abdominal: soft, NT/ND+ BS   Neurological: Grossly normal   Extremities: extremities normal, atraumatic, no cyanosis or edema     Findings/Diagnosis: GERD H/O polyps    Plan of Care/Planned Procedure: EGD & Colonoscopy

## 2021-11-04 NOTE — PERIOP NOTES
Antony Habermann  1950  [id number]    Situation:  Verbal report given from: SADIE Rodarte CRNA, Kristie Iniguez RN  Procedure: Procedure(s):  COLONOSCOPY, EGD  ESOPHAGOGASTRODUODENOSCOPY (EGD)  ESOPHAGOGASTRODUODENAL (EGD) BIOPSY  ENDOSCOPIC POLYPECTOMY    Background:    Preoperative diagnosis: PERSONAL HISTORY POLYPS, GASTRITIS    Postoperative diagnosis: Colon Polyp    :  Dr. Boogie Soto    Assistant(s): Endoscopy Technician-1: Nery Gonzalez  Scrub Tech-1: Susi Metzger    Specimens:   ID Type Source Tests Collected by Time Destination   1 : Duodenum Bx Preservative Duodenum  Kemar Gregg MD 11/4/2021 9266 Pathology   2 : Gastric Bx Preservative Gastric  Kemar Gregg MD 11/4/2021 5987 Pathology   3 : GE Junction Bx Preservative GE Junction  Kemar Gregg MD 11/4/2021 0283 Pathology   4 : Mid Esophagus Bx Preservative Esophagus, Mid  Kemar Gregg MD 11/4/2021 0940 Pathology   5 : Transverse Colon Polyp Preservative Colon, Transverse  Kemar Gregg MD 11/4/2021 6643 Pathology       Assessment:  Intra-procedure medications   Propofol 450 mg      Anesthesia gave intra-procedure sedation and medications, see anesthesia flow sheet     Intravenous fluids: LR@ KVO     Vital signs stable     Abdominal assessment: round and soft nontender      Recommendation:        All side rails up, bed in low position, wheels locked. Nurse at bedside.

## 2021-11-04 NOTE — DISCHARGE INSTRUCTIONS
Katrina Kev  [id number]  1950    COLON / EGD DISCHARGE INSTRUCTIONS  Discomfort:  Sore throat- throat lozenges or warm salt water gargle  Redness at IV site- apply warm compress to area; if redness or soreness persist- contact your physician  There may be a slight amount of blood passed from the rectum  Gaseous discomfort- walking, belching will help relieve any discomfort  You may not operate a vehicle for 12 hours  You may not engage in an occupation involving machinery or appliances for rest of today  You may not drink alcoholic beverages for at least 12 hours  Avoid making any critical decisions for at least 24 hour  DIET:   Regular diet. - however -  remember your colon is empty and a heavy meal will produce gas. Avoid these foods:  vegetables, fried / greasy foods, carbonated drinks for today     ACTIVITY:  You may  resume your normal daily activities it is recommended that you spend the remainder of the day resting -  avoid any strenuous activity. CALL M.D. ANY SIGN OF:   Increasing pain, nausea, vomiting  Abdominal distension (swelling)  New increased bleeding (oral or rectal)  Fever (chills)  Pain in chest area  Bloody discharge from nose or mouth  Shortness of breath     Tylenol as needed for pain.       Follow-up Instructions:   Call Dr. Iris Guevara for results of procedure / biopsy in 7 days at telephone #  [phone]  Additional instructions: Continue current medications for acid reflux                                       Repeat Colonoscopy in 5 years                  [address] Angulo  [id number]  1950        DISCHARGE SUMMARY from Nurse    The following personal items collected during your admission are returned to you:   Dental Appliance: Dental Appliances: None  Vision: Visual Aid: Glasses  Hearing Aid:    Jewelry:    Clothing:    Other Valuables:    Valuables sent to safe:      PATIENT INSTRUCTIONS:          DO NOT TAKE SLEEPING MEDICATIONS OR ANTIANXIETY MEDICATIONS WHILE TAKING NARCOTIC PAIN MEDICATIONS,  ESPECIALLY THE NIGHT OF ANESTHESIA. CPAP PATIENTS BE SURE TO WEAR MACHINE WHENEVER NAPPING OR SLEEPING. DISCHARGE SUMMARY from Nurse    The following personal items collected during your admission are returned to you:   Dental Appliance: Dental Appliances: None  Vision: Visual Aid: Glasses  Hearing Aid:    Jewelry:    Clothing:    Other Valuables:    Valuables sent to safe:        PATIENT INSTRUCTIONS:    Anesthesia Discharge Instructions for Procedural Area requiring Sedation (MAC Anesthesia, Cath Lab, Endo and Radiology): You have been given medications during your procedure that may affect your memory and mental judgement for the next 24 hours. During this time frame for your safety, please follow the instructions listed below :    Have a responsible adult to drive you home and be with you for at least 12 hours.  Rest today and resume normal activities tomorrow.  Start with a soft bland diet and advance as tolerated to your recommended diet.  Do not drive any motor vehicle or operate mechanical or electrical equipment prior to [employer].  Avoid making critical decisions or signing legal documents prior to 6am tomorrow.  Do not drink alcohol prior to 6am tomorrow.  If you have sleep apnea and you plan to go home and take a nap, please use your CPAP machine not only at bedtime, but also while napping for 24 hours.  If you notice any redness or swelling on parts of your body where IV medications were given, place a warm wet washcloth over the area for 20 minutes at a time until the redness or swelling goes away. If you still have redness or swelling after 2-3 days, please call us. · You will receive a Post Operative Call from one of the Recovery Room Nurses on the day after your surgery to check on you. It is very important for us to know how you are recovering after your surgery.  If you have an issue or need to speak with someone, please call your surgeon, do not wait for the post operative call. · You may receive an e-mail or letter in the mail from [employer] regarding your experience with us in the Ambulatory Surgery Unit. Your feedback is valuable to us and we appreciate your participation in the survey. · We wish you a speedy recovery ? What to do at Home:      *  Please give a list of your current medications to your Primary Care Provider. *  Please update this list whenever your medications are discontinued, doses are      changed, or new medications (including over-the-counter products) are added. *  Please carry medication information at all times in case of emergency situations. If you have not received your influenza and/or pneumococcal vaccine, please follow up with your primary care physician. The discharge information has been reviewed with the patient and caregiver. The patient and caregiver verbalized understanding.

## 2021-11-04 NOTE — ANESTHESIA POSTPROCEDURE EVALUATION
Procedure(s):  COLONOSCOPY, EGD  ESOPHAGOGASTRODUODENOSCOPY (EGD)  ESOPHAGOGASTRODUODENAL (EGD) BIOPSY  ENDOSCOPIC POLYPECTOMY. MAC    Anesthesia Post Evaluation      Multimodal analgesia: multimodal analgesia used between 6 hours prior to anesthesia start to PACU discharge  Patient location during evaluation: PACU  Patient participation: complete - patient participated  Level of consciousness: sleepy but conscious and responsive to verbal stimuli  Pain score: 2  Pain management: adequate  Airway patency: patent  Anesthetic complications: no  Cardiovascular status: acceptable  Respiratory status: acceptable  Hydration status: acceptable  Comments: +Post-Anesthesia Evaluation and Assessment    Patient: Dante Jackson MRN: [id number]  SSN: [id number]   YOB: 1950  Age: 73 y.o. Sex: female      Cardiovascular Function/Vital Signs    BP (!) 97/50   Pulse 75   Temp 36.4 °C (97.5 °F)   Resp 14   Ht 5' 2\" (1.575 m)   Wt 67.1 kg (148 lb)   SpO2 98%   Breastfeeding No   BMI 27.07 kg/m²     Patient is status post Procedure(s):  COLONOSCOPY, EGD  ESOPHAGOGASTRODUODENOSCOPY (EGD)  ESOPHAGOGASTRODUODENAL (EGD) BIOPSY  ENDOSCOPIC POLYPECTOMY. Nausea/Vomiting: Controlled. Postoperative hydration reviewed and adequate. Pain:  Pain Scale 1: Numeric (0 - 10) (11/04/21 1015)  Pain Intensity 1: 0 (11/04/21 1015)   Managed. Neurological Status:   Neuro (WDL): Exceptions to WDL (11/04/21 1015)   At baseline. Mental Status and Level of Consciousness: Arousable. Pulmonary Status:   O2 Device: None (Room air) (11/04/21 1015)   Adequate oxygenation and airway patent. Complications related to anesthesia: None    Post-anesthesia assessment completed. No concerns.     Signed By: Jonah Hartley MD    11/4/2021  Post anesthesia nausea and vomiting:  controlled  Final Post Anesthesia Temperature Assessment:  Normothermia (36.0-37.5 degrees C)      INITIAL Post-op Vital signs:   Vitals Value Taken Time   BP 97/50 11/04/21 1015   Temp 36.4 °C (97.5 °F) 11/04/21 1015   Pulse 75 11/04/21 1015   Resp 14 11/04/21 1015   SpO2 98 % 11/04/21 1015

## 2021-12-15 ENCOUNTER — OFFICE VISIT (OUTPATIENT)
Dept: FAMILY MEDICINE CLINIC | Age: 71
End: 2021-12-15
Payer: MEDICARE

## 2021-12-15 VITALS
RESPIRATION RATE: 18 BRPM | OXYGEN SATURATION: 98 % | WEIGHT: 151.4 LBS | HEIGHT: 62 IN | TEMPERATURE: 97 F | HEART RATE: 64 BPM | BODY MASS INDEX: 27.86 KG/M2 | DIASTOLIC BLOOD PRESSURE: 63 MMHG | SYSTOLIC BLOOD PRESSURE: 142 MMHG

## 2021-12-15 DIAGNOSIS — E11.9 CONTROLLED TYPE 2 DIABETES MELLITUS WITHOUT COMPLICATION, WITHOUT LONG-TERM CURRENT USE OF INSULIN (HCC): ICD-10-CM

## 2021-12-15 DIAGNOSIS — E78.00 HYPERCHOLESTEROLEMIA: ICD-10-CM

## 2021-12-15 DIAGNOSIS — I10 ESSENTIAL HYPERTENSION: Primary | ICD-10-CM

## 2021-12-15 PROBLEM — E11.22 TYPE 2 DIABETES MELLITUS WITH CHRONIC KIDNEY DISEASE (HCC): Status: ACTIVE | Noted: 2021-12-15

## 2021-12-15 LAB
ALBUMIN SERPL-MCNC: 4 G/DL (ref 3.5–5)
ALBUMIN/GLOB SERPL: 1.3 {RATIO} (ref 1.1–2.2)
ALP SERPL-CCNC: 87 U/L (ref 45–117)
ALT SERPL-CCNC: 35 U/L (ref 12–78)
ANION GAP SERPL CALC-SCNC: 8 MMOL/L (ref 5–15)
AST SERPL-CCNC: 23 U/L (ref 15–37)
BILIRUB SERPL-MCNC: 0.4 MG/DL (ref 0.2–1)
BUN SERPL-MCNC: 11 MG/DL (ref 6–20)
BUN/CREAT SERPL: 10 (ref 12–20)
CALCIUM SERPL-MCNC: 9.4 MG/DL (ref 8.5–10.1)
CHLORIDE SERPL-SCNC: 108 MMOL/L (ref 97–108)
CHOLEST SERPL-MCNC: 123 MG/DL
CO2 SERPL-SCNC: 26 MMOL/L (ref 21–32)
CREAT SERPL-MCNC: 1.1 MG/DL (ref 0.55–1.02)
EST. AVERAGE GLUCOSE BLD GHB EST-MCNC: 166 MG/DL
GLOBULIN SER CALC-MCNC: 3.1 G/DL (ref 2–4)
GLUCOSE SERPL-MCNC: 148 MG/DL (ref 65–100)
HBA1C MFR BLD: 7.4 % (ref 4–5.6)
HDLC SERPL-MCNC: 74 MG/DL
HDLC SERPL: 1.7 {RATIO} (ref 0–5)
LDLC SERPL CALC-MCNC: 32.2 MG/DL (ref 0–100)
POTASSIUM SERPL-SCNC: 4.1 MMOL/L (ref 3.5–5.1)
PROT SERPL-MCNC: 7.1 G/DL (ref 6.4–8.2)
SODIUM SERPL-SCNC: 142 MMOL/L (ref 136–145)
TRIGL SERPL-MCNC: 84 MG/DL (ref ?–150)
VLDLC SERPL CALC-MCNC: 16.8 MG/DL

## 2021-12-15 PROCEDURE — G8754 DIAS BP LESS 90: HCPCS | Performed by: FAMILY MEDICINE

## 2021-12-15 PROCEDURE — 3017F COLORECTAL CA SCREEN DOC REV: CPT | Performed by: FAMILY MEDICINE

## 2021-12-15 PROCEDURE — G8536 NO DOC ELDER MAL SCRN: HCPCS | Performed by: FAMILY MEDICINE

## 2021-12-15 PROCEDURE — G8753 SYS BP > OR = 140: HCPCS | Performed by: FAMILY MEDICINE

## 2021-12-15 PROCEDURE — G8399 PT W/DXA RESULTS DOCUMENT: HCPCS | Performed by: FAMILY MEDICINE

## 2021-12-15 PROCEDURE — 1101F PT FALLS ASSESS-DOCD LE1/YR: CPT | Performed by: FAMILY MEDICINE

## 2021-12-15 PROCEDURE — G0463 HOSPITAL OUTPT CLINIC VISIT: HCPCS | Performed by: FAMILY MEDICINE

## 2021-12-15 PROCEDURE — G8419 CALC BMI OUT NRM PARAM NOF/U: HCPCS | Performed by: FAMILY MEDICINE

## 2021-12-15 PROCEDURE — 2022F DILAT RTA XM EVC RTNOPTHY: CPT | Performed by: FAMILY MEDICINE

## 2021-12-15 PROCEDURE — G8427 DOCREV CUR MEDS BY ELIG CLIN: HCPCS | Performed by: FAMILY MEDICINE

## 2021-12-15 PROCEDURE — 99213 OFFICE O/P EST LOW 20 MIN: CPT | Performed by: FAMILY MEDICINE

## 2021-12-15 PROCEDURE — 3044F HG A1C LEVEL LT 7.0%: CPT | Performed by: FAMILY MEDICINE

## 2021-12-15 PROCEDURE — 1090F PRES/ABSN URINE INCON ASSESS: CPT | Performed by: FAMILY MEDICINE

## 2021-12-15 PROCEDURE — G9717 DOC PT DX DEP/BP F/U NT REQ: HCPCS | Performed by: FAMILY MEDICINE

## 2021-12-15 RX ORDER — HYDROCODONE BITARTRATE AND ACETAMINOPHEN 5; 325 MG/1; MG/1
TABLET ORAL
COMMUNITY
Start: 2021-11-26 | End: 2022-02-02

## 2021-12-15 RX ORDER — AMLODIPINE BESYLATE 5 MG/1
TABLET ORAL
COMMUNITY
Start: 2021-10-30 | End: 2021-12-15 | Stop reason: SDUPTHER

## 2021-12-15 RX ORDER — AMLODIPINE BESYLATE 5 MG/1
5 TABLET ORAL DAILY
Qty: 90 TABLET | Refills: 1 | Status: SHIPPED | OUTPATIENT
Start: 2021-12-15 | End: 2022-06-13

## 2021-12-15 RX ORDER — RABEPRAZOLE SODIUM 20 MG/1
20 TABLET, DELAYED RELEASE ORAL 2 TIMES DAILY
COMMUNITY
Start: 2021-12-12

## 2021-12-15 RX ORDER — GABAPENTIN 300 MG/1
300 CAPSULE ORAL
COMMUNITY
Start: 2021-12-06

## 2021-12-15 NOTE — PROGRESS NOTES
Chief Complaint   Patient presents with    Hypertension     6m f/u    Cholesterol Problem    Diabetes       1. Have you been to the ER, urgent care clinic since your last visit? Hospitalized since your last visit? ER [facility], elevated b/p.    2. Have you seen or consulted any other health care providers outside of the [address] [place] [address] since your last visit? Include any pap smears or colon screening. Colonoscopy & Endoscopy Dr Javier Zuñiga. [facility] ([facility]opt) back pain.

## 2021-12-15 NOTE — PROGRESS NOTES
HISTORY OF PRESENT ILLNESS  Asuncion Felty is a 73 y.o. female. HPI Pt. Comes in for blood pressure, cholesterol, and diabetes check. Some swelling in ankles. No complaints of chest pain, shortness of breath, TIAs, claudication or edema. Was put on amlodipine in ER 2 months ago. Had a colonoscopy and egd with Dr. Olu Blanc, had one polyp. Dxed with gastroparesis. Still working parttime    ROS    Physical Exam  Vitals and nursing note reviewed. Constitutional:       Appearance: She is well-developed. HENT:      Right Ear: External ear normal.      Left Ear: External ear normal.   Neck:      Thyroid: No thyromegaly. Cardiovascular:      Rate and Rhythm: Normal rate and regular rhythm. Heart sounds: Normal heart sounds. Pulmonary:      Breath sounds: Normal breath sounds. No wheezing. Abdominal:      General: Bowel sounds are normal. There is no distension. Palpations: Abdomen is soft. There is no mass. Tenderness: There is no abdominal tenderness. Lymphadenopathy:      Cervical: No cervical adenopathy. ASSESSMENT and PLAN  Orders Placed This Encounter    HEMOGLOBIN A1C WITH EAG    METABOLIC PANEL, COMPREHENSIVE    LIPID PANEL    amLODIPine (NORVASC) 5 mg tablet     Diagnoses and all orders for this visit:    1. Essential hypertension  -     METABOLIC PANEL, COMPREHENSIVE; Future    2. Controlled type 2 diabetes mellitus without complication, without long-term current use of insulin (HCC)  -     HEMOGLOBIN A1C WITH EAG; Future    3. Hypercholesterolemia  -     LIPID PANEL; Future    Other orders  -     amLODIPine (NORVASC) 5 mg tablet; Take 1 Tablet by mouth daily. Follow-up and Dispositions    · Return in about 6 months (around 6/15/2022).

## 2021-12-24 ENCOUNTER — TRANSCRIBE ORDER (OUTPATIENT)
Dept: SCHEDULING | Age: 71
End: 2021-12-24

## 2021-12-24 DIAGNOSIS — M54.50 LUMBAGO: ICD-10-CM

## 2021-12-24 DIAGNOSIS — M51.36 DEGENERATION OF LUMBAR INTERVERTEBRAL DISC: ICD-10-CM

## 2021-12-24 DIAGNOSIS — M54.16 LUMBAR RADICULOPATHY: ICD-10-CM

## 2021-12-24 DIAGNOSIS — M54.31 BILATERAL SCIATICA: ICD-10-CM

## 2021-12-24 DIAGNOSIS — M47.816 LUMBAR SPONDYLOSIS: ICD-10-CM

## 2021-12-24 DIAGNOSIS — M54.32 BILATERAL SCIATICA: ICD-10-CM

## 2021-12-24 DIAGNOSIS — M48.062 SPINAL STENOSIS OF LUMBAR REGION WITH NEUROGENIC CLAUDICATION: Primary | ICD-10-CM

## 2022-01-04 ENCOUNTER — HOSPITAL ENCOUNTER (OUTPATIENT)
Dept: MRI IMAGING | Age: 72
Discharge: HOME | End: 2022-01-04
Attending: ORTHOPAEDIC SURGERY
Payer: MEDICARE

## 2022-01-04 DIAGNOSIS — M54.50 LUMBAGO: ICD-10-CM

## 2022-01-04 DIAGNOSIS — M48.062 SPINAL STENOSIS OF LUMBAR REGION WITH NEUROGENIC CLAUDICATION: ICD-10-CM

## 2022-01-04 DIAGNOSIS — M54.32 BILATERAL SCIATICA: ICD-10-CM

## 2022-01-04 DIAGNOSIS — M51.36 DEGENERATION OF LUMBAR INTERVERTEBRAL DISC: ICD-10-CM

## 2022-01-04 DIAGNOSIS — M54.31 BILATERAL SCIATICA: ICD-10-CM

## 2022-01-04 DIAGNOSIS — M47.816 LUMBAR SPONDYLOSIS: ICD-10-CM

## 2022-01-04 DIAGNOSIS — M54.16 LUMBAR RADICULOPATHY: ICD-10-CM

## 2022-01-04 PROCEDURE — 72148 MRI LUMBAR SPINE W/O DYE: CPT

## 2022-01-13 DIAGNOSIS — G25.0 ESSENTIAL TREMOR: ICD-10-CM

## 2022-01-14 RX ORDER — PRIMIDONE 50 MG/1
50 TABLET ORAL 2 TIMES DAILY
Qty: 180 TABLET | Refills: 1 | Status: SHIPPED | OUTPATIENT
Start: 2022-01-14

## 2022-01-17 ENCOUNTER — OFFICE VISIT (OUTPATIENT)
Dept: NEUROLOGY | Age: 72
End: 2022-01-17
Payer: MEDICARE

## 2022-01-17 VITALS
SYSTOLIC BLOOD PRESSURE: 120 MMHG | WEIGHT: 148.4 LBS | BODY MASS INDEX: 27.31 KG/M2 | HEART RATE: 87 BPM | OXYGEN SATURATION: 97 % | TEMPERATURE: 97.1 F | DIASTOLIC BLOOD PRESSURE: 68 MMHG | RESPIRATION RATE: 15 BRPM | HEIGHT: 62 IN

## 2022-01-17 DIAGNOSIS — R69 MULTIPLE COMORBID CONDITIONS: ICD-10-CM

## 2022-01-17 DIAGNOSIS — G25.0 ESSENTIAL TREMOR: Primary | ICD-10-CM

## 2022-01-17 DIAGNOSIS — Z79.899 POLYPHARMACY: ICD-10-CM

## 2022-01-17 DIAGNOSIS — E11.40 TYPE 2 DIABETES MELLITUS WITH DIABETIC NEUROPATHY, WITHOUT LONG-TERM CURRENT USE OF INSULIN (HCC): ICD-10-CM

## 2022-01-17 PROCEDURE — 99214 OFFICE O/P EST MOD 30 MIN: CPT | Performed by: PSYCHIATRY & NEUROLOGY

## 2022-01-17 PROCEDURE — G9717 DOC PT DX DEP/BP F/U NT REQ: HCPCS | Performed by: PSYCHIATRY & NEUROLOGY

## 2022-01-17 PROCEDURE — 1101F PT FALLS ASSESS-DOCD LE1/YR: CPT | Performed by: PSYCHIATRY & NEUROLOGY

## 2022-01-17 PROCEDURE — 1090F PRES/ABSN URINE INCON ASSESS: CPT | Performed by: PSYCHIATRY & NEUROLOGY

## 2022-01-17 PROCEDURE — 3046F HEMOGLOBIN A1C LEVEL >9.0%: CPT | Performed by: PSYCHIATRY & NEUROLOGY

## 2022-01-17 PROCEDURE — G8399 PT W/DXA RESULTS DOCUMENT: HCPCS | Performed by: PSYCHIATRY & NEUROLOGY

## 2022-01-17 PROCEDURE — 3017F COLORECTAL CA SCREEN DOC REV: CPT | Performed by: PSYCHIATRY & NEUROLOGY

## 2022-01-17 PROCEDURE — 2022F DILAT RTA XM EVC RTNOPTHY: CPT | Performed by: PSYCHIATRY & NEUROLOGY

## 2022-01-17 PROCEDURE — G8536 NO DOC ELDER MAL SCRN: HCPCS | Performed by: PSYCHIATRY & NEUROLOGY

## 2022-01-17 PROCEDURE — G8419 CALC BMI OUT NRM PARAM NOF/U: HCPCS | Performed by: PSYCHIATRY & NEUROLOGY

## 2022-01-17 PROCEDURE — G8754 DIAS BP LESS 90: HCPCS | Performed by: PSYCHIATRY & NEUROLOGY

## 2022-01-17 PROCEDURE — G8752 SYS BP LESS 140: HCPCS | Performed by: PSYCHIATRY & NEUROLOGY

## 2022-01-17 PROCEDURE — G8427 DOCREV CUR MEDS BY ELIG CLIN: HCPCS | Performed by: PSYCHIATRY & NEUROLOGY

## 2022-01-17 NOTE — ASSESSMENT & PLAN NOTE
Mild  Patient is on gabapentin for multiple reasons  At this point I do not recommend any other changes

## 2022-01-17 NOTE — PROGRESS NOTES
Chief Complaint   Patient presents with    Follow-up     follow up on essential tremour. no new symptoms.

## 2022-01-17 NOTE — LETTER
1/17/2022    Patient: Tom Romero   YOB: 1950   Date of Visit: 1/17/2022     Dalton Gonzalez MD  [address] [place] [address]  [facility] [facility] [address]    Dear Dalton Gonzalez MD,      Thank you for referring Ms. Cecille Lester to [address] [place] [address] for evaluation. My notes for this consultation are attached. If you have questions, please do not hesitate to call me. I look forward to following your patient along with you.       Sincerely,    Whit Jha NP

## 2022-01-17 NOTE — PROGRESS NOTES
[facility] 85  In Office FOLLOW-UP VISIT         Tom Romero is a 73 y.o. female who presents today for the following:  Chief Complaint   Patient presents with    Follow-up     follow up on essential tremour. no new symptoms. ASSESSMENT AND PLAN  Patient is known to this practice. This is my first time seeing the patient. Chart and history reviewed in detail at today's office visit. 1. Essential tremor  Assessment & Plan:   Well-controlled continue on Mysoline 50 mg twice daily  2. Type 2 diabetes mellitus with diabetic neuropathy, without long-term current use of insulin ([facility])  Assessment & Plan:  Mild  Patient is on gabapentin for multiple reasons  At this point I do not recommend any other changes  3. Multiple comorbid conditions  4. Polypharmacy      Patient and/or family was given time to ask questions and voice concerns. I believe all questions concerns were adequately addressed at this  office visit. Patient and/or family also verbalized agreement and understanding of the above-stated plan    Patient remains a complex patient secondary to polypharmacy, significant comorbid conditions, and use of high-risk medications which complicate the decision making process related to patient's neurologic diagnosis          ICD-10-CM ICD-9-CM    1. Essential tremor  G25.0 333.1    2. Type 2 diabetes mellitus with diabetic neuropathy, without long-term current use of insulin (HCC)  E11.40 250.60      357.2    3. Multiple comorbid conditions  R69 799.9    4. Polypharmacy  Z79.899 V58.69          I attest that 30 minutes was spent on today's visit reviewing medical records and diagnostic testing deemed pertinent to this patient's care, along with direct time spent at patient's visit including the history, physical assessment and plan, discussing diagnosis and management along with documentation.       HPI  Historical Data  Patient is known to the practice and was previously seen by Dr. Mike Correa    Patient is right-handed    Neurologic diagnosis  Essential tremor   Onset around 2020 with gradual progression   Initially started in the right hand and then gradually progressed to the left    Other significant comorbid conditions/concerns  Hypertension  Diabetes  Depression  Chronic back pain      Interim Data:     Essential tremor  Current medications:  Mysoline 50 mg the a.m. 50 mg in the p.m. Patient states that her tremor is well controlled is a little bit more pronounced on the left than the right but she is right-handed and neither of them at this time are causing any significant issues    On the medication she is able to get through all of her ADLs without any difficulty she is able to write and sign her name without any problems and in fact she feels as though her tremor is well controlled    She denies any side effects to medication    Patient is on gabapentin for back issues I let her know that we also use that for tremor management so she may be getting some dual efficacy    Patient states she has some mild tingling in her left leg but she thinks that is from her back pain more than diabetic neuropathy    Patient states all of her other comorbid conditions are under good control      Pertinent diagnostic data    Lab Results   Component Value Date/Time    Hemoglobin A1c 7.4 (H) 12/15/2021 10:59 AM    Hemoglobin A1c (POC) 6.7 06/15/2021 10:51 AM     Lab Results   Component Value Date/Time    LDL, calculated 32.2 12/15/2021 10:59 AM         Results from [place] encounter on 01/04/22    MRI LUMB SPINE WO CONT    Impression  1. Severe chronic disc degeneration adjacent endplate changes at G6-C1 with  bilateral right more than left foraminal narrowing.               Allergies   Allergen Reactions    Latex Itching    Lisinopril Swelling    Atorvastatin Other (comments)     20 mg causes muscle cramps    Sulfa (Sulfonamide Antibiotics) Itching       Current Outpatient Medications   Medication Sig    primidone (MYSOLINE) 50 mg tablet Take 1 Tablet by mouth two (2) times a day.  gabapentin (NEURONTIN) 300 mg capsule Take 300 mg by mouth three (3) times daily as needed.  RABEprazole (ACIPHEX) 20 mg TbEC Take 20 mg by mouth two (2) times a day.  HYDROcodone-acetaminophen (NORCO) 5-325 mg per tablet     amLODIPine (NORVASC) 5 mg tablet Take 1 Tablet by mouth daily.  atorvastatin (LIPITOR) 10 mg tablet Take 1 Tablet by mouth daily. For cholesterol    glucose blood VI test strips (Accu-Chek Wendy Plus test strp) strip USE TO TEST BLOOD SUGAR ONCE DAILY. DIAGNOSIS E11.40    triamcinolone acetonide (KENALOG) 0.1 % topical cream Apply  to affected area two (2) times daily as needed.  metFORMIN (GLUCOPHAGE) 1,000 mg tablet Take 1 tablet in AM and 1 tablet in PM. For Diabetes    omeprazole (PRILOSEC) 40 mg capsule Take 1 Capsule by mouth every morning.  alendronate (FOSAMAX) 35 mg tablet PLEASE SEE ATTACHED FOR DETAILED DIRECTIONS    Blood-Glucose Meter (ACCU-CHEK WENDY PLUS METER) misc Use as directed. E11.9    lancets (ACCU-CHEK SOFTCLIX LANCETS) misc TEST 2 TIMES DAILY AS DIRECTED (E11.9)    polyethylene glycol (MIRALAX) 17 gram packet Take 17 g by mouth daily as needed ('Constipation').  multivit,calc,mins/iron/folic (ONE-A-DAY WOMENS FORMULA PO) Take 1 Tab by mouth daily. No current facility-administered medications for this visit.        Past medical history/surgical history, family history, and social history have been reviewed for today's visit      ROS    A ten system review of constitutional, cardiovascular, respiratory, musculoskeletal, endocrine, skin, SHEENT, genitourinary, psychiatric and neurologic systems was obtained and is unremarkable except as mentioned under HPI          EXAMINATION:     Visit Vitals  /68 (BP 1 Location: Left upper arm, BP Patient Position: Sitting, BP Cuff Size: Adult)   Pulse 87   Temp 97.1 °F (36.2 °C) (Temporal)   Resp 15   Ht 5' 2\" (1.575 m)   Wt 148 lb 6.4 oz (67.3 kg)   LMP  (LMP Unknown)   SpO2 97%   BMI 27.14 kg/m²         General appearance: Patient is well-developed and well-nourished in no apparent distress and well groomed. Psych/mental health:  Affect: Appropriate    PHQ  No flowsheet data found. HEENT:   Normocephalic  With evidence of trauma: No  Full range of motion head neck: Yes  Tenderness to palpation of the head neck region: No      Cardiovascular:     Extremities warm to touch: Yes  Extremity swelling: No  Discoloration: No  Evidence of PVD: No    Respiratory:   Dyspnea on exertion: No   Abnormal effort on casual observation: No   Use of portable oxygen: No   Evidence of cyanosis: No     Musculoskeletal:   Evidence of significant bone deformities: No   Spinal curvature: No     Integumentary:    Obvious bruising: No   Lacerations or discoloration on casual observation: No       Neurological Examination:   Mental Status:        MMSE  No flowsheet data found. Formal testing was not completed    there was nothing concerning on general observation and discussion. Alert oriented and appropriate to general conversation  Normal processing on general observation  Followed conversation and responded seemingly appropriate throughout the office visit  No word finding difficulties noted on casual observation  Able to follow directions without difficulty     Cranial Nerves:    Grossly intact    Motor:   Normal bulk and tone  Very minimal antigravity tremor in her left hand only no tremor appreciated in the right  No abnormal movements appreciated on today's exam  Moves extremities spontaneously and with purpose  5/5 x 4      Sensation: Intact to light touch    Coordination/Cerebellar:   Grossly intact    Gait: Ambulates independently    Reflexes: Not tested    Fall risk assessment  Fall Risk Assessment, last 12 mths 8/11/2021   Able to walk? Yes   Fall in past 12 months? 0   Do you feel unsteady? 0   Are you worried about falling 0         Follow-up and Dispositions    · Return in about 1 year (around 1/17/2023) for In office appointment.              Rin Blank MS, ANP-BC, [facility]

## 2022-01-17 NOTE — PATIENT INSTRUCTIONS
As per discussion    Continue on the primidone also known as Mysoline 50 mg twice a day  If you need a new prescription before we see you back please ask your pharmacy to send us an electronic renewal that is the most efficient method of medication renewal      Overall you are well controlled so we will just see you back once a year for med checks sooner if there are problems issues or concerns you can always send us a note through [employer] if you have any issues or concerns    If your primary care wants to go over the prescriptions then we do not need to see you back except as needed but for right now we will tentatively plan on having you come back in a year for med check        Office Policies    o Phone calls/patient messages:  Please allow up to 24 hours for someone in the office to contact you about your call or message. Be mindful your provider may be out of the office or your message may require further review. We encourage you to use [employer] for your messages as this is a faster, more efficient way to communicate with our office    o Medication Refills:  Prescription medications require up to 48 business hours to process. We encourage you to use [employer] for your refills. For controlled medications: Please allow up to 72 business hours to process. Certain medications may require you to  a written prescription at our office. NO narcotic/controlled medications will be prescribed after 4pm Monday through Friday or on weekends    o Form/Paperwork Completion:  We ask that you allow 7-14 business days. You may also download your forms to [employer] to have your doctor print off.

## 2022-01-21 ENCOUNTER — TRANSCRIBE ORDER (OUTPATIENT)
Dept: SCHEDULING | Age: 72
End: 2022-01-21

## 2022-01-21 DIAGNOSIS — M54.50 LOW BACK PAIN: ICD-10-CM

## 2022-01-21 DIAGNOSIS — M48.062 SPINAL STENOSIS, LUMBAR REGION, WITH NEUROGENIC CLAUDICATION: ICD-10-CM

## 2022-01-21 DIAGNOSIS — M54.31 BILATERAL SCIATICA: ICD-10-CM

## 2022-01-21 DIAGNOSIS — M47.816 LUMBAR SPONDYLOSIS: Primary | ICD-10-CM

## 2022-01-21 DIAGNOSIS — M54.32 BILATERAL SCIATICA: ICD-10-CM

## 2022-01-28 ENCOUNTER — HOSPITAL ENCOUNTER (OUTPATIENT)
Dept: CT IMAGING | Age: 72
Discharge: HOME | End: 2022-01-28
Attending: ORTHOPAEDIC SURGERY
Payer: MEDICARE

## 2022-01-28 DIAGNOSIS — M54.31 BILATERAL SCIATICA: ICD-10-CM

## 2022-01-28 DIAGNOSIS — M47.816 LUMBAR SPONDYLOSIS: ICD-10-CM

## 2022-01-28 DIAGNOSIS — M48.062 SPINAL STENOSIS, LUMBAR REGION, WITH NEUROGENIC CLAUDICATION: ICD-10-CM

## 2022-01-28 DIAGNOSIS — M54.32 BILATERAL SCIATICA: ICD-10-CM

## 2022-01-28 DIAGNOSIS — M54.50 LOW BACK PAIN: ICD-10-CM

## 2022-01-28 PROCEDURE — 72131 CT LUMBAR SPINE W/O DYE: CPT

## 2022-02-01 ENCOUNTER — OFFICE VISIT (OUTPATIENT)
Dept: FAMILY MEDICINE CLINIC | Age: 72
End: 2022-02-01
Payer: MEDICARE

## 2022-02-01 VITALS
HEART RATE: 80 BPM | WEIGHT: 145 LBS | TEMPERATURE: 97.8 F | RESPIRATION RATE: 18 BRPM | HEIGHT: 62 IN | BODY MASS INDEX: 26.68 KG/M2 | SYSTOLIC BLOOD PRESSURE: 128 MMHG | OXYGEN SATURATION: 98 % | DIASTOLIC BLOOD PRESSURE: 64 MMHG

## 2022-02-01 DIAGNOSIS — E11.9 DIABETES MELLITUS WITHOUT COMPLICATION (HCC): ICD-10-CM

## 2022-02-01 DIAGNOSIS — I10 ESSENTIAL HYPERTENSION: ICD-10-CM

## 2022-02-01 DIAGNOSIS — E78.00 HYPERCHOLESTEROLEMIA: ICD-10-CM

## 2022-02-01 DIAGNOSIS — M51.9 LUMBAR DISC DISEASE: Primary | ICD-10-CM

## 2022-02-01 PROCEDURE — G8536 NO DOC ELDER MAL SCRN: HCPCS | Performed by: FAMILY MEDICINE

## 2022-02-01 PROCEDURE — 1090F PRES/ABSN URINE INCON ASSESS: CPT | Performed by: FAMILY MEDICINE

## 2022-02-01 PROCEDURE — 3017F COLORECTAL CA SCREEN DOC REV: CPT | Performed by: FAMILY MEDICINE

## 2022-02-01 PROCEDURE — G8427 DOCREV CUR MEDS BY ELIG CLIN: HCPCS | Performed by: FAMILY MEDICINE

## 2022-02-01 PROCEDURE — G8399 PT W/DXA RESULTS DOCUMENT: HCPCS | Performed by: FAMILY MEDICINE

## 2022-02-01 PROCEDURE — G8752 SYS BP LESS 140: HCPCS | Performed by: FAMILY MEDICINE

## 2022-02-01 PROCEDURE — 3046F HEMOGLOBIN A1C LEVEL >9.0%: CPT | Performed by: FAMILY MEDICINE

## 2022-02-01 PROCEDURE — G0463 HOSPITAL OUTPT CLINIC VISIT: HCPCS | Performed by: FAMILY MEDICINE

## 2022-02-01 PROCEDURE — G8419 CALC BMI OUT NRM PARAM NOF/U: HCPCS | Performed by: FAMILY MEDICINE

## 2022-02-01 PROCEDURE — 99213 OFFICE O/P EST LOW 20 MIN: CPT | Performed by: FAMILY MEDICINE

## 2022-02-01 PROCEDURE — G9717 DOC PT DX DEP/BP F/U NT REQ: HCPCS | Performed by: FAMILY MEDICINE

## 2022-02-01 PROCEDURE — 2022F DILAT RTA XM EVC RTNOPTHY: CPT | Performed by: FAMILY MEDICINE

## 2022-02-01 PROCEDURE — G8754 DIAS BP LESS 90: HCPCS | Performed by: FAMILY MEDICINE

## 2022-02-01 PROCEDURE — 1101F PT FALLS ASSESS-DOCD LE1/YR: CPT | Performed by: FAMILY MEDICINE

## 2022-02-01 RX ORDER — CYCLOBENZAPRINE HCL 10 MG
10 TABLET ORAL
COMMUNITY
Start: 2021-12-23 | End: 2022-02-02

## 2022-02-01 RX ORDER — DEXAMETHASONE 4 MG/1
TABLET ORAL
COMMUNITY
Start: 2021-12-24 | End: 2022-02-02

## 2022-02-01 NOTE — PROGRESS NOTES
Identified pt with two pt identifiers(name and ). Reviewed record in preparation for visit and have obtained necessary documentation. Chief Complaint   Patient presents with    Pre-op Exam    Spinal Pain      patient complains of back pain, pt would like a DMV disabled placard for vehicle in the near future. Pt gait is labored but steady. Vitals:    22 1206   BP: 128/64   Pulse: 80   Resp: 18   Temp: 97.8 °F (36.6 °C)   TempSrc: Oral   SpO2: 98%   Weight: 145 lb (65.8 kg)   Height: 5' 2\" (1.575 m)   PainSc:   0 - No pain       Health Maintenance Due   Topic    Depression Monitoring     Low dose CT lung screening     Foot Exam Q1     MICROALBUMIN Q1     Breast Cancer Screen Mammogram     COVID-19 Vaccine (3 - Booster for Pfizer series)     Allergies   Allergen Reactions    Latex Itching    Lisinopril Swelling    Atorvastatin Other (comments)     20 mg causes muscle cramps    Sulfa (Sulfonamide Antibiotics) Itching     Current Outpatient Medications   Medication Instructions    alendronate (FOSAMAX) 35 mg tablet PLEASE SEE ATTACHED FOR DETAILED DIRECTIONS    amLODIPine (NORVASC) 5 mg, Oral, DAILY    atorvastatin (LIPITOR) 10 mg, Oral, DAILY, For cholesterol    Blood-Glucose Meter (ACCU-CHEK SOFIA PLUS METER) misc Use as directed. E11.9    cyclobenzaprine (FLEXERIL) 10 mg, 3 TIMES DAILY AS NEEDED    dexAMETHasone (DECADRON) 4 mg tablet No dose, route, or frequency recorded.  gabapentin (NEURONTIN) 300 mg, Oral, 3 TIMES DAILY AS NEEDED    glucose blood VI test strips (Accu-Chek Sofia Plus test strp) strip USE TO TEST BLOOD SUGAR ONCE DAILY. DIAGNOSIS E11.40    HYDROcodone-acetaminophen (NORCO) 5-325 mg per tablet No dose, route, or frequency recorded.     lancets (ACCU-CHEK SOFTCLIX LANCETS) misc TEST 2 TIMES DAILY AS DIRECTED (E11.9)    metFORMIN (GLUCOPHAGE) 1,000 mg tablet Take 1 tablet in AM and 1 tablet in PM. For Diabetes    multivit,calc,mins/iron/folic (ONE-A-DAY WOMENS FORMULA PO) 1 Tablet, Oral, DAILY    omeprazole (PRILOSEC) 40 mg, Oral, 7AM    polyethylene glycol (MIRALAX) 17 g, Oral, DAILY AS NEEDED    primidone (MYSOLINE) 50 mg, Oral, 2 TIMES DAILY    RABEprazole (ACIPHEX) 20 mg, Oral, 2 TIMES DAILY    triamcinolone acetonide (KENALOG) 0.1 % topical cream Topical, 2 TIMES DAILY AS NEEDED     Immunization History   Administered Date(s) Administered    (RETIRED) Pneumococcal Vaccine (Unspecified Type) 10/19/2010    COVID-19, Pfizer Purple top, DILUTE for use, 12+ yrs, 30mcg/0.3mL dose 01/27/2021, 02/24/2021    Influenza High Dose Vaccine PF 10/09/2019    Influenza Vaccine 10/21/2013, 10/21/2014, 10/25/2017, 09/20/2018, 09/24/2020, 10/30/2021    Influenza Vaccine (>6 mo Afluria QUAD Vial 27379 (0.25 mL) / 30517 (0.5 mL)) 10/07/2015    Influenza Vaccine (Quad) PF (>6 Mo Flulaval, Fluarix, and >3 Yrs Afluria, Fluzone 49717) 11/01/2016    Influenza Vaccine Split 10/19/2010, 10/13/2011, 10/25/2012    Influenza, High-dose, Quadrivalent (>65 Yrs Fluzone High Dose Quad 13421) 09/24/2020    Pneumococcal Conjugate (PCV-13) 11/29/2017    Pneumococcal Polysaccharide (PPSV-23) 12/12/2018    Tdap 08/09/2016    Varicella Virus Vaccine 10/17/2012    Zoster Recombinant 07/03/2020, 11/14/2020     Family History   Problem Relation Age of Onset    Diabetes Mother     Hypertension Mother     Stroke Mother     Cancer Father         lung    Diabetes Father     Cancer Paternal Aunt         colon    Cancer Maternal Aunt         colon, and lung    Hypertension Maternal Aunt     Diabetes Maternal Aunt     Hypertension Maternal Aunt     Stroke Maternal Aunt     Heart Disease Maternal Aunt     Heart Disease Maternal Uncle     Psychiatric Disorder Brother        Coordination of Care Questionnaire:  :   1) Have you been to an emergency room, urgent care, or hospitalized since your last visit? If yes, where when, and reason for visit? no       2.  Have seen or consulted any other health care provider since your last visit? If yes, where when, and reason for visit? NO      Patient is accompanied by self I have received verbal consent from Cayden Reeder to discuss any/all medical information while they are present in the room.

## 2022-02-01 NOTE — PROGRESS NOTES
HISTORY OF PRESENT ILLNESS  Sasha Tucker is a 73 y.o. female. HPI In for preop back surgery. Doing well, no co. .  Past Medical History:   Diagnosis Date    Allergic rhinitis due to allergen 10/22/2014    Arthritis     spine    Asthma     Chronic pain     back pain    Depression     Diabetes ([address]     Type II    GERD (gastroesophageal reflux disease)     Hypercholesterolemia     Hypertension     Kidney stones     Positive PPD     treated with INH       Social History     Socioeconomic History    Marital status: SINGLE   Tobacco Use    Smoking status: Former Smoker     Packs/day: 1.00     Years: 25.00     Pack years: 25.00     Types: Cigarettes     Quit date: 2010     Years since quittin.4    Smokeless tobacco: Never Used   Vaping Use    Vaping Use: Never used   Substance and Sexual Activity    Alcohol use: Never    Drug use: Never    Sexual activity: Not Currently     Partners: Male     Birth control/protection: None   Social History Narrative    Retired schoolteacher. Raised 2 nieces. Current Outpatient Medications   Medication Sig Dispense Refill    primidone (MYSOLINE) 50 mg tablet Take 1 Tablet by mouth two (2) times a day. 180 Tablet 1    gabapentin (NEURONTIN) 300 mg capsule Take 300 mg by mouth three (3) times daily as needed.  RABEprazole (ACIPHEX) 20 mg TbEC Take 20 mg by mouth two (2) times a day.  HYDROcodone-acetaminophen (NORCO) 5-325 mg per tablet       amLODIPine (NORVASC) 5 mg tablet Take 1 Tablet by mouth daily. 90 Tablet 1    atorvastatin (LIPITOR) 10 mg tablet Take 1 Tablet by mouth daily. For cholesterol 90 Tablet 12    glucose blood VI test strips (Accu-Chek Wendy Plus test strp) strip USE TO TEST BLOOD SUGAR ONCE DAILY. DIAGNOSIS E11.40 100 Strip 4    triamcinolone acetonide (KENALOG) 0.1 % topical cream Apply  to affected area two (2) times daily as needed.       metFORMIN (GLUCOPHAGE) 1,000 mg tablet Take 1 tablet in AM and 1 tablet in PM. For Diabetes 180 Tablet 3    omeprazole (PRILOSEC) 40 mg capsule Take 1 Capsule by mouth every morning. 90 Capsule 3    alendronate (FOSAMAX) 35 mg tablet PLEASE SEE ATTACHED FOR DETAILED DIRECTIONS 12 Tablet 3    Blood-Glucose Meter (ACCU-CHEK SOFIA PLUS METER) misc Use as directed. E11.9 1 Each 0    lancets (ACCU-CHEK SOFTCLIX LANCETS) misc TEST 2 TIMES DAILY AS DIRECTED (E11.9) 200 Each 12    polyethylene glycol (MIRALAX) 17 gram packet Take 17 g by mouth daily as needed ('Constipation').  multivit,calc,mins/iron/folic (ONE-A-DAY WOMENS FORMULA PO) Take 1 Tab by mouth daily.  cyclobenzaprine (FLEXERIL) 10 mg tablet Take 10 mg by mouth three (3) times daily as needed. (Patient not taking: Reported on 2/1/2022)      dexAMETHasone (DECADRON) 4 mg tablet  (Patient not taking: Reported on 2/1/2022)           Review of Systems   Respiratory: Negative for cough and shortness of breath. Cardiovascular: Negative for chest pain and orthopnea. Neurological: Negative for seizures, loss of consciousness and weakness. Physical Exam  Vitals and nursing note reviewed. Constitutional:       Appearance: She is well-developed. HENT:      Right Ear: External ear normal.      Left Ear: External ear normal.   Neck:      Thyroid: No thyromegaly. Cardiovascular:      Rate and Rhythm: Normal rate and regular rhythm. Heart sounds: Normal heart sounds. Pulmonary:      Breath sounds: Normal breath sounds. No wheezing. Abdominal:      General: Bowel sounds are normal. There is no distension. Palpations: Abdomen is soft. There is no mass. Tenderness: There is no abdominal tenderness. Lymphadenopathy:      Cervical: No cervical adenopathy. ASSESSMENT and PLAN  No orders of the defined types were placed in this encounter. Diagnoses and all orders for this visit:    1. Lumbar disc disease    2. Essential hypertension    3.  Diabetes mellitus without complication ([address]    4.  Hypercholesterolemia          Pt acceptable risk for surgery

## 2022-02-02 ENCOUNTER — HOSPITAL ENCOUNTER (OUTPATIENT)
Dept: PREADMISSION TESTING | Age: 72
Discharge: HOME | End: 2022-02-02
Attending: ORTHOPAEDIC SURGERY
Payer: MEDICARE

## 2022-02-02 VITALS
DIASTOLIC BLOOD PRESSURE: 54 MMHG | RESPIRATION RATE: 16 BRPM | WEIGHT: 151.9 LBS | HEART RATE: 66 BPM | OXYGEN SATURATION: 97 % | SYSTOLIC BLOOD PRESSURE: 131 MMHG | TEMPERATURE: 99 F | HEIGHT: 62 IN | BODY MASS INDEX: 27.95 KG/M2

## 2022-02-02 LAB
25(OH)D3 SERPL-MCNC: 15.7 NG/ML (ref 30–100)
ABO + RH BLD: NORMAL
ALBUMIN SERPL-MCNC: 3.8 G/DL (ref 3.5–5)
ALBUMIN/GLOB SERPL: 1.1 {RATIO} (ref 1.1–2.2)
ALP SERPL-CCNC: 74 U/L (ref 45–117)
ALT SERPL-CCNC: 32 U/L (ref 12–78)
AMPHET UR QL SCN: NEGATIVE
ANION GAP SERPL CALC-SCNC: 7 MMOL/L (ref 5–15)
APPEARANCE UR: CLEAR
AST SERPL-CCNC: 20 U/L (ref 15–37)
ATRIAL RATE: 63 BPM
BACTERIA URNS QL MICRO: NEGATIVE /HPF
BARBITURATES UR QL SCN: POSITIVE
BENZODIAZ UR QL: NEGATIVE
BILIRUB SERPL-MCNC: 0.2 MG/DL (ref 0.2–1)
BILIRUB UR QL: NEGATIVE
BLOOD GROUP ANTIBODIES SERPL: NORMAL
BUN SERPL-MCNC: 11 MG/DL (ref 6–20)
BUN/CREAT SERPL: 13 (ref 12–20)
CALCIUM SERPL-MCNC: 9.7 MG/DL (ref 8.5–10.1)
CALCULATED P AXIS, ECG09: 50 DEGREES
CALCULATED R AXIS, ECG10: -9 DEGREES
CALCULATED T AXIS, ECG11: 22 DEGREES
CANNABINOIDS UR QL SCN: NEGATIVE
CHLORIDE SERPL-SCNC: 110 MMOL/L (ref 97–108)
CO2 SERPL-SCNC: 26 MMOL/L (ref 21–32)
COCAINE UR QL SCN: NEGATIVE
COLOR UR: ABNORMAL
CREAT SERPL-MCNC: 0.87 MG/DL (ref 0.55–1.02)
DIAGNOSIS, 93000: NORMAL
DRUG SCRN COMMENT,DRGCM: ABNORMAL
EPITH CASTS URNS QL MICRO: ABNORMAL /LPF
ERYTHROCYTE [DISTWIDTH] IN BLOOD BY AUTOMATED COUNT: 15 % (ref 11.5–14.5)
GLOBULIN SER CALC-MCNC: 3.6 G/DL (ref 2–4)
GLUCOSE SERPL-MCNC: 85 MG/DL (ref 65–100)
GLUCOSE UR STRIP.AUTO-MCNC: NEGATIVE MG/DL
HCT VFR BLD AUTO: 36.9 % (ref 35–47)
HGB BLD-MCNC: 12.1 G/DL (ref 11.5–16)
HGB UR QL STRIP: NEGATIVE
HYALINE CASTS URNS QL MICRO: ABNORMAL /LPF (ref 0–5)
INR PPP: 1 (ref 0.9–1.1)
KETONES UR QL STRIP.AUTO: NEGATIVE MG/DL
LEUKOCYTE ESTERASE UR QL STRIP.AUTO: ABNORMAL
MCH RBC QN AUTO: 29.5 PG (ref 26–34)
MCHC RBC AUTO-ENTMCNC: 32.8 G/DL (ref 30–36.5)
MCV RBC AUTO: 90 FL (ref 80–99)
METHADONE UR QL: NEGATIVE
NITRITE UR QL STRIP.AUTO: NEGATIVE
NRBC # BLD: 0 K/UL (ref 0–0.01)
NRBC BLD-RTO: 0 PER 100 WBC
OPIATES UR QL: NEGATIVE
P-R INTERVAL, ECG05: 146 MS
PCP UR QL: NEGATIVE
PH UR STRIP: 5.5 [PH] (ref 5–8)
PLATELET # BLD AUTO: 178 K/UL (ref 150–400)
PMV BLD AUTO: 12.1 FL (ref 8.9–12.9)
POTASSIUM SERPL-SCNC: 3.9 MMOL/L (ref 3.5–5.1)
PREALB SERPL-MCNC: 28.2 MG/DL (ref 20–40)
PROT SERPL-MCNC: 7.4 G/DL (ref 6.4–8.2)
PROT UR STRIP-MCNC: NEGATIVE MG/DL
PROTHROMBIN TIME: 10 SEC (ref 9–11.1)
Q-T INTERVAL, ECG07: 400 MS
QRS DURATION, ECG06: 78 MS
QTC CALCULATION (BEZET), ECG08: 409 MS
RBC # BLD AUTO: 4.1 M/UL (ref 3.8–5.2)
RBC #/AREA URNS HPF: ABNORMAL /HPF (ref 0–5)
SODIUM SERPL-SCNC: 143 MMOL/L (ref 136–145)
SP GR UR REFRACTOMETRY: 1.02 (ref 1–1.03)
SPECIMEN EXP DATE BLD: NORMAL
UA: UC IF INDICATED,UAUC: ABNORMAL
UROBILINOGEN UR QL STRIP.AUTO: 0.2 EU/DL (ref 0.2–1)
VENTRICULAR RATE, ECG03: 63 BPM
WBC # BLD AUTO: 4.5 K/UL (ref 3.6–11)
WBC URNS QL MICRO: ABNORMAL /HPF (ref 0–4)

## 2022-02-02 PROCEDURE — 80307 DRUG TEST PRSMV CHEM ANLYZR: CPT

## 2022-02-02 PROCEDURE — 93005 ELECTROCARDIOGRAM TRACING: CPT

## 2022-02-02 PROCEDURE — 85610 PROTHROMBIN TIME: CPT

## 2022-02-02 PROCEDURE — 85027 COMPLETE CBC AUTOMATED: CPT

## 2022-02-02 PROCEDURE — 97161 PT EVAL LOW COMPLEX 20 MIN: CPT

## 2022-02-02 PROCEDURE — 97116 GAIT TRAINING THERAPY: CPT

## 2022-02-02 PROCEDURE — 80053 COMPREHEN METABOLIC PANEL: CPT

## 2022-02-02 PROCEDURE — 86900 BLOOD TYPING SEROLOGIC ABO: CPT

## 2022-02-02 PROCEDURE — 36415 COLL VENOUS BLD VENIPUNCTURE: CPT

## 2022-02-02 PROCEDURE — 82306 VITAMIN D 25 HYDROXY: CPT

## 2022-02-02 PROCEDURE — 81001 URINALYSIS AUTO W/SCOPE: CPT

## 2022-02-02 PROCEDURE — 84134 ASSAY OF PREALBUMIN: CPT

## 2022-02-02 RX ORDER — SODIUM CHLORIDE, SODIUM LACTATE, POTASSIUM CHLORIDE, CALCIUM CHLORIDE 600; 310; 30; 20 MG/100ML; MG/100ML; MG/100ML; MG/100ML
25 INJECTION, SOLUTION INTRAVENOUS CONTINUOUS
Status: CANCELLED | OUTPATIENT
Start: 2022-02-08

## 2022-02-02 RX ORDER — PREGABALIN 150 MG/1
150 CAPSULE ORAL ONCE
Status: CANCELLED | OUTPATIENT
Start: 2022-02-08 | End: 2022-02-08

## 2022-02-02 RX ORDER — ACETAMINOPHEN 500 MG
1000 TABLET ORAL ONCE
Status: CANCELLED | OUTPATIENT
Start: 2022-02-08 | End: 2022-02-08

## 2022-02-02 NOTE — PERIOP NOTES
Orthopedic and Spine Patients: Instructions on When You Can   Eat or Drink Before Surgery      You have been provided a pre-surgery drink received at your pre-admission testing appointment.  Night before surgery:  o You should drink 1/2 bottle of the  pre-surgery drink at bedtime. No food after midnight!  Day of Surgery:  o Complete 2nd half of the bottle of the pre-surgery drink 1 hour prior to arrival at hospital.  For questions call Pre-Admission Testing at [phone]. They are available from 8:00am-5:00pm, Monday through Friday. 2/3/2022: called to pt, instructed not to drink pre-sx drink. Patient verbalized understanding.

## 2022-02-02 NOTE — PROGRESS NOTES
[facility]  Physical Therapy Pre-surgery evaluation  [address] [facility], [address] [facility]    PHYSICAL THERAPY PRE SPINE SURGERY EVALUATION  Patient:Hailey Agarwal [de-identified]73 y.o. female)  Date: 2/2/2022    PAT  Procedure(s) (LRB):  LUMBAR 5 TO SACRAL 1 POSTERIOR DECOMPRESSION AND FUSION (ANESTHESIA CHOICE) (N/A)     Precautions:          ASSESSMENT :  Based on the objective data described below, the patient presents with impaired gait, back pain, BLE radicular pain L>R, decreased standing and ambulation distance tolerance, and overall decreased quality of life due to end stage degenerative joint disease in the spinal level: lumbar spine. Discussed anticipated disposition to home with possible discharge within a 1 to 2 day time frame post-surgery. Patient and  in agreement. Anticipate patient will need acute PT and OT orders based on current deficits and expected deficits post surgery. GOALS: (Goals have been discussed and agreed upon with patient.)  DISCHARGE GOALS: Time Frame: 1 DAY  1. Patient will demonstrate increased strength, range of motion, and pain control via a home exercise program in order to minimize functional deficits in preparation for their upcoming surgery. This will be achieved by using education, demonstration and through the use of an informational handout including a home exercise program.  REHABILITATION POTENTIAL FOR STATED GOALS: Good     RECOMMENDATIONS AND PLANNED INTERVENTIONS: (Benefits and precautions of physical therapy have been discussed with the patient.)  · Home Exercise Program  TREATMENT PLAN EFFECTIVE DATES: 2/2/2022 to 2/2/2022   FREQUENCY/DURATION: Patient to continue to perform home exercise program at least twice daily until surgery. SUBJECTIVE:   Patient stated I like to take bubble baths. 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     Past Surgical History:   Procedure Laterality Date    COLONOSCOPY  4-11    manetas- n ormal    COLONOSCOPY N/A 11/4/2021    COLONOSCOPY, EGD performed by Lupis Rizvi MD at [place] [address] EGD  4-11 manetas- esophageal ring    ENDOSCOPY, COLON, DIAGNOSTIC  12/2007    2 polyps    HX BREAST BIOPSY Bilateral 1980s    all benign cysts    HX BUNIONECTOMY Bilateral 1990s    HX CATARACT REMOVAL Bilateral 11/2018    HX CHOLECYSTECTOMY      HX COLONOSCOPY  May 2016    HX ENDOSCOPY  May 2016   Albert Stager HX GYN  1978    tubal laparoscopy-unblocked tubes    HX HEART CATHETERIZATION      negatve    HX ORTHOPAEDIC  2016    had an injection in her back to help with leg pain    HX ORTHOPAEDIC Left 12/12/2017    foot surg. 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 Pt lives in a 2 story home alone, but will have family and friends assisting her at home.     Personal factors and/or comorbidities impacting plan of care: none      Home Situation  # Steps to Enter: 4  Rails to Enter: Yes  Hand Rails : Bilateral  One/Two Story Residence: Two story  # of Interior Steps: 10  Interior Rails: Left  Living Alone: Yes  Support Systems: Other Family Member(s),Friend/Neighbor  Current DME Used/Available at Home: Cane, straight,Crutches  Tub or Shower Type: Tub/Shower combination EXAMINATION/PRESENTATION/DECISION MAKING:     ADLs (Current Functional Status): Bathing/Showering:   [x] Independent  [] Requires Assistance from Someone  [] Sponge Bath Only   Ambulation:  [x] Independent  [] Walk Indoors Only  [x] Walk Outdoors  [] Use Assistive Device  [] Use Wheelchair Only     Dressing:  [x] Independent    Requires Assistance from Someone for:  [] Sock/Shoes  [] Pants  [] Everything   Household Activities:  [] Routine house and yard work  [x] Light Housework Only  [] None       Critical Behavior:                Strength:     Strength: Within functional limits                       Tone & Sensation:                  Sensation: Impaired                  Range Of Motion:     AROM: Within functional limits                 Coordination:   Coordination: Within functional limits    Functional Mobility:  Transfers:  Sit to Stand: Modified independent  Stand to Sit: Modified independent                       Balance:   Sitting: Intact  Standing: Intact  Ambulation/Gait Training:  Distance (ft): 20 Feet (ft)     Ambulation - Level of Assistance: Independent     Gait Description (WDL): Exceptions to WDL  Gait Abnormalities:  (forward flexion)              Speed/Lolis: Slow  Step Length: Left shortened,Right shortened              Functional Measure:  10 Meter walk test:  (Specify if any supplemental oxygen is used, the type, pre, during and post sats.)        Aborted, as ambulation became too painful and pt unable to complete the test                       Pain:  Pain Scale 1: Numeric (0 - 10)9/10  Pain Intensity 1: 9  Radicular pain:     BLE, L>R  Activity Tolerance:   Good   Patient []   does  []   does not demonstrate signs/symptoms of shortness of breath/dyspnea on exertion/respiratory distress. COMMUNICATION/EDUCATION:   The patient was educated on:  [x]         Early post operative mobility is imperative to achieve a patient's desired outcomes and to restore biological function.   [x] Post operative spinal precautions may/may not be applicable. These precautions are based on the patient's physician and the procedure(s) performed. [x]         Spinal precautions including:  ·   No bending forward, sideways, or backwards  ·   No twisting   ·   No lifting more than 5-10 pounds  ·   No sitting longer than 30-60 minutes at a time  ·   Miguel brace when out of bed and mobilizing    The patients plan of care was discussed as follows:   [x]         The patient verbalized understanding of his/her plan in preparation for their upcoming surgery  []         The patient's  was present for this session  []        The patient reports that he/she does not have a  identified at this time  [x]         The  verbalized understanding of the education regarding the patient's upcoming surgery  []         Patient/family agree to work toward stated goals and plan of care. []         Patient understands intent and goals of therapy, but is neutral about his/her participation. []         Patient is unable to participate in goal setting and plan of care.       Thank you for this referral.  Petty Huffman, PT    Time Calculation: 26 mins

## 2022-02-02 NOTE — PERIOP NOTES
Incentive Spirometer        Using the incentive spirometer helps expand the small air sacs of your lungs, helps you breathe deeply, and helps improve your lung function. Use your incentive spirometer twice a day (10 breaths each time) prior to surgery. How to Use Your Incentive Spirometer:  1. Hold the incentive spirometer in an upright position. 2. Breathe out as usual.   3. Place the mouthpiece in your mouth and seal your lips tightly around it. 4. Take a deep breath. Breathe in slowly and as deeply as possible. Keep the blue flow rate guide between the arrows. 5. Hold your breath as long as possible. Then exhale slowly and allow the piston to fall to the bottom of the column. 6. Rest for a few seconds and repeat steps one through five at least 10 times. PAT Tidal Volume____1250, 1500______________  x___2_____________  Date___2/2/2022______    Bertha Boeck THE INCENTIVE SPIROMETER WITH YOU TO THE HOSPITAL ON THE DAY OF YOUR SURGERY. Opportunity given to ask and answer questions as well as to observe return demonstration.     Patient signature_____________________________          Witness____________________________

## 2022-02-02 NOTE — PERIOP NOTES
[facility]  Joint/Spine Preoperative Instructions    Surgery Date 2/8/2022          Time of Arrival TBD  Contact # [phone]    1. On the day of your surgery, please report to the Surgical Services Registration Desk and sign in at your designated time. The Surgery Center is located to the right of the Emergency Room. 2. You must have someone with you to drive you home. You should not drive a car for 24 hours following surgery. Please make arrangements for a friend or family member to stay with you for the first 24 hours after your surgery. 3. No food after midnight 2/7/2022. Medications morning of surgery should be taken with a sip of water. Please follow pre-surgery drink instructions that were given at your Pre Admission Testing appointment. 4. We recommend you do not drink any alcoholic beverages for 24 hours before and after your surgery. 5. Contact your surgeons office for instructions on the following medications: non-steroidal anti-inflammatory drugs (i.e. Advil, Aleve), vitamins, and supplements. (Some surgeons will want you to stop these medications prior to surgery and others may allow you to take them)  **If you are currently taking Plavix, Coumadin, Aspirin and/or other blood-thinning agents, contact your surgeon for instructions. ** Your surgeon will partner with the physician prescribing these medications to determine if it is safe to stop or if you need to continue taking. Please do not stop taking these medications without instructions from your surgeon    6. Wear comfortable clothes. Wear glasses instead of contacts. Do not bring any money or jewelry. Please bring picture ID, insurance card, and any prearranged co-payment or hospital payment. Do not wear make-up, particularly mascara the morning of your surgery. Do not wear nail polish, particularly if you are having foot /hand surgery.   Wear your hair loose or down, no ponytails, buns, afshin pins or clips. All body piercings must be removed. Please shower with antibacterial soap for three consecutive days before and on the morning of surgery, but do not apply any lotions, powders or deodorants after the shower on the day of surgery. Please use a fresh towels after each shower. Please sleep in clean clothes and change bed linens the night before surgery. Please do not shave for 48 hours prior to surgery. Shaving of the face is acceptable. 7. You should understand that if you do not follow these instructions your surgery may be cancelled. If your physical condition changes (I.e. fever, cold or flu) please contact your surgeon as soon as possible. 8. It is important that you be on time. If a situation occurs where you may be late, please call [phone] (OR Holding Area). 9. If you have any questions and or problems, please call [phone] (Pre-admission Testing). 10. Your surgery time may be subject to change. You will receive a phone call the evening prior if your time changes. 11.  If having outpatient surgery, you must have someone to drive you here, stay with you during the duration of your stay, and to drive you home at time of discharge. 12. The following link is for the educational video for patients and/or families. http://[facility].org/. [email or web address]/[email or web address]-oqpnvbp-[email or web address]/educational-materials    13  Due to current COVID restrictions, only ONE adult may accompany you the day of the procedure. We have limited seating available. If our waiting room is at capacity, your ride may be asked to remain in their vehicle. No children are allowed in the waiting room    Special Instructions:   Patient is scheduled for a Covid-19 test @ [facility] between 7am - 12/noon on Friday, 2/4/2022; patient was instructed to self quarantine after test through day of surgery/procedure.        TAKE ALL MEDICATIONS THE DAY OF SURGERY EXCEPT: Metformin, vitamins/supplements. I understand a pre-operative phone call will be made to verify my surgery time. In the event that I am not available, I give permission for a message to be left on my answering service and/or with another person?   yes         ___________________        __________   2/2/2022 @ 9076    (Signature of Patient)             (Witness)                (Date and Time)

## 2022-02-02 NOTE — PERIOP NOTES
The [address] [facility]  \"We've Got Your Back! Caring For Yourself Before and After Spine Surgery\" handbook was provided & reviewed with the patient during the pre-admission testing (PAT) appointment. An opportunity for questions was provided, patient verbalized understanding.

## 2022-02-02 NOTE — PERIOP NOTES
Hibiclens/Chlorhexidine    Preventing Infections Before and After - Your Surgery    IMPORTANT INSTRUCTIONS    Please read and follow these instructions carefully. If you are unable to comply with the below instructions your procedure will be cancelled. Every Night for Three (3) nights before your surgery:  1. Shower with an antibacterial soap, such as Dial, or the soap provided at your preassessment appointment. A shower is better than a bath for cleaning your skin. 2. If needed, ask someone to help you reach all areas of your body. Dont forget to clean your belly button with every shower. The night before your surgery: If you lose your Hibiclens/chlorhexidine please contact surgery center or you can purchase it at a local pharmacy  1. On the night before your surgery, shower with an antibacterial soap, such as Dial, or the soap provided at your preassessment appointment. 2. With one packet of Hibiclens/Chlorhexidine in hand, turn water off.  3. Apply Hibiclens antiseptic skin cleanser with a clean, freshly washed washcloth. ? Gently apply to your body from chin to toes (except the genital area) and especially the area(s) where your incision(s) will be. ? Leave Hibiclens/Chlorhexidine on your skin for at least 20 seconds. CAUTION: If needed, Hibiclens/chlorhexidine may be used to clean the folds of skin of the legs (such as in the area of the groin) and on your buttocks and hips. However, do not use Hibiclens/Chlorhexidine above the neck or in the genital area (your bottom) or put inside any area of your body. 4. Turn the water back on and rinse. 5. Dry gently with a clean, freshly washed towel. 6. After your shower, do not use any powder, deodorant, perfumes or lotion. 7. Use clean, freshly washed towels and washcloths every time you shower. 8. Wear clean, freshly washed pajamas to bed the night before surgery. 9. Sleep on clean, freshly washed sheets.   10. Do not allow pets to sleep in your bed with you. The Morning of your surgery:  1. Shower again thoroughly with an antibacterial soap, such as Dial or the soap provided at your preassessment appointment. If needed, ask someone for help to reach all areas of your body. Dont forget to clean your belly button! Rinse. 2. Dry gently with a clean, freshly washed towel. 3. After your shower, do not use any powder, deodorant, perfumes or lotion prior to surgery. 4. Put on clean, freshly washed clothing. Tips to help prevent infections after your surgery:  1. Protect your surgical wound from germs:  ? Hand washing is the most important thing you and your caregivers can do to prevent infections. ? Keep your bandage clean and dry! ? Do not touch your surgical wound. 2. Use clean, freshly washed towels and washcloths every time you shower; do not share bath linens with others. 3. Until your surgical wound is healed, wear clothing and sleep on bed linens each day that are clean and freshly washed. 4. Do not allow pets to sleep in your bed with you or touch your surgical wound. 5. Do not smoke - smoking delays wound healing. This may be a good time to stop smoking. 6. If you have diabetes, it is important for you to manage your blood sugar levels properly before your surgery as well as after your surgery. Poorly managed blood sugar levels slow down wound healing and prevent you from healing completely. If you lose your Hibiclens/chlorhexidine, please call the [facility], or it is available for purchase at your pharmacy.                ___________________      ___________________      2/2/2022 @ 9391  (Signature of Patient)          (Witness)                   (Date and Time)

## 2022-02-03 LAB
BACTERIA SPEC CULT: NORMAL
BACTERIA SPEC CULT: NORMAL
SERVICE CMNT-IMP: NORMAL

## 2022-02-03 RX ORDER — CHOLECALCIFEROL (VITAMIN D3) 125 MCG
2000 CAPSULE ORAL DAILY
COMMUNITY
Start: 2022-02-03

## 2022-02-03 NOTE — PERIOP NOTES
Called to patient, discussed Vit D level. Instructed pt to begin Vitamin D 2000 units/day supplement, may purchase OTC, patient verbalized understanding.

## 2022-02-03 NOTE — ADVANCED PRACTICE NURSE
PAT Nurse Practitioner   Pre-Operative Chart Review/Assessment:-ORTHOPEDIC/NEUROSURGICAL SPINE                Patient Name:  Michelle Cornell                                                           Age:   73 y.o.    :  1950     Today's Date:  2022     Date of PAT:   22     Date of Surgery:    2022      Procedure(s):    L5-S1 posterior decompression and fusion      Surgeon:   Laureano Quintero     Medical Clearance:  Dr. Tom Del Rosario:      1)  Cardiac Clearance:  Not requested        2)  Program for Diabetes Health Consult:  Not indicated-A1C 7.4 (no presurgery drinks given)       3)  Sleep Apnea evaluation:   Not indicated-MALINI 2       4) Treatment for MRSA/Staph Aureus:  Negative      5) Additional Concerns:  T2DM, depression, chronic pain, asthma, essential tremor, former smoker                 Vital Signs:         Visit Vitals  BP (!) 131/54 (BP 1 Location: Left arm, BP Patient Position: Sitting)   Pulse 66   Temp 99 °F (37.2 °C)   Resp 16   Ht 5' 2\" (1.575 m)   Wt 68.9 kg (151 lb 14.4 oz)   LMP  (LMP Unknown)   SpO2 97%   BMI 27.78 kg/m²                        ____________________________________________  PAST MEDICAL HISTORY  Past Medical History:   Diagnosis Date    Allergic rhinitis due to allergen 10/22/2014    Arthritis     spine    Asthma     many years ago, no inhaler or meds    Chronic pain     back pain    Depression     Diabetes ([address]     Type II    GERD (gastroesophageal reflux disease)     Hepatitis B     Hypercholesterolemia     Hypertension     Kidney stones     Positive PPD     treated with INH      ____________________________________________  PAST SURGICAL HISTORY  Past Surgical History:   Procedure Laterality Date    COLONOSCOPY  -    manetas- n ormal    COLONOSCOPY N/A 2021    COLONOSCOPY, EGD performed by [facility]am [facility]ip [facility]    EGD      manetas- esophageal ring    ENDOSCOPY, COLON, DIAGNOSTIC  2007 2 polyps    HX BREAST BIOPSY Bilateral 1980s    all benign cysts    HX BUNIONECTOMY Bilateral 1990s    HX CATARACT REMOVAL Bilateral 11/2018    HX CHOLECYSTECTOMY      HX COLONOSCOPY  May 2016    HX ENDOSCOPY  May 2016    HX GYN  1978    tubal laparoscopy-unblocked tubes    HX HEART CATHETERIZATION      negatve    HX ORTHOPAEDIC  2016    had an injection in her back to help with leg pain    HX ORTHOPAEDIC Left 12/12/2017    foot surg. bunion removal  \"Put a plate in\"    HX OTHER SURGICAL  1997    bunionectomy    HX TONSIL AND ADENOIDECTOMY      approx 11years old    LA BREAST SURGERY PROCEDURE UNLISTED  1990's    abcess bilateral    LA ESOPHAGEAL MOTILITY STUDY W/INTERP&RPT  1/27/2020    LA GERD TST W/ NASAL IMPEDENCE ELECTROD  1/27/2020      ____________________________________________  HOME MEDICATIONS    Current Outpatient Medications   Medication Sig    cholecalciferol, vitamin D3, (Vitamin D3) 50 mcg (2,000 unit) tab Take 2,000 Units by mouth daily.  primidone (MYSOLINE) 50 mg tablet Take 1 Tablet by mouth two (2) times a day. (Patient taking differently: Take 50 mg by mouth two (2) times a day. Indications: essential tremor)    gabapentin (NEURONTIN) 300 mg capsule Take 300 mg by mouth three (3) times daily as needed.  RABEprazole (ACIPHEX) 20 mg TbEC Take 20 mg by mouth two (2) times a day.  amLODIPine (NORVASC) 5 mg tablet Take 1 Tablet by mouth daily.  atorvastatin (LIPITOR) 10 mg tablet Take 1 Tablet by mouth daily. For cholesterol (Patient taking differently: Take 10 mg by mouth nightly. For cholesterol)    glucose blood VI test strips (Accu-Chek Wendy Plus test strp) strip USE TO TEST BLOOD SUGAR ONCE DAILY. DIAGNOSIS E11.40    metFORMIN (GLUCOPHAGE) 1,000 mg tablet Take 1 tablet in AM and 1 tablet in PM. For Diabetes (Patient taking differently: Take 1,000 mg by mouth two (2) times daily (with meals).  Take 1 tablet in AM and 1 tablet in PM. For Diabetes)    omeprazole (PRILOSEC) 40 mg capsule Take 1 Capsule by mouth every morning.  Blood-Glucose Meter (ACCU-CHEK SOFIA PLUS METER) misc Use as directed. E11.9    lancets (ACCU-CHEK SOFTCLIX LANCETS) misc TEST 2 TIMES DAILY AS DIRECTED (E11.9)    polyethylene glycol (MIRALAX) 17 gram packet Take 17 g by mouth daily as needed ('Constipation').  multivit,calc,mins/iron/folic (ONE-A-DAY WOMENS FORMULA PO) Take 1 Tab by mouth daily.  alendronate (FOSAMAX) 35 mg tablet PLEASE SEE ATTACHED FOR DETAILED DIRECTIONS (Patient taking differently: Take 35 mg by mouth every seven (7) days.  PLEASE SEE ATTACHED FOR DETAILED DIRECTIONS, )     No current facility-administered medications for this encounter.      ____________________________________________  ALLERGIES  Allergies   Allergen Reactions    Latex Itching    Lisinopril Swelling    Atorvastatin Other (comments)     20 mg causes muscle cramps    Sulfa (Sulfonamide Antibiotics) Itching     About [place] yo  Keflex=unsure      ____________________________________________  SOCIAL HISTORY  Social History     Tobacco Use    Smoking status: Former Smoker     Packs/day: 1.00     Years: 25.00     Pack years: 25.00     Types: Cigarettes     Quit date: 2010     Years since quittin.4    Smokeless tobacco: Never Used   Substance Use Topics    Alcohol use: Never      ____________________________________________  COVID VACCINATION STATUS:      Internal Administration   First Dose COVID-19, Pfizer Purple top, DILUTE for use, 12+ yrs, 30mcg/0.3mL dose  2021   Second Dose COVID-19, Pfizer Purple top, DILUTE for use, 12+ yrs, 30mcg/0.3mL dose  2021      Last COVID Lab SARS-CoV-2 ( )   Date Value   2022 Not detected                      Labs:     Hospital Outpatient Visit on 2022   Component Date Value Ref Range Status    Crossmatch Expiration 2022,2359   Final    ABO/Rh(D) 2022 O POSITIVE   Final    Antibody screen 02/02/2022 NEG   Final    WBC 02/02/2022 4.5  3.6 - 11.0 K/uL Final    RBC 02/02/2022 4.10  3.80 - 5.20 M/uL Final    HGB 02/02/2022 12.1  11.5 - 16.0 g/dL Final    HCT 02/02/2022 36.9  35.0 - 47.0 % Final    MCV 02/02/2022 90.0  80.0 - 99.0 FL Final    MCH 02/02/2022 29.5  26.0 - 34.0 PG Final    MCHC 02/02/2022 32.8  30.0 - 36.5 g/dL Final    RDW 02/02/2022 15.0* 11.5 - 14.5 % Final    PLATELET 68/05/6452 781  150 - 400 K/uL Final    MPV 02/02/2022 12.1  8.9 - 12.9 FL Final    NRBC 02/02/2022 0.0  0  WBC Final    ABSOLUTE NRBC 02/02/2022 0.00  0.00 - 0.01 K/uL Final    Special Requests: 02/02/2022 NO SPECIAL REQUESTS    Final    Culture result: 02/02/2022 MRSA NOT PRESENT    Final    Culture result: 02/02/2022 Screening of patient nares for MRSA is for surveillance purposes and, if positive, to facilitate isolation considerations in high risk settings. It is not intended for automatic decolonization interventions per se as regimens are not sufficiently effective to warrant routine use.     Final    Ventricular Rate 02/02/2022 63  BPM Final    Atrial Rate 02/02/2022 63  BPM Final    P-R Interval 02/02/2022 146  ms Final    QRS Duration 02/02/2022 78  ms Final    Q-T Interval 02/02/2022 400  ms Final    QTC Calculation (Bezet) 02/02/2022 409  ms Final    Calculated P Axis 02/02/2022 50  degrees Final    Calculated R Axis 02/02/2022 -9  degrees Final    Calculated T Axis 02/02/2022 22  degrees Final    Diagnosis 02/02/2022    Final                    Value:Normal sinus rhythm  Possible Left atrial enlargement  When compared with ECG of 01-SEP-2021 15:20,  Criteria for Septal infarct are no longer present  Confirmed by Genesis Sorto [phone]) on 2/2/2022 6:00:25 PM      INR 02/02/2022 1.0  0.9 - 1.1   Final    A single therapeutic range for Vit K antagonists may not be optimal for all indications - see June, 2008 issue of Chest, [facility] Chest Physicians Evidence-Based Clinical Practice Guidelines, 8th Edition.  Prothrombin time 02/02/2022 10.0  9.0 - 11.1 sec Final    Color 02/02/2022 YELLOW/STRAW    Final    Color Reference Range: Straw, Yellow or Dark Yellow    Appearance 02/02/2022 CLEAR  CLEAR   Final    Specific gravity 02/02/2022 1.020  1.003 - 1.030   Final    pH (UA) 02/02/2022 5.5  5.0 - 8.0   Final    Protein 02/02/2022 Negative  NEG mg/dL Final    Glucose 02/02/2022 Negative  NEG mg/dL Final    Ketone 02/02/2022 Negative  NEG mg/dL Final    Bilirubin 02/02/2022 Negative  NEG   Final    Blood 02/02/2022 Negative  NEG   Final    Urobilinogen 02/02/2022 0.2  0.2 - 1.0 EU/dL Final    Nitrites 02/02/2022 Negative  NEG   Final    Leukocyte Esterase 02/02/2022 TRACE* NEG   Final    WBC 02/02/2022 0-4  0 - 4 /hpf Final    RBC 02/02/2022 0-5  0 - 5 /hpf Final    Epithelial cells 02/02/2022 FEW  FEW /lpf Final    Epithelial cell category consists of squamous cells and /or transitional urothelial cells. Renal tubular cells, if present, are separately identified as such.     Bacteria 02/02/2022 Negative  NEG /hpf Final    UA:UC IF INDICATED 02/02/2022 CULTURE NOT INDICATED BY UA RESULT  CNI   Final    Hyaline cast 02/02/2022 0-2  0 - 5 /lpf Final    Sodium 02/02/2022 143  136 - 145 mmol/L Final    Potassium 02/02/2022 3.9  3.5 - 5.1 mmol/L Final    Chloride 02/02/2022 110* 97 - 108 mmol/L Final    CO2 02/02/2022 26  21 - 32 mmol/L Final    Anion gap 02/02/2022 7  5 - 15 mmol/L Final    Glucose 02/02/2022 85  65 - 100 mg/dL Final    BUN 02/02/2022 11  6 - 20 MG/DL Final    Creatinine 02/02/2022 0.87  0.55 - 1.02 MG/DL Final    BUN/Creatinine ratio 02/02/2022 13  12 - 20   Final    GFR est AA 02/02/2022 >60  >60 ml/min/1.73m2 Final    GFR est non-AA 02/02/2022 >60  >60 ml/min/1.73m2 Final    Estimated GFR is calculated using the IDMS-traceable Modification of Diet in Renal Disease (MDRD) Study equation, reported for both  Americans (GFRAA) and non- Americans (GFRNA), and normalized to 1.73m2 body surface area. The physician must decide which value applies to the patient.  Calcium 02/02/2022 9.7  8.5 - 10.1 MG/DL Final    Bilirubin, total 02/02/2022 0.2  0.2 - 1.0 MG/DL Final    ALT (SGPT) 02/02/2022 32  12 - 78 U/L Final    AST (SGOT) 02/02/2022 20  15 - 37 U/L Final    Alk. phosphatase 02/02/2022 74  45 - 117 U/L Final    Protein, total 02/02/2022 7.4  6.4 - 8.2 g/dL Final    Albumin 02/02/2022 3.8  3.5 - 5.0 g/dL Final    Globulin 02/02/2022 3.6  2.0 - 4.0 g/dL Final    A-G Ratio 02/02/2022 1.1  1.1 - 2.2   Final    AMPHETAMINES 02/02/2022 Negative  NEG   Final    BARBITURATES 02/02/2022 Positive* NEG   Final    BENZODIAZEPINES 02/02/2022 Negative  NEG   Final    COCAINE 02/02/2022 Negative  NEG   Final    METHADONE 02/02/2022 Negative  NEG   Final    OPIATES 02/02/2022 Negative  NEG   Final    PCP(PHENCYCLIDINE) 02/02/2022 Negative  NEG   Final    THC (TH-CANNABINOL) 02/02/2022 Negative  NEG   Final    Drug screen comment 02/02/2022 (NOTE)   Final    Comment: This test is a screen for drugs of abuse in a medical setting only   (i.e., they are unconfirmed results and as such must not be used for   non-medical purposes e.g., employment testing, legal testing). Due to   its inherent nature, false positive (FP) and false negative (FN)   results may be obtained. Therefore, if necessary for medical care,   recommend confirmation of positive findings by GC/MS.  The cutoff   values (i.e., the level at which this screening test becomes positive   for a given drug group) are:    Amphetamine/Methamphetamine: 300 ng/mL  Barbiturates:                200 ng/mL  Benzodiazepines:             200 ng/mL  Cocaine:                     150 ng/mL  Methadone:                   300 ng/mL  Opiates:                     300 ng/mL   Phencyclidine, PCP:           25 ng/mL  Marijuana, THC:               50 ng/mL    This screening test can identify the presence of the following drugs   when above the cutoff value; see list posted on the intranet. It can   be viewed by valeriano                           ecting in sequence the following from the [address] home   page: Vikram; [address], Resources; Count includes the [facility], Physician Resources Q to Z; \"UDS (Urine Drug Screen   Automated) List of Detectable Drugs. \"     Or use web address:   [email or web address]  UDS%20List%20of%20Detectable%20Drugs. pdf      Prealbumin 02/02/2022 28.2  20.0 - 40.0 mg/dL Final    Vitamin D 25-Hydroxy 02/02/2022 15.7* 30 - 100 ng/mL Final    Comment: (NOTE)  Deficiency               <20 ng/mL  Insufficiency          20-30 ng/mL  Sufficient             ng/mL  Possible toxicity       >100 ng/mL    The Method used is Siemens Advia Centaur currently standardized to a   Center of Disease Control and Prevention (CDC) certified reference   [address]. Samples containing fluorescein dye can produce falsely   elevated values when tested with the ADVIA Centaur Vitamin D Assay. It is recommended that results in the toxic range, >100 ng/mL, be   retested 72 hours post fluorescein exposure. XR Results (most recent):    Results from Hospital Encounter encounter on 09/01/21    XR CHEST PORT    Narrative  PORTABLE CHEST RADIOGRAPH/S: 9/1/2021 4:47 PM    INDICATION: Dyspnea. COMPARISON: 3/18/2021, 12/9/2019. TECHNIQUE: Portable frontal upright radiograph/s of the chest.    FINDINGS:  The lungs are clear. The central airways are patent. No pneumothorax or pleural  effusion. Incidental note is made of bilateral hemidiaphragm eventration. Cholecystectomy clips in the right upper quadrant. Impression  Clear lungs. Skin:   Denies open wounds, cuts, sores, rashes or other areas of concern in PAT assessment.         Sonya Hernandez NP

## 2022-02-04 ENCOUNTER — HOSPITAL ENCOUNTER (OUTPATIENT)
Dept: PREADMISSION TESTING | Age: 72
Discharge: HOME | End: 2022-02-04
Payer: MEDICARE

## 2022-02-04 PROCEDURE — U0005 INFEC AGEN DETEC AMPLI PROBE: HCPCS

## 2022-02-05 LAB
SARS-COV-2, XPLCVT: NOT DETECTED
SOURCE, COVRS: NORMAL

## 2022-02-08 ENCOUNTER — APPOINTMENT (OUTPATIENT)
Dept: GENERAL RADIOLOGY | Age: 72
DRG: 455 | End: 2022-02-08
Attending: ORTHOPAEDIC SURGERY
Payer: MEDICARE

## 2022-02-08 ENCOUNTER — ANESTHESIA (OUTPATIENT)
Dept: SURGERY | Age: 72
DRG: 455 | End: 2022-02-08
Payer: MEDICARE

## 2022-02-08 ENCOUNTER — HOSPITAL ENCOUNTER (INPATIENT)
Age: 72
LOS: 4 days | Discharge: HOME HEALTH | DRG: 455 | End: 2022-02-12
Attending: ORTHOPAEDIC SURGERY | Admitting: ORTHOPAEDIC SURGERY
Payer: MEDICARE

## 2022-02-08 ENCOUNTER — ANESTHESIA EVENT (OUTPATIENT)
Dept: SURGERY | Age: 72
DRG: 455 | End: 2022-02-08
Payer: MEDICARE

## 2022-02-08 DIAGNOSIS — Z98.1 S/P LUMBAR SPINAL FUSION: Primary | ICD-10-CM

## 2022-02-08 PROBLEM — M48.00 SPINAL STENOSIS: Status: ACTIVE | Noted: 2022-02-08

## 2022-02-08 LAB
GLUCOSE BLD STRIP.AUTO-MCNC: 124 MG/DL (ref 65–117)
GLUCOSE BLD STRIP.AUTO-MCNC: 146 MG/DL (ref 65–117)
GLUCOSE BLD STRIP.AUTO-MCNC: 157 MG/DL (ref 65–117)
GLUCOSE BLD STRIP.AUTO-MCNC: 167 MG/DL (ref 65–117)
SERVICE CMNT-IMP: ABNORMAL

## 2022-02-08 PROCEDURE — 65270000029 HC RM PRIVATE

## 2022-02-08 PROCEDURE — 77030018723 HC ELCTRD BLD COVD -A: Performed by: ORTHOPAEDIC SURGERY

## 2022-02-08 PROCEDURE — 76210000000 HC OR PH I REC 2 TO 2.5 HR: Performed by: ORTHOPAEDIC SURGERY

## 2022-02-08 PROCEDURE — 77030026438 HC STYL ET INTUB CARD -A: Performed by: ANESTHESIOLOGY

## 2022-02-08 PROCEDURE — 2709999900 HC NON-CHARGEABLE SUPPLY

## 2022-02-08 PROCEDURE — 74011250636 HC RX REV CODE- 250/636: Performed by: ANESTHESIOLOGY

## 2022-02-08 PROCEDURE — 77030013079 HC BLNKT BAIR HGGR 3M -A: Performed by: ANESTHESIOLOGY

## 2022-02-08 PROCEDURE — 74011000250 HC RX REV CODE- 250: Performed by: NURSE ANESTHETIST, CERTIFIED REGISTERED

## 2022-02-08 PROCEDURE — 77030032806 HC CAP SPN LOK CREO GLBM -B: Performed by: ORTHOPAEDIC SURGERY

## 2022-02-08 PROCEDURE — 74011000250 HC RX REV CODE- 250: Performed by: ORTHOPAEDIC SURGERY

## 2022-02-08 PROCEDURE — C1713 ANCHOR/SCREW BN/BN,TIS/BN: HCPCS | Performed by: ORTHOPAEDIC SURGERY

## 2022-02-08 PROCEDURE — 82962 GLUCOSE BLOOD TEST: CPT

## 2022-02-08 PROCEDURE — 8E0W0CZ ROBOTIC ASSISTED PROCEDURE OF TRUNK REGION, OPEN APPROACH: ICD-10-PCS | Performed by: ORTHOPAEDIC SURGERY

## 2022-02-08 PROCEDURE — 74011250637 HC RX REV CODE- 250/637: Performed by: PHYSICIAN ASSISTANT

## 2022-02-08 PROCEDURE — 07DR3ZZ EXTRACTION OF ILIAC BONE MARROW, PERCUTANEOUS APPROACH: ICD-10-PCS | Performed by: ORTHOPAEDIC SURGERY

## 2022-02-08 PROCEDURE — 74011000250 HC RX REV CODE- 250: Performed by: PHYSICIAN ASSISTANT

## 2022-02-08 PROCEDURE — 97162 PT EVAL MOD COMPLEX 30 MIN: CPT

## 2022-02-08 PROCEDURE — 2709999900 HC NON-CHARGEABLE SUPPLY: Performed by: ORTHOPAEDIC SURGERY

## 2022-02-08 PROCEDURE — 77030003445 HC NDL BIOP BN BD -B: Performed by: ORTHOPAEDIC SURGERY

## 2022-02-08 PROCEDURE — 0SG3071 FUSION OF LUMBOSACRAL JOINT WITH AUTOLOGOUS TISSUE SUBSTITUTE, POSTERIOR APPROACH, POSTERIOR COLUMN, OPEN APPROACH: ICD-10-PCS | Performed by: ORTHOPAEDIC SURGERY

## 2022-02-08 PROCEDURE — 77030014007 HC SPNG HEMSTAT J&J -B: Performed by: ORTHOPAEDIC SURGERY

## 2022-02-08 PROCEDURE — 74011250636 HC RX REV CODE- 250/636: Performed by: NURSE ANESTHETIST, CERTIFIED REGISTERED

## 2022-02-08 PROCEDURE — 77030020275 HC MISC ORTHOPEDIC: Performed by: ORTHOPAEDIC SURGERY

## 2022-02-08 PROCEDURE — 76060000036 HC ANESTHESIA 2.5 TO 3 HR: Performed by: ORTHOPAEDIC SURGERY

## 2022-02-08 PROCEDURE — 77030034475 HC MISC IMPL SPN: Performed by: ORTHOPAEDIC SURGERY

## 2022-02-08 PROCEDURE — 77030033138 HC SUT PGA STRATFX J&J -B: Performed by: ORTHOPAEDIC SURGERY

## 2022-02-08 PROCEDURE — 77030008462 HC STPLR SKN PROX J&J -A: Performed by: ORTHOPAEDIC SURGERY

## 2022-02-08 PROCEDURE — 77010033678 HC OXYGEN DAILY

## 2022-02-08 PROCEDURE — 01NB0ZZ RELEASE LUMBAR NERVE, OPEN APPROACH: ICD-10-PCS | Performed by: ORTHOPAEDIC SURGERY

## 2022-02-08 PROCEDURE — 77030014647 HC SEAL FBRN TISSL BAXT -D: Performed by: ORTHOPAEDIC SURGERY

## 2022-02-08 PROCEDURE — 0SG30AJ FUSION OF LUMBOSACRAL JOINT WITH INTERBODY FUSION DEVICE, POSTERIOR APPROACH, ANTERIOR COLUMN, OPEN APPROACH: ICD-10-PCS | Performed by: ORTHOPAEDIC SURGERY

## 2022-02-08 PROCEDURE — 76000 FLUOROSCOPY <1 HR PHYS/QHP: CPT

## 2022-02-08 PROCEDURE — 74011250637 HC RX REV CODE- 250/637: Performed by: ORTHOPAEDIC SURGERY

## 2022-02-08 PROCEDURE — 72100 X-RAY EXAM L-S SPINE 2/3 VWS: CPT

## 2022-02-08 PROCEDURE — 77030005513 HC CATH URETH FOL11 MDII -B: Performed by: ORTHOPAEDIC SURGERY

## 2022-02-08 PROCEDURE — 74011250636 HC RX REV CODE- 250/636: Performed by: PHYSICIAN ASSISTANT

## 2022-02-08 PROCEDURE — 77030038692 HC WND DEB SYS IRMX -B: Performed by: ORTHOPAEDIC SURGERY

## 2022-02-08 PROCEDURE — 77030008771 HC TU NG SALEM SUMP -A: Performed by: ANESTHESIOLOGY

## 2022-02-08 PROCEDURE — 74011250636 HC RX REV CODE- 250/636: Performed by: ORTHOPAEDIC SURGERY

## 2022-02-08 PROCEDURE — 0SG30K1 FUSION OF LUMBOSACRAL JOINT WITH NONAUTOLOGOUS TISSUE SUBSTITUTE, POSTERIOR APPROACH, POSTERIOR COLUMN, OPEN APPROACH: ICD-10-PCS | Performed by: ORTHOPAEDIC SURGERY

## 2022-02-08 PROCEDURE — 77030019908 HC STETH ESOPH SIMS -A: Performed by: ANESTHESIOLOGY

## 2022-02-08 PROCEDURE — 77030034479 HC ADH SKN CLSR PRINEO J&J -B: Performed by: ORTHOPAEDIC SURGERY

## 2022-02-08 PROCEDURE — 74011000250 HC RX REV CODE- 250

## 2022-02-08 PROCEDURE — 74011250636 HC RX REV CODE- 250/636

## 2022-02-08 PROCEDURE — 77030003028 HC SUT VCRL J&J -A: Performed by: ORTHOPAEDIC SURGERY

## 2022-02-08 PROCEDURE — 97530 THERAPEUTIC ACTIVITIES: CPT

## 2022-02-08 PROCEDURE — 74011000250 HC RX REV CODE- 250: Performed by: ANESTHESIOLOGY

## 2022-02-08 PROCEDURE — 77030003666 HC NDL SPINAL BD -A: Performed by: ORTHOPAEDIC SURGERY

## 2022-02-08 PROCEDURE — 76010000172 HC OR TIME 2.5 TO 3 HR INTENSV-TIER 1: Performed by: ORTHOPAEDIC SURGERY

## 2022-02-08 DEVICE — CREO MIS 5.5MM CURVED ROD, TITANIUM ALLOY, 50MM LENGTH
Type: IMPLANTABLE DEVICE | Site: SPINE LUMBAR | Status: FUNCTIONAL
Brand: CREO

## 2022-02-08 DEVICE — GRAFT BONE 10 CC: Type: IMPLANTABLE DEVICE | Site: SPINE LUMBAR | Status: FUNCTIONAL

## 2022-02-08 DEVICE — CREO MIS LOCKING CAP
Type: IMPLANTABLE DEVICE | Site: SPINE LUMBAR | Status: FUNCTIONAL
Brand: CREO

## 2022-02-08 DEVICE — 6.5 X 40MM CANNULATED SCREW, MODULAR, CREO AMP
Type: IMPLANTABLE DEVICE | Site: SPINE LUMBAR | Status: FUNCTIONAL
Brand: CREO

## 2022-02-08 DEVICE — 7.5 X 35MM CANNULATED SCREW, MODULAR, CREO AMP
Type: IMPLANTABLE DEVICE | Site: SPINE LUMBAR | Status: FUNCTIONAL
Brand: CREO

## 2022-02-08 DEVICE — CREO MIS MODULAR POLYAXIAL TULIP, 30MM REDUCTION
Type: IMPLANTABLE DEVICE | Site: SPINE LUMBAR | Status: FUNCTIONAL
Brand: CREO

## 2022-02-08 RX ORDER — FLUMAZENIL 0.1 MG/ML
0.3 INJECTION INTRAVENOUS ONCE
Status: COMPLETED | OUTPATIENT
Start: 2022-02-08 | End: 2022-02-08

## 2022-02-08 RX ORDER — FENTANYL CITRATE 50 UG/ML
50 INJECTION, SOLUTION INTRAMUSCULAR; INTRAVENOUS AS NEEDED
Status: DISCONTINUED | OUTPATIENT
Start: 2022-02-08 | End: 2022-02-08 | Stop reason: HOSPADM

## 2022-02-08 RX ORDER — ACETAMINOPHEN 500 MG
1000 TABLET ORAL EVERY 6 HOURS
Status: DISCONTINUED | OUTPATIENT
Start: 2022-02-08 | End: 2022-02-12 | Stop reason: HOSPADM

## 2022-02-08 RX ORDER — FENTANYL CITRATE 50 UG/ML
25 INJECTION, SOLUTION INTRAMUSCULAR; INTRAVENOUS
Status: DISCONTINUED | OUTPATIENT
Start: 2022-02-08 | End: 2022-02-08 | Stop reason: HOSPADM

## 2022-02-08 RX ORDER — ACETAMINOPHEN 500 MG
1000 TABLET ORAL ONCE
Status: COMPLETED | OUTPATIENT
Start: 2022-02-08 | End: 2022-02-08

## 2022-02-08 RX ORDER — ONDANSETRON 2 MG/ML
4 INJECTION INTRAMUSCULAR; INTRAVENOUS AS NEEDED
Status: DISCONTINUED | OUTPATIENT
Start: 2022-02-08 | End: 2022-02-08 | Stop reason: HOSPADM

## 2022-02-08 RX ORDER — PREGABALIN 75 MG/1
150 CAPSULE ORAL ONCE
Status: COMPLETED | OUTPATIENT
Start: 2022-02-08 | End: 2022-02-08

## 2022-02-08 RX ORDER — SODIUM CHLORIDE, SODIUM LACTATE, POTASSIUM CHLORIDE, CALCIUM CHLORIDE 600; 310; 30; 20 MG/100ML; MG/100ML; MG/100ML; MG/100ML
25 INJECTION, SOLUTION INTRAVENOUS CONTINUOUS
Status: DISCONTINUED | OUTPATIENT
Start: 2022-02-08 | End: 2022-02-08 | Stop reason: HOSPADM

## 2022-02-08 RX ORDER — ONDANSETRON 2 MG/ML
4 INJECTION INTRAMUSCULAR; INTRAVENOUS
Status: DISPENSED | OUTPATIENT
Start: 2022-02-08 | End: 2022-02-09

## 2022-02-08 RX ORDER — LIDOCAINE HYDROCHLORIDE 10 MG/ML
0.1 INJECTION, SOLUTION EPIDURAL; INFILTRATION; INTRACAUDAL; PERINEURAL AS NEEDED
Status: DISCONTINUED | OUTPATIENT
Start: 2022-02-08 | End: 2022-02-08 | Stop reason: HOSPADM

## 2022-02-08 RX ORDER — OXYCODONE HYDROCHLORIDE 5 MG/1
10-15 TABLET ORAL
Status: DISCONTINUED | OUTPATIENT
Start: 2022-02-08 | End: 2022-02-12 | Stop reason: HOSPADM

## 2022-02-08 RX ORDER — HYDROMORPHONE HYDROCHLORIDE 1 MG/ML
.2-.5 INJECTION, SOLUTION INTRAMUSCULAR; INTRAVENOUS; SUBCUTANEOUS
Status: DISCONTINUED | OUTPATIENT
Start: 2022-02-08 | End: 2022-02-08 | Stop reason: HOSPADM

## 2022-02-08 RX ORDER — LIDOCAINE HYDROCHLORIDE AND EPINEPHRINE 10; 10 MG/ML; UG/ML
INJECTION, SOLUTION INFILTRATION; PERINEURAL AS NEEDED
Status: DISCONTINUED | OUTPATIENT
Start: 2022-02-08 | End: 2022-02-08 | Stop reason: HOSPADM

## 2022-02-08 RX ORDER — PROPOFOL 10 MG/ML
INJECTION, EMULSION INTRAVENOUS AS NEEDED
Status: DISCONTINUED | OUTPATIENT
Start: 2022-02-08 | End: 2022-02-08 | Stop reason: HOSPADM

## 2022-02-08 RX ORDER — FLUMAZENIL 0.1 MG/ML
INJECTION INTRAVENOUS
Status: COMPLETED
Start: 2022-02-08 | End: 2022-02-08

## 2022-02-08 RX ORDER — GABAPENTIN 100 MG/1
100 CAPSULE ORAL 3 TIMES DAILY
Status: DISCONTINUED | OUTPATIENT
Start: 2022-02-08 | End: 2022-02-12 | Stop reason: HOSPADM

## 2022-02-08 RX ORDER — GLYCOPYRROLATE 0.2 MG/ML
INJECTION INTRAMUSCULAR; INTRAVENOUS AS NEEDED
Status: DISCONTINUED | OUTPATIENT
Start: 2022-02-08 | End: 2022-02-08 | Stop reason: HOSPADM

## 2022-02-08 RX ORDER — NALOXONE HYDROCHLORIDE 0.4 MG/ML
INJECTION, SOLUTION INTRAMUSCULAR; INTRAVENOUS; SUBCUTANEOUS
Status: COMPLETED
Start: 2022-02-08 | End: 2022-02-08

## 2022-02-08 RX ORDER — NALOXONE HYDROCHLORIDE 0.4 MG/ML
0.04 INJECTION, SOLUTION INTRAMUSCULAR; INTRAVENOUS; SUBCUTANEOUS
Status: DISCONTINUED | OUTPATIENT
Start: 2022-02-08 | End: 2022-02-08 | Stop reason: HOSPADM

## 2022-02-08 RX ORDER — MELATONIN
2000 DAILY
Status: DISCONTINUED | OUTPATIENT
Start: 2022-02-09 | End: 2022-02-12 | Stop reason: HOSPADM

## 2022-02-08 RX ORDER — LIDOCAINE HYDROCHLORIDE 20 MG/ML
INJECTION, SOLUTION EPIDURAL; INFILTRATION; INTRACAUDAL; PERINEURAL AS NEEDED
Status: DISCONTINUED | OUTPATIENT
Start: 2022-02-08 | End: 2022-02-08 | Stop reason: HOSPADM

## 2022-02-08 RX ORDER — AMOXICILLIN 250 MG
1 CAPSULE ORAL 2 TIMES DAILY
Status: DISCONTINUED | OUTPATIENT
Start: 2022-02-08 | End: 2022-02-12 | Stop reason: HOSPADM

## 2022-02-08 RX ORDER — METFORMIN HYDROCHLORIDE 500 MG/1
1000 TABLET ORAL 2 TIMES DAILY WITH MEALS
Status: DISCONTINUED | OUTPATIENT
Start: 2022-02-08 | End: 2022-02-12 | Stop reason: HOSPADM

## 2022-02-08 RX ORDER — OXYCODONE HYDROCHLORIDE 5 MG/1
5 TABLET ORAL
Status: DISCONTINUED | OUTPATIENT
Start: 2022-02-08 | End: 2022-02-12 | Stop reason: HOSPADM

## 2022-02-08 RX ORDER — NALOXONE HYDROCHLORIDE 0.4 MG/ML
0.4 INJECTION, SOLUTION INTRAMUSCULAR; INTRAVENOUS; SUBCUTANEOUS AS NEEDED
Status: DISCONTINUED | OUTPATIENT
Start: 2022-02-08 | End: 2022-02-12 | Stop reason: HOSPADM

## 2022-02-08 RX ORDER — POLYETHYLENE GLYCOL 3350 17 G/17G
17 POWDER, FOR SOLUTION ORAL DAILY
Status: DISCONTINUED | OUTPATIENT
Start: 2022-02-09 | End: 2022-02-12 | Stop reason: HOSPADM

## 2022-02-08 RX ORDER — FACIAL-BODY WIPES
10 EACH TOPICAL DAILY PRN
Status: DISCONTINUED | OUTPATIENT
Start: 2022-02-10 | End: 2022-02-12 | Stop reason: HOSPADM

## 2022-02-08 RX ORDER — DIAZEPAM 5 MG/1
5 TABLET ORAL
Status: DISCONTINUED | OUTPATIENT
Start: 2022-02-08 | End: 2022-02-12 | Stop reason: HOSPADM

## 2022-02-08 RX ORDER — ROPIVACAINE HYDROCHLORIDE 5 MG/ML
INJECTION, SOLUTION EPIDURAL; INFILTRATION; PERINEURAL AS NEEDED
Status: DISCONTINUED | OUTPATIENT
Start: 2022-02-08 | End: 2022-02-08 | Stop reason: HOSPADM

## 2022-02-08 RX ORDER — AMLODIPINE BESYLATE 5 MG/1
5 TABLET ORAL DAILY
Status: DISCONTINUED | OUTPATIENT
Start: 2022-02-09 | End: 2022-02-12 | Stop reason: HOSPADM

## 2022-02-08 RX ORDER — ONDANSETRON 2 MG/ML
INJECTION INTRAMUSCULAR; INTRAVENOUS AS NEEDED
Status: DISCONTINUED | OUTPATIENT
Start: 2022-02-08 | End: 2022-02-08 | Stop reason: HOSPADM

## 2022-02-08 RX ORDER — PRIMIDONE 50 MG/1
50 TABLET ORAL 2 TIMES DAILY
Status: DISCONTINUED | OUTPATIENT
Start: 2022-02-08 | End: 2022-02-12 | Stop reason: HOSPADM

## 2022-02-08 RX ORDER — FENTANYL CITRATE 50 UG/ML
INJECTION, SOLUTION INTRAMUSCULAR; INTRAVENOUS AS NEEDED
Status: DISCONTINUED | OUTPATIENT
Start: 2022-02-08 | End: 2022-02-08 | Stop reason: HOSPADM

## 2022-02-08 RX ORDER — SODIUM CHLORIDE 9 MG/ML
125 INJECTION, SOLUTION INTRAVENOUS CONTINUOUS
Status: DISPENSED | OUTPATIENT
Start: 2022-02-08 | End: 2022-02-09

## 2022-02-08 RX ORDER — SODIUM CHLORIDE 0.9 % (FLUSH) 0.9 %
5-40 SYRINGE (ML) INJECTION AS NEEDED
Status: DISCONTINUED | OUTPATIENT
Start: 2022-02-08 | End: 2022-02-12 | Stop reason: HOSPADM

## 2022-02-08 RX ORDER — POLYETHYLENE GLYCOL 3350 17 G/17G
17 POWDER, FOR SOLUTION ORAL
Status: DISCONTINUED | OUTPATIENT
Start: 2022-02-08 | End: 2022-02-12 | Stop reason: HOSPADM

## 2022-02-08 RX ORDER — PHENYLEPHRINE HCL IN 0.9% NACL 0.4MG/10ML
SYRINGE (ML) INTRAVENOUS AS NEEDED
Status: DISCONTINUED | OUTPATIENT
Start: 2022-02-08 | End: 2022-02-08 | Stop reason: HOSPADM

## 2022-02-08 RX ORDER — ROCURONIUM BROMIDE 10 MG/ML
INJECTION, SOLUTION INTRAVENOUS AS NEEDED
Status: DISCONTINUED | OUTPATIENT
Start: 2022-02-08 | End: 2022-02-08 | Stop reason: HOSPADM

## 2022-02-08 RX ORDER — CYCLOBENZAPRINE HCL 10 MG
10 TABLET ORAL
Status: DISCONTINUED | OUTPATIENT
Start: 2022-02-08 | End: 2022-02-12 | Stop reason: HOSPADM

## 2022-02-08 RX ORDER — GABAPENTIN 100 MG/1
300 CAPSULE ORAL
Status: DISCONTINUED | OUTPATIENT
Start: 2022-02-08 | End: 2022-02-12 | Stop reason: HOSPADM

## 2022-02-08 RX ORDER — MIDAZOLAM HYDROCHLORIDE 1 MG/ML
1 INJECTION, SOLUTION INTRAMUSCULAR; INTRAVENOUS AS NEEDED
Status: DISCONTINUED | OUTPATIENT
Start: 2022-02-08 | End: 2022-02-08 | Stop reason: HOSPADM

## 2022-02-08 RX ORDER — DEXAMETHASONE SODIUM PHOSPHATE 4 MG/ML
INJECTION, SOLUTION INTRA-ARTICULAR; INTRALESIONAL; INTRAMUSCULAR; INTRAVENOUS; SOFT TISSUE AS NEEDED
Status: DISCONTINUED | OUTPATIENT
Start: 2022-02-08 | End: 2022-02-08 | Stop reason: HOSPADM

## 2022-02-08 RX ORDER — HYDROMORPHONE HYDROCHLORIDE 2 MG/ML
INJECTION, SOLUTION INTRAMUSCULAR; INTRAVENOUS; SUBCUTANEOUS AS NEEDED
Status: DISCONTINUED | OUTPATIENT
Start: 2022-02-08 | End: 2022-02-08 | Stop reason: HOSPADM

## 2022-02-08 RX ORDER — FLUMAZENIL 0.1 MG/ML
0.2 INJECTION INTRAVENOUS ONCE
Status: COMPLETED | OUTPATIENT
Start: 2022-02-08 | End: 2022-02-08

## 2022-02-08 RX ORDER — PANTOPRAZOLE SODIUM 40 MG/1
40 TABLET, DELAYED RELEASE ORAL EVERY MORNING
Status: DISCONTINUED | OUTPATIENT
Start: 2022-02-09 | End: 2022-02-12 | Stop reason: HOSPADM

## 2022-02-08 RX ORDER — MIDAZOLAM HYDROCHLORIDE 1 MG/ML
INJECTION, SOLUTION INTRAMUSCULAR; INTRAVENOUS AS NEEDED
Status: DISCONTINUED | OUTPATIENT
Start: 2022-02-08 | End: 2022-02-08 | Stop reason: HOSPADM

## 2022-02-08 RX ORDER — SUCCINYLCHOLINE CHLORIDE 20 MG/ML
INJECTION INTRAMUSCULAR; INTRAVENOUS AS NEEDED
Status: DISCONTINUED | OUTPATIENT
Start: 2022-02-08 | End: 2022-02-08 | Stop reason: HOSPADM

## 2022-02-08 RX ORDER — KETOROLAC TROMETHAMINE 30 MG/ML
30 INJECTION, SOLUTION INTRAMUSCULAR; INTRAVENOUS
Status: COMPLETED | OUTPATIENT
Start: 2022-02-08 | End: 2022-02-08

## 2022-02-08 RX ORDER — NEOSTIGMINE METHYLSULFATE 1 MG/ML
INJECTION, SOLUTION INTRAVENOUS AS NEEDED
Status: DISCONTINUED | OUTPATIENT
Start: 2022-02-08 | End: 2022-02-08 | Stop reason: HOSPADM

## 2022-02-08 RX ORDER — HYDROMORPHONE HYDROCHLORIDE 1 MG/ML
0.5 INJECTION, SOLUTION INTRAMUSCULAR; INTRAVENOUS; SUBCUTANEOUS
Status: DISCONTINUED | OUTPATIENT
Start: 2022-02-08 | End: 2022-02-08

## 2022-02-08 RX ORDER — ATORVASTATIN CALCIUM 10 MG/1
10 TABLET, FILM COATED ORAL
Status: DISCONTINUED | OUTPATIENT
Start: 2022-02-08 | End: 2022-02-12 | Stop reason: HOSPADM

## 2022-02-08 RX ORDER — KETOROLAC TROMETHAMINE 30 MG/ML
INJECTION, SOLUTION INTRAMUSCULAR; INTRAVENOUS
Status: COMPLETED
Start: 2022-02-08 | End: 2022-02-08

## 2022-02-08 RX ORDER — KETAMINE HYDROCHLORIDE 100 MG/ML
INJECTION, SOLUTION INTRAMUSCULAR; INTRAVENOUS AS NEEDED
Status: DISCONTINUED | OUTPATIENT
Start: 2022-02-08 | End: 2022-02-08 | Stop reason: HOSPADM

## 2022-02-08 RX ORDER — SODIUM CHLORIDE 0.9 % (FLUSH) 0.9 %
5-40 SYRINGE (ML) INJECTION EVERY 8 HOURS
Status: DISCONTINUED | OUTPATIENT
Start: 2022-02-08 | End: 2022-02-12 | Stop reason: HOSPADM

## 2022-02-08 RX ORDER — FAMOTIDINE 20 MG/1
20 TABLET, FILM COATED ORAL 2 TIMES DAILY
Status: DISCONTINUED | OUTPATIENT
Start: 2022-02-08 | End: 2022-02-09 | Stop reason: SDUPTHER

## 2022-02-08 RX ORDER — DIPHENHYDRAMINE HCL 12.5MG/5ML
12.5 LIQUID (ML) ORAL
Status: ACTIVE | OUTPATIENT
Start: 2022-02-08 | End: 2022-02-09

## 2022-02-08 RX ADMIN — FENTANYL CITRATE 50 MCG: 50 INJECTION, SOLUTION INTRAMUSCULAR; INTRAVENOUS at 09:18

## 2022-02-08 RX ADMIN — SODIUM CHLORIDE, POTASSIUM CHLORIDE, SODIUM LACTATE AND CALCIUM CHLORIDE 25 ML/HR: 600; 310; 30; 20 INJECTION, SOLUTION INTRAVENOUS at 08:10

## 2022-02-08 RX ADMIN — SUCCINYLCHOLINE CHLORIDE 120 MG: 20 INJECTION, SOLUTION INTRAMUSCULAR; INTRAVENOUS at 09:14

## 2022-02-08 RX ADMIN — FLUMAZENIL 0.3 MG: 0.1 INJECTION, SOLUTION INTRAVENOUS at 12:27

## 2022-02-08 RX ADMIN — Medication 3 MG: at 11:11

## 2022-02-08 RX ADMIN — DOCUSATE SODIUM 50 MG AND SENNOSIDES 8.6 MG 1 TABLET: 8.6; 5 TABLET, FILM COATED ORAL at 17:30

## 2022-02-08 RX ADMIN — FAMOTIDINE 20 MG: 20 TABLET, FILM COATED ORAL at 18:00

## 2022-02-08 RX ADMIN — GABAPENTIN 100 MG: 100 CAPSULE ORAL at 21:24

## 2022-02-08 RX ADMIN — HYDROMORPHONE HYDROCHLORIDE 0.2 MG: 2 INJECTION, SOLUTION INTRAMUSCULAR; INTRAVENOUS; SUBCUTANEOUS at 10:22

## 2022-02-08 RX ADMIN — ROCURONIUM BROMIDE 20 MG: 10 INJECTION INTRAVENOUS at 10:18

## 2022-02-08 RX ADMIN — ONDANSETRON 4 MG: 2 INJECTION INTRAMUSCULAR; INTRAVENOUS at 16:29

## 2022-02-08 RX ADMIN — OXYCODONE 10 MG: 5 TABLET ORAL at 20:31

## 2022-02-08 RX ADMIN — SODIUM CHLORIDE 125 ML/HR: 9 INJECTION, SOLUTION INTRAVENOUS at 20:34

## 2022-02-08 RX ADMIN — MIDAZOLAM HYDROCHLORIDE 2 MG: 1 INJECTION, SOLUTION INTRAMUSCULAR; INTRAVENOUS at 09:07

## 2022-02-08 RX ADMIN — ATORVASTATIN CALCIUM 10 MG: 10 TABLET, FILM COATED ORAL at 20:31

## 2022-02-08 RX ADMIN — HYDROMORPHONE HYDROCHLORIDE 0.2 MG: 2 INJECTION, SOLUTION INTRAMUSCULAR; INTRAVENOUS; SUBCUTANEOUS at 10:30

## 2022-02-08 RX ADMIN — LIDOCAINE HYDROCHLORIDE 40 MG: 20 INJECTION, SOLUTION EPIDURAL; INFILTRATION; INTRACAUDAL; PERINEURAL at 09:14

## 2022-02-08 RX ADMIN — Medication 1000 MG: at 07:58

## 2022-02-08 RX ADMIN — NALOXONE HYDROCHLORIDE 0.08 MG: 0.4 INJECTION, SOLUTION INTRAMUSCULAR; INTRAVENOUS; SUBCUTANEOUS at 12:40

## 2022-02-08 RX ADMIN — PROPOFOL 120 MG: 10 INJECTION, EMULSION INTRAVENOUS at 09:14

## 2022-02-08 RX ADMIN — SODIUM CHLORIDE, PRESERVATIVE FREE 10 ML: 5 INJECTION INTRAVENOUS at 21:24

## 2022-02-08 RX ADMIN — Medication 1 AMPULE: at 20:31

## 2022-02-08 RX ADMIN — KETOROLAC TROMETHAMINE 30 MG: 30 INJECTION, SOLUTION INTRAMUSCULAR; INTRAVENOUS at 13:08

## 2022-02-08 RX ADMIN — OXYCODONE 10 MG: 5 TABLET ORAL at 17:34

## 2022-02-08 RX ADMIN — FLUMAZENIL 0.2 MG: 0.1 INJECTION, SOLUTION INTRAVENOUS at 12:32

## 2022-02-08 RX ADMIN — CEFAZOLIN SODIUM 2 G: 1 INJECTION, POWDER, FOR SOLUTION INTRAMUSCULAR; INTRAVENOUS at 16:33

## 2022-02-08 RX ADMIN — KETAMINE HYDROCHLORIDE 50 MG: 100 INJECTION, SOLUTION, CONCENTRATE INTRAMUSCULAR; INTRAVENOUS at 09:53

## 2022-02-08 RX ADMIN — Medication 40 MCG: at 10:16

## 2022-02-08 RX ADMIN — KETOROLAC TROMETHAMINE 30 MG: 30 INJECTION, SOLUTION INTRAMUSCULAR at 13:08

## 2022-02-08 RX ADMIN — ROCURONIUM BROMIDE 30 MG: 10 INJECTION INTRAVENOUS at 09:22

## 2022-02-08 RX ADMIN — ONDANSETRON HYDROCHLORIDE 4 MG: 2 INJECTION, SOLUTION INTRAMUSCULAR; INTRAVENOUS at 11:04

## 2022-02-08 RX ADMIN — FLUMAZENIL 0.2 MG: 0.1 INJECTION INTRAVENOUS at 12:32

## 2022-02-08 RX ADMIN — GLYCOPYRROLATE 0.2 MG: 0.2 INJECTION, SOLUTION INTRAMUSCULAR; INTRAVENOUS at 11:11

## 2022-02-08 RX ADMIN — Medication 3 AMPULE: at 08:00

## 2022-02-08 RX ADMIN — FENTANYL CITRATE 50 MCG: 50 INJECTION, SOLUTION INTRAMUSCULAR; INTRAVENOUS at 09:14

## 2022-02-08 RX ADMIN — DEXAMETHASONE SODIUM PHOSPHATE 8 MG: 4 INJECTION, SOLUTION INTRAMUSCULAR; INTRAVENOUS at 09:22

## 2022-02-08 RX ADMIN — WATER 2 G: 1 INJECTION INTRAMUSCULAR; INTRAVENOUS; SUBCUTANEOUS at 09:20

## 2022-02-08 RX ADMIN — PREGABALIN 150 MG: 75 CAPSULE ORAL at 07:59

## 2022-02-08 RX ADMIN — ACETAMINOPHEN 1000 MG: 500 TABLET ORAL at 17:29

## 2022-02-08 RX ADMIN — HYDROMORPHONE HYDROCHLORIDE 0.2 MG: 2 INJECTION, SOLUTION INTRAMUSCULAR; INTRAVENOUS; SUBCUTANEOUS at 10:59

## 2022-02-08 NOTE — PERIOP NOTES
Floseal Hemostatic Matrix: 10mL   Reference number: [id number]   Lot Number: [id number]   Expiration Date: 11/03/2023

## 2022-02-08 NOTE — DISCHARGE INSTRUCTIONS
[address]    Discharge Instruction Sheet: POSTERIOR SPINAL FUSION    DR. Elaina Scott    Pain control:   Typically, we will prescribe a narcotic usually 1-2 tabs every four hours is    sufficient for the pain. Most patients need this only for the first few weeks. You   should discontinue this as the pain decreases. You should not drive while taking any narcotic pain medications. Constipation  Pain medicines and anesthesia can be constipating-this can be prevented by gentle physical activity and drinking plenty of fluid. It should be treated with over-the-counter medications such as Miralax or suppositories, and/or Fleets enema. You should have a bowel movement at least every other day following surgery. Incision care     Keep this area clean and dry. Your dressing is designed to stay in place for 5-7 days. You will be sent home with one additional dressing to change at that time. Leave this new dressing in place until our follow up visit in the office in about 10-14 days. If staples are in place, they should be removed about 14-20 days after surgery. You may shower with this impermeable dressing in place. DO NOT take a tub bath or go swimming until cleared by your doctor. DO NOT apply lotions, oils, or creams to incision. To increase and promote healing:   Stop Smoking (or at least cut back on smoking).  Eat a well-balanced diet (high in protein and vitamin C)   If your appetite is poor, consider nutritional supplements like Ensure, Glucerna, or [place] Instant Breakfast.   If you are diabetic, controlling you blood sugars is very important to prevent infection and promote wound healing. Nutrition:   If you were on a supplement such as Ensure or Glucerna) while in the hospital, please continue using them with each meal for the next 30 days.    Eat a well-balanced diet - High in protein, high in vitamins and minerals, especially vitamin C and zinc.     Restrictions:   Remember your \"BLT's\"    1. Limited bending at waist    2. Lift no more than 10 pounds    3. No Twisting     If you were given a brace, wear it when out of bed. Warning signs : Please call your physician immediately at [phone] if you have   Bleeding from incision that is constant.  Change in mental status (unusual behavior or confusion)   If your incision develops redness or swelling   Change in wound drainage (increase in amount, color, or foul odor)   [place] over 101.5 degrees Fahrenheit    Headache that is not relieved with pain medication   Tenderness or redness in the calf of your leg    Emergency: CALL 911 if you have   Shortness of breath   Chest pain   Localized chest pain when coughing or taking a deep breath    Follow-up  Please call Dr. Carmela Pierre office for a follow up appointment in 2-3 weeks at [phone]. You can return to work when cleared by a physician. During normal business hours you may reach Dr. Jared Avalos' team directly at [phone] if you have concerns or questions.     Saige Rodrigues

## 2022-02-08 NOTE — BRIEF OP NOTE
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 Procedure(s):  LUMBAR 5 TO SACRAL 1 POSTERIOR DECOMPRESSION AND FUSION    Surgeon(s):  Deep Webb MD    Surgical Assistant: Physician Assistant: MERRILL Orellana  Surg Asst-1: Tho Fonseca    Anesthesia: Other     Estimated Blood Loss (mL): less than 50     Complications: None    Specimens: * No specimens in log *     Implants:   Implant Name Type Inv.  Item Serial No.  Lot No. LRB No. Used Action   GRAFT BONE 10 CC - [id number]  GRAFT BONE 10 CC [id number] [employer]WD NA N/A 1 Implanted   SCREW SPNL L40MM DIA6.5MM THORLUM DARRIUS ADV MOD PLATFRM CREO - SNA  SCREW SPNL L40MM DIA6.5MM THORLUM DARRIUS ADV MOD PLATFRM CREO NA [employer]WD NA N/A 2 Implanted   CREO MIS MOD POLYAX [place] 30MM - SNA  CREO MIS MOD POLYAX [place] 30MM NA [employer]WD NA N/A 4 Implanted   CAP SPNL POST FACET JT ESTRELLITA FOR INTEGR JOSÉ LUIS REDUC CREO MIS - SNA  CAP SPNL POST FACET JT ESTRELLITA FOR INTEGR JOSÉ LUIS REDUC CREO MIS NA [employer]WD NA N/A 4 Implanted   SCREW SPNL L35MM OD7.5MM DARRIUS MOD CREO - SNA  SCREW SPNL L35MM OD7.5MM DARRIUS MOD CREO NA [employer]_WD NA N/A 2 Implanted   JOSÉ LUIS SPNL CRV 5.5X50 MM TI CREO - SNA  JOSÉ LUIS SPNL CRV 5.5X50 MM TI CREO NA [employer]WD NA N/A 2 Implanted       Drains: * No LDAs found *    Findings: Stenosis    Electronically Signed by Markos Garcia MD on 2/8/2022 at 11:05 AM

## 2022-02-08 NOTE — PERIOP NOTES
Consulted with Dr. Alonzo Kim r/t patient c/o pain. Patient remains drowsy with periodic episodes of apnea when dozing. Does not respond to apnea alarm but is easily aroused to name. However reports no relief of pain since aroused after Narcan administration @ 1240. Returns to sleep within minutes. No further orders at this time except continuous pulse oximeter for the Ortho unit.

## 2022-02-08 NOTE — PROGRESS NOTES
Problem: Mobility Impaired (Adult and Pediatric)  Goal: *Acute Goals and Plan of Care (Insert Text)  Description: FUNCTIONAL STATUS PRIOR TO ADMISSION: Patient was independent and active without use of DME. Limited tolerance for standing secondary to pain into bilat LE's. HOME SUPPORT PRIOR TO ADMISSION: The patient lived alone with local family/friends to provide assistance. Patient does not plan to have anyone stay with her over night. Physical Therapy Goals  Initiated 2/8/2022    1. Patient will move from supine to sit and sit to supine , scoot up and down, and roll side to side in bed with modified independence within 4 days. 2. Patient will perform sit to stand with supervision/set-up within 4 days. 3. Patient will ambulate with supervision/set-up for 200 feet with the least restrictive device within 4 days. 4. Patient will ascend/descend 4 stairs with bilat handrail(s) with contract guard assist within 4 days. 5. Patient will verbalize and demonstrate understanding of spinal precautions (No bending, lifting greater than 5 lbs, or twisting; log-roll technique; frequent repositioning as instructed) within 4 days.   Outcome: Not Met   PHYSICAL THERAPY EVALUATION  Patient: Toña Marshall (78 y.o. female)  Date: 2/8/2022  Primary Diagnosis: Spinal stenosis, lumbar region, with neurogenic claudication [M48.062]  Lumbar spondylosis [M47.816]  Lumbar pain [M54.50]  Bilateral sciatica [M54.31, M54.32]  Lumbar radiculopathy [M54.16]  DDD (degenerative disc disease), lumbar [M51.36]  Low back pain, unspecified back pain laterality, unspecified chronicity, unspecified whether sciatica present [M54.50]  Foraminal stenosis of lumbar region [M48.061]  Spinal stenosis [M48.00]  Procedure(s) (LRB):  LUMBAR 5 TO SACRAL 1 POSTERIOR DECOMPRESSION AND FUSION (N/A) Day of Surgery   Precautions:  Spinal      ASSESSMENT  Based on the objective data described below, the patient presents with significant drowsiness, nausea with mobility, increased back pain, impaired bed mobility, inability to tolerate OOB mobility, and decreased overall independence following admission POD #0 L5-S1 decompression & fusion. Patient very drowsy and only able to stay awake for short period though does become more alert with verbal stimuli. Completes supine<>sit Mod A and max cuing for maintaining spinal precautions. Upon sitting EOB, patient c/o increasing nausea and she continues to appear drowsy; VSS. Deferred OOB transfers due to this. Returned to R sidelying position per patient request as she c/o midline back pain in supine position. Patient will need reinforcement and re-education of spinal precautions. She is well below baseline functioning status and will require continued skilled PT services to address functional impairments. Recommend HH therapy at discharge. Current Level of Function Impacting Discharge (mobility/balance): Mod A bed mobility    Functional Outcome Measure: The patient scored Total: 15/100 on the Barthel Index which is indicative of ~85% impaired ability to care for basic self needs/dependency on others. Other factors to consider for discharge: lives alone and plans to only have assist during day     Patient will benefit from skilled therapy intervention to address the above noted impairments. PLAN :  Recommendations and Planned Interventions: bed mobility training, transfer training, gait training, therapeutic exercises, patient and family training/education and therapeutic activities      Frequency/Duration: Patient will be followed by physical therapy:  twice daily to address goals.     Recommendation for discharge: (in order for the patient to meet his/her long term goals)  Physical therapy at least 2 days/week in the home AND ensure assist and/or supervision for safety with stair negotiation and ADLs    This discharge recommendation:  A follow-up discussion with the attending provider and/or case management is planned    IF patient discharges home will need the following DME: rolling walker     SUBJECTIVE:   Patient stated Elizabeth Agosto would someone need to stay with me at night?     OBJECTIVE DATA SUMMARY:   HISTORY:    Past Medical History:   Diagnosis Date    Allergic rhinitis due to allergen 10/22/2014    Arthritis     spine    Asthma     many years ago, no inhaler or meds    Chronic pain     back pain    Depression     Diabetes ([address]     Type II    GERD (gastroesophageal reflux disease)     Hepatitis B 1984    Hypercholesterolemia     Hypertension     Kidney stones 1990's    Positive PPD 2009    treated with INH     Past Surgical History:   Procedure Laterality Date    COLONOSCOPY  4-11    manetas- n ormal    COLONOSCOPY N/A 11/4/2021    COLONOSCOPY, EGD performed by Lupis Rizvi MD at [place] [address] EGD  4-11    manetas- esophageal ring    ENDOSCOPY, COLON, DIAGNOSTIC  12/2007    2 polyps    HX BREAST BIOPSY Bilateral 1980s    all benign cysts    HX BUNIONECTOMY Bilateral 1990s    HX CATARACT REMOVAL Bilateral 11/2018    HX CHOLECYSTECTOMY      HX COLONOSCOPY  May 2016    HX ENDOSCOPY  May 2016   [facility] H[facility] GYN  1978    tubal laparoscopy-unblocked tubes    HX HEART CATHETERIZATION      negatve    HX ORTHOPAEDIC  2016    had an injection in her back to help with leg pain    HX ORTHOPAEDIC Left 12/12/2017    foot surg.   bunion removal  \"Put a plate in\"    HX OTHER SURGICAL  1997    bunionectomy    HX TONSIL AND ADENOIDECTOMY      approx 11years old   [address]  1990's    abcess bilateral    NV ESOPHAGEAL MOTILITY STUDY W/INTERP&RPT  1/27/2020    NV GERD TST W/ NASAL IMPEDENCE ELECTROD  1/27/2020     Personal factors and/or comorbidities impacting plan of care: DM II, HTN, chronic pain, lives alone    Home Situation  Home Environment: Private residence  # Steps to Enter: 4  Rails to Enter: Yes  Hand Rails : Bilateral  One/Two Story Residence: One story  # of Interior Steps: 10  Interior Rails: Left  Lift Chair Available: No  Living Alone: Yes  Support Systems: Other Family Member(s),Friend/Neighbor  Current DME Used/Available at Home: Cane, straight  Tub or Shower Type: Tub/Shower combination    EXAMINATION/PRESENTATION/DECISION MAKING:   Critical Behavior:  Neurologic State: Drowsy  Orientation Level: Oriented X4  Cognition: Follows commands     Hearing: Auditory  Auditory Impairment: None  Hearing Aids/Status: Does not own    Range Of Motion:  AROM: Generally decreased, functional  Strength:    Strength: Generally decreased, functional    Coordination:  Coordination: Generally decreased, functional  Functional Mobility:  Bed Mobility:  Rolling: Minimum assistance  Supine to Sit: Moderate assistance  Sit to Supine: Moderate assistance  Scooting: Maximum assistance  Transfers:  Deferred secondary to drowsiness and nausea   Balance:   Sitting: Impaired  Sitting - Static: Good (unsupported)  Sitting - Dynamic: Fair (occasional)  Ambulation/Gait Training:    Deferred secondary to drowsiness and nausea    Functional Measure:  Barthel Index:    Bathin  Bladder: 0  Bowels: 5  Groomin  Dressin  Feedin  Mobility: 0  Stairs: 0  Toilet Use: 0  Transfer (Bed to Chair and Back): 5  Total: 15/100       The Barthel ADL Index: Guidelines  1. The index should be used as a record of what a patient does, not as a record of what a patient could do. 2. The main aim is to establish degree of independence from any help, physical or verbal, however minor and for whatever reason. 3. The need for supervision renders the patient not independent. 4. A patient's performance should be established using the best available evidence. Asking the patient, friends/relatives and nurses are the usual sources, but direct observation and common sense are also important. However direct testing is not needed.   5. Usually the patient's performance over the preceding 24-48 hours is important, but occasionally longer periods will be relevant. 6. Middle categories imply that the patient supplies over 50 per cent of the effort. 7. Use of aids to be independent is allowed. Houston Rowley., Barthel, D.W. ([id number]). Functional evaluation: the Barthel Index. [facility] (14)2. LAURO Dominguez, Cinthya Thomas., Janak Pascual., Padmini Antoine, [facility] (1999). Measuring the change indisability after inpatient rehabilitation; comparison of the responsiveness of the Barthel Index and Functional [place] Measure. Journal of Neurology, Neurosurgery, and Psychiatry, 66(4), 064-122. Genet Ramirez, N.J.A, SHANTE Del Rosario, & Jorden Homans, MJOHN. (2004.) Assessment of post-stroke quality of life in cost-effectiveness studies: The usefulness of the Barthel Index and the EuroQoL-5D. Quality of Life Research, 15, 399-        Physical Therapy Evaluation Charge Determination   History Examination Presentation Decision-Making   HIGH Complexity :3+ comorbidities / personal factors will impact the outcome/ POC  MEDIUM Complexity : 3 Standardized tests and measures addressing body structure, function, activity limitation and / or participation in recreation  MEDIUM Complexity : Evolving with changing characteristics  Other outcome measures Barthel Index 15/100  HIGH       Based on the above components, the patient evaluation is determined to be of the following complexity level: MEDIUM    Activity Tolerance:   Fair, Poor, requires frequent rest breaks and very drowsy    After treatment patient left in no apparent distress:   Patient positioned in R sidelying for pressure relief, Call bell within reach, Caregiver / family present and Side rails x 3    COMMUNICATION/EDUCATION:   The patients plan of care was discussed with: Registered nurse and nurse practitioner.      Fall prevention education was provided and the patient/caregiver indicated understanding., Patient/family have participated as able in goal setting and plan of care. and Patient/family agree to work toward stated goals and plan of care.     Thank you for this referral.  Jalyn Mclean, PT   Time Calculation: 18 mins

## 2022-02-08 NOTE — OP NOTES
[facility]  OPERATIVE REPORT    Name:  Sapphire Alberto  MR#:  [id number]  :  1950  DATE OF SERVICE:  2022    PREOPERATIVE DIAGNOSES:  1. Lumbar spondylosis    2. Spinal stenosis, lumbar region with neurogenic claudication    3. Lumbar pain    4. Bilateral sciatica    5. DDD (degenerative disc disease), lumbar    6. Lumbar radiculopathy    7. Low back pain, unspecified back pain laterality, unspecified chronicity, unspecified whether sciatica present    8. Foraminal stenosis of lumbar region        POSTOPERATIVE DIAGNOSES:  1. Lumbar spondylosis    2. Spinal stenosis, lumbar region with neurogenic claudication    3. Lumbar pain    4. Bilateral sciatica    5. DDD (degenerative disc disease), lumbar    6. Lumbar radiculopathy    7. Low back pain, unspecified back pain laterality, unspecified chronicity, unspecified whether sciatica present    8. Foraminal stenosis of lumbar region        PROCEDURES PERFORMED:  1. L5-S1 posterior arthrodesis with TLIF.  2. L5-S1 posterior instrumentation. 3. Bone marrow aspirate through separate fascial incision for spinal fusion augmentation. 4. Use of [facility] robotic system (Stereotactic computer-assisted system) for placement of pedicle screws. 5. Use of allograft bone for spinal fusion. 6. Insertion of interbody biomechanical device. 7. L4-5 laminotomy and medial facetectomy. 8. Use of local autograft bone for spinal fusion. SURGEON:  Saul Miranda MD    FIRST ASSISTANT:  MERRILL Pham  Ms. Hakeem Arizmendi has undergone a major surgery. [facility] knowledge about the pertinent anatomy, procedural steps and equipment requirent in this procedure was medically necessary to ensure the safety of the patient. His assistance has helped reduce surgical time by 35%. Surgical tasks included, but are not limited to:  1) Safe and proper retraction.   2) Maintenance of visualization during surgery by helping with tasks such as suctioning. 3) Multiple tasks throughout the decompression and instrumentation to allow for timely and safe completion of the procedure. 4) Overall assistance helped decrease the risk complications such as infection, bleeding, hardware malposition, and damage to surrounding structures. ANESTHESIA:  GETA. COMPLICATIONS:  None. SPECIMENS:  None. IMPLANTS:    1. Globus CREO pedicle screw system  2. Globus Altera TLIF cage (interbody biomechanical device). ESTIMATED BLOOD LOSS:  50 mL. DRAINS:  None. PRE-OP ANTIBIOTICS: Ancef. INDICATIONS FOR PROCEDURE:    Ariane Clarke is a 73 y.o. female who has the above listed diagnosis. Ms. Tara Villegas has failed to improve with non-operative treatment and has chosen to proceed with surgical intervention. We had a long conversation about pros and cons of surgery, risks, possible complications, alternative treatments, and Ariane Clarke had an opportunity to ask questions and is satisfied with my answers and explanations.   I have personally given warnings about possible complications including but not limited to death, permanent disability, heart attack, stroke, lung injury or infection, blindness, ileus, bladder or bowel problems, ureter injury, bleeding, blood vessel injury, nerve injury (including numbness, pain and weakness), paralysis (which may be permanent), failure to heal, failure to fuse bone together in fusion procedures, failure to relief symptoms, failure to relief pain, increased pain, need for further surgeries, failure or breakage or hardware, malpositioning of hardware, need to fuse or operate on additional levels determined either during or after surgery, destabilization of the spine (which may require fusion or later surgery), infections (which may or may not require additional surgery), dural tears (tears of the sac holding in nerves and spinal fluid), meningitis, blood clots, pulmonary embolus, recurrent disc herniation, and anesthetic complications. Comorbidities such as obesity, smoking, rheumatoid arthritis, chronic steroid use and diabetes increase these risks. .  Ms. Willi Robin  understands and wants to proceed. NARRATIVE OF THE PROCEDURE:    After informed consent was obtained and Orville Sandoval had a chance to ask any last minute questions, the patient was transported to the operating room and underwent general endotracheal anesthesia. Orville Sandoval was positioned prone on the [place] table and the body was properly padded. Fluoroscopy was used to joelle the level of the incision and the site was prepped and draped in the usual manner. A time out was obtained and we verified that we had the correct patient the correct site and that the proper IV antibiotics had been administered in accordance with SCIP protocol. I proceeded to perform a stab incision over the left iliac crest.  A Jamshidi needle was inserted in the left iliac crest and 20 mL os bone marrow was aspirated. The aspirate was mixed with the allograft bone and used for the fusion later in the procedure. The [employer] robotic navigation marker was placed through the same incision and a secondary marker was placed on the contralateral side. Fluoroscopy was brought into the field and the spinal levels from L5 through S1 were registered on the AP and lateral views. The [employer] robotic system merged the images from fluoroscopy with the pre-operative CT and guided us for the placement of Celestina approaches. Subsequently, screws were placed with the following technique; the robotic arm guided the proper trajectory, the soft tissue was cleared with a dilator, a high speed drill was used to create an opening on the cortical bone in line with the pedicle, the pedicle was cannulated with a tap and finally the pre-measured screw was inserted. The placement of pedicle screws was repeated in the exact same manner from L5 through S1 bilaterally.   Once the screw placement had been completed, the [email or web address] robotic system was removed and fluoroscopy was used on AP and lateral views to confirm proper placement of the screws. With the screws in place I turned my attention to the TLIF part of the procedure. I exposed the S1 screw on the bilateral and the surrounding faced and lamina. I used a high speed drill to create a partial facetectomy by drilling superior to the pedicle screw between the 11 o'clock and 1 o'clock positions. The facetectomy borders are; medially it's the medial wall of the pedicle, lateral it's the lateral border of the facet, inferiorly it's the superior wall of the pedicle and superiorly it's the superior aspect of the disc. The lamina, pars and facet of this level were removed with a Kerrison #3, the exiting and traversing nerve roots were visualized and fully decompressed. Once the L5-S1 level was fully decompressed, I inspected epidural space with a Concepcion ball and noticed nerve compression proximally medial to the pedicle at the adjacent level. I made the decision to extend the decompression proximally by entering the bilateral L4-5 interspace with a laminotomy/medial facetectomy. The lamina medial to the L5 pedicle was removed with a high speed drill and a Kerrison #3. The L5 nerve root was exposed and a medial facetectomy was achieved with the Kerrison #3. Repeat inspection of the area with a Concepcion ball revealed adequate decompression. The posterior screw heights were adjusted and a caliper was used to measure the shakir length. The shakir was selected and placed across the posterior screws from L5 to S1 bilaterally. Locking caps were placed at every level and final tightened with the final tightening device (completing the posterior instrumentation). The posterior elements were decorticated from L5 to S1. Allograft bone soaked in bone marrow aspirate was placed from L5 to S1 to complete the posterior fusion.   Final fluoroscopy images were taken and saved to PACS. The fascia was closed with #1 vicryl, the subcutaneous tissues with 2-0 vicryl and the skin with monocryl and dermabond. The patient was then awakened and transfer to PACU in stable condition. POSTOPERATIVE PLAN:  The patient will have SCDs for DVT prophylaxis and IV antibiotics for infection prophylaxis. COUNTS: Sponge and needle counts were correct.     SIGNED BY:  Saul Miranda MD     February 8, 2022

## 2022-02-08 NOTE — ANESTHESIA PREPROCEDURE EVALUATION
Relevant Problems   NEUROLOGY   (+) Depression   (+) Major depressive disorder, recurrent, mild   (+) Major depressive disorder, recurrent, moderate   (+) Major depressive disorder, recurrent, unspecified      CARDIOVASCULAR   (+) HTN (hypertension)      ENDOCRINE   (+) DM (diabetes mellitus) (HCC)   (+) Type 2 diabetes mellitus with chronic kidney disease (HCC)   (+) Type 2 diabetes mellitus with diabetic neuropathy (HCC)   (+) Type 2 diabetes with nephropathy (HCC)       Anesthetic History   No history of anesthetic complications            Review of Systems / Medical History  Patient summary reviewed, nursing notes reviewed and pertinent labs reviewed    Pulmonary            Asthma : well controlled       Neuro/Psych         Psychiatric history     Cardiovascular    Hypertension              Exercise tolerance: >4 METS     GI/Hepatic/Renal     GERD: well controlled      Liver disease     Endo/Other    Diabetes: type 2    Arthritis     Other Findings              Physical Exam    Airway  Mallampati: III  TM Distance: 4 - 6 cm  Neck ROM: normal range of motion   Mouth opening: Normal     Cardiovascular  Regular rate and rhythm,  S1 and S2 normal,  no murmur, click, rub, or gallop  Rhythm: regular  Rate: normal         Dental  No notable dental hx       Pulmonary  Breath sounds clear to auscultation               Abdominal  GI exam deferred       Other Findings            Anesthetic Plan    ASA: 2  Anesthesia type: general    Monitoring Plan: BIS      Induction: Intravenous  Anesthetic plan and risks discussed with: Patient

## 2022-02-08 NOTE — H&P
Progress Notes  Jessie Coles (Julia) [facility] Orthopedic Surgery  Cosigned by: Mike Briones MD at 1/21/2022  1:53 PM   Expand All Collapse All  ASSESSMENT:  (Q10.223) Spinal stenosis, lumbar region with neurogenic claudication  (primary encounter diagnosis)  (M47.816) Lumbar spondylosis  (M54.50) Lumbar pain  (M54.31,  M54.32) Bilateral sciatica  (M54.16) Lumbar radiculopathy  (M51.36) DDD (degenerative disc disease), lumbar  (M54.50) Low back pain, unspecified back pain laterality, unspecified chronicity, unspecified whether sciatica present  (M48.061) Foraminal stenosis of lumbar region  (S33.969) Spinal stenosis, lumbar region, with neurogenic claudication         Patient Active Problem List   Diagnosis    Allergic rhinitis due to allergen    Biliary sludge determined by ultrasound    Cerumen impaction    Depression    Type 2 diabetes mellitus with chronic kidney disease    HTN (hypertension)    Hypokalemia    Suppurative otitis media    Major depressive disorder, recurrent, moderate    Type 2 diabetes mellitus with diabetic neuropathy    Essential tremor         Impression: bilateral foraminal stenosis L5-S1;low back pain with BLE sciatica, L > R     PLAN:  The patient verbalized understanding of our findings and treatment plan. We engaged in the shared decision-making process and treatment options, along with risks and benefits, were discussed at length with the patient.     I reviewed the exam findings with Ms. Angie Colvin today. Treatment options include trying epidural steroid injections or considering surgical intervention. She may also visit with PT and continue with medication. At this time, she would like to proceed with surgical intervention. She is a candidate for L5-S1 decompression and fusion possibly without TLIF depending on the CT. We discussed the operation in detail and answered her questions. She is ready to proceed.  I ordered a CT scan with santi protocol for robotic pre-op planning.     I have discussed the procedure in detail with the patient and mentioned complications, including but not limited to: death, permanent disability, heart attack, stroke, lung injury or infection, blindness, ileus, bladder or bowel problems, ureter injury, bleeding, nerve injury (including numbness, pain and weakness), paralysis (which may be permanent), failure to heal, failure to fuse bone together in fusion procedures, failure to relief symptoms, failure to relief pain, increased pain, need for further surgeries, failure or breakage or hardware, malpositioning of hardware, need to fuse or operate on additional levels determined either during or after surgery, destabilization of the spine (which may require fusion or later surgery), infections (which may or may not require additional surgery), dural tears (tears of the sac holding in nerves and spinal fluid), meningitis, voice changes, blood clots, pulmonary embolus, recurrent disc herniation, diaphragm paralysis, and anesthetic complications. Comorbidities such as obesity, smoking, rheumatoid arthritis, chronic steroid use and diabetes increase these risks. The patient understands and wants to proceed.      The patient has been prescribed a LSO spinal orthosis pre-operatively for pain relief. The orthosis is medically necessary to reduce pain by restricting mobility of the trunk and to otherwise support weak spinal muscles and/or deformed spine. The patient will meet with our bracing coordinator to be fit for the brace. Follow up after surgery.            Treatment Plan:       Orders Placed This Encounter    Surgical Posting Sheet    CT lumbar spine without contrast (50065)    BMI >=25 PATIENT INSTRUCTIONS & EDUCATION         Follow-up: No follow-ups on file.         HISTORY OF PRESENT ILLNESS:  Petra Moore; [id number]   Age: 73 y.o.  Sex: female   Pain score: Pain rating =   8 out of 10      Chief Complaint: Pain of the Lower Back        History of present illness:  Isac Snyder is a 73 y.o. female with complaints of low back pain radiating into the buttocks and posterolateral BLE, L > R. She describes her pain as tingling and numb. Her symptoms have been present since November 2021 and worsen with walking, getting in/out of car, bending and transitioning from sitting to standing. She presents today to discuss the results of her lumbar spine MRI. Isac Snyder has tried flexeril, 300 mg gabapentin TID, acupuncture, chiropractic care and decadron. The pain is rated 8 out of 10 on the VAS.  History of bilateral L5 ESIs with Dr. Mery Deluca that provided over 50% relief.              Patient Active Problem List     Diagnosis Date Noted    Essential tremor 01/17/2022    Major depressive disorder, recurrent, moderate 07/21/2021    Type 2 diabetes mellitus with diabetic neuropathy 12/15/2020    Type 2 diabetes mellitus with chronic kidney disease 09/27/2018    Biliary sludge determined by ultrasound 04/19/2017    Allergic rhinitis due to allergen 10/22/2014    Cerumen impaction 10/22/2014    Suppurative otitis media 10/22/2014    Depression 02/17/2011    HTN (hypertension) 02/17/2011    Hypokalemia 02/17/2011               Family History   Problem Relation Age of Onset    Diabetes Mother      Diabetes Father      No Known Problems Brother      No Known Problems Sister      No Known Problems Son      No Known Problems Daughter      No Known Problems Other      Coronary artery disease Neg Hx      Clotting disorder Neg Hx      Anesthesia problems Neg Hx           Social History            Tobacco Use    Smoking status: Former Smoker       Types: Cigarettes    Smokeless tobacco: Never Used   Substance Use Topics    Alcohol use: Never         Medical History        Past Medical History:   Diagnosis Date    Depression      Diabetes mellitus      GERD (gastroesophageal reflux disease)      Hypertension      Infectious viral hepatitis      Osteoporosis              Surgical History   Past Surgical History:   Procedure Laterality Date    CHOLECYSTECTOMY        FOOT SURGERY        NO RELEVANT SURGERIES        SHOULDER SURGERY                   Current Outpatient Medications:     Accu-Chek Wendy Plus test strip, , Disp: , Rfl:     alendronate (FOSAMAX) 35 MG tablet, Take 35 mg by mouth every 7 days  , Disp: , Rfl:     amLODIPine (NORVASC) 5 MG tablet, , Disp: , Rfl:     atorvastatin (LIPITOR) 10 MG tablet, , Disp: , Rfl:     cyclobenzaprine (FLEXERIL) 10 MG tablet, Take 1 tablet (10 mg total) by mouth 3 (three) times a day as needed for muscle spasms, Disp: 45 tablet, Rfl: 2    gabapentin (NEURONTIN) 300 MG capsule, Take 1 capsule (300 mg total) by mouth 3 (three) times a day, Disp: 90 capsule, Rfl: 0    HYDROcodone-acetaminophen (Norco) 5-325 MG per tablet, 1-2 tabs po q 6 hours prn acute pain., Disp: 18 tablet, Rfl: 0    metFORMIN (GLUCOPHAGE) 1000 MG tablet, Take 1,500 mg by mouth 2 (two) times a day with meals  , Disp: , Rfl: 11    methylPREDNISolone (Medrol) 4 MG tablet therapy pack, As Directed on Tnag, Disp: 1 each, Rfl: 0    MIRALAX packet, Take 17 g by mouth once  , Disp: , Rfl: 3    nystatin-triamcinolone (MYCOLOG II) cream, nystatin-triamcinolone 100,000 unit/g-0.1 % topical cream  APPLY TO THE AFFECTED AREA(S) BY TOPICAL ROUTE 2 TIMES PER DAY IN THE MORNING AND EVENING, Disp: , Rfl:     omeprazole (PriLOSEC) 40 MG capsule, Take 40 mg by mouth daily  , Disp: , Rfl: 3    primidone (MYSOLINE) 50 MG tablet, , Disp: , Rfl:     RABEprazole (ACIPHEX) 20 MG EC tablet, , Disp: , Rfl:           Allergies   Allergen Reactions    Lisinopril Swelling and Rash    Latex Itching    Sulfa Antibiotics Itching         ROS:   No new bowel or bladder incontinence. No fever. No saddle anesthesia.     OBJECTIVE:      Vitals:     01/20/22 1645   BP: (!) 139/75   Pulse: 76         Body mass index is 27.62 kg/m². , a BMI over 30 is considered obese and a BMI over 40 has been associated with a higher risk of surgical complications.     Constitutional: No acute distress. Well nourished. Respiratory:  No labored breathing. Cardiovascular:  No marked cyanosis. Skin:  No marked skin ulcers/lesions on bilateral upper or lower extremities. Psychiatric: Alert and oriented x3. Inspection: No gross deformity of bilateral upper or lower extremities. Musculoskeletal/Neurological:   Gait/balance:  - Normal. Able to walk on heels and toes. Thoracolumbar spine:  - No tenderness to palpation  - Full range of motion. Right lower extremity:  - No tenderness to palpation   - Full range of motion  - No pain with internal/external rotation of the hip  - Strength:  - 5 out of 5 to hip flexors  - 5 out of 5 to quads  - 5 out of 5 to TA  - 5 out of 5 to EHL  - 5 out of 5 to Gastroc/Soleus  - Negative straight leg raise  Left lower extremity:  - No tenderness to palpation   - Full range of motion  - No pain with internal/external rotation of the hip  - Strength:  - 5 out of 5 to hip flexors  - 5 out of 5 to quads  - 5 out of 5 to TA  - 5 out of 5 to EHL  - 5 out of 5 to Gastroc/Soleus  - Negative straight leg raise  Sensation:  - Intact to light touch  Reflexes:  - +2 Patellar tendon   - +2 Achilles tendon            RESULTS REVIEWED:          MRI lumbar spine without contrast ([id number])     Result Date: 2022  [facility] - [facility] - MRI LUMB SPINE WO CONT Patient: Maddy Bello : 1950 Date of Service: 2022 5:51:15 PM Reason For Exam: Ordering Provider: Nataly Hansen Physician: Halley Bowman Signing Date: 2022 7:51:31 AM EXAM: MRI LUMB SPINE WO CONT INDICATION: . Spinal stenosis, lumbar region with neurogenic claudication COMPARISON: None TECHNIQUE: MR imaging of the lumbar spine was performed using the following sequences: sagittal T1, T2, STIR;  axial T1, T2. CONTRAST:  None. FINDINGS: Mild levoscoliosis seen at the thoracolumbar junction.  There is no evidence for bone marrow replacement. The conus does appear unremarkable. T12-L1 show some minimal chronic disc degeneration and disc during, no significant canal or foraminal compromise. L1-L2 show some minimal spondylosis and disc degeneration no canal or foraminal compromise. L2-L3 shows some minimal disc degeneration no canal or foraminal compromise. L3-L4 shows some mild facet disease and ligamentum thickening. There is however no significant canal or foraminal compromise. L4-5 show some mild facet disease and ligamentum thickening, no significant canal or foraminal compromise. L5-S1 show severe chronic disc degeneration with adjacent chronic endplate changes and partial body fusion. There is facet disease with bilateral foraminal compromise slight asymmetry to the right. IMPRESSION: 1. Severe chronic disc degeneration adjacent endplate changes at Y4-W9 with bilateral right more than left foraminal narrowing. Signing date/time: 1/5/2022  7:51 AM Signed by: Edwin Hsu personally and independently reviewed the lumbar spine MRI done at [facility] in January 2022 that shows degenerative disc disease L5-S1 with bilateral foraminal stenosis. Otherwise, no nerve compression.         I have instructed the patient to continue to work on their physician supervised home exercise program.      This note has been transcribed electronically using voice recognition and a trained scribe. It is believed to be accurate, but may contain errors secondary to technological limitations and other factors.     I, Shaista Nolen MD, personally, performed the services described in this documentation, as scribed in my presence, and it is both accurate and complete.   Scribed by: Jerrica Duran

## 2022-02-08 NOTE — ANESTHESIA POSTPROCEDURE EVALUATION
Procedure(s):  LUMBAR 5 TO SACRAL 1 POSTERIOR DECOMPRESSION AND FUSION. general    Anesthesia Post Evaluation      Multimodal analgesia: multimodal analgesia used between 6 hours prior to anesthesia start to PACU discharge  Patient location during evaluation: PACU  Level of consciousness: sleepy but conscious  Pain management: adequate  Airway patency: patent  Anesthetic complications: no  Cardiovascular status: acceptable  Respiratory status: acceptable  Hydration status: acceptable  Comments: +Post-Anesthesia Evaluation and Assessment    Patient: Gregory Galindo MRN: [id number]  SSN: [id number]   YOB: 1950  Age: 73 y.o. Sex: female      Cardiovascular Function/Vital Signs    BP (!) 127/52   Pulse 69   Temp 36.5 °C (97.7 °F)   Resp 10   Ht 5' 2\" (1.575 m)   Wt 65.2 kg (143 lb 11.8 oz)   SpO2 97%   BMI 26.29 kg/m²     Patient is status post Procedure(s):  LUMBAR 5 TO SACRAL 1 POSTERIOR DECOMPRESSION AND FUSION. Nausea/Vomiting: Controlled. Postoperative hydration reviewed and adequate. Pain:  Pain Scale 1: Visual (02/08/22 1415)  Pain Intensity 1: 0 (02/08/22 1415)   Managed. Neurological Status:   Neuro (WDL): Exceptions to WDL (02/08/22 1245)   At baseline. Mental Status and Level of Consciousness: Arousable. Pulmonary Status:   O2 Device: Nasal cannula (02/08/22 1415)   Adequate oxygenation and airway patent. Complications related to anesthesia: None    Post-anesthesia assessment completed. No concerns. Signed By: Ej Garcia MD    2/8/2022  Post anesthesia nausea and vomiting:  controlled  Final Post Anesthesia Temperature Assessment:  Normothermia (36.0-37.5 degrees C)      INITIAL Post-op Vital signs:   Vitals Value Taken Time   /52 02/08/22 1415   Temp 36.5 °C (97.7 °F) 02/08/22 1330   Pulse 73 02/08/22 1427   Resp 9 02/08/22 1427   SpO2 100 % 02/08/22 1427   Vitals shown include unvalidated device data.

## 2022-02-08 NOTE — PROGRESS NOTES
Ortho / Neurosurgery NP Note    POD# 0  s/p LUMBAR 5 TO SACRAL 1 POSTERIOR DECOMPRESSION AND FUSION   Pt seen with no visitor present. Pt resting in bed. Drowsy, wakes easily to voice and appropriate in conversation. Quickly drifts back to sleep. Reports appropriate incisional pain. Received narcan in PACU due to unresponsiveness - continuous pulse ox in place 100% on 2L   C/o nausea. Tolerating a few sips of ginger ale. VSS Afebrile. 2L NC    Visit Vitals  /62 (BP 1 Location: Right upper arm, BP Patient Position: At rest)   Pulse 75   Temp 98 °F (36.7 °C)   Resp 16   Ht 5' 2\" (1.575 m)   Wt 65.2 kg (143 lb 11.8 oz)   LMP  (LMP Unknown)   SpO2 100%   BMI 26.29 kg/m²       Voiding status: due to void  Output (mL)  Last Bowel Movement Date: 02/07/22 (02/08/22 0743)  Unmeasurable Output  Urine Occurrence(s): 1 (02/08/22 0826)      Labs    Lab Results   Component Value Date/Time    HGB 12.1 02/02/2022 03:36 PM      Lab Results   Component Value Date/Time    INR 1.0 02/02/2022 03:36 PM      Lab Results   Component Value Date/Time    Sodium 143 02/02/2022 03:36 PM    Potassium 3.9 02/02/2022 03:36 PM    Chloride 110 (H) 02/02/2022 03:36 PM    CO2 26 02/02/2022 03:36 PM    Glucose 85 02/02/2022 03:36 PM    BUN 11 02/02/2022 03:36 PM    Creatinine 0.87 02/02/2022 03:36 PM    Calcium 9.7 02/02/2022 03:36 PM     Recent Glucose Results:   Lab Results   Component Value Date/Time    GLUCPOC 157 (H) 02/08/2022 11:46 AM    GLUCPOC 124 (H) 02/08/2022 08:09 AM           Body mass index is 26.29 kg/m². : A BMI > 30 is classified as obesity and > 40 is classified as morbid obesity. Prineo dressing intact with small sanguinous breakthrough  Optifoams applied    Calves soft and supple;  No pain with passive stretch  Sensation and motor intact   SCDs for mechanical DVT proph while in bed     PLAN:  1) Neurovascular assessment q4 hours   2) PT/OT - LSO  3) Pain control - scheduled tylenol, prn oxycodone   4) Readniess for discharge:     [x] Vital Signs stable    [] Hgb stable    [] + Voiding    [x] Wound intact, drainage minimal    [] Tolerating PO intake     [] Cleared by PT (OT if applicable)     [] Stair training completed (if applicable)    [] Independent / Contact Guard Assist (household distance)     [] Bed mobility     [] Car transfers     [] ADLs    [] Adequate pain control on oral medication alone     Routine post-op care. Monitor respiratory status - continue continuous pulse ox overnight. Lives alone, states niece will assist some but did not plan on someone staying with her at night.      Jame Shell, ROLY

## 2022-02-08 NOTE — PERIOP NOTES
Handoff Report from Operating Room to PACU    Report received from JUWAN Rodriguez RN and Caty Golden CRNA regarding Sasha Tucker. Surgeon(s):  Abhishek Iqbal MD  And Procedure(s) (LRB):  LUMBAR 5 TO SACRAL 1 POSTERIOR DECOMPRESSION AND FUSION (N/A)  confirmed   with allergies, dressings and local anesthetic discussed. Anesthesia type, drugs, patient history, complications, estimated blood loss, vital signs, intake and output, and last pain medication, lines, reversal medications and temperature were reviewed.

## 2022-02-08 NOTE — PERIOP NOTES
1208- Report received from Jeramie Ortiz RN for lunch relief. 46- Notified Dr. Krupa Salazar of persistent unresponsiveness. Pt still with oral airway and no response to stimuli but has good respiratory effort. Order received for 0.3mg IV romazicon now. 1231- No response from romazicon, Dr. Krupa Salazar notified. Order received for an additional 0.2 IV romazicon. 1239- No response from second dose of romazicon. 80mcg IV narcan ordered at this time. 1245- Pt responding to stimuli. Oral airway removed. Pt verbalizing pain. Report given to [facility]. 1430- Report received from [employer] Matt Nassar Report was already called to ortho LUIS DANIEL Tovar by Jeramie Ortiz. Monitoring patient until room is ready. 56- Niece updated on room number.

## 2022-02-08 NOTE — PERIOP NOTES
Irrisept Wound Debridement and Cleansing System  Ref: [place]: [id number] LOT: [id number] Expiration Date: 10/31/2023

## 2022-02-08 NOTE — PERIOP NOTES
1 - PT'S COVID TEST RESULTED NEG - PT DENIES FEVER, COLD, COUGH, SOB, N/V, DIARRHEA. .. PRE-OP TCHING DONE - PT VERBALIZES UNDERSTANDING. STRETCHER IN LOWEST POSITION, CB IN PLACE AND SR UP X2.

## 2022-02-09 ENCOUNTER — APPOINTMENT (OUTPATIENT)
Dept: GENERAL RADIOLOGY | Age: 72
DRG: 455 | End: 2022-02-09
Attending: PHYSICIAN ASSISTANT
Payer: MEDICARE

## 2022-02-09 LAB
ANION GAP SERPL CALC-SCNC: 6 MMOL/L (ref 5–15)
BUN SERPL-MCNC: 18 MG/DL (ref 6–20)
BUN/CREAT SERPL: 18 (ref 12–20)
CALCIUM SERPL-MCNC: 7.7 MG/DL (ref 8.5–10.1)
CHLORIDE SERPL-SCNC: 109 MMOL/L (ref 97–108)
CO2 SERPL-SCNC: 25 MMOL/L (ref 21–32)
CREAT SERPL-MCNC: 0.98 MG/DL (ref 0.55–1.02)
GLUCOSE BLD STRIP.AUTO-MCNC: 103 MG/DL (ref 65–117)
GLUCOSE BLD STRIP.AUTO-MCNC: 145 MG/DL (ref 65–117)
GLUCOSE BLD STRIP.AUTO-MCNC: 150 MG/DL (ref 65–117)
GLUCOSE BLD STRIP.AUTO-MCNC: 157 MG/DL (ref 65–117)
GLUCOSE SERPL-MCNC: 103 MG/DL (ref 65–100)
HGB BLD-MCNC: 10.3 G/DL (ref 11.5–16)
POTASSIUM SERPL-SCNC: 3.9 MMOL/L (ref 3.5–5.1)
SERVICE CMNT-IMP: ABNORMAL
SERVICE CMNT-IMP: NORMAL
SODIUM SERPL-SCNC: 140 MMOL/L (ref 136–145)

## 2022-02-09 PROCEDURE — 74011250637 HC RX REV CODE- 250/637: Performed by: ORTHOPAEDIC SURGERY

## 2022-02-09 PROCEDURE — 97530 THERAPEUTIC ACTIVITIES: CPT

## 2022-02-09 PROCEDURE — 65270000029 HC RM PRIVATE

## 2022-02-09 PROCEDURE — 97116 GAIT TRAINING THERAPY: CPT

## 2022-02-09 PROCEDURE — 74011250637 HC RX REV CODE- 250/637: Performed by: PHYSICIAN ASSISTANT

## 2022-02-09 PROCEDURE — 97535 SELF CARE MNGMENT TRAINING: CPT | Performed by: OCCUPATIONAL THERAPIST

## 2022-02-09 PROCEDURE — 82962 GLUCOSE BLOOD TEST: CPT

## 2022-02-09 PROCEDURE — 80048 BASIC METABOLIC PNL TOTAL CA: CPT

## 2022-02-09 PROCEDURE — 85018 HEMOGLOBIN: CPT

## 2022-02-09 PROCEDURE — 74011000250 HC RX REV CODE- 250: Performed by: PHYSICIAN ASSISTANT

## 2022-02-09 PROCEDURE — 97165 OT EVAL LOW COMPLEX 30 MIN: CPT | Performed by: OCCUPATIONAL THERAPIST

## 2022-02-09 PROCEDURE — 36415 COLL VENOUS BLD VENIPUNCTURE: CPT

## 2022-02-09 PROCEDURE — 72100 X-RAY EXAM L-S SPINE 2/3 VWS: CPT

## 2022-02-09 PROCEDURE — 74011250636 HC RX REV CODE- 250/636: Performed by: PHYSICIAN ASSISTANT

## 2022-02-09 RX ORDER — ACETAMINOPHEN 500 MG
1000 TABLET ORAL EVERY 6 HOURS
Qty: 56 TABLET | Refills: 0 | Status: SHIPPED | OUTPATIENT
Start: 2022-02-09 | End: 2022-02-16

## 2022-02-09 RX ORDER — AMOXICILLIN 250 MG
1 CAPSULE ORAL 2 TIMES DAILY
Qty: 14 TABLET | Refills: 0 | Status: SHIPPED | OUTPATIENT
Start: 2022-02-09 | End: 2022-02-16

## 2022-02-09 RX ORDER — OXYCODONE HYDROCHLORIDE 5 MG/1
5 TABLET ORAL
Qty: 30 TABLET | Refills: 0 | Status: SHIPPED | OUTPATIENT
Start: 2022-02-09 | End: 2022-02-11

## 2022-02-09 RX ADMIN — POLYETHYLENE GLYCOL 3350 17 G: 17 POWDER, FOR SOLUTION ORAL at 08:28

## 2022-02-09 RX ADMIN — PRIMIDONE 50 MG: 50 TABLET ORAL at 08:27

## 2022-02-09 RX ADMIN — ACETAMINOPHEN 1000 MG: 500 TABLET ORAL at 00:50

## 2022-02-09 RX ADMIN — OXYCODONE 10 MG: 5 TABLET ORAL at 21:18

## 2022-02-09 RX ADMIN — CEFAZOLIN SODIUM 2 G: 1 INJECTION, POWDER, FOR SOLUTION INTRAMUSCULAR; INTRAVENOUS at 00:50

## 2022-02-09 RX ADMIN — METFORMIN HYDROCHLORIDE 1000 MG: 500 TABLET ORAL at 17:06

## 2022-02-09 RX ADMIN — METFORMIN HYDROCHLORIDE 1000 MG: 500 TABLET ORAL at 08:27

## 2022-02-09 RX ADMIN — GABAPENTIN 100 MG: 100 CAPSULE ORAL at 21:18

## 2022-02-09 RX ADMIN — PANTOPRAZOLE SODIUM 40 MG: 40 TABLET, DELAYED RELEASE ORAL at 08:27

## 2022-02-09 RX ADMIN — Medication 2000 UNITS: at 08:27

## 2022-02-09 RX ADMIN — CYCLOBENZAPRINE 10 MG: 10 TABLET, FILM COATED ORAL at 19:33

## 2022-02-09 RX ADMIN — OXYCODONE 5 MG: 5 TABLET ORAL at 11:29

## 2022-02-09 RX ADMIN — Medication 1 AMPULE: at 09:00

## 2022-02-09 RX ADMIN — OXYCODONE 5 MG: 5 TABLET ORAL at 17:07

## 2022-02-09 RX ADMIN — DOCUSATE SODIUM 50 MG AND SENNOSIDES 8.6 MG 1 TABLET: 8.6; 5 TABLET, FILM COATED ORAL at 08:28

## 2022-02-09 RX ADMIN — SODIUM CHLORIDE, PRESERVATIVE FREE 10 ML: 5 INJECTION INTRAVENOUS at 21:19

## 2022-02-09 RX ADMIN — PRIMIDONE 50 MG: 50 TABLET ORAL at 17:07

## 2022-02-09 RX ADMIN — MULTIPLE VITAMINS W/ MINERALS TAB 1 TABLET: TAB at 08:27

## 2022-02-09 RX ADMIN — Medication 1 AMPULE: at 23:20

## 2022-02-09 RX ADMIN — OXYCODONE 5 MG: 5 TABLET ORAL at 08:27

## 2022-02-09 RX ADMIN — ATORVASTATIN CALCIUM 10 MG: 10 TABLET, FILM COATED ORAL at 21:18

## 2022-02-09 RX ADMIN — ACETAMINOPHEN 1000 MG: 500 TABLET ORAL at 23:19

## 2022-02-09 RX ADMIN — ACETAMINOPHEN 1000 MG: 500 TABLET ORAL at 05:29

## 2022-02-09 RX ADMIN — SODIUM CHLORIDE, PRESERVATIVE FREE 10 ML: 5 INJECTION INTRAVENOUS at 17:07

## 2022-02-09 RX ADMIN — SODIUM CHLORIDE, PRESERVATIVE FREE 10 ML: 5 INJECTION INTRAVENOUS at 05:29

## 2022-02-09 RX ADMIN — ACETAMINOPHEN 1000 MG: 500 TABLET ORAL at 11:29

## 2022-02-09 RX ADMIN — DOCUSATE SODIUM 50 MG AND SENNOSIDES 8.6 MG 1 TABLET: 8.6; 5 TABLET, FILM COATED ORAL at 17:07

## 2022-02-09 RX ADMIN — GABAPENTIN 100 MG: 100 CAPSULE ORAL at 17:07

## 2022-02-09 RX ADMIN — GABAPENTIN 100 MG: 100 CAPSULE ORAL at 08:27

## 2022-02-09 NOTE — PROGRESS NOTES
End of Shift Note    Bedside shift change report given to [address] (oncoming nurse) by Kvng Ferraro (offgoing nurse). Report included the following information SBAR, Kardex, Intake/Output, MAR, Accordion, Recent Results and Med Rec Status    Shift worked:  night     Shift summary and any significant changes:     Patient resting quietly overnight. No complaints of pain. Concerns for physician to address:  none     Zone phone for oncoming shift:   [phone]       Activity:  Activity Level: Up with Assistance  Number times ambulated in hallways past shift: 0  Number of times OOB to chair past shift: 0    Cardiac:   Cardiac Monitoring: No      Cardiac Rhythm: Sinus Rhythm    Access:   Current line(s): PIV     Genitourinary:   Urinary status: voiding    Respiratory:   O2 Device: None (Room air)  Chronic home O2 use?: NO  Incentive spirometer at bedside: YES     GI:  Last Bowel Movement Date: 02/07/22  Current diet:  ADULT DIET Regular; 3 carb choices (45 gm/meal)  Passing flatus: YES  Tolerating current diet: YES       Pain Management:   Patient states pain is manageable on current regimen: YES    Skin:  Bhavik Score: 20  Interventions: float heels and increase time out of bed    Patient Safety:  Fall Score:  Total Score: 3  Interventions: assistive device (walker, cane, etc), gripper socks and pt to call before getting OOB  High Fall Risk: Yes    Length of Stay:  Expected LOS: - - -  Actual LOS: 1      Kvng Ferraro

## 2022-02-09 NOTE — PROGRESS NOTES
Problem: Self Care Deficits Care Plan (Adult)  Goal: *Acute Goals and Plan of Care (Insert Text)  Description: FUNCTIONAL STATUS PRIOR TO ADMISSION: Pt I with ADLs, IADLS, amb without AD, driving, works as  and , working remotely prior to surgery    HOME SUPPORT: Pt lives alone, has supportive niece in area who works during the day,but could help at night. Occupational Therapy Goals  Initiated 2/9/2022    1. Patient will perform lower body dressing and bathing with modified independence using AE PRN within 7 days. 2.  Patient will perform toileting with modified independence using most appropriate DME within 7 days. 3.  Patient will standing ADLs without LOB for 5 minutes at modified independence within 7 days. 4.  Patient will don/doff back brace at modified independence within 7 days. 5.  Patient will verbalize/demonstrate 3/3 back precautions during ADL tasks without cues within 7 days.      Outcome: Not Met    OCCUPATIONAL THERAPY EVALUATION  Patient: Petra Moore (28 y.o. female)  Date: 2/9/2022  Primary Diagnosis: Spinal stenosis, lumbar region, with neurogenic claudication [M48.062]  Lumbar spondylosis [M47.816]  Lumbar pain [M54.50]  Bilateral sciatica [M54.31, M54.32]  Lumbar radiculopathy [M54.16]  DDD (degenerative disc disease), lumbar [M51.36]  Low back pain, unspecified back pain laterality, unspecified chronicity, unspecified whether sciatica present [M54.50]  Foraminal stenosis of lumbar region [M48.061]  Spinal stenosis [M48.00]  Procedure(s) (LRB):  LUMBAR 5 TO SACRAL 1 POSTERIOR DECOMPRESSION AND FUSION (N/A) 1 Day Post-Op   Precautions:   Spinal,Back,Fall (LSO when OOB, reposition every 30 minutes)    ASSESSMENT  Based on the objective data described below, the patient presents with decreased I with self care tasks due to decreased ROM to distal LE, decreased strength, decreased standing balance, decreased endurance, and increased pain s/p POD #1 for L5-S1 posterior decompression and fusion. Pt very pleasant, had just gotten up to chair and BSC with RN prior to OT arrival.  RN approved pt for OT session, reporting pt had just received pain meds for 8/10 incision pain. Pt demonstrating difficulty crossing B LE for adaptive LE ADLS within precautions. Pt able to state 3/3 back precautions, reviewed precautions and adaptive ADLS. Pt indicated understanding. Obtained hip kit for pt to utilize for LE ADLs as she lives alone and will not have A with self care at discharge. Transport arriving to take pt to radiology prior to OT provided further instruction on using AE for ADLs. Will follow up this afternoon to review further as able. Pt transferred to transport chair with CGA and RW, cues for proper hand placement and cues for keeping RW close during turns. Feel pt will benefit from skilled OT to maximize functional return to mod I level. Pt lives alone and is not safe with self care tasks at this time without further adaptive ADL review prior to discharge. Pt may benefit from [address] [place] [address] to address ADLs and IADLs in home. Current Level of Function Impacting Discharge (ADLs/self-care): mod A for LE ADLs, CGA for transfers, mod A toileting    Functional Outcome Measure: The patient scored 60/100 on the Barthel outcome measure which is indicative of minimally independent with ADLS. .      Other factors to consider for discharge: lives alone, niece works days     Patient will benefit from skilled therapy intervention to address the above noted impairments. PLAN :  Recommendations and Planned Interventions: self care training, therapeutic exercise, therapeutic activities, endurance activities, patient education, and home safety training    Frequency/Duration: Patient will be followed by occupational therapy 5 times a week to address goals.     Recommendation for discharge: (in order for the patient to meet his/her long term goals)  Occupational therapy at least 2 days/week in the home AND ensure assist and/or supervision for safety with IADLs initially    This discharge recommendation:  Has been made in collaboration with the attending provider and/or case management    IF patient discharges home will need the following DME: AE: long handled bathing, AE: long handled dressing, bedside commode, and Pt has riser on 2nd floor commode, may need BSC for 1st floor toilet       SUBJECTIVE:   Patient stated I'm doing alright.     OBJECTIVE DATA SUMMARY:   HISTORY:   Past Medical History:   Diagnosis Date    Allergic rhinitis due to allergen 10/22/2014    Arthritis     spine    Asthma     many years ago, no inhaler or meds    Chronic pain     back pain    Depression     Diabetes ([facility] [address]     Type II    GERD (gastroesophageal reflux disease)     Hepatitis B 1984    Hypercholesterolemia     Hypertension     Kidney stones 1990's    Positive PPD 2009    treated with INH     Past Surgical History:   Procedure Laterality Date    COLONOSCOPY  4-11    manetas- n ormal    COLONOSCOPY N/A 11/4/2021    COLONOSCOPY, EGD performed by Daniela Anton MD at [facility] AMBULATORY OR    EGD  4-11    manetas- esophageal ring    ENDOSCOPY, COLON, DIAGNOSTIC  12/2007    2 polyps    HX BREAST BIOPSY Bilateral 1980s    all benign cysts    HX BUNIONECTOMY Bilateral 1990s    HX CATARACT REMOVAL Bilateral 11/2018    HX CHOLECYSTECTOMY      HX COLONOSCOPY  May 2016    HX ENDOSCOPY  May 2016    HX GYN  1978    tubal laparoscopy-unblocked tubes    HX HEART CATHETERIZATION      negatve    HX ORTHOPAEDIC  2016    had an injection in her back to help with leg pain    HX ORTHOPAEDIC Left 12/12/2017    foot surg.   bunion removal  \"Put a plate in\"    HX OTHER SURGICAL  1997    bunionectomy    HX TONSIL AND ADENOIDECTOMY      approx 11years old    MN BREAST SURGERY PROCEDURE UNLISTED  1990's    abcess bilateral    MN ESOPHAGEAL MOTILITY STUDY W/INTERP&RPT  1/27/2020    MN GERD TST W/ NASAL IMPEDENCE ELECTROD 1/27/2020       Expanded or extensive additional review of patient history:     Home Situation  Home Environment: Private residence  # Steps to Enter: 4  Rails to Enter: Yes  Hand Rails : Bilateral  One/Two Story Residence: Two story (bed and full bath on 2nd, 1/2 bath on 1st)  # of Interior Steps: 10  Interior Rails: Left  Lift Chair Available: No  Living Alone: Yes  Support Systems: Other Family Member(s) (neice in area, works during the day)  Current DME Used/Available at Home: [address], straight,Safety frame toliet,Raised toilet seat,Walker, rollator  Tub or Shower Type: Tub/Shower combination    Hand dominance: Right    EXAMINATION OF PERFORMANCE DEFICITS:  Cognitive/Behavioral Status:  Neurologic State: Alert  Orientation Level: Oriented X4  Cognition: Follows commands  Perception: Appears intact  Perseveration: No perseveration noted  Safety/Judgement: Awareness of environment; Fall prevention    Skin: UE intact    Edema: UE intact    Hearing: Auditory  Auditory Impairment: None  Hearing Aids/Status: Does not own    Vision/Perceptual:                           Acuity: Within Defined Limits         Range of Motion:    AROM: Generally decreased, functional                         Strength:    Strength:  (moves UE against gravity, deferred MMT)                Coordination:  Coordination: Generally decreased, functional  Fine Motor Skills-Upper: Left Intact; Right Intact    Gross Motor Skills-Upper: Left Intact; Right Intact    Tone & Sensation:       Sensation: Intact (B UE)                      Balance:  Sitting: Intact  Sitting - Static: Good (unsupported)  Sitting - Dynamic: Normal;Supported sitting  Standing: Impaired; With support  Standing - Static: Good;Constant support  Standing - Dynamic : Fair    Functional Mobility and Transfers for ADLs:  Bed Mobility:  Rolling:  (NT, received in chair)  Supine to Sit:  (NT, received in chair)  Scooting: Supervision    Transfers:  Sit to Stand: Contact guard assistance (cues for proper hand placement)  Stand to Sit: Contact guard assistance (cues to bring RW closer to chair)  Bed to Chair: Contact guard assistance (chair to transport chair)  Toilet Transfer : Contact guard assistance  Assistive Device : Orthodic device; Walker    ADL Assessment:  Feeding: Setup    Oral Facial Hygiene/Grooming: Setup    Bathing: Moderate assistance (for distal LE)    Upper Body Dressing: Moderate assistance    Lower Body Dressing: Moderate assistance    Toileting: Moderate assistance                ADL Intervention and task modifications:  Patient instructed and demonstrated 3/3 back precautions with verbal cues. Lower Body Bathing  Bathing Assistance: Moderate assistance (unable to cross LE, reviewed adaptive ADLS)         Lower Body Dressing Assistance  Dressing Assistance: Moderate assistance (unable to cross LE, reviewed adaptive ADLS)  Socks:  (provided pt with hip kit, transport taking to radiolog, need)  Antiembolitic Stockings:  (need to review hip kit, pt going off floor to radiology )         Cognitive Retraining  Safety/Judgement: Awareness of environment; Fall prevention    Patient instructed and indicated understanding the benefits of maintaining activity tolerance, functional mobility, and independence with self care tasks during acute stay  to ensure safe return home and to baseline. Encouraged patient to increase frequency and duration OOB, not sitting longer than 30 mins without marching/walking with staff, be out of bed for all meals, perform daily ADLs (as approved by RN/MD regarding bathing etc), and performing functional mobility to/from bathroom. Patient instruction and indicated understanding on body mechanics, ergonomics and gravitational force on the spine during different body positions to plan activities in prep for return home to complete basic ADLs, instrumental ADLs and back to work safely.        Functional Measure:    Barthel Index:  Bathing: 0  Bladder: 10  Bowels: 10  Groomin  Dressin  Feeding: 10  Mobility: 0  Stairs: 5  Toilet Use: 5  Transfer (Bed to Chair and Back): 10  Total: 60/100      The Barthel ADL Index: Guidelines  1. The index should be used as a record of what a patient does, not as a record of what a patient could do. 2. The main aim is to establish degree of independence from any help, physical or verbal, however minor and for whatever reason. 3. The need for supervision renders the patient not independent. 4. A patient's performance should be established using the best available evidence. Asking the patient, friends/relatives and nurses are the usual sources, but direct observation and common sense are also important. However direct testing is not needed. 5. Usually the patient's performance over the preceding 24-48 hours is important, but occasionally longer periods will be relevant. 6. Middle categories imply that the patient supplies over 50 per cent of the effort. 7. Use of aids to be independent is allowed. Score Interpretation (from [facility] [place] [address])    Independent   60-79 Minimally independent   40-59 Partially dependent   20-39 Very dependent   <20 Totally dependent     -Nancy Hernandez., Barthel, DGenaroW. (1965). Functional evaluation: the Barthel Index. [facility] ([address] (). The Barthel activities of daily living index: self-reporting versus actual performance in the old (> or = 75 years). Journal of [address] [place] [address] 45(7), [facility], JODYF, Bobby Shafer., Carmine Elizabeth. (). Measuring the change in disability after inpatient rehabilitation; comparison of the responsiveness of the Barthel Index and Functional Eagle Measure. Journal of Neurology, Neurosurgery, and Psychiatry, 66(4), 708-786.   Gonzalez Talaamntes, N.DEZ.A, SHANTE Del Rosario, & Kalani Batista MGenaroA. (2004) Assessment of post-stroke quality of life in cost-effectiveness studies: The usefulness of the Barthel Index and the EuroQoL-5D. Quality of Life Research, 15, 200-68         Occupational Therapy Evaluation Charge Determination   History Examination Decision-Making   LOW Complexity : Brief history review  MEDIUM Complexity : 3-5 performance deficits relating to physical, cognitive , or psychosocial skils that result in activity limitations and / or participation restrictions MEDIUM Complexity : Patient may present with comorbidities that affect occupational performnce. Miniml to moderate modification of tasks or assistance (eg, physical or verbal ) with assesment(s) is necessary to enable patient to complete evaluation       Based on the above components, the patient evaluation is determined to be of the following complexity level: LOW   Pain Ratin/10 in back    Activity Tolerance:   Fair    After treatment patient left in no apparent distress: On transport chair to radiology, RN aware    COMMUNICATION/EDUCATION:   The patients plan of care was discussed with: Physical therapist, Registered nurse, and Case management. Home safety education was provided and the patient/caregiver indicated understanding. and Patient/family have participated as able in goal setting and plan of care. This patients plan of care is appropriate for delegation to [facility].     Thank you for this referral.  Didi Birmingham OTR/L  Time Calculation: 11 mins

## 2022-02-09 NOTE — PROGRESS NOTES
Ortho / Neurosurgery NP Note    POD# 1 s/p LUMBAR 5 TO SACRAL 1 POSTERIOR DECOMPRESSION AND FUSION   Pt seen with no visitor present. Pt resting in chair, in NAD. Awake and alert today. Recently seen ambulating in [place] with PT. States leg pain has resolved, denies BLE pain/numbness/tingling. Reports appropriate mild incisional pain, well controlled with oxycodone. Nausea resolved. Tolerating regular diet. VSS Afebrile. Room air. BP soft - reports some light headedness with activity     Visit Vitals  BP (!) 109/54   Pulse 82   Temp 98.5 °F (36.9 °C)   Resp 16   Ht 5' 2\" (1.575 m)   Wt 65.2 kg (143 lb 11.8 oz)   LMP  (LMP Unknown)   SpO2 96%   BMI 26.29 kg/m²       Voiding status: voiding   Output (mL)  Urine Voided: 300 ml (02/09/22 0740)  Last Bowel Movement Date: 02/07/22 (02/08/22 0743)  Unmeasurable Output  Urine Occurrence(s): 1 (02/08/22 0826)      Labs    Lab Results   Component Value Date/Time    HGB 10.3 (L) 02/09/2022 04:31 AM      Lab Results   Component Value Date/Time    INR 1.0 02/02/2022 03:36 PM      Lab Results   Component Value Date/Time    Sodium 140 02/09/2022 04:31 AM    Potassium 3.9 02/09/2022 04:31 AM    Chloride 109 (H) 02/09/2022 04:31 AM    CO2 25 02/09/2022 04:31 AM    Glucose 103 (H) 02/09/2022 04:31 AM    BUN 18 02/09/2022 04:31 AM    Creatinine 0.98 02/09/2022 04:31 AM    Calcium 7.7 (L) 02/09/2022 04:31 AM     Recent Glucose Results:   Lab Results   Component Value Date/Time     (H) 02/09/2022 04:31 AM    GLUCPOC 103 02/09/2022 07:26 AM    GLUCPOC 146 (H) 02/08/2022 08:52 PM    GLUCPOC 167 (H) 02/08/2022 04:15 PM           Body mass index is 26.29 kg/m². : A BMI > 30 is classified as obesity and > 40 is classified as morbid obesity. Optifoams dressings c.d.i  Calves soft and supple;  No pain with passive stretch  Sensation and motor intact   SCDs for mechanical DVT proph while in bed     PLAN:  1) Neurovascular assessment q4 hours   2) PT/OT - LSO  3) Pain control - scheduled tylenol, prn oxycodone   4) BP soft - hold amlodipine. Drinking adeuqate fluids. 5) Readniess for discharge:     [x] Vital Signs stable    [x] Hgb stable    [x] + Voiding    [x] Wound intact, drainage minimal    [x] Tolerating PO intake     [] Cleared by PT (OT if applicable)     [] Stair training completed (if applicable)    [x] Independent / Contact Guard Assist (household distance)     [x] Bed mobility     [] Car transfers     [] ADLs    [x] Adequate pain control on oral medication alone     Home today vs tomorrow pending pm therapy session (likely tomorrow). Lives alone, states niece will assist some, discussed having someone stay with her first few days and says she can make arrangements.      Jeanie Harp NP

## 2022-02-09 NOTE — PROGRESS NOTES
ORTHO POST OP SPINE PROGRESS NOTE    2022  Admit Date: 2022  Admit Diagnosis: Spinal stenosis, lumbar region, with neurogenic claudication [M48.062]  Lumbar spondylosis [M47.816]  Lumbar pain [M54.50]  Bilateral sciatica [M54.31, M54.32]  Lumbar radiculopathy [M54.16]  DDD (degenerative disc disease), lumbar [M51.36]  Low back pain, unspecified back pain laterality, unspecified chronicity, unspecified whether sciatica present [M54.50]  Foraminal stenosis of lumbar region [M48.061]  Spinal stenosis [M48.00]  Procedure: Procedure(s):  LUMBAR 5 TO SACRAL 1 POSTERIOR DECOMPRESSION AND FUSION  Post Op day: 1 Day Post-Op    Subjective:     Piyush Tejada is a patient who has complaints of lbp. improved LE pain s/p L5-S1 psf. tolerating po and able to void. Review of Systems: Pertinent items are noted in HPI. Objective:     PT/OT:   Distance Ambulated:           Time Ambulated (min):        Ambulation Response:        Assistive Device:              Assistive Device: Fall prevention device    Vital Signs:    Blood pressure (!) 132/55, pulse 89, temperature 97.8 °F (36.6 °C), resp. rate 17, height 5' 2\" (1.575 m), weight 65.2 kg (143 lb 11.8 oz), SpO2 97 %. Temp (24hrs), Av °F (36.7 °C), Min:97.4 °F (36.3 °C), Max:98.7 °F (37.1 °C)      701 -  1900  In: -   Out: 515 [NGASF:701]   190 -  0700  In: 1201.3 [I.V.:1201.3]  Out: 345 [Urine:295]    LAB:    Recent Labs     22  0431   HGB 10.3*       Wound/Drain Assessment:  Drain:      Dressing:     Physical Exam:  Neurological: no deficit  Incision clean, dry, and intact   BLE    Assessment:      Patient Active Problem List   Diagnosis Code    DM (diabetes mellitus) ([facility] [address] E11.9    HTN (hypertension) I10    Depression F32. A    Hypokalemia E87.6    Suppurative otitis media H66.40    Cerumen impaction H61.20    Allergic rhinitis due to allergen J30.9    Biliary sludge determined by ultrasound K83.8    Type 2 diabetes with nephropathy (HCC) E11.21    Type 2 diabetes mellitus with diabetic neuropathy (HCC) E11.40    Major depressive disorder, recurrent, mild F33.0    Major depressive disorder, recurrent, moderate F33.1    Major depressive disorder, recurrent, unspecified F33.9    Type 2 diabetes mellitus with chronic kidney disease ([facility] [address] E11.22    Essential tremor G25.0    Spinal stenosis M48.00       Plan:     Continue PT/OT/Rehab  Discontinue: IV  Consult: PT  and OT    Discharge To: home.  today v tomorrow pending progress

## 2022-02-09 NOTE — PROGRESS NOTES
Problem: Self Care Deficits Care Plan (Adult)  Goal: *Acute Goals and Plan of Care (Insert Text)  Description: FUNCTIONAL STATUS PRIOR TO ADMISSION: Pt I with ADLs, IADLS, amb without AD, driving, works as  and , working remotely prior to surgery    HOME SUPPORT: Pt lives alone, has supportive niece in area who works during the day,but could help at night. Occupational Therapy Goals  Initiated 2/9/2022    1. Patient will perform lower body dressing and bathing with modified independence using AE PRN within 7 days. 2.  Patient will perform toileting with modified independence using most appropriate DME within 7 days. 3.  Patient will standing ADLs without LOB for 5 minutes at modified independence within 7 days. 4.  Patient will don/doff back brace at modified independence within 7 days. 5.  Patient will verbalize/demonstrate 3/3 back precautions during ADL tasks without cues within 7 days. 2/9/2022 1503 by Saulo Tran, OTR/L  Outcome: Progressing Towards Goal  2/9/2022 1200 by Saulo Tran, OTR/L  Outcome: Not Met     OCCUPATIONAL THERAPY TREATMENT  Patient: Ramiro Galvez (89 y.o. female)  Date: 2/9/2022  Diagnosis: Spinal stenosis, lumbar region, with neurogenic claudication [M48.062]  Lumbar spondylosis [M47.816]  Lumbar pain [M54.50]  Bilateral sciatica [M54.31, M54.32]  Lumbar radiculopathy [M54.16]  DDD (degenerative disc disease), lumbar [M51.36]  Low back pain, unspecified back pain laterality, unspecified chronicity, unspecified whether sciatica present [M54.50]  Foraminal stenosis of lumbar region [M48.061]  Spinal stenosis [M48.00] <principal problem not specified>  Procedure(s) (LRB):  LUMBAR 5 TO SACRAL 1 POSTERIOR DECOMPRESSION AND FUSION (N/A) 1 Day Post-Op  Precautions: Spinal,Back,Fall (LSO when OOB, reposition every 30 minutes)  Chart, occupational therapy assessment, plan of care, and goals were reviewed.     ASSESSMENT  Patient continues with skilled OT services and is progressing towards goals. Returned in pm for second session as pt going to radiology in morning and unable to complete adaptive equipment training. Pt pleasant, agreeable, declining OOB for PT and for OT this afternoon as she had just returned to bed. Provided pt with education regarding adaptive ADLS, adaptive equipment and precautions. Provided demonstration and modeling for all and pt indicated understanding of all education. Pt appreciative of information and feels comfortable with adaptive ADLs should she discharge prior to another OT session. Will continue to follow per POC, and will benefit from [facility] and family supervision pending progress in acute setting. .        Current Level of Function Impacting Discharge (ADLs): mod A for LE ADLs    Other factors to consider for discharge: lives alone, has supportive family in area         PLAN :  Patient continues to benefit from skilled intervention to address the above impairments. Continue treatment per established plan of care to address goals. Recommend with staff: OOB to chair for meals    Recommend next OT session: review LE ADLs with AE    Recommendation for discharge: (in order for the patient to meet his/her long term goals)  Occupational therapy at least 2 days/week in the home AND ensure assist and/or supervision for safety with IADLs    This discharge recommendation:  Has been made in collaboration with the attending provider and/or case management    IF patient discharges home will need the following DME: provided pt with hip kit, may need BSC pending progress       SUBJECTIVE:   Patient stated I don't think I can get up now.     OBJECTIVE DATA SUMMARY:   Cognitive/Behavioral Status:  Neurologic State: Alert  Orientation Level: Appropriate for age  Cognition: Appropriate decision making; Follows commands  Perception: Appears intact  Perseveration: No perseveration noted  Safety/Judgement: Fall prevention    Functional Mobility and Transfers for ADLs:  Bed Mobility:  Rolling:  (NT, received in chair)  Supine to Sit:  (NT, received in chair)  Sit to Supine: Minimum assistance  Scooting: Supervision    Transfers:  Sit to Stand: Contact guard assistance (cues for proper hand placement)  Functional Transfers  Toilet Transfer : Contact guard assistance  Bed to Chair: Contact guard assistance (chair to transport chair)    Balance:  Sitting: Intact  Sitting - Static: Good (unsupported)  Sitting - Dynamic: Normal;Supported sitting  Standing: Impaired; With support  Standing - Static: Good;Constant support  Standing - Dynamic : Fair    ADL Intervention:                 Lower Body Bathing  Bathing Assistance: Moderate assistance (unable to cross LE, reviewed adaptive ADLS)         Lower Body Dressing Assistance  Dressing Assistance:  (pt declined to attempt as just back to bed, educated on AE)  Pants With Elastic Waist: Compensatory technique training  Socks: Compensatory technique training  Antiembolitic Stockings:  (need to review hip kit, pt going off floor to radiology )  Shoes with Elastic Laces: Compensatory technique training  Cues: Verbal cues provided;Visual cues provided  Adaptive Equipment Used: Dressing stick; Long handled shoe horn;Reacher;Sock aid         Cognitive Retraining  Safety/Judgement: Fall prevention        Pain:  No c.o pain during session. Activity Tolerance:   Fair    After treatment patient left in no apparent distress:   Supine in bed    COMMUNICATION/COLLABORATION:   The patients plan of care was discussed with: Registered nurse.      Theodora Urena, OTR/L  Time Calculation: 21 mins

## 2022-02-09 NOTE — PROGRESS NOTES
Problem: Mobility Impaired (Adult and Pediatric)  Goal: *Acute Goals and Plan of Care (Insert Text)  Description: FUNCTIONAL STATUS PRIOR TO ADMISSION: Patient was independent and active without use of DME. Limited tolerance for standing secondary to pain into bilat LE's. HOME SUPPORT PRIOR TO ADMISSION: The patient lived alone with local family/friends to provide assistance. Patient does not plan to have anyone stay with her over night. Physical Therapy Goals  Initiated 2/8/2022    1. Patient will move from supine to sit and sit to supine , scoot up and down, and roll side to side in bed with modified independence within 4 days. 2. Patient will perform sit to stand with supervision/set-up within 4 days. 3. Patient will ambulate with supervision/set-up for 200 feet with the least restrictive device within 4 days. 4. Patient will ascend/descend 4 stairs with bilat handrail(s) with contract guard assist within 4 days. 5. Patient will verbalize and demonstrate understanding of spinal precautions (No bending, lifting greater than 5 lbs, or twisting; log-roll technique; frequent repositioning as instructed) within 4 days.        2/9/2022 1447 by Rekha Raygoza, PT  Outcome: Progressing Towards Goal   PHYSICAL THERAPY TREATMENT  Patient: Cathy Metzger (44 y.o. 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 ASSESSMENT  Patient continues with skilled PT services and is progressing towards goals. Pt received seated on the [facility] and agreeable to therapy. Pt tolerated session well, but continues to be limited by generalized weakness, decreased functional mobility, impaired balance and gait. Pt tolerated progress gait training x 110 ft with RW with CGA. Pt required verbal cues for proper hand placement to reach back before sitting down. Pt required min A to bring LEs onto the bed and remained in R sidelying position. Placed pillow in between LEs for proper positioning. Pt will continue to benefit from PT to progress mobility and reach highest level of independence. Pt will benefit from stair training next session. Recommend home with HHPT upon discharge. .     Current Level of Function Impacting Discharge (mobility/balance): CGA transfers with RW, gait training x 110 ft RW with CGA    Other factors to consider for discharge:          PLAN :  Patient continues to benefit from skilled intervention to address the above impairments. Continue treatment per established plan of care. to address goals. Recommendation for discharge: (in order for the patient to meet his/her long term goals)  Physical therapy at least 2 days/week in the home     This discharge recommendation:  Has been made in collaboration with the attending provider and/or case management    IF patient discharges home will need the following DME: rolling walker       SUBJECTIVE:   Patient stated I was a .     OBJECTIVE DATA SUMMARY:   Critical Behavior:  Neurologic State: Alert  Orientation Level: Oriented X4  Cognition: Follows commands  Safety/Judgement: Awareness of environment,Fall prevention    Spinal diagnosis intervention:  The patient stated 3/3 back precautions when prompted. Reviewed all 3 back precautions, log roll technique, and sitting for 30 minutes at a time. Functional Mobility Training:    Bed Mobility:  Log Rolling:  (NT, received in chair)  Supine to Sit:  (NT, received in chair)  Sit to Supine: Minimum assistance  Scooting: Supervision        Transfers:  Sit to Stand: Contact guard assistance (cues for proper hand placement)  Stand to Sit: Contact guard assistance (cues to bring RW closer to chair)        Bed to Chair: Contact guard assistance (chair to transport chair)                    Balance:  Sitting: Intact  Sitting - Static: Good (unsupported)  Sitting - Dynamic: Normal;Supported sitting  Standing: Impaired; With support  Standing - Static: Good;Constant support  Standing - Dynamic : Fair  Ambulation/Gait Training:  Distance (ft): 110 Feet (ft)  Assistive Device: Gait belt;Brace/Splint; Walker, rolling  Ambulation - Level of Assistance: Contact guard assistance        Gait Abnormalities: Decreased step clearance (guarded posture, elevated shoulders)        Base of Support: Narrowed     Speed/Lolis: Pace decreased (<100 feet/min)  Step Length: Right shortened;Left shortened         Pain Rating:  Pt reported minimal back pain    Activity Tolerance:   Good    After treatment patient left in no apparent distress:   Patient positioned in R sidelying for pressure relief    COMMUNICATION/COLLABORATION:   The patients plan of care was discussed with: Occupational therapist and Registered nurse.      Ashlyn Velasquez, PT, DPT   Time Calculation: 15 mins

## 2022-02-09 NOTE — PROGRESS NOTES
CM following planned spinal surgery patient to assist with any potential d/c needs.      Souleymane LubenLens, [address]

## 2022-02-09 NOTE — PROGRESS NOTES
Problem: Falls - Risk of  Goal: *Absence of Falls  Description: Document Lettie Duverney Fall Risk and appropriate interventions in the flowsheet.   Outcome: Progressing Towards Goal  Note: Fall Risk Interventions:            Medication Interventions: Patient to call before getting OOB    Elimination Interventions: Call light in reach              Problem: Patient Education: Go to Patient Education Activity  Goal: Patient/Family Education  Outcome: Progressing Towards Goal     Problem: Patient Education: Go to Patient Education Activity  Goal: Patient/Family Education  Outcome: Progressing Towards Goal     Problem: Patient Education: Go to Patient Education Activity  Goal: Patient/Family Education  Outcome: Progressing Towards Goal     Problem: Complex Spine Procedure:  Day of Surgery  Goal: Off Pathway (Use only if patient is Off Pathway)  Outcome: Progressing Towards Goal  Goal: Activity/Safety  Outcome: Progressing Towards Goal  Goal: Consults, if ordered  Outcome: Progressing Towards Goal  Goal: Diagnostic Test/Procedures  Outcome: Progressing Towards Goal  Goal: Nutrition/Diet  Outcome: Progressing Towards Goal  Goal: Discharge Planning  Outcome: Progressing Towards Goal  Goal: Medications  Outcome: Progressing Towards Goal  Goal: Respiratory  Outcome: Progressing Towards Goal  Goal: Treatments/Interventions/Procedures  Outcome: Progressing Towards Goal  Goal: Psychosocial  Outcome: Progressing Towards Goal  Goal: *Optimal pain control at patient's stated goal  Outcome: Progressing Towards Goal  Goal: *Demonstrates progressive activity  Outcome: Progressing Towards Goal  Goal: *Respiratory status stable  Outcome: Progressing Towards Goal     Problem: Complex Spine Procedure:  Post Op Day 1  Goal: Off Pathway (Use only if patient is Off Pathway)  Outcome: Progressing Towards Goal  Goal: Activity/Safety  Outcome: Progressing Towards Goal  Goal: Consults, if ordered  Outcome: Progressing Towards Goal  Goal: Diagnostic Test/Procedures  Outcome: Progressing Towards Goal  Goal: Nutrition/Diet  Outcome: Progressing Towards Goal  Goal: Discharge Planning  Outcome: Progressing Towards Goal  Goal: Medications  Outcome: Progressing Towards Goal  Goal: Respiratory  Outcome: Progressing Towards Goal  Goal: Treatments/Interventions/Procedures  Outcome: Progressing Towards Goal  Goal: Psychosocial  Outcome: Progressing Towards Goal  Goal: *Progress independence mobility/activities (eg: Mobility precautions)  Outcome: Progressing Towards Goal  Goal: *Verbalizes/demonstrates understanding of proper body mechanics and use of stabilization device if ordered  Outcome: Progressing Towards Goal  Goal: *Optimal pain control at patient's stated goal  Outcome: Progressing Towards Goal  Goal: *Resumes normal function of bladder and bowel  Outcome: Progressing Towards Goal  Goal: *Hemodynamically stable  Outcome: Progressing Towards Goal  Goal: *Tolerating diet  Outcome: Progressing Towards Goal  Goal: *Labs within defined limits  Outcome: Progressing Towards Goal     Problem: Complex Spine Procedure:  Post Op Day 2  Goal: Off Pathway (Use only if patient is Off Pathway)  Outcome: Progressing Towards Goal  Goal: Activity/Safety  Outcome: Progressing Towards Goal  Goal: Diagnostic Test/Procedures  Outcome: Progressing Towards Goal  Goal: Nutrition/Diet  Outcome: Progressing Towards Goal  Goal: Discharge Planning  Outcome: Progressing Towards Goal  Goal: Medications  Outcome: Progressing Towards Goal  Goal: Respiratory  Outcome: Progressing Towards Goal  Goal: Treatments/Interventions/Procedures  Outcome: Progressing Towards Goal  Goal: Psychosocial  Outcome: Progressing Towards Goal  Goal: *Progress independence mobility/activities (eg: Mobility precautions)  Outcome: Progressing Towards Goal  Goal: *Verbalizes/demonstrates understanding of proper body mechanics and use of stabilization device if ordered  Outcome: Progressing Towards Goal  Goal: *Optimal pain control at patient's stated goal  Outcome: Progressing Towards Goal  Goal: *Resumes normal function of bladder and bowel  Outcome: Progressing Towards Goal  Goal: *Hemodynamically stable  Outcome: Progressing Towards Goal  Goal: *Tolerating diet  Outcome: Progressing Towards Goal  Goal: *Labs within defined limits  Outcome: Progressing Towards Goal  Goal: *Able to place stabilization device  Outcome: Progressing Towards Goal     Problem: Complex Spine Procedure:  Post Op Day 3  Goal: Off Pathway (Use only if patient is Off Pathway)  Outcome: Progressing Towards Goal  Goal: Activity/Safety  Outcome: Progressing Towards Goal  Goal: Nutrition/Diet  Outcome: Progressing Towards Goal  Goal: Discharge Planning  Outcome: Progressing Towards Goal  Goal: Medications  Outcome: Progressing Towards Goal  Goal: Respiratory  Outcome: Progressing Towards Goal  Goal: Treatments/Interventions/Procedures  Outcome: Progressing Towards Goal  Goal: Psychosocial  Outcome: Progressing Towards Goal     Problem: Complex Spine Procedure:  Discharge Outcomes  Goal: *Optimal pain control at patient's stated goal  Outcome: Progressing Towards Goal  Goal: *Demonstrates ability to place and remove stabilization device  Outcome: Progressing Towards Goal  Goal: *Progress independence mobility/activities (eg: Mobility precautions)  Outcome: Progressing Towards Goal  Goal: *Resumes normal function of bladder and bowel  Outcome: Progressing Towards Goal  Goal: *Lungs clear or at baseline  Outcome: Progressing Towards Goal  Goal: *Verbalizes name, dosage, time, side effects, and number of days to continue medications  Outcome: Progressing Towards Goal  Goal: *Modified independence with transfers, ambulation on levels with assistance devices, stair climbing, ADL's  Outcome: Progressing Towards Goal  Goal: *Describes follow-up/return visits to physicians  Outcome: Progressing Towards Goal  Goal: *Describes available resources and support systems  Outcome: Progressing Towards Goal  Goal: *Labs within defined limits  Outcome: Progressing Towards Goal  Goal: *Tolerating diet  Outcome: Progressing Towards Goal

## 2022-02-09 NOTE — PROGRESS NOTES
Problem: Mobility Impaired (Adult and Pediatric)  Goal: *Acute Goals and Plan of Care (Insert Text)  Description: FUNCTIONAL STATUS PRIOR TO ADMISSION: Patient was independent and active without use of DME. Limited tolerance for standing secondary to pain into bilat LE's. HOME SUPPORT PRIOR TO ADMISSION: The patient lived alone with local family/friends to provide assistance. Patient does not plan to have anyone stay with her over night. Physical Therapy Goals  Initiated 2/8/2022    1. Patient will move from supine to sit and sit to supine , scoot up and down, and roll side to side in bed with modified independence within 4 days. 2. Patient will perform sit to stand with supervision/set-up within 4 days. 3. Patient will ambulate with supervision/set-up for 200 feet with the least restrictive device within 4 days. 4. Patient will ascend/descend 4 stairs with bilat handrail(s) with contract guard assist within 4 days. 5. Patient will verbalize and demonstrate understanding of spinal precautions (No bending, lifting greater than 5 lbs, or twisting; log-roll technique; frequent repositioning as instructed) within 4 days.        Outcome: Progressing Towards Goal   PHYSICAL THERAPY TREATMENT  Patient: Tom Romero (89 y.o. 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 ASSESSMENT  Patient continues with skilled PT services and is progressing towards goals. Pt received R sidelying position and agreeable to therapy. Pt tolerated session well and making good progress towards goals. Pt continues to be limited by generalized weakness, decreased functional mobility, impaired balance and gait, low back pain. Pt completed sidelying to sitting EOB with min A, sit<>stand with RW with CGA and verbal cues for proper hand placement. Pt tolerated gait training x 80 ft with RW with CGA demonstrating decreased aguilar, elevated shoulders. Pt remained seated in the chair with all needs met. Pt will need further gait and stair training prior to clearance for d/c home with HHPT. .     Current Level of Function Impacting Discharge (mobility/balance): min A supine to sit, CGA transfers with RW, gait training x 80 ft with RW with CGA    Other factors to consider for discharge:          PLAN :  Patient continues to benefit from skilled intervention to address the above impairments. Continue treatment per established plan of care. to address goals. Recommendation for discharge: (in order for the patient to meet his/her long term goals)  Physical therapy at least 2 days/week in the home     This discharge recommendation:  Has been made in collaboration with the attending provider and/or case management    IF patient discharges home will need the following DME: rolling walker       SUBJECTIVE:   Patient stated I'm still hurting a lot.     OBJECTIVE DATA SUMMARY:   Critical Behavior:  Neurologic State: Alert  Orientation Level: Oriented X4  Cognition: Appropriate decision making       Spinal diagnosis intervention:  The patient stated 3/3 back precautions when prompted.  Reviewed all 3 back precautions, log roll technique, and sitting for 30 minutes at a time. Functional Mobility Training:    Bed Mobility:  Log    Supine to Sit: Minimum assistance     Scooting: Supervision        Transfers:  Sit to Stand: Contact guard assistance  Stand to Sit: Contact guard assistance                             Balance:  Sitting: Impaired  Sitting - Static: Good (unsupported)  Sitting - Dynamic: Good (unsupported); Fair (occasional)  Standing: Impaired; With support  Standing - Static: Good  Standing - Dynamic : Fair  Ambulation/Gait Training:  Distance (ft): 80 Feet (ft)  Assistive Device: Gait belt;Brace/Splint; Walker, rolling  Ambulation - Level of Assistance: Contact guard assistance        Gait Abnormalities: Decreased step clearance (guarded posture, elevated shoulders)        Base of Support: Narrowed     Speed/Lolis: Pace decreased (<100 feet/min)  Step Length: Right shortened;Left shortened         Pain Rating:  Pt reported 6-7/10 pain low back    Activity Tolerance:   Good    After treatment patient left in no apparent distress:   Sitting in chair and Call bell within reach    COMMUNICATION/COLLABORATION:   The patients plan of care was discussed with: Occupational therapist and Registered nurse.      Kelley Espinoza PT, DPT   Time Calculation: 23 mins

## 2022-02-10 LAB
GLUCOSE BLD STRIP.AUTO-MCNC: 126 MG/DL (ref 65–117)
GLUCOSE BLD STRIP.AUTO-MCNC: 136 MG/DL (ref 65–117)
GLUCOSE BLD STRIP.AUTO-MCNC: 158 MG/DL (ref 65–117)
GLUCOSE BLD STRIP.AUTO-MCNC: 174 MG/DL (ref 65–117)
HGB BLD-MCNC: 10.5 G/DL (ref 11.5–16)
SERVICE CMNT-IMP: ABNORMAL

## 2022-02-10 PROCEDURE — 74011250637 HC RX REV CODE- 250/637: Performed by: PHYSICIAN ASSISTANT

## 2022-02-10 PROCEDURE — 74011000250 HC RX REV CODE- 250: Performed by: PHYSICIAN ASSISTANT

## 2022-02-10 PROCEDURE — 74011250637 HC RX REV CODE- 250/637: Performed by: ORTHOPAEDIC SURGERY

## 2022-02-10 PROCEDURE — 2709999900 HC NON-CHARGEABLE SUPPLY

## 2022-02-10 PROCEDURE — 82962 GLUCOSE BLOOD TEST: CPT

## 2022-02-10 PROCEDURE — 77030038269 HC DRN EXT URIN PURWCK BARD -A

## 2022-02-10 PROCEDURE — 97530 THERAPEUTIC ACTIVITIES: CPT

## 2022-02-10 PROCEDURE — 94760 N-INVAS EAR/PLS OXIMETRY 1: CPT

## 2022-02-10 PROCEDURE — 97116 GAIT TRAINING THERAPY: CPT

## 2022-02-10 PROCEDURE — 65270000029 HC RM PRIVATE

## 2022-02-10 PROCEDURE — 97535 SELF CARE MNGMENT TRAINING: CPT | Performed by: OCCUPATIONAL THERAPIST

## 2022-02-10 PROCEDURE — 85018 HEMOGLOBIN: CPT

## 2022-02-10 PROCEDURE — 36415 COLL VENOUS BLD VENIPUNCTURE: CPT

## 2022-02-10 RX ADMIN — GABAPENTIN 100 MG: 100 CAPSULE ORAL at 09:35

## 2022-02-10 RX ADMIN — Medication 2000 UNITS: at 09:34

## 2022-02-10 RX ADMIN — PANTOPRAZOLE SODIUM 40 MG: 40 TABLET, DELAYED RELEASE ORAL at 09:34

## 2022-02-10 RX ADMIN — ATORVASTATIN CALCIUM 10 MG: 10 TABLET, FILM COATED ORAL at 21:14

## 2022-02-10 RX ADMIN — PRIMIDONE 50 MG: 50 TABLET ORAL at 17:09

## 2022-02-10 RX ADMIN — POLYETHYLENE GLYCOL 3350 17 G: 17 POWDER, FOR SOLUTION ORAL at 09:33

## 2022-02-10 RX ADMIN — Medication 1 AMPULE: at 21:13

## 2022-02-10 RX ADMIN — GABAPENTIN 100 MG: 100 CAPSULE ORAL at 21:14

## 2022-02-10 RX ADMIN — OXYCODONE 15 MG: 5 TABLET ORAL at 11:55

## 2022-02-10 RX ADMIN — DOCUSATE SODIUM 50 MG AND SENNOSIDES 8.6 MG 1 TABLET: 8.6; 5 TABLET, FILM COATED ORAL at 17:09

## 2022-02-10 RX ADMIN — OXYCODONE 15 MG: 5 TABLET ORAL at 04:21

## 2022-02-10 RX ADMIN — ACETAMINOPHEN 1000 MG: 500 TABLET ORAL at 11:53

## 2022-02-10 RX ADMIN — OXYCODONE 15 MG: 5 TABLET ORAL at 08:21

## 2022-02-10 RX ADMIN — SODIUM CHLORIDE, PRESERVATIVE FREE 10 ML: 5 INJECTION INTRAVENOUS at 21:14

## 2022-02-10 RX ADMIN — ACETAMINOPHEN 1000 MG: 500 TABLET ORAL at 17:09

## 2022-02-10 RX ADMIN — DOCUSATE SODIUM 50 MG AND SENNOSIDES 8.6 MG 1 TABLET: 8.6; 5 TABLET, FILM COATED ORAL at 09:34

## 2022-02-10 RX ADMIN — OXYCODONE 5 MG: 5 TABLET ORAL at 20:17

## 2022-02-10 RX ADMIN — GABAPENTIN 100 MG: 100 CAPSULE ORAL at 15:53

## 2022-02-10 RX ADMIN — METFORMIN HYDROCHLORIDE 1000 MG: 500 TABLET ORAL at 09:34

## 2022-02-10 RX ADMIN — ACETAMINOPHEN 1000 MG: 500 TABLET ORAL at 06:09

## 2022-02-10 RX ADMIN — MULTIPLE VITAMINS W/ MINERALS TAB 1 TABLET: TAB at 09:34

## 2022-02-10 RX ADMIN — SODIUM CHLORIDE, PRESERVATIVE FREE 10 ML: 5 INJECTION INTRAVENOUS at 06:09

## 2022-02-10 RX ADMIN — SODIUM CHLORIDE, PRESERVATIVE FREE 10 ML: 5 INJECTION INTRAVENOUS at 15:07

## 2022-02-10 RX ADMIN — PHENOL 1 SPRAY: 1.5 LIQUID ORAL at 08:21

## 2022-02-10 RX ADMIN — DIAZEPAM 5 MG: 5 TABLET ORAL at 09:34

## 2022-02-10 RX ADMIN — METFORMIN HYDROCHLORIDE 1000 MG: 500 TABLET ORAL at 17:09

## 2022-02-10 RX ADMIN — PRIMIDONE 50 MG: 50 TABLET ORAL at 09:34

## 2022-02-10 RX ADMIN — AMLODIPINE BESYLATE 5 MG: 5 TABLET ORAL at 09:34

## 2022-02-10 NOTE — PROGRESS NOTES
Problem: Self Care Deficits Care Plan (Adult)  Goal: *Acute Goals and Plan of Care (Insert Text)  Description: FUNCTIONAL STATUS PRIOR TO ADMISSION: Pt I with ADLs, IADLS, amb without AD, driving, works as  and , working remotely prior to surgery    HOME SUPPORT: Pt lives alone, has supportive niece in area who works during the day,but could help at night. Occupational Therapy Goals  Initiated 2/9/2022    1. Patient will perform lower body dressing and bathing with modified independence using AE PRN within 7 days. 2.  Patient will perform toileting with modified independence using most appropriate DME within 7 days. 3.  Patient will standing ADLs without LOB for 5 minutes at modified independence within 7 days. 4.  Patient will don/doff back brace at modified independence within 7 days. 5.  Patient will verbalize/demonstrate 3/3 back precautions during ADL tasks without cues within 7 days. Outcome: Progressing Towards Goal   OCCUPATIONAL THERAPY TREATMENT  Patient: Tesfaye Barahona (34 y.o. 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 ASSESSMENT  Patient continues with skilled OT services and is progressing towards goals.   Pt was agreeable to treatment, but was drowsy throughout tx session, closing her eyes at times. She'd recenty received pain medication. Worked on bed mobility log roll and using AE/compensatory strategies for performing lower body adls. Pt verbalized and understanding in using the sock aide, dressing stick and reacher for lower body adls this session. Pt anticipates discharge tomorrow and continues to benefit from  acute OT services. Pt reports that her niece will assist her at home and that other family members will rotate in the schedule. Pt will benefit from [facility] OT services at discharge. Current Level of Function Impacting Discharge (ADLs): up to minimal assistance when using AE for adls. Other factors to consider for discharge: sleepy this session, question carryover of today's session. PLAN :  Patient continues to benefit from skilled intervention to address the above impairments. Continue treatment per established plan of care to address goals. Recommend with staff: encourage up to bathroom and not using BSC. Needs X1 assist.    Recommend next OT session: continue AE training    Recommendation for discharge: (in order for the patient to meet his/her long term goals)  Occupational therapy at least 2 days/week in the home AND ensure assist and/or supervision for safety with adls and adl mobility    This discharge recommendation:  Has not yet been discussed the attending provider and/or case management    IF patient discharges home will need the following DME: tbd       SUBJECTIVE:   Patient stated I'm going home tomorrow, but in the morning would be good.   (to participate in OT treatment)    OBJECTIVE DATA SUMMARY:   Cognitive/Behavioral Status:  Neurologic State: Alert;Drowsy (closing eyes throughout tx session)  Orientation Level: Oriented X4  Cognition: Follows commands  Perception: Appears intact  Perseveration: No perseveration noted (very sleepy at times pt reports due to pain medications)  Safety/Judgement: Fall prevention    Functional Mobility and Transfers for ADLs:  Bed Mobility:  Rolling: Contact guard assistance  Supine to Sit: Contact guard assistance;Minimum assistance  Sit to Supine: Contact guard assistance  Scooting: Stand-by assistance    Transfers:  Sit to Stand: Stand-by assistance;Contact guard assistance  Functional Transfers  Bathroom Mobility:  (pt declined)  Bed to Chair: Contact guard assistance    Balance:  Sitting: Intact  Sitting - Static: Good (unsupported) (closing eyes throughout tx session needed close S and cues)  Sitting - Dynamic: Not tested  Standing: Intact; With support  Standing - Static: Good; Unsupported  Standing - Dynamic : Constant support;Good    ADL Intervention:                 Lower Body Bathing  Bathing Assistance:  (educ on AE kit-long sponge reacher)    Upper Body Dressing Assistance  Orthotics(Brace): Supervision (seated EOB-cues for string tab placement)  Hospital Gown: Contact guard assistance  Pullover Shirt: Set-up    Lower Body Dressing Assistance  Pants With Elastic Waist: Minimum assistance (use of dressing stick)  Socks: Minimum assistance; Compensatory technique training (using sock aide while seated EOB)  Leg Crossed Method Used: No (educ in performing crossed leg in supine)  Position Performed: Seated edge of bed  Cues: Doff;Don;Physical assistance;Verbal cues provided  Adaptive Equipment Used: Dressing stick; Reacher;Sock aid    Toileting  Toileting Assistance:  (encouraged pt to call nsg to walk to bathroom with her)    Cognitive Retraining  Maintains Attention For (Time):  (limited attention due to drowsiness, pt reports from pain me)  Safety/Judgement: Fall prevention    The patient recalled and demonstrated 3/3 back precautions. Reviewed all 3 with patient.        Dressing brace: Patient instructed and demonstrated to don/doff velcro on brace  Patient instructed  being able to stand with back against wall to don/doff brace, to don/doff seated using lap and bed/chair surface to support brace while manipulating. Dressing lower body: Patient instructed to don brace first and on the benefits to remain seated to don all clothing to increase independence with precautions and pain management. Educatead In use of AE    Patient instructed and indicated understanding the benefits of maintaining activity tolerance, functional mobility, and independence with self care tasks during acute stay  to ensure safe return home and to baseline. Encouraged patient to increase frequency and duration OOB, not sitting longer than 30 mins without marching/walking with staff, be out of bed for all meals, perform daily ADLs (as approved by RN/MD regarding bathing etc), and performing functional mobility to/from bathroom. Patient instruction and indicated understanding on body mechanics, ergonomics and gravitational force on the spine during different body positions to plan activities in prep for return home to complete instrumental ADLs      Patient instructed and demonstrated while supine hip ER stretch and hold 10 seconds to increase ROM in prep for lower body ADLs daily with Supervision. Verbally educated and demonstrated--pt needs to perform, but too sleepy this date. Therapeutic Exercises:   Patient instructed on the benefits shoulder exercises to increase independence with ADLs, active ROM, and strength of spine. .   Pain:  Did not rate pain. Pt recently had pain medication    Activity Tolerance:   Fair--pt drowsy and performed seated adls only this session. After treatment patient left in no apparent distress:   Supine in bed, Call bell within reach, Side rails x 3, and nurse informed    COMMUNICATION/COLLABORATION:   The patients plan of care was discussed with: Registered nurse.      Dominique Plaza OTR/L  Time Calculation: 29 mins

## 2022-02-10 NOTE — PROGRESS NOTES
Ortho / Neurosurgery NP Note    POD# 2 s/p LUMBAR 5 TO SACRAL 1 POSTERIOR DECOMPRESSION AND FUSION   Pt seen with no visitor present. Pt resting in bed, in NAD. Awake and alert. Reports right hip pain radiating down right thigh, which started overnight  Expected incisional pain well controlled with oxycodone. Temp 102 overnight - denies sob, cough, chills or issues voiding   Temp resolved with Tylenol   Tolerating regular diet. No nausea. VSS Afebrile. Room air. Visit Vitals  BP (!) 121/57   Pulse (!) 102   Temp 98.3 °F (36.8 °C)   Resp 16   Ht 5' 2\" (1.575 m)   Wt 65.2 kg (143 lb 11.8 oz)   LMP  (LMP Unknown)   SpO2 93%   BMI 26.29 kg/m²       Voiding status: voiding   Output (mL)  Urine Voided: 350 ml (02/10/22 0824)  Last Bowel Movement Date: 02/07/22 (02/10/22 0807)  Unmeasurable Output  Urine Occurrence(s): 1 (02/10/22 0424)      Labs    Lab Results   Component Value Date/Time    HGB 10.5 (L) 02/10/2022 04:54 AM      Lab Results   Component Value Date/Time    INR 1.0 02/02/2022 03:36 PM      Lab Results   Component Value Date/Time    Sodium 140 02/09/2022 04:31 AM    Potassium 3.9 02/09/2022 04:31 AM    Chloride 109 (H) 02/09/2022 04:31 AM    CO2 25 02/09/2022 04:31 AM    Glucose 103 (H) 02/09/2022 04:31 AM    BUN 18 02/09/2022 04:31 AM    Creatinine 0.98 02/09/2022 04:31 AM    Calcium 7.7 (L) 02/09/2022 04:31 AM     Recent Glucose Results:   Lab Results   Component Value Date/Time    GLUCPOC 126 (H) 02/10/2022 07:20 AM    GLUCPOC 150 (H) 02/09/2022 08:59 PM    GLUCPOC 157 (H) 02/09/2022 04:13 PM           Body mass index is 26.29 kg/m². : A BMI > 30 is classified as obesity and > 40 is classified as morbid obesity. S1S2  Lungs diminished in bases   Optifoams dressings c.d.i  Calves soft and supple;  No pain with passive stretch  Sensation and motor intact   SCDs for mechanical DVT proph while in bed     PLAN:  1) Neurovascular assessment q4 hours   2) PT/OT - LSO  3) Pain control - scheduled tylenol, prn oxycodone   4) BP soft - hold amlodipine. Drinking adeuqate fluids. 5) Hourly IS  6) Readniess for discharge:     [x] Vital Signs stable    [x] Hgb stable    [x] + Voiding    [x] Wound intact, drainage minimal    [x] Tolerating PO intake     [] Cleared by PT (OT if applicable)     [] Stair training completed (if applicable)    [x] Independent / Contact Guard Assist (household distance)     [x] Bed mobility     [] Car transfers     [] ADLs    [x] Adequate pain control on oral medication alone     Continue to mobilize with PT/OT . Monitor VS and temp. Lives alone, states niece will assist some, discussed having someone stay with her first few days and says she can make arrangements.      Lula Abdi NP

## 2022-02-10 NOTE — PROGRESS NOTES
11:30pm  Paged  [facility] for temp=102.9 ,TH=372/59 ,SR=983 ,RESP= 20,  Spo2 =95%. given tylenol p/o at 11:19pm and applied cold ice pack B/L axilla and legs. Received call back from md calli greer advice RN  to monitor the pts vital sign. Inforrmed hospitalis if pts  having problem in retention of urine or any abdominal pain .

## 2022-02-10 NOTE — PROGRESS NOTES
PT informed nurse that patient was feeling woozy. Blood pressure obtained 125/60. Encouraged patient to drink fluids. Rating pain 3/10.  Will continue to monitor

## 2022-02-10 NOTE — PROGRESS NOTES
Problem: Mobility Impaired (Adult and Pediatric)  Goal: *Acute Goals and Plan of Care (Insert Text)  Description: FUNCTIONAL STATUS PRIOR TO ADMISSION: Patient was independent and active without use of DME. Limited tolerance for standing secondary to pain into bilat LE's. HOME SUPPORT PRIOR TO ADMISSION: The patient lived alone with local family/friends to provide assistance. Patient does not plan to have anyone stay with her over night. Physical Therapy Goals  Initiated 2/8/2022    1. Patient will move from supine to sit and sit to supine , scoot up and down, and roll side to side in bed with modified independence within 4 days. 2. Patient will perform sit to stand with supervision/set-up within 4 days. 3. Patient will ambulate with supervision/set-up for 200 feet with the least restrictive device within 4 days. 4. Patient will ascend/descend 4 stairs with bilat handrail(s) with contract guard assist within 4 days. 5. Patient will verbalize and demonstrate understanding of spinal precautions (No bending, lifting greater than 5 lbs, or twisting; log-roll technique; frequent repositioning as instructed) within 4 days.        2/10/2022 1609 by Laurie Valencia, PT  Outcome: Progressing Towards Goal  2/10/2022 1356 by Laurie Valencia, PT  Outcome: Progressing Towards Goal   PHYSICAL THERAPY TREATMENT  Patient: Ashley Jeronimo (87 y.o. 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 ASSESSMENT  Patient continues with skilled PT services and is progressing towards goals. Pt received in bed, agreeable to PT session and stating she feels better and her pain is better. Pt with less physical assistance required for all mobility this session. Stair training and car transfer training completed. Pt would like one additional session in the am to address bed mobility, reporting that it's the hardest thing to do. Pt will be safe to dc home with HHPT and assistance of family. Current Level of Function Impacting Discharge (mobility/balance):   Bed Mobility:  Log Rolling: Contact guard assistance  Supine to Sit: Contact guard assistance;Minimum assistance  Supine to sit:  Contact guard assistance;Minimum assistance to BLE  Scooting: Stand-by assistance               Transfers:  Sit to Stand: Stand-by assistance;Contact guard assistance  Stand to Sit: Stand-by assistance  Bed to Chair: Contact guard assistance  Car transfer: Minimum assistance to assist BLE in to car  Ambulation/Gait Training:  Distance (ft): 80 Feet (ft)  Assistive Device: Brace/Splint;Gait belt;Walker, rolling  Ambulation - Level of Assistance: Contact guard assistance  Stairs:  Number of Stairs Trained: 8  Stairs - Level of Assistance: Stand-by assistance   Rail Use: Left     Other factors to consider for discharge: I and active without DME          PLAN :  Patient continues to benefit from skilled intervention to address the above impairments. Continue treatment per established plan of care. to address goals.     Recommendation for discharge: (in order for the patient to meet his/her long term goals)  Physical therapy at least 2 days/week in the home     This discharge recommendation:  Has been made in collaboration with the attending provider and/or case management    IF patient discharges home will need the following DME: rolling walker       SUBJECTIVE:   Patient stated I feel much better now than I did this moring.     OBJECTIVE DATA SUMMARY:   Critical Behavior:  Neurologic State: Alert,Appropriate for age  Orientation Level: Oriented X4  Cognition: Follows commands  Safety/Judgement: Fall prevention      Functional Mobility Training:    Bed Mobility:  Log Rolling: Contact guard assistance  Supine to Sit: Contact guard assistance;Minimum assistance  Supine to sit:  Contact guard assistance;Minimum assistance to BLE     Scooting: Stand-by assistance        Transfers:  Sit to Stand: Stand-by assistance;Contact guard assistance  Stand to Sit: Stand-by assistance        Bed to Chair: Contact guard assistance  Car transfer: Minimum assistance to assist BLE in to car                    Balance:  Sitting: Intact  Sitting - Static: Good (unsupported)  Sitting - Dynamic: Not tested  Standing: Intact; With support  Standing - Static: Good; Unsupported  Standing - Dynamic : Constant support;Good  Ambulation/Gait Training:  Distance (ft): 80 Feet (ft)  Assistive Device: Brace/Splint;Gait belt;Walker, rolling  Ambulation - Level of Assistance: Contact guard assistance        Gait Abnormalities: Decreased step clearance (slightly guarded posture)              Speed/Lolis: Pace decreased (<100 feet/min)  Step Length: Left shortened;Right shortened      Stairs:  Number of Stairs Trained: 8  Stairs - Level of Assistance: Stand-by assistance   Rail Use: Left       Pain Ratin-6/10    Activity Tolerance:   Good    After treatment patient left in no apparent distress:   Call bell within reach, Side rails x 3, and R sidelying in bed    COMMUNICATION/COLLABORATION:   The patients plan of care was discussed with: Registered nurse.      Puneet Rodarte, PT   Time Calculation: 31 mins

## 2022-02-10 NOTE — PROGRESS NOTES
End of Shift Note    Bedside shift change report given to Ian (oncoming nurse) by Isidra Fisher RN (offgoing nurse). Report included the following information SBAR and Kardex    Shift worked:  day     Shift summary and any significant changes:     pod 2, resting comforatbley, home tomorrow     Concerns for physician to address:  none     Zone phone for oncoming shift:   [id number]       Activity:  Activity Level: Up with Assistance  Number times ambulated in hallways past shift: 1  Number of times OOB to chair past shift: 1    Cardiac:   Cardiac Monitoring: No      Cardiac Rhythm: Sinus Rhythm    Access:   Current line(s): PIV     Genitourinary:   Urinary status: voiding    Respiratory:   O2 Device: None (Room air)  Chronic home O2 use?: NO  Incentive spirometer at bedside: YES  Actual Volume (ml): 1550 ml  GI:  Last Bowel Movement Date: 02/07/22  Current diet:  ADULT DIET Regular; 3 carb choices (45 gm/meal)  Passing flatus: YES  Tolerating current diet: YES       Pain Management:   Patient states pain is manageable on current regimen: YES    Skin:  Bhavik Score: 19  Interventions: increase time out of bed and PT/OT consult    Patient Safety:  Fall Score:  Total Score: 3  Interventions: assistive device (walker, cane, etc), gripper socks and pt to call before getting OOB  High Fall Risk: Yes    Length of Stay:  Expected LOS: 2d 16h  Actual LOS: [address], RN

## 2022-02-10 NOTE — PROGRESS NOTES
Problem: Mobility Impaired (Adult and Pediatric)  Goal: *Acute Goals and Plan of Care (Insert Text)  Description: FUNCTIONAL STATUS PRIOR TO ADMISSION: Patient was independent and active without use of DME. Limited tolerance for standing secondary to pain into bilat LE's. HOME SUPPORT PRIOR TO ADMISSION: The patient lived alone with local family/friends to provide assistance. Patient does not plan to have anyone stay with her over night. Physical Therapy Goals  Initiated 2/8/2022    1. Patient will move from supine to sit and sit to supine , scoot up and down, and roll side to side in bed with modified independence within 4 days. 2. Patient will perform sit to stand with supervision/set-up within 4 days. 3. Patient will ambulate with supervision/set-up for 200 feet with the least restrictive device within 4 days. 4. Patient will ascend/descend 4 stairs with bilat handrail(s) with contract guard assist within 4 days. 5. Patient will verbalize and demonstrate understanding of spinal precautions (No bending, lifting greater than 5 lbs, or twisting; log-roll technique; frequent repositioning as instructed) within 4 days.        Outcome: Progressing Towards Goal   PHYSICAL THERAPY TREATMENT  Patient: Whitney Bender (41 y.o. 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 ASSESSMENT  Patient continues with skilled PT services and is progressing towards goals,but not moving as well as she did yesterday. Pt required increased physical assistance initially particularly with bed mobility and sit to stand from the bed, but mobility improved with time. Pt with good recall and adherence to her precautions and was able to ambulate 80ft. Her sit to stand improved when she stood from the bedside commode. Stair training still needs to be completed before pt can clear to go home. Anticipate pt will be fine to discharge home with HHPT and increased assistance, which she states will be provided by her niece. Current Level of Function Impacting Discharge (mobility/balance):   Bed Mobility:  Log Rolling: Moderate assistance  Supine to Sit: Moderate assistance      Transfers:  Sit to Stand: Contact guard assistance;Minimum assistance (improved to CGA)  Stand to Sit: Stand-by assistance  Bed to Chair: Contact guard assistance       Ambulation/Gait Training:  Distance (ft): 80 Feet (ft)  Assistive Device: Brace/Splint;Gait belt;Walker, rolling       Other factors to consider for discharge: pain control         PLAN :  Patient continues to benefit from skilled intervention to address the above impairments. Continue treatment per established plan of care. to address goals.     Recommendation for discharge: (in order for the patient to meet his/her long term goals)  Physical therapy at least 2 days/week in the home and increased assistance in the home    This discharge recommendation:  Has been made in collaboration with the attending provider and/or case management    IF patient discharges home will need the following DME: rolling walker       SUBJECTIVE:   Patient stated I did so much better yesterday    OBJECTIVE DATA SUMMARY:   Critical Behavior:  Neurologic State: Alert,Appropriate for age  Orientation Level: Oriented X4  Cognition: Follows commands  Safety/Judgement: Fall prevention    Spinal diagnosis intervention:  The patient stated 3/3 back precautions when prompted. Reviewed all 3 back precautions, log roll technique, and sitting for 30 minutes at a time. Reviewed back brace application and wear schedule. Brace donned with supervision/set-up      Functional Mobility Training:    Bed Mobility:  Log Rolling: Contact guard assistance  Supine to Sit: Contact guard assistance;Minimum assistance      Transfers:  Sit to Stand: Stand-by assistance;Contact guard assistance  Stand to Sit: Stand-by assistance        Bed to Chair: Contact guard assistance          Balance:  Sitting: Intact  Sitting - Static: Good (unsupported)  Sitting - Dynamic: Not tested  Standing: Intact; With support  Standing - Static: Good; Unsupported  Standing - Dynamic : Constant support;Good  Ambulation/Gait Training:  Distance (ft): 80 Feet (ft)  Assistive Device: Brace/Splint;Gait belt;Walker, rolling  Ambulation - Level of Assistance: Contact guard assistance        Gait Abnormalities: Decreased step clearance (slightly guarded posture)              Speed/Lolis: Pace decreased (<100 feet/min)  Step Length: Left shortened;Right shortened        Pain Ratin/10 initially with bed mobility    Activity Tolerance:   Good    After treatment patient left in no apparent distress:   Sitting in chair and Call bell within reach    COMMUNICATION/COLLABORATION:   The patients plan of care was discussed with: Occupational therapist and Registered nurse.      Ken Garza, PT   Time Calculation: 31 mins

## 2022-02-10 NOTE — PROGRESS NOTES
End of Shift Note    Bedside shift change report given to Aj Pereyra RN (oncoming nurse) by Modesta Medrano RN (offgoing nurse). Report included the following information SBAR, Kardex, Intake/Output, MAR and Recent Results    Shift worked:  7P-7A     Shift summary and any significant changes:     darryl pagerolly Pt has temp 102.9 1 hour after tylenol and ice pack applied for temp 101. 7.pain controlled by oxycodone. Concerns for physician to address:       Zone phone for oncoming shift:   [phone]       Activity:  Activity Level: Up with Assistance  Number times ambulated in hallways past shift: 0  Number of times OOB to chair past shift: 0    Cardiac:   Cardiac Monitoring: No      Cardiac Rhythm: Sinus Rhythm    Access:   Current line(s): PIV     Genitourinary:   Urinary status: voiding    Respiratory:   O2 Device: None (Room air)  Chronic home O2 use?: NO  Incentive spirometer at bedside: YES     GI:  Last Bowel Movement Date: 02/07/22  Current diet:  ADULT DIET Regular; 3 carb choices (45 gm/meal)  Passing flatus: YES  Tolerating current diet: YES       Pain Management:   Patient states pain is manageable on current regimen: YES    Skin:  Bhavik Score: 19  Interventions: float heels and increase time out of bed    Patient Safety:  Fall Score:  Total Score: 3  Interventions: assistive device (walker, cane, etc), gripper socks and pt to call before getting OOB  High Fall Risk: Yes    Length of Stay:  Expected LOS: - - -  Actual LOS: 2      Ian Cheng RN

## 2022-02-10 NOTE — PROGRESS NOTES
11:54 PM  Patient has temp of 102.6 1 hour after tylenol and ice packs applied for temp of 102.9 @ 1051.  Oncall paged at this time

## 2022-02-10 NOTE — PROGRESS NOTES
Problem: Falls - Risk of  Goal: *Absence of Falls  Description: Document Puneet Draper Fall Risk and appropriate interventions in the flowsheet.   Outcome: Progressing Towards Goal  Note: Fall Risk Interventions:  Mobility Interventions: Communicate number of staff needed for ambulation/transfer         Medication Interventions: Patient to call before getting OOB    Elimination Interventions: Call light in reach              Problem: Patient Education: Go to Patient Education Activity  Goal: Patient/Family Education  Outcome: Progressing Towards Goal     Problem: Patient Education: Go to Patient Education Activity  Goal: Patient/Family Education  Outcome: Progressing Towards Goal     Problem: Patient Education: Go to Patient Education Activity  Goal: Patient/Family Education  Outcome: Progressing Towards Goal     Problem: Patient Education: Go to Patient Education Activity  Goal: Patient/Family Education  Outcome: Progressing Towards Goal     Problem: Pain  Goal: *Control of Pain  Outcome: Progressing Towards Goal

## 2022-02-11 ENCOUNTER — APPOINTMENT (OUTPATIENT)
Dept: GENERAL RADIOLOGY | Age: 72
DRG: 455 | End: 2022-02-11
Attending: NURSE PRACTITIONER
Payer: MEDICARE

## 2022-02-11 LAB
ANION GAP SERPL CALC-SCNC: 8 MMOL/L (ref 5–15)
APPEARANCE UR: CLEAR
B PERT DNA SPEC QL NAA+PROBE: NOT DETECTED
BACTERIA URNS QL MICRO: ABNORMAL /HPF
BASOPHILS # BLD: 0 K/UL (ref 0–0.1)
BASOPHILS NFR BLD: 1 % (ref 0–1)
BILIRUB UR QL CFM: NEGATIVE
BORDETELLA PARAPERTUSSIS PCR, BORPAR: NOT DETECTED
BUN SERPL-MCNC: 11 MG/DL (ref 6–20)
BUN/CREAT SERPL: 15 (ref 12–20)
C PNEUM DNA SPEC QL NAA+PROBE: NOT DETECTED
CALCIUM SERPL-MCNC: 8.8 MG/DL (ref 8.5–10.1)
CHLORIDE SERPL-SCNC: 106 MMOL/L (ref 97–108)
CO2 SERPL-SCNC: 24 MMOL/L (ref 21–32)
COLOR UR: ABNORMAL
CREAT SERPL-MCNC: 0.72 MG/DL (ref 0.55–1.02)
DIFFERENTIAL METHOD BLD: ABNORMAL
EOSINOPHIL # BLD: 0.1 K/UL (ref 0–0.4)
EOSINOPHIL NFR BLD: 1 % (ref 0–7)
EPITH CASTS URNS QL MICRO: ABNORMAL /LPF
ERYTHROCYTE [DISTWIDTH] IN BLOOD BY AUTOMATED COUNT: 15.7 % (ref 11.5–14.5)
FLUAV H1 2009 PAND RNA SPEC QL NAA+PROBE: NOT DETECTED
FLUAV H1 RNA SPEC QL NAA+PROBE: NOT DETECTED
FLUAV H3 RNA SPEC QL NAA+PROBE: NOT DETECTED
FLUAV SUBTYP SPEC NAA+PROBE: NOT DETECTED
FLUBV RNA SPEC QL NAA+PROBE: NOT DETECTED
GLUCOSE BLD STRIP.AUTO-MCNC: 106 MG/DL (ref 65–117)
GLUCOSE BLD STRIP.AUTO-MCNC: 141 MG/DL (ref 65–117)
GLUCOSE BLD STRIP.AUTO-MCNC: 91 MG/DL (ref 65–117)
GLUCOSE BLD STRIP.AUTO-MCNC: 96 MG/DL (ref 65–117)
GLUCOSE SERPL-MCNC: 119 MG/DL (ref 65–100)
GLUCOSE UR STRIP.AUTO-MCNC: NEGATIVE MG/DL
HADV DNA SPEC QL NAA+PROBE: NOT DETECTED
HCOV 229E RNA SPEC QL NAA+PROBE: NOT DETECTED
HCOV HKU1 RNA SPEC QL NAA+PROBE: NOT DETECTED
HCOV NL63 RNA SPEC QL NAA+PROBE: NOT DETECTED
HCOV OC43 RNA SPEC QL NAA+PROBE: NOT DETECTED
HCT VFR BLD AUTO: 31.8 % (ref 35–47)
HGB BLD-MCNC: 10.3 G/DL (ref 11.5–16)
HGB UR QL STRIP: NEGATIVE
HMPV RNA SPEC QL NAA+PROBE: NOT DETECTED
HPIV1 RNA SPEC QL NAA+PROBE: NOT DETECTED
HPIV2 RNA SPEC QL NAA+PROBE: NOT DETECTED
HPIV3 RNA SPEC QL NAA+PROBE: NOT DETECTED
HPIV4 RNA SPEC QL NAA+PROBE: NOT DETECTED
IMM GRANULOCYTES # BLD AUTO: 0 K/UL (ref 0–0.04)
IMM GRANULOCYTES NFR BLD AUTO: 1 % (ref 0–0.5)
KETONES UR QL STRIP.AUTO: 80 MG/DL
LEUKOCYTE ESTERASE UR QL STRIP.AUTO: NEGATIVE
LYMPHOCYTES # BLD: 1.1 K/UL (ref 0.8–3.5)
LYMPHOCYTES NFR BLD: 13 % (ref 12–49)
M PNEUMO DNA SPEC QL NAA+PROBE: NOT DETECTED
MCH RBC QN AUTO: 29.3 PG (ref 26–34)
MCHC RBC AUTO-ENTMCNC: 32.4 G/DL (ref 30–36.5)
MCV RBC AUTO: 90.3 FL (ref 80–99)
MONOCYTES # BLD: 0.7 K/UL (ref 0–1)
MONOCYTES NFR BLD: 8 % (ref 5–13)
NEUTS SEG # BLD: 6.8 K/UL (ref 1.8–8)
NEUTS SEG NFR BLD: 76 % (ref 32–75)
NITRITE UR QL STRIP.AUTO: NEGATIVE
NRBC # BLD: 0 K/UL (ref 0–0.01)
NRBC BLD-RTO: 0 PER 100 WBC
PH UR STRIP: 5.5 [PH] (ref 5–8)
PLATELET # BLD AUTO: 145 K/UL (ref 150–400)
PMV BLD AUTO: 12.5 FL (ref 8.9–12.9)
POTASSIUM SERPL-SCNC: 3.7 MMOL/L (ref 3.5–5.1)
PROCALCITONIN SERPL-MCNC: 13.57 NG/ML
PROT UR STRIP-MCNC: ABNORMAL MG/DL
RBC # BLD AUTO: 3.52 M/UL (ref 3.8–5.2)
RBC #/AREA URNS HPF: ABNORMAL /HPF (ref 0–5)
RSV RNA SPEC QL NAA+PROBE: NOT DETECTED
RV+EV RNA SPEC QL NAA+PROBE: NOT DETECTED
SARS-COV-2 PCR, COVPCR: NOT DETECTED
SERVICE CMNT-IMP: ABNORMAL
SERVICE CMNT-IMP: NORMAL
SODIUM SERPL-SCNC: 138 MMOL/L (ref 136–145)
SP GR UR REFRACTOMETRY: 1.02 (ref 1–1.03)
UA: UC IF INDICATED,UAUC: ABNORMAL
UROBILINOGEN UR QL STRIP.AUTO: 0.2 EU/DL (ref 0.2–1)
WBC # BLD AUTO: 8.8 K/UL (ref 3.6–11)
WBC URNS QL MICRO: ABNORMAL /HPF (ref 0–4)

## 2022-02-11 PROCEDURE — 74011250636 HC RX REV CODE- 250/636: Performed by: INTERNAL MEDICINE

## 2022-02-11 PROCEDURE — 71045 X-RAY EXAM CHEST 1 VIEW: CPT

## 2022-02-11 PROCEDURE — 85025 COMPLETE CBC W/AUTO DIFF WBC: CPT

## 2022-02-11 PROCEDURE — 74011250637 HC RX REV CODE- 250/637: Performed by: ORTHOPAEDIC SURGERY

## 2022-02-11 PROCEDURE — 84145 PROCALCITONIN (PCT): CPT

## 2022-02-11 PROCEDURE — 74011250637 HC RX REV CODE- 250/637: Performed by: PHYSICIAN ASSISTANT

## 2022-02-11 PROCEDURE — 0202U NFCT DS 22 TRGT SARS-COV-2: CPT

## 2022-02-11 PROCEDURE — 87040 BLOOD CULTURE FOR BACTERIA: CPT

## 2022-02-11 PROCEDURE — 80048 BASIC METABOLIC PNL TOTAL CA: CPT

## 2022-02-11 PROCEDURE — 65270000029 HC RM PRIVATE

## 2022-02-11 PROCEDURE — 81001 URINALYSIS AUTO W/SCOPE: CPT

## 2022-02-11 PROCEDURE — 36415 COLL VENOUS BLD VENIPUNCTURE: CPT

## 2022-02-11 PROCEDURE — 74011000250 HC RX REV CODE- 250: Performed by: PHYSICIAN ASSISTANT

## 2022-02-11 PROCEDURE — 82962 GLUCOSE BLOOD TEST: CPT

## 2022-02-11 PROCEDURE — 74011250636 HC RX REV CODE- 250/636: Performed by: ORTHOPAEDIC SURGERY

## 2022-02-11 PROCEDURE — 97530 THERAPEUTIC ACTIVITIES: CPT

## 2022-02-11 RX ORDER — LEVOFLOXACIN 5 MG/ML
750 INJECTION, SOLUTION INTRAVENOUS EVERY 24 HOURS
Status: DISCONTINUED | OUTPATIENT
Start: 2022-02-11 | End: 2022-02-12 | Stop reason: HOSPADM

## 2022-02-11 RX ORDER — INSULIN LISPRO 100 [IU]/ML
INJECTION, SOLUTION INTRAVENOUS; SUBCUTANEOUS
Status: DISCONTINUED | OUTPATIENT
Start: 2022-02-11 | End: 2022-02-12 | Stop reason: HOSPADM

## 2022-02-11 RX ORDER — OXYCODONE HYDROCHLORIDE 5 MG/1
5 TABLET ORAL
Qty: 28 TABLET | Refills: 0 | Status: SHIPPED | OUTPATIENT
Start: 2022-02-11 | End: 2022-02-18

## 2022-02-11 RX ORDER — MAGNESIUM SULFATE 100 %
4 CRYSTALS MISCELLANEOUS AS NEEDED
Status: DISCONTINUED | OUTPATIENT
Start: 2022-02-11 | End: 2022-02-12 | Stop reason: HOSPADM

## 2022-02-11 RX ORDER — VANCOMYCIN/0.9 % SOD CHLORIDE 1.5G/250ML
1500 PLASTIC BAG, INJECTION (ML) INTRAVENOUS ONCE
Status: COMPLETED | OUTPATIENT
Start: 2022-02-11 | End: 2022-02-11

## 2022-02-11 RX ADMIN — PRIMIDONE 50 MG: 50 TABLET ORAL at 08:14

## 2022-02-11 RX ADMIN — Medication 1 AMPULE: at 08:14

## 2022-02-11 RX ADMIN — PRIMIDONE 50 MG: 50 TABLET ORAL at 17:48

## 2022-02-11 RX ADMIN — CYCLOBENZAPRINE 10 MG: 10 TABLET, FILM COATED ORAL at 00:41

## 2022-02-11 RX ADMIN — AMLODIPINE BESYLATE 5 MG: 5 TABLET ORAL at 08:14

## 2022-02-11 RX ADMIN — METFORMIN HYDROCHLORIDE 1000 MG: 500 TABLET ORAL at 08:14

## 2022-02-11 RX ADMIN — OXYCODONE 5 MG: 5 TABLET ORAL at 08:17

## 2022-02-11 RX ADMIN — ACETAMINOPHEN 1000 MG: 500 TABLET ORAL at 05:41

## 2022-02-11 RX ADMIN — Medication 1 AMPULE: at 21:24

## 2022-02-11 RX ADMIN — DOCUSATE SODIUM 50 MG AND SENNOSIDES 8.6 MG 1 TABLET: 8.6; 5 TABLET, FILM COATED ORAL at 08:13

## 2022-02-11 RX ADMIN — VANCOMYCIN HYDROCHLORIDE 1500 MG: 10 INJECTION, POWDER, LYOPHILIZED, FOR SOLUTION INTRAVENOUS at 17:51

## 2022-02-11 RX ADMIN — SODIUM CHLORIDE, PRESERVATIVE FREE 10 ML: 5 INJECTION INTRAVENOUS at 21:24

## 2022-02-11 RX ADMIN — MULTIPLE VITAMINS W/ MINERALS TAB 1 TABLET: TAB at 08:13

## 2022-02-11 RX ADMIN — POLYETHYLENE GLYCOL 3350 17 G: 17 POWDER, FOR SOLUTION ORAL at 08:13

## 2022-02-11 RX ADMIN — LEVOFLOXACIN 750 MG: 5 INJECTION, SOLUTION INTRAVENOUS at 14:41

## 2022-02-11 RX ADMIN — SODIUM CHLORIDE, PRESERVATIVE FREE 10 ML: 5 INJECTION INTRAVENOUS at 12:52

## 2022-02-11 RX ADMIN — OXYCODONE 5 MG: 5 TABLET ORAL at 17:56

## 2022-02-11 RX ADMIN — ACETAMINOPHEN 1000 MG: 500 TABLET ORAL at 12:51

## 2022-02-11 RX ADMIN — ATORVASTATIN CALCIUM 10 MG: 10 TABLET, FILM COATED ORAL at 21:22

## 2022-02-11 RX ADMIN — ACETAMINOPHEN 1000 MG: 500 TABLET ORAL at 00:41

## 2022-02-11 RX ADMIN — OXYCODONE 5 MG: 5 TABLET ORAL at 21:22

## 2022-02-11 RX ADMIN — Medication 2000 UNITS: at 08:14

## 2022-02-11 RX ADMIN — ACETAMINOPHEN 1000 MG: 500 TABLET ORAL at 23:45

## 2022-02-11 RX ADMIN — GABAPENTIN 100 MG: 100 CAPSULE ORAL at 08:14

## 2022-02-11 RX ADMIN — GABAPENTIN 100 MG: 100 CAPSULE ORAL at 21:22

## 2022-02-11 RX ADMIN — SODIUM CHLORIDE, PRESERVATIVE FREE 10 ML: 5 INJECTION INTRAVENOUS at 05:41

## 2022-02-11 RX ADMIN — DOCUSATE SODIUM 50 MG AND SENNOSIDES 8.6 MG 1 TABLET: 8.6; 5 TABLET, FILM COATED ORAL at 17:48

## 2022-02-11 RX ADMIN — PANTOPRAZOLE SODIUM 40 MG: 40 TABLET, DELAYED RELEASE ORAL at 08:14

## 2022-02-11 RX ADMIN — ACETAMINOPHEN 1000 MG: 500 TABLET ORAL at 17:48

## 2022-02-11 RX ADMIN — CYCLOBENZAPRINE 10 MG: 10 TABLET, FILM COATED ORAL at 19:58

## 2022-02-11 RX ADMIN — GABAPENTIN 100 MG: 100 CAPSULE ORAL at 17:48

## 2022-02-11 RX ADMIN — METFORMIN HYDROCHLORIDE 1000 MG: 500 TABLET ORAL at 17:48

## 2022-02-11 NOTE — CONSULTS
Hospitalist Consultation Note    NAME:  Conchis Everett   :   1950   MRN:   [id number]     ATTENDING: Ani Almanza MD  PCP:  Demetrius Klinefelter, MD    Date/Time:  2022 8:46 AM      Recommendations/Plan:       SIRS (temperature 102F, tachycardia)  -UA on , negative. Patient currently has no lower urinary tract symptoms  -SARS-CoV-2 PCR on  undetected  -Chest x-ray NEG  -Procalcitonin  -She has some nasal congestion, will check viral respiratory panel PCR  -Encouraged to continue using incentive spirometry    Addendum: 11:52am  -Procalcitonin came back elevated at 13. Will check UA and blood cultures and start empiric vanco and levaquin. There is a patient of Dr. Carol Ambriz, will turn over care to his service in the morning. S/p LUMBAR 5 TO SACRAL 1 POSTERIOR DECOMPRESSION AND FUSION   -SCDs  -Bowel regimen  -PT OT eval and treat    DM2  -Continue Metformin  -Add sliding scale insulin    HTN  -Continue Norvasc    HLD  -Continue Lipitor      DVT Prophylaxis: SCD          Subjective:   REQUESTING PHYSICIAN:  Dr Malik Shoulders:    Assist with evaluation of tachycardia and fever  Zara Marr is a 73 y.o. female with a history of lumbar spinal stenosis with back pain and neurogenic claudication who underwent L5-S1 posterior decompression and fusion on . Since the surgery she has been on SCDs for mechanical DVT prophylaxis. On  she has been ambulating in the hallway with physical therapy and states her leg pain has resolved. On 2/10 she was noted to be febrile with temperature of 102F and tachycardic, heart rate 100-120. Today we were asked to assist with the evaluation of her fever and tachycardia.       Past Medical History:   Diagnosis Date    Allergic rhinitis due to allergen 10/22/2014    Arthritis     spine    Asthma     many years ago, no inhaler or meds    Chronic pain     back pain    Depression     Diabetes (HCC)     Type II    GERD (gastroesophageal reflux disease)     Hepatitis B 1984    Hypercholesterolemia     Hypertension     Kidney stones     Positive PPD 2009    treated with INH      Past Surgical History:   Procedure Laterality Date    COLONOSCOPY      manetas- n ormal    COLONOSCOPY N/A 2021    COLONOSCOPY, EGD performed by Irene Pierce MD at [place] [address] EGD      mans- esophageal ring    ENDOSCOPY, COLON, DIAGNOSTIC  2007    2 polyps    HX BREAST BIOPSY Bilateral     all benign cysts    HX BUNIONECTOMY Bilateral     HX CATARACT REMOVAL Bilateral 2018    HX CHOLECYSTECTOMY      HX COLONOSCOPY  May 2016    HX ENDOSCOPY  May 2016    HX GYN  1978    tubal laparoscopy-unblocked tubes    HX HEART CATHETERIZATION      negatve    HX ORTHOPAEDIC      had an injection in her back to help with leg pain    HX ORTHOPAEDIC Left 2017    foot surg.   bunion removal  \"Put a plate in\"    HX OTHER SURGICAL      bunionectomy    HX TONSIL AND ADENOIDECTOMY      approx 11years old   [address]      abcess bilateral    SD ESOPHAGEAL MOTILITY STUDY W/INTERP&RPT  2020    SD GERD TST W/ NASAL IMPEDENCE ELECTROD  2020     Social History     Tobacco Use    Smoking status: Former Smoker     Packs/day: 1.00     Years: 25.00     Pack years: 25.00     Types: Cigarettes     Quit date: 2010     Years since quittin.4    Smokeless tobacco: Never Used   Substance Use Topics    Alcohol use: Never      Family History   Problem Relation Age of Onset    Diabetes Mother     Hypertension Mother     Stroke Mother     Cancer Father         lung    Diabetes Father     Cancer Paternal Aunt         colon    Cancer Maternal Aunt         colon, and lung    Hypertension Maternal Aunt     Diabetes Maternal Aunt     Hypertension Maternal Aunt     Stroke Maternal Aunt     Heart Disease Maternal Aunt     Heart Disease Maternal Uncle     Psychiatric Disorder Brother        Allergies   Allergen Reactions    Latex Itching    Lisinopril Swelling    Atorvastatin Other (comments)     20 mg causes muscle cramps    Sulfa (Sulfonamide Antibiotics) Itching     About 9 yo  Keflex=unsure      Prior to Admission medications    Medication Sig Start Date End Date Taking? Authorizing Provider   oxyCODONE IR (ROXICODONE) 5 mg immediate release tablet Take 1 Tablet by mouth every six (6) hours as needed for Pain for up to 7 days. Max Daily Amount: 20 mg. 2/11/22 2/18/22 Yes Brannon Leavitt NP   acetaminophen (TYLENOL) 500 mg tablet Take 2 Tablets by mouth every six (6) hours for 7 days. 2/9/22 2/16/22 Yes Brannon Leavitt NP   senna-docusate (PERICOLACE) 8.6-50 mg per tablet Take 1 Tablet by mouth two (2) times a day for 7 days. 2/9/22 2/16/22 Yes Brannon Leavitt NP   cholecalciferol, vitamin D3, (Vitamin D3) 50 mcg (2,000 unit) tab Take 2,000 Units by mouth daily. 2/3/22  Yes Provider, Historical   primidone (MYSOLINE) 50 mg tablet Take 1 Tablet by mouth two (2) times a day. Patient taking differently: Take 50 mg by mouth two (2) times a day. Indications: essential tremor 1/14/22  Yes Clemetalize Dobson NP   gabapentin (NEURONTIN) 300 mg capsule Take 300 mg by mouth three (3) times daily as needed. 12/6/21  Yes Provider, Historical   RABEprazole (ACIPHEX) 20 mg TbEC Take 20 mg by mouth two (2) times a day. 12/12/21  Yes Provider, Historical   amLODIPine (NORVASC) 5 mg tablet Take 1 Tablet by mouth daily. 12/15/21  Yes Judie Shah MD   atorvastatin (LIPITOR) 10 mg tablet Take 1 Tablet by mouth daily. For cholesterol  Patient taking differently: Take 10 mg by mouth nightly. For cholesterol 8/11/21  Yes Judie Shah MD   glucose blood VI test strips (Accu-Chek Wendy Plus test strp) strip USE TO TEST BLOOD SUGAR ONCE DAILY.   DIAGNOSIS E11.40 7/19/21  Yes Judie Shah MD   metFORMIN (GLUCOPHAGE) 1,000 mg tablet Take 1 tablet in AM and 1 tablet in PM. For Diabetes  Patient taking differently: Take 1,000 mg by mouth two (2) times daily (with meals). Take 1 tablet in AM and 1 tablet in PM. For Diabetes 6/15/21  Yes Clem Rosas MD   omeprazole (PRILOSEC) 40 mg capsule Take 1 Capsule by mouth every morning. 6/15/21  Yes Clem Rosas MD   Blood-Glucose Meter (ACCU-CHEK SOFIA PLUS METER) misc Use as directed. E11.9 2/14/20  Yes Clem Rosas MD   lancets (ACCU-CHEK SOFTCLIX LANCETS) misc TEST 2 TIMES DAILY AS DIRECTED (E11.9) 12/12/18  Yes Clem Rosas MD   multivit,calc,mins/iron/folic (ONE-A-DAY WOMENS FORMULA PO) Take 1 Tab by mouth daily. Yes Provider, Historical   alendronate (FOSAMAX) 35 mg tablet PLEASE SEE ATTACHED FOR DETAILED DIRECTIONS  Patient taking differently: Take 35 mg by mouth every seven (7) days. PLEASE SEE ATTACHED FOR DETAILED DIRECTIONS, mondays 6/15/21   Clem Rosas MD   polyethylene glycol (MIRALAX) 17 gram packet Take 17 g by mouth daily as needed ('Constipation').     Provider, Historical       REVIEW OF SYSTEMS:     Total of 12 systems reviewed as follows:      POSITIVE= underlined text  Negative = text not underlined  General:  fever, chills, sweats, generalized weakness, weight loss/gain,      loss of appetite   Eyes:    blurred vision, eye pain, loss of vision, double vision  ENT:    rhinorrhea, pharyngitis, mild nasal congestion  Respiratory:   cough, sputum production, SOB, wheezing, PIKE, pleuritic pain   Cardiology:   chest pain, palpitations, orthopnea, PND, edema, syncope   Gastrointestinal:  abdominal pain , N/V, dysphagia, diarrhea, constipation, bleeding   Genitourinary:  frequency, urgency, dysuria, hematuria, incontinence   Muskuloskeletal :  arthralgia, myalgia   Hematology:  easy bruising, nose or gum bleeding, lymphadenopathy   Dermatological: rash, ulceration, pruritis   Endocrine:   hot flashes or polydipsia   Neurological:  headache, dizziness, confusion, focal weakness, paresthesia,     Speech difficulties, memory loss, gait disturbance  Psychological: Feelings of anxiety, depression, agitation    Objective:   VITALS:    Visit Vitals  /64 (BP 1 Location: Left upper arm, BP Patient Position: At rest)   Pulse (!) 104   Temp 99.1 °F (37.3 °C)   Resp 16   Ht 5' 2\" (1.575 m)   Wt 65.2 kg (143 lb 11.8 oz)   LMP  (LMP Unknown)   SpO2 95%   BMI 26.29 kg/m²     Temp (24hrs), Av.1 °F (37.3 °C), Min:98.8 °F (37.1 °C), Max:99.8 °F (37.7 °C)      PHYSICAL EXAM:   General:    Alert, cooperative, no distress, appears stated age. HEENT: Atraumatic, anicteric sclerae, pink conjunctivae     No oral ulcers, mucosa moist, throat clear  Neck:  Supple, symmetrical,  thyroid: non tender  Lungs:   Clear to auscultation bilaterally. No Wheezing or Rhonchi. No rales. Chest wall:  No tenderness  No Accessory muscle use. Heart:   Regular  rhythm,  No  murmur   No edema  Abdomen:   Soft, non-tender. Not distended. Bowel sounds normal  Extremities: No cyanosis. No clubbing  Skin:     Not pale. Not Jaundiced  No rashes   Psych:  Good insight. Not depressed. Not anxious or agitated. Neurologic: EOMs intact. No facial asymmetry. No aphasia or slurred speech.  Symmetrical strength, Alert and oriented X 4.     _______________________________________________________________________  Care Plan discussed with:    Comments   Patient x    Family      RN     Care Manager                    Consultant:      ____________________________________________________________________  TOTAL TIME:  45   mins    Comments    y Reviewed previous records   >50% of visit spent in counseling and coordination of care  Discussion with patient and/or family and questions answered       Critical Care Provided     Minutes non procedure based  ________________________________________________________________________  Signed: Roderick Delgado MD      Procedures: see electronic medical records for all procedures/Xrays and details which were not copied into this note but were reviewed prior to creation of Plan.     LAB DATA REVIEWED:    Recent Results (from the past 24 hour(s))   GLUCOSE, POC    Collection Time: 02/10/22 10:56 AM   Result Value Ref Range    Glucose (POC) 158 (H) 65 - 117 mg/dL    Performed by Unknown Dine PCT    GLUCOSE, POC    Collection Time: 02/10/22  4:11 PM   Result Value Ref Range    Glucose (POC) 174 (H) 65 - 117 mg/dL    Performed by Unknown Dine PCT    GLUCOSE, POC    Collection Time: 02/10/22  9:01 PM   Result Value Ref Range    Glucose (POC) 136 (H) 65 - 117 mg/dL    Performed by Madeline Fisher, POC    Collection Time: 02/11/22  6:52 AM   Result Value Ref Range    Glucose (POC) 91 65 - 117 mg/dL    Performed by Wicho Stevenson        _____________________________  Hospitalist: Saul Rubio MD

## 2022-02-11 NOTE — PROGRESS NOTES
Ortho / Neurosurgery NP Note    POD#  3 s/p LUMBAR 5 TO SACRAL 1 POSTERIOR DECOMPRESSION AND FUSION   Pt seen with no visitor present. Pt resting in bed, in NAD. Awake and alert. Reports right hip pain radiating down right thigh, which started yesterday and still persists  Expected incisional pain well controlled with oxycodone. Temp 102 yesterday, afebrile since - denies sob, cough, chills or issues voiding   Tolerating regular diet. No nausea. VSS; Room air. Visit Vitals  BP (!) 130/55 (BP 1 Location: Left upper arm, BP Patient Position: At rest)   Pulse (!) 103   Temp 98.9 °F (37.2 °C)   Resp 16   Ht 5' 2\" (1.575 m)   Wt 65.2 kg (143 lb 11.8 oz)   LMP  (LMP Unknown)   SpO2 97%   BMI 26.29 kg/m²       Voiding status: voiding   Output (mL)  Urine Voided: 350 ml (02/10/22 0824)  Last Bowel Movement Date: 02/07/22 (02/11/22 0752)  Unmeasurable Output  Urine Occurrence(s): 1 (02/11/22 0030)      Labs    Lab Results   Component Value Date/Time    HGB 10.3 (L) 02/11/2022 09:35 AM      Lab Results   Component Value Date/Time    INR 1.0 02/02/2022 03:36 PM      Lab Results   Component Value Date/Time    Sodium 138 02/11/2022 09:35 AM    Potassium 3.7 02/11/2022 09:35 AM    Chloride 106 02/11/2022 09:35 AM    CO2 24 02/11/2022 09:35 AM    Glucose 119 (H) 02/11/2022 09:35 AM    BUN 11 02/11/2022 09:35 AM    Creatinine 0.72 02/11/2022 09:35 AM    Calcium 8.8 02/11/2022 09:35 AM     Recent Glucose Results:   Lab Results   Component Value Date/Time     (H) 02/11/2022 09:35 AM    GLUCPOC 91 02/11/2022 06:52 AM    GLUCPOC 136 (H) 02/10/2022 09:01 PM    GLUCPOC 174 (H) 02/10/2022 04:11 PM       Body mass index is 26.29 kg/m². : A BMI > 30 is classified as obesity and > 40 is classified as morbid obesity. Optifoams dressings c.d.i  Calves soft and supple;  No pain with passive stretch  Sensation and motor intact   SCDs for mechanical DVT proph while in bed     PLAN:  1) Neurovascular assessment q4 hours   2) PT/OT - LSO when OOB  3) Pain control - scheduled tylenol, prn oxycodone   4) BP soft - hold amlodipine. Drinking adeuqate fluids. 5) Fever - CXR no acute findings, CBC, BMP , Respiratory panel pending. ON droplet plus precautions. Pt is successfully completing Hourly IS. Appreciate hospitalist consult and assistance. 6) Readniess for discharge:     [x] Vital Signs stable    [x] Hgb stable    [x] + Voiding    [x] Wound intact, drainage minimal    [x] Tolerating PO intake     [] Cleared by PT (OT if applicable)     [] Stair training completed (if applicable)    [x] Independent / Contact Guard Assist (household distance)     [x] Bed mobility     [] Car transfers     [] ADLs    [x] Adequate pain control on oral medication alone     Continue to mobilize with PT/OT . Still needs to complete stair training. Monitor VS and temp. Lives alone, states niece will assist some, discussed having someone stay with her first few days and says she can make arrangements. Home when medically stable.      Ed Neigh, NP

## 2022-02-11 NOTE — PROGRESS NOTES
Physical Therapy    Chart reviewed. Per Hospitalist consult note, pt currently with fever of 102 and tachycardic (100-120bpm). Pending chest x-ray and COVID 19 test result. Not appropriate for PT services at this time. Will defer and continue to follow.     Vasiliy Samuels PT, DPT, NCS

## 2022-02-11 NOTE — PROGRESS NOTES
Occupational Therapy  Medical record reviewed. Pt has been febrile and with tachycardia with congestion. Chest Xray pending as well as virus panel. Hospitalist assessing medical condition. Will defer at this time and continue to follow.

## 2022-02-11 NOTE — PROGRESS NOTES
Transition of Care Plan:  RUR: 9% low   Disposition: home with home health- at home care  Follow up appointments: ortho  DME needed: RW- provided  Transportation at Discharge: niece Darryle Loft or means to access home: yes        IM Medicare Letter: provided 2/11  Is patient a BCPI-A Bundle: no          If yes, was Bundle Letter given?:    Is patient a  and connected with the [place]?  no              If yes, was [employer] transfer form completed and [place] notified? Caregiver Contact: suhas Baker [phone]  Discharge Caregiver contacted prior to discharge? To be contacted  Care Conference needed?: no        Update  At Home Care accepted. AVS updated. [place] approved RW and provided to bedside. AVS updated. 2nd IM letter provided, signed, and placed on bedside chart in anticipation for potential d/c this weekend. Reason for Admission:  LUMBAR 5 TO SACRAL 1 POSTERIOR DECOMPRESSION AND FUSION                    RUR Score: 9%                    Plan for utilizing home health: per recommendation          PCP: First and Last name:  Diego Gonzalez MD   Name of Practice: [facility]    Are you a current patient: Yes/No: yes   Approximate date of last visit: 2/1/22   Can you participate in a virtual visit with your PCP: yes                    Current Advanced Directive/Advance Care Plan: Advance Care Planning     General Advance Care Planning (ACP) Conversation      Date of Conversation: 2/11/22  Conducted with: Patient with Decision Making Capacity    Content/Action Overview:   DECLINED ACP conversation - will revisit periodically     Healthcare Decision Maker:   Click here to complete [address] including selection of the Healthcare Decision Maker Relationship (ie \"Primary\")                        Transition of Care Plan:                      CM met with patient who was alert and oriented. Pt confirmed demographics, insurance and emergency contact on file.  Pt lives alone in a 2 story home with 4 neha. Jorge Garcia Has supportive friends/family. At baseline independent with ADLs. Pt has cane and crutches at home. Will need RW for d/c. Referral sent to [facility] and waiting for response. Pt wishes to d/c home with home health. FOC signed for [facility]. Referral sent and waiting for response. Care Management Interventions  PCP Verified by CM: Yes  Mode of Transport at Discharge:  Other (see comment)  Transition of Care Consult (CM Consult): Discharge 1 [facility]:  (rw)  Discharge Durable Medical Equipment: Yes  Physical Therapy Consult: Yes  Occupational Therapy Consult: Yes  Speech Therapy Consult: No  Support Systems: Other Family Member(s)  Confirm Follow Up Transport: Family  The Plan for Transition of Care is Related to the Following Treatment Goals : [place]  The Patient and/or Patient Representative was Provided with a Choice of Provider and Agrees with the Discharge Plan?: Yes  Freedom of Choice List was Provided with Basic Dialogue that Supports the Patient's Individualized Plan of Care/Goals, Treatment Preferences and Shares the Quality Data Associated with the Providers?: Yes  Discharge Location  Patient Expects to be Discharged to[de-identified] Home with home health    Harry Cordova, [address]

## 2022-02-11 NOTE — PROGRESS NOTES
End of Shift Note    Bedside shift change report given to [address] [facility] [address] ,Rn (oncoming nurse) by Armando Camacho RN (offgoing nurse). Report included the following information SBAR, Kardex, Intake/Output and MAR    Shift worked:  7P-7A     Shift summary and any significant changes:          Concerns for physician to address:       Zone phone for oncoming shift:   [phone]       Activity:  Activity Level: Up with Assistance  Number times ambulated in hallways past shift: 0  Number of times OOB to chair past shift: 0    Cardiac:   Cardiac Monitoring: No      Cardiac Rhythm: Sinus Rhythm    Access:   Current line(s): PIV     Genitourinary:   Urinary status: voiding    Respiratory:   O2 Device: None (Room air)  Chronic home O2 use?: NO  Incentive spirometer at bedside: YES  Actual Volume (ml): 1550 ml  GI:  Last Bowel Movement Date: 02/07/22  Current diet:  ADULT DIET Regular; 3 carb choices (45 gm/meal)  Passing flatus: YES  Tolerating current diet: YES       Pain Management:   Patient states pain is manageable on current regimen: YES    Skin:  Bhavik Score: 19  Interventions: float heels and increase time out of bed    Patient Safety:  Fall Score:  Total Score: 3  Interventions: assistive device (walker, cane, etc), gripper socks and pt to call before getting OOB  High Fall Risk: Yes    Length of Stay:  Expected LOS: 2d 16h  Actual LOS: 3      Ian Cheng RN

## 2022-02-11 NOTE — PROGRESS NOTES
Problem: Mobility Impaired (Adult and Pediatric)  Goal: *Acute Goals and Plan of Care (Insert Text)  Description: FUNCTIONAL STATUS PRIOR TO ADMISSION: Patient was independent and active without use of DME. Limited tolerance for standing secondary to pain into bilat LE's. HOME SUPPORT PRIOR TO ADMISSION: The patient lived alone with local family/friends to provide assistance. Patient does not plan to have anyone stay with her over night. Physical Therapy Goals  Initiated 2/8/2022    1. Patient will move from supine to sit and sit to supine , scoot up and down, and roll side to side in bed with modified independence within 4 days. 2. Patient will perform sit to stand with supervision/set-up within 4 days. 3. Patient will ambulate with supervision/set-up for 200 feet with the least restrictive device within 4 days. 4. Patient will ascend/descend 4 stairs with bilat handrail(s) with contract guard assist within 4 days. 5. Patient will verbalize and demonstrate understanding of spinal precautions (No bending, lifting greater than 5 lbs, or twisting; log-roll technique; frequent repositioning as instructed) within 4 days.      Outcome: Progressing Towards Goal  PHYSICAL THERAPY TREATMENT  Patient: Manfred Robert (18 y.o. 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 ASSESSMENT  Patient continues with skilled PT services and is progressing towards goals. Patient requested additional focus on bed mobility and log roll transfers this session. Performed multiple supine to sit and sit to stand transfers with emphasis on BLT precautions. She was able to perform a very good log roll and reposition herself in bed with good compliance with BLT precautions. She reports RLE pain to her calf, but says it is well managed with her current pain meds. She was left up in bedside chair when the session ended. Would benefit from [facility] follow up and will need a RW for home use. Current Level of Function Impacting Discharge (mobility/balance): sba supine to sit to supine using log roll technique. Sba sit to stand and bed to chair transfers with RW. Other factors to consider for discharge: lives alone, but niece will be staying with her once home; independent and active PLOF. PLAN :  Patient continues to benefit from skilled intervention to address the above impairments. Continue treatment per established plan of care. to address goals. Recommendation for discharge: (in order for the patient to meet his/her long term goals)  Physical therapy at least 2 days/week in the home     This discharge recommendation:  Has been made in collaboration with the attending provider and/or case management    IF patient discharges home will need the following DME: rolling walker       SUBJECTIVE:   Patient stated  I need to get better at getting into and out of the bed.     OBJECTIVE DATA SUMMARY:   Critical Behavior:  Neurologic State: Alert  Orientation Level: Oriented X4  Cognition: Follows commands  Safety/Judgement: Decreased insight into deficits,Awareness of environment,Fall prevention,Good awareness of safety precautions  Functional Mobility Training:  Bed Mobility:  Rolling: Stand-by assistance  Supine to Sit: Stand-by assistance; Additional time (cues for motor planning/log roll)  Sit to Supine: Stand-by assistance  Scooting: Supervision        Transfers:  Sit to Stand: Stand-by assistance  Stand to Sit: Stand-by assistance        Bed to Chair: Stand-by assistance; Adaptive equipment (RW)                    Balance:  Sitting: Impaired  Sitting - Static: Good (unsupported)  Sitting - Dynamic: Fair (occasional)  Standing: Impaired  Standing - Static: Good;Constant support  Standing - Dynamic : Fair;Constant support           Pain Rating:  RLE pain to calf 4/10    Activity Tolerance:   Good and SpO2 stable on RA    After treatment patient left in no apparent distress:   Sitting in chair and Call bell within reach    COMMUNICATION/COLLABORATION:   The patients plan of care was discussed with: Registered nurse, Case management, and NP .      Julisa Roche, PT   Time Calculation: 30 mins

## 2022-02-11 NOTE — PROGRESS NOTES
Spiritual Care Assessment/Progress Note  [facility]      NAME: Lidia Castellanos      MRN: [id number]  AGE: 73 y.o. SEX: female  [place] Affiliation: Rastafarian   Language: English     2/11/2022     Total Time (in minutes): 7     Spiritual Assessment begun in [facility] through conversation with:         []Patient        [] Family    [] Friend(s)              Spiritual beliefs: (Please include comment if needed)     [] Identifies with a lencho tradition:         [] Supported by a lencho community:            [] Claims no spiritual orientation:           [] Seeking spiritual identity:                [] Adheres to an individual form of spirituality:           [x] Not able to assess:                           Identified resources for coping:      [] Prayer                               [] Music                  [] Guided Imagery     [] Family/friends                 [] Pet visits     [] Devotional reading                         [x] Unknown     [] Other:                                         Interventions offered during this visit: (See comments for more details)    Patient Interventions: Initial visit           Plan of Care:     [x] Support spiritual and/or cultural needs    [] Support AMD and/or advance care planning process      [] Support grieving process   [] Coordinate Rites and/or Rituals    [] Coordination with community clergy   [] No spiritual needs identified at this time   [] Detailed Plan of Care below (See Comments)  [] Make referral to Music Therapy  [] Make referral to Pet Therapy     [] Make referral to Addiction services  [] Make referral to [facility]  [] Make referral to Spiritual Care Partner  [] No future visits requested        [x] Contact Spiritual Care for further referrals     Comments:  Reviewed chart prior to visit in Ortho for spiritual assessment. As patient is on COVID+ isolation precautions;  telephoned into room but patient did not answer.   Nurse was in rounds on the unit; unable to consult with her. Unable to assess for spiritual/Synagogue needs or concerns.    available upon referral by nurse or by patient request.       MICHAEL Kuhn, [facility], Staff [address]    [address] [place] [address] Road Paging Service  [phone]

## 2022-02-11 NOTE — PROGRESS NOTES
Problem: Falls - Risk of  Goal: *Absence of Falls  Description: Document Lettie Duverney Fall Risk and appropriate interventions in the flowsheet.   Outcome: Progressing Towards Goal  Note: Fall Risk Interventions:  Mobility Interventions: Assess mobility with egress test         Medication Interventions: Patient to call before getting OOB    Elimination Interventions: Call light in reach              Problem: Patient Education: Go to Patient Education Activity  Goal: Patient/Family Education  Outcome: Progressing Towards Goal     Problem: Patient Education: Go to Patient Education Activity  Goal: Patient/Family Education  Outcome: Progressing Towards Goal     Problem: Patient Education: Go to Patient Education Activity  Goal: Patient/Family Education  Outcome: Progressing Towards Goal     Problem: Patient Education: Go to Patient Education Activity  Goal: Patient/Family Education  Outcome: Progressing Towards Goal     Problem: Pain  Goal: *Control of Pain  Outcome: Progressing Towards Goal     Problem: Patient Education: Go to Patient Education Activity  Goal: Patient/Family Education  Outcome: Progressing Towards Goal

## 2022-02-11 NOTE — PROGRESS NOTES
Problem: Falls - Risk of  Goal: *Absence of Falls  Description: Document Yumiko Mcdowell Fall Risk and appropriate interventions in the flowsheet.   Outcome: Progressing Towards Goal  Note: Fall Risk Interventions:  Mobility Interventions: Assess mobility with egress test,Communicate number of staff needed for ambulation/transfer,OT consult for ADLs,Patient to call before getting OOB,PT Consult for mobility concerns,PT Consult for assist device competence,Strengthening exercises (ROM-active/passive),Utilize gait belt for transfers/ambulation,Utilize walker, cane, or other assistive device         Medication Interventions: Assess postural VS orthostatic hypotension,Evaluate medications/consider consulting pharmacy,Patient to call before getting OOB,Teach patient to arise slowly,Utilize gait belt for transfers/ambulation    Elimination Interventions: Call light in reach,Elevated toilet seat,Patient to call for help with toileting needs,Stay With Me (per policy),Toilet paper/wipes in reach,Toileting schedule/hourly rounds              Problem: Patient Education: Go to Patient Education Activity  Goal: Patient/Family Education  Outcome: Progressing Towards Goal     Problem: Patient Education: Go to Patient Education Activity  Goal: Patient/Family Education  Outcome: Progressing Towards Goal     Problem: Patient Education: Go to Patient Education Activity  Goal: Patient/Family Education  Outcome: Progressing Towards Goal     Problem: Complex Spine Procedure:  Day of Surgery  Goal: Off Pathway (Use only if patient is Off Pathway)  Outcome: Progressing Towards Goal  Goal: Activity/Safety  Outcome: Progressing Towards Goal  Goal: Consults, if ordered  Outcome: Progressing Towards Goal  Goal: Diagnostic Test/Procedures  Outcome: Progressing Towards Goal  Goal: Nutrition/Diet  Outcome: Progressing Towards Goal  Goal: Discharge Planning  Outcome: Progressing Towards Goal  Goal: Medications  Outcome: Progressing Towards Goal  Goal: Respiratory  Outcome: Progressing Towards Goal  Goal: Treatments/Interventions/Procedures  Outcome: Progressing Towards Goal  Goal: Psychosocial  Outcome: Progressing Towards Goal  Goal: *Optimal pain control at patient's stated goal  Outcome: Progressing Towards Goal  Goal: *Demonstrates progressive activity  Outcome: Progressing Towards Goal  Goal: *Respiratory status stable  Outcome: Progressing Towards Goal     Problem: Complex Spine Procedure:  Post Op Day 1  Goal: Off Pathway (Use only if patient is Off Pathway)  Outcome: Progressing Towards Goal  Goal: Activity/Safety  Outcome: Progressing Towards Goal  Goal: Consults, if ordered  Outcome: Progressing Towards Goal  Goal: Diagnostic Test/Procedures  Outcome: Progressing Towards Goal  Goal: Nutrition/Diet  Outcome: Progressing Towards Goal  Goal: Discharge Planning  Outcome: Progressing Towards Goal  Goal: Medications  Outcome: Progressing Towards Goal  Goal: Respiratory  Outcome: Progressing Towards Goal  Goal: Treatments/Interventions/Procedures  Outcome: Progressing Towards Goal  Goal: Psychosocial  Outcome: Progressing Towards Goal  Goal: *Progress independence mobility/activities (eg: Mobility precautions)  Outcome: Progressing Towards Goal  Goal: *Verbalizes/demonstrates understanding of proper body mechanics and use of stabilization device if ordered  Outcome: Progressing Towards Goal  Goal: *Optimal pain control at patient's stated goal  Outcome: Progressing Towards Goal  Goal: *Resumes normal function of bladder and bowel  Outcome: Progressing Towards Goal  Goal: *Hemodynamically stable  Outcome: Progressing Towards Goal  Goal: *Tolerating diet  Outcome: Progressing Towards Goal  Goal: *Labs within defined limits  Outcome: Progressing Towards Goal     Problem: Complex Spine Procedure:  Post Op Day 2  Goal: Off Pathway (Use only if patient is Off Pathway)  Outcome: Progressing Towards Goal  Goal: Activity/Safety  Outcome: Progressing Towards Goal  Goal: Diagnostic Test/Procedures  Outcome: Progressing Towards Goal  Goal: Nutrition/Diet  Outcome: Progressing Towards Goal  Goal: Discharge Planning  Outcome: Progressing Towards Goal  Goal: Medications  Outcome: Progressing Towards Goal  Goal: Respiratory  Outcome: Progressing Towards Goal  Goal: Treatments/Interventions/Procedures  Outcome: Progressing Towards Goal  Goal: Psychosocial  Outcome: Progressing Towards Goal  Goal: *Progress independence mobility/activities (eg: Mobility precautions)  Outcome: Progressing Towards Goal  Goal: *Verbalizes/demonstrates understanding of proper body mechanics and use of stabilization device if ordered  Outcome: Progressing Towards Goal  Goal: *Optimal pain control at patient's stated goal  Outcome: Progressing Towards Goal  Goal: *Resumes normal function of bladder and bowel  Outcome: Progressing Towards Goal  Goal: *Hemodynamically stable  Outcome: Progressing Towards Goal  Goal: *Tolerating diet  Outcome: Progressing Towards Goal  Goal: *Labs within defined limits  Outcome: Progressing Towards Goal  Goal: *Able to place stabilization device  Outcome: Progressing Towards Goal     Problem: Complex Spine Procedure:  Post Op Day 3  Goal: Off Pathway (Use only if patient is Off Pathway)  Outcome: Progressing Towards Goal  Goal: Activity/Safety  Outcome: Progressing Towards Goal  Goal: Nutrition/Diet  Outcome: Progressing Towards Goal  Goal: Discharge Planning  Outcome: Progressing Towards Goal  Goal: Medications  Outcome: Progressing Towards Goal  Goal: Respiratory  Outcome: Progressing Towards Goal  Goal: Treatments/Interventions/Procedures  Outcome: Progressing Towards Goal  Goal: Psychosocial  Outcome: Progressing Towards Goal     Problem: Complex Spine Procedure:  Discharge Outcomes  Goal: *Optimal pain control at patient's stated goal  Outcome: Progressing Towards Goal  Goal: *Demonstrates ability to place and remove stabilization device  Outcome: Progressing Towards Goal  Goal: *Progress independence mobility/activities (eg: Mobility precautions)  Outcome: Progressing Towards Goal  Goal: *Resumes normal function of bladder and bowel  Outcome: Progressing Towards Goal  Goal: *Lungs clear or at baseline  Outcome: Progressing Towards Goal  Goal: *Verbalizes name, dosage, time, side effects, and number of days to continue medications  Outcome: Progressing Towards Goal  Goal: *Modified independence with transfers, ambulation on levels with assistance devices, stair climbing, ADL's  Outcome: Progressing Towards Goal  Goal: *Describes follow-up/return visits to physicians  Outcome: Progressing Towards Goal  Goal: *Describes available resources and support systems  Outcome: Progressing Towards Goal  Goal: *Labs within defined limits  Outcome: Progressing Towards Goal  Goal: *Tolerating diet  Outcome: Progressing Towards Goal

## 2022-02-11 NOTE — PROGRESS NOTES
Pharmacy Antimicrobial Kinetic Dosing    Indication for Antimicrobials: Sepsis     Current Regimen of Each Antimicrobial:  Vancomycin, pharmacy to dose (Start Date ; Day # 1)  Levofloxacin 750mg IV q24h (start ; day 1)    Previous Antimicrobial Therapy:    Goal Level: AUC: 400-600 mg/hr/Liter/day    Date Dose & Interval Measured (mcg/mL) Predicted AUC/JOSÉ                       Date & time of next level:     Dosing calculator used: [employer] calculator    Significant Positive Cultures:    pbcx -    Conditions for Dosing Consideration: None    Labs:  Recent Labs     22  0935 22  0431   CREA 0.72 0.98   BUN 11 18   PCT 13.57  --      Recent Labs     22  0935   WBC 8.8     Temp (24hrs), Av.9 °F (37.2 °C), Min:97.8 °F (36.6 °C), Max:99.8 °F (37.7 °C)        Creatinine Clearance (mL/min):   CrCl (Ideal Body Weight): 58.3   If actual weight < IBW: CrCl (Actual Body Weight) 75.9    Impression/Plan:   Continue levofloxacin as ordered  Will give vancomycin 1500mg IV x 1 followed by vancomycin 750mg IV q12h to give an estimated   BMP daily and PCT daily x 3  Antimicrobial stop date TBD     Pharmacy will follow daily and adjust medications as appropriate for renal function and/or serum levels.     Thank you,  Cherie Ledesma, PHARMD    Vancomycin Dosing Document    Documents located on pharmacy Teams site: Clinical Practice -> Antimicrobial Stewardship -> Antibiotics_Vancomycin     Aminoglycoside Dosing Document    Documents located on pharmacy Teams site: Clinical Practice -> Antimicrobial Stewardship -> Antibiotics_Aminoglycosides

## 2022-02-12 VITALS
DIASTOLIC BLOOD PRESSURE: 49 MMHG | RESPIRATION RATE: 16 BRPM | SYSTOLIC BLOOD PRESSURE: 120 MMHG | WEIGHT: 143.74 LBS | HEART RATE: 92 BPM | BODY MASS INDEX: 26.45 KG/M2 | OXYGEN SATURATION: 95 % | TEMPERATURE: 98.7 F | HEIGHT: 62 IN

## 2022-02-12 LAB
ANION GAP SERPL CALC-SCNC: 6 MMOL/L (ref 5–15)
BUN SERPL-MCNC: 8 MG/DL (ref 6–20)
BUN/CREAT SERPL: 12 (ref 12–20)
CALCIUM SERPL-MCNC: 8.1 MG/DL (ref 8.5–10.1)
CHLORIDE SERPL-SCNC: 110 MMOL/L (ref 97–108)
CO2 SERPL-SCNC: 24 MMOL/L (ref 21–32)
CREAT SERPL-MCNC: 0.69 MG/DL (ref 0.55–1.02)
GLUCOSE BLD STRIP.AUTO-MCNC: 125 MG/DL (ref 65–117)
GLUCOSE BLD STRIP.AUTO-MCNC: 130 MG/DL (ref 65–117)
GLUCOSE SERPL-MCNC: 129 MG/DL (ref 65–100)
POTASSIUM SERPL-SCNC: 3.8 MMOL/L (ref 3.5–5.1)
PROCALCITONIN SERPL-MCNC: 11.17 NG/ML
SERVICE CMNT-IMP: ABNORMAL
SERVICE CMNT-IMP: ABNORMAL
SODIUM SERPL-SCNC: 140 MMOL/L (ref 136–145)

## 2022-02-12 PROCEDURE — 80048 BASIC METABOLIC PNL TOTAL CA: CPT

## 2022-02-12 PROCEDURE — 74011000250 HC RX REV CODE- 250: Performed by: PHYSICIAN ASSISTANT

## 2022-02-12 PROCEDURE — 94760 N-INVAS EAR/PLS OXIMETRY 1: CPT

## 2022-02-12 PROCEDURE — 74011250637 HC RX REV CODE- 250/637: Performed by: ORTHOPAEDIC SURGERY

## 2022-02-12 PROCEDURE — 84145 PROCALCITONIN (PCT): CPT

## 2022-02-12 PROCEDURE — 99239 HOSP IP/OBS DSCHRG MGMT >30: CPT | Performed by: INTERNAL MEDICINE

## 2022-02-12 PROCEDURE — 97116 GAIT TRAINING THERAPY: CPT

## 2022-02-12 PROCEDURE — 74011250636 HC RX REV CODE- 250/636: Performed by: ORTHOPAEDIC SURGERY

## 2022-02-12 PROCEDURE — 82962 GLUCOSE BLOOD TEST: CPT

## 2022-02-12 PROCEDURE — 74011250637 HC RX REV CODE- 250/637: Performed by: PHYSICIAN ASSISTANT

## 2022-02-12 PROCEDURE — 97530 THERAPEUTIC ACTIVITIES: CPT

## 2022-02-12 PROCEDURE — 36415 COLL VENOUS BLD VENIPUNCTURE: CPT

## 2022-02-12 RX ADMIN — SODIUM CHLORIDE, PRESERVATIVE FREE 10 ML: 5 INJECTION INTRAVENOUS at 05:56

## 2022-02-12 RX ADMIN — AMLODIPINE BESYLATE 5 MG: 5 TABLET ORAL at 08:13

## 2022-02-12 RX ADMIN — ACETAMINOPHEN 1000 MG: 500 TABLET ORAL at 05:56

## 2022-02-12 RX ADMIN — Medication 2000 UNITS: at 08:13

## 2022-02-12 RX ADMIN — OXYCODONE 10 MG: 5 TABLET ORAL at 11:52

## 2022-02-12 RX ADMIN — PANTOPRAZOLE SODIUM 40 MG: 40 TABLET, DELAYED RELEASE ORAL at 08:13

## 2022-02-12 RX ADMIN — POLYETHYLENE GLYCOL 3350 17 G: 17 POWDER, FOR SOLUTION ORAL at 08:13

## 2022-02-12 RX ADMIN — MULTIPLE VITAMINS W/ MINERALS TAB 1 TABLET: TAB at 08:13

## 2022-02-12 RX ADMIN — METFORMIN HYDROCHLORIDE 1000 MG: 500 TABLET ORAL at 08:12

## 2022-02-12 RX ADMIN — DOCUSATE SODIUM 50 MG AND SENNOSIDES 8.6 MG 1 TABLET: 8.6; 5 TABLET, FILM COATED ORAL at 08:13

## 2022-02-12 RX ADMIN — GABAPENTIN 100 MG: 100 CAPSULE ORAL at 08:13

## 2022-02-12 RX ADMIN — OXYCODONE 10 MG: 5 TABLET ORAL at 03:32

## 2022-02-12 RX ADMIN — VANCOMYCIN HYDROCHLORIDE 750 MG: 750 INJECTION, POWDER, LYOPHILIZED, FOR SOLUTION INTRAVENOUS at 05:56

## 2022-02-12 RX ADMIN — Medication 1 AMPULE: at 08:14

## 2022-02-12 RX ADMIN — OXYCODONE 10 MG: 5 TABLET ORAL at 00:26

## 2022-02-12 RX ADMIN — PRIMIDONE 50 MG: 50 TABLET ORAL at 08:13

## 2022-02-12 RX ADMIN — OXYCODONE 5 MG: 5 TABLET ORAL at 08:13

## 2022-02-12 NOTE — PROGRESS NOTES
Problem: Mobility Impaired (Adult and Pediatric)  Goal: *Acute Goals and Plan of Care (Insert Text)  Description: FUNCTIONAL STATUS PRIOR TO ADMISSION: Patient was independent and active without use of DME. Limited tolerance for standing secondary to pain into bilat LE's. HOME SUPPORT PRIOR TO ADMISSION: The patient lived alone with local family/friends to provide assistance. Patient does not plan to have anyone stay with her over night. Physical Therapy Goals  Initiated 2/8/2022    1. Patient will move from supine to sit and sit to supine , scoot up and down, and roll side to side in bed with modified independence within 4 days. 2. Patient will perform sit to stand with supervision/set-up within 4 days. 3. Patient will ambulate with supervision/set-up for 200 feet with the least restrictive device within 4 days. 4. Patient will ascend/descend 4 stairs with bilat handrail(s) with contract guard assist within 4 days. 5. Patient will verbalize and demonstrate understanding of spinal precautions (No bending, lifting greater than 5 lbs, or twisting; log-roll technique; frequent repositioning as instructed) within 4 days.        Outcome: Progressing Towards Goal   PHYSICAL THERAPY TREATMENT  Patient: Toña Marshall (89 y.o. 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 ASSESSMENT  Patient continues with skilled PT services and is progressing towards goals. Patient presents with increased RLE pain, decreased strength, and decreased endurance. Pt performed supine to sit using log roll technique at [facility]. Pt required verbal cues for sequencing. Pt able to sit EOB unsupported for several minutes. Pt performed sit to stand transfer at [facility]. Pt required verbal cues for hand placement. Pt able to ambulate 75 ft with RW at [facility] and additional time. Pt with step to gait pattern, decreased foot clearance and slow aguilar. Pt required constant verbal cues and tactile for sequencing. Pt able to improve foot clearance and sequencing occasionally with verbal cues and time. Pt moving slow and steady. Pt with increased fatigue and required several standing rest breaks. Pt required verbal cues to perform PLB. Pt reported feeling lightheaded and needed to sit. Pt's BP was checked and stable. See below. Pt performed sit to supine using log roll technique at [facility]. Educated pt on importance of getting OOB and mobility throughout the day to decrease stiffness and pain. Pt verbalized understanding. Pt left in bed with all needs met. Pt moving well with no safety concerns. From physical therapy standpoint pt is cleared from therapy. After ambulation: 136/60      Current Level of Function Impacting Discharge (mobility/balance): bed mobility CGA, sit to stand CGA, ambulation CGA     Other factors to consider for discharge: pain management, decreased strength and endurance, lives alone, cleared stairs and car on 2/10          PLAN :  Patient continues to benefit from skilled intervention to address the above impairments. Continue treatment per established plan of care. to address goals.     Recommendation for discharge: (in order for the patient to meet his/her long term goals)  Physical therapy at least 2 days/week in the home AND ensure assist and/or supervision for safety with mobility     This discharge recommendation:  Has been made in collaboration with the attending provider and/or case management    IF patient discharges home will need the following DME: rolling walker       SUBJECTIVE:   Patient stated  It hurts all the way down my leg.     OBJECTIVE DATA SUMMARY:   Critical Behavior:  Neurologic State: Alert,Eyes open spontaneously  Orientation Level: Oriented X4  Cognition: Follows commands  Safety/Judgement: Decreased insight into deficits,Awareness of environment,Fall prevention,Good awareness of safety precautions  Functional Mobility Training:  Bed Mobility:  Rolling: Contact guard assistance; Additional time  Supine to Sit: Contact guard assistance; Additional time  Sit to Supine: Contact guard assistance  Scooting: Stand-by assistance        Transfers:  Sit to Stand: Stand-by assistance  Stand to Sit: Stand-by assistance                             Balance:  Sitting: Impaired  Sitting - Static: Good (unsupported)  Sitting - Dynamic: Fair (occasional)  Standing: Impaired  Standing - Static: Good;Constant support  Standing - Dynamic : Fair;Constant support  Ambulation/Gait Training:  Distance (ft): 75 Feet (ft)  Assistive Device: Walker, rolling;Gait belt;Brace/Splint  Ambulation - Level of Assistance: Contact guard assistance        Gait Abnormalities: Decreased step clearance; Step to gait        Base of Support: Narrowed  Stance: Right decreased  Speed/Lolis: Slow  Step Length: Right shortened;Left shortened  Swing Pattern: Left asymmetrical;Right asymmetrical       Pain Rating:  Pt reported increased pain in RLE with mobility. Activity Tolerance:   Fair and requires frequent rest breaks    After treatment patient left in no apparent distress:   Supine in bed and Call bell within reach    COMMUNICATION/COLLABORATION:   The patients plan of care was discussed with: Registered nurse and Physician.      RENEE Bella   Time Calculation: 30 mins

## 2022-02-12 NOTE — PROGRESS NOTES
INTERNAL MEDICINE PROGRESS NOTE    NAME:  Rich Doherty   :   1950   MRN:   [id number]     Date/Time:  2022 7:37 AM  Subjective:   History:  Chart reviewed and patient seen and examined and D/W her nurse and PT this AM and all events noted. She has a history of DM with neuropathy and CKD, HTN, and recurrent depression and was admitted now with spinal stenosis treated with L5- S1 decompression on . We are now seeing her for fever and SIRS. On  she had been ambulating in the hallway with physical therapy and stateed her leg pain had resolved. On 2/10 she was noted to be febrile with temperature of 102F and tachycardic, heart rate 100-120. On  we were asked to assist with the evaluation of her fever and tachycardia. With URI symptoms Levaquin was started as well as Vancomycin. Covid PCR sent and has returned negative. She does c/o of a little cough this AM w/o SOB or other cardiac or respiratory c/o. She has no GI/ c/o. She is very weak w/o other neurologic c/o. I observed her walking with PT and mobility extremely slow and limited.        Medications reviewed:  Current Facility-Administered Medications   Medication Dose Route Frequency    insulin lispro (HUMALOG) injection   SubCUTAneous AC&HS    glucose chewable tablet 16 g  4 Tablet Oral PRN    glucagon (GLUCAGEN) injection 1 mg  1 mg IntraMUSCular PRN    levoFLOXacin (LEVAQUIN) 750 mg in D5W IVPB  750 mg IntraVENous Q24H    vancomycin (VANCOCIN) 750 mg in 0.9% sodium chloride 250 mL (VIAL-MATE)  750 mg IntraVENous Q12H    polyethylene glycol (MIRALAX) packet 17 g  17 g Oral DAILY PRN    multivitamin, tx-iron-ca-min (THERA-M w/ IRON) tablet 1 Tablet  1 Tablet Oral DAILY    metFORMIN (GLUCOPHAGE) tablet 1,000 mg  1,000 mg Oral BID WITH MEALS    pantoprazole (PROTONIX) tablet 40 mg  40 mg Oral QAM    atorvastatin (LIPITOR) tablet 10 mg  10 mg Oral QHS    gabapentin (NEURONTIN) capsule 300 mg  300 mg Oral TID PRN    amLODIPine (NORVASC) tablet 5 mg  5 mg Oral DAILY    primidone (MYSOLINE) tablet 50 mg  50 mg Oral BID    cholecalciferol (VITAMIN D3) (1000 Units /25 mcg) tablet 2,000 Units  2,000 Units Oral DAILY    sodium chloride (NS) flush 5-40 mL  5-40 mL IntraVENous Q8H    sodium chloride (NS) flush 5-40 mL  5-40 mL IntraVENous PRN    acetaminophen (TYLENOL) tablet 1,000 mg  1,000 mg Oral Q6H    oxyCODONE IR (ROXICODONE) tablet 5 mg  5 mg Oral Q3H PRN    oxyCODONE IR (ROXICODONE) tablet 10-15 mg  10-15 mg Oral Q3H PRN    naloxone (NARCAN) injection 0.4 mg  0.4 mg IntraVENous PRN    senna-docusate (PERICOLACE) 8.6-50 mg per tablet 1 Tablet  1 Tablet Oral BID    polyethylene glycol (MIRALAX) packet 17 g  17 g Oral DAILY    bisacodyL (DULCOLAX) suppository 10 mg  10 mg Rectal DAILY PRN    phenol throat spray (CHLORASEPTIC) 1 Spray  1 Spray Oral PRN    benzocaine-menthoL (CHLORASEPTIC MAX) lozenge 1 Lozenge  1 Lozenge Oral PRN    diazePAM (VALIUM) tablet 5 mg  5 mg Oral Q6H PRN    cyclobenzaprine (FLEXERIL) tablet 10 mg  10 mg Oral BID PRN    gabapentin (NEURONTIN) capsule 100 mg  100 mg Oral TID    alcohol 62% (NOZIN) nasal  1 Ampule  1 Ampule Topical Q12H        Objective:   Vitals:  Visit Vitals  BP (!) 120/49   Pulse 92   Temp 98.7 °F (37.1 °C)   Resp 16   Ht 5' 2\" (1.575 m)   Wt 143 lb 11.8 oz (65.2 kg)   LMP  (LMP Unknown)   SpO2 95%   BMI 26.29 kg/m²    O2 Flow Rate (L/min): 2 l/min O2 Device: None (Room air) Temp (24hrs), Av.4 °F (36.9 °C), Min:97.8 °F (36.6 °C), Max:98.7 °F (37.1 °C)      Last 24hr Input/Output:    Intake/Output Summary (Last 24 hours) at 2022 1116  Last data filed at 2022 0734  Gross per 24 hour   Intake 900 ml   Output 400 ml   Net 500 ml        PHYSICAL EXAM:  General:     Alert, cooperative, no distress, appears stated age. Head:    Normocephalic, without obvious abnormality, atraumatic. Eyes:    Conjunctivae/corneas clear.   PERRLA  Nose:   Nares normal. No drainage or sinus tenderness. Throat:     Lips, mucosa, and tongue normal.  No Thrush  Neck:   Supple, symmetrical,  no adenopathy, thyroid: non tender     no carotid bruit and no JVD. Back:     Symmetric,  No CVA tenderness. Lungs:    Clear to auscultation bilaterally. No Wheezing or Rhonchi. No rales. Heart:    Regular rate and rhythm,  (after PT tachycardic) no murmur, rub or gallop. Abdomen:    Soft, non-tender. Not distended. Bowel sounds normal. No masses  Extremities:  Extremities normal, atraumatic, No cyanosis. No edema. No clubbing  Lymph nodes:  Cervical, supraclavicular normal.  Neurologic:  Marked generalized decreased strength, Alert and oriented X 3.    Skin:                 No rash      Lab Data Reviewed:    Recent Results (from the past 24 hour(s))   GLUCOSE, POC    Collection Time: 02/11/22 12:38 PM   Result Value Ref Range    Glucose (POC) 96 65 - 117 mg/dL    Performed by Bhumika Scott    CULTURE, BLOOD, PAIRED    Collection Time: 02/11/22  1:01 PM    Specimen: Blood   Result Value Ref Range    Special Requests: NO SPECIAL REQUESTS      Culture result: NO GROWTH AFTER 18 HOURS     URINALYSIS W/ REFLEX CULTURE    Collection Time: 02/11/22  4:04 PM    Specimen: Miscellaneous sample    Urine specimen   Result Value Ref Range    Color YELLOW/STRAW      Appearance CLEAR CLEAR      Specific gravity 1.025 1.003 - 1.030      pH (UA) 5.5 5.0 - 8.0      Protein TRACE (A) NEG mg/dL    Glucose Negative NEG mg/dL    Ketone 80 (A) NEG mg/dL    Blood Negative NEG      Urobilinogen 0.2 0.2 - 1.0 EU/dL    Nitrites Negative NEG      Leukocyte Esterase Negative NEG      WBC 0-4 0 - 4 /hpf    RBC 0-5 0 - 5 /hpf    Epithelial cells MODERATE (A) FEW /lpf    Bacteria 1+ (A) NEG /hpf    UA:UC IF INDICATED CULTURE NOT INDICATED BY UA RESULT CNI     BILIRUBIN, CONFIRM    Collection Time: 02/11/22  4:04 PM   Result Value Ref Range    Bilirubin UA, confirm Negative NEG     GLUCOSE, POC    Collection Time: 02/11/22  5:14 PM Result Value Ref Range    Glucose (POC) 106 65 - 117 mg/dL    Performed by Mendez Galeano    GLUCOSE, POC    Collection Time: 02/11/22  9:31 PM   Result Value Ref Range    Glucose (POC) 141 (H) 65 - 117 mg/dL    Performed by Whit PITT PCT    METABOLIC PANEL, BASIC    Collection Time: 02/12/22  3:38 AM   Result Value Ref Range    Sodium 140 136 - 145 mmol/L    Potassium 3.8 3.5 - 5.1 mmol/L    Chloride 110 (H) 97 - 108 mmol/L    CO2 24 21 - 32 mmol/L    Anion gap 6 5 - 15 mmol/L    Glucose 129 (H) 65 - 100 mg/dL    BUN 8 6 - 20 MG/DL    Creatinine 0.69 0.55 - 1.02 MG/DL    BUN/Creatinine ratio 12 12 - 20      GFR est AA >60 >60 ml/min/1.73m2    GFR est non-AA >60 >60 ml/min/1.73m2    Calcium 8.1 (L) 8.5 - 10.1 MG/DL   PROCALCITONIN    Collection Time: 02/12/22  3:38 AM   Result Value Ref Range    Procalcitonin 11.17 ng/mL   GLUCOSE, POC    Collection Time: 02/12/22  6:51 AM   Result Value Ref Range    Glucose (POC) 125 (H) 65 - 117 mg/dL    Performed by Whit PITT PCT          Assessment/Plan:     Principal Problem:    Spinal stenosis (2/8/2022)    Active Problems:    HTN (hypertension) (2/17/2011)      Type 2 diabetes with nephropathy ([facility] (9/27/2018)      Type 2 diabetes mellitus with diabetic neuropathy ([facility] (12/15/2020)       ___________________________________________________  PLAN:    1. Continue Levaquin and Vancomycin started on 2/11  2. F/U on Covid PCR is neg  3.  UA looks OK and cx was not sent  4.  BL cx NG at 18 hr  5. Procalcitonin markedly elevated yesterday with a little improvement today  6. Follow BS and continue SSI, On Metformin BID  7. Continue OT and PT  8. Continue Norvasc for HTN  9. Lipitor for Hyperlipidemia  10. Reviewed CXR from yesterday 2/11 and no infiltrates noted. 40 Minutes spent today in direct care of this high complexity patient with greater than 50% in counseling and coordination of care.     If need to contact me use hospital  [phone], DO NOT USE PERFECT SERVE    ___________________________________________________    Attending Physician: Kwasi Benito MD

## 2022-02-12 NOTE — PROGRESS NOTES
Transition of Care Plan:    RUR: 9% low     Disposition: Home with [facility] - Accepted and information on AVS.   Anticipating DC today. Follow up appointments: ortho  DME needed: RW- provided 2/11/22  Transportation at Discharge: suhas  [address] [place] [address] Avenue or means to access home: yes        IM Medicare Letter:   2nd IM letter provided 2/11/22, signed, and placed on bedside chart in anticipation for potential d/c this weekend  Is patient a BCPI-A Bundle: no                     If yes, was Bundle Letter given?:    Is patient a  and connected with the [address]?  no              If yes, was [place] transfer form completed and [address] [facility] notified? Caregiver Contact: suhas Garcia [phone]  Discharge Caregiver contacted prior to discharge? To be contacted if needed prior to DC. Care Conference needed?: no        CM will continue to monitor discharge plan. Care Management Interventions  PCP Verified by CM: Yes  Mode of Transport at Discharge:  Other (see comment)  Transition of Care Consult (CM Consult): Discharge [address] [facility] [address]:  (rw)  Discharge Durable Medical Equipment: Yes  Physical Therapy Consult: Yes  Occupational Therapy Consult: Yes  Speech Therapy Consult: No  Support Systems: Other Family Member(s)  Confirm Follow Up Transport: Family  The Plan for Transition of Care is Related to the Following Treatment Goals : [facility]  The Patient and/or Patient Representative was Provided with a Choice of Provider and Agrees with the Discharge Plan?: Yes  Freedom of Choice List was Provided with Basic Dialogue that Supports the Patient's Individualized Plan of Care/Goals, Treatment Preferences and Shares the Quality Data Associated with the Providers?: Yes  Discharge Location  Patient Expects to be Discharged to[de-identified] Home with home health    Vj, Shweta CM  Ext [phone]

## 2022-02-12 NOTE — PROGRESS NOTES
DISCHARGE NOTE FROM [facility]    Patient determined to be stable for discharge by attending provider. I have reviewed the discharge instructions with the patient and daughter. They verbalized understanding and all questions were answered to their satisfaction. No complaints or further questions were expressed. Medications sent to pharmacy. Appropriate educational materials and medication side effect teaching were provided. PIV were removed prior to discharge. Patient did not discharge with any line, van, or drain. Personal items and valuables accounted for at discharge by patient and/or family: YES    Post-op patient: Yes- Patient given post-op discharge kit and instructed on use.        Isa Vale RN

## 2022-02-12 NOTE — PROGRESS NOTES
Problem: Falls - Risk of  Goal: *Absence of Falls  Description: Document [facility] Fall Risk and appropriate interventions in the flowsheet.   2/12/2022 1407 by Neftali Schneider RN  Outcome: Resolved/Met  Note: Fall Risk Interventions:  Mobility Interventions: Assess mobility with egress test,Communicate number of staff needed for ambulation/transfer,OT consult for ADLs,Patient to call before getting OOB,PT Consult for assist device competence,Strengthening exercises (ROM-active/passive),PT Consult for mobility concerns,Utilize walker, cane, or other assistive device,Utilize gait belt for transfers/ambulation         Medication Interventions: Assess postural VS orthostatic hypotension,Evaluate medications/consider consulting pharmacy,Patient to call before getting OOB,Teach patient to arise slowly,Utilize gait belt for transfers/ambulation    Elimination Interventions: Call light in reach,Elevated toilet seat,Patient to call for help with toileting needs,Stay With Me (per policy),Toilet paper/wipes in reach,Toileting schedule/hourly rounds           2/12/2022 1102 by Neftali Schneider RN  Outcome: Progressing Towards Goal  Note: Fall Risk Interventions:  Mobility Interventions: Assess mobility with egress test,Communicate number of staff needed for ambulation/transfer,OT consult for ADLs,Patient to call before getting OOB,PT Consult for assist device competence,Strengthening exercises (ROM-active/passive),PT Consult for mobility concerns,Utilize walker, cane, or other assistive device,Utilize gait belt for transfers/ambulation         Medication Interventions: Assess postural VS orthostatic hypotension,Evaluate medications/consider consulting pharmacy,Patient to call before getting OOB,Teach patient to arise slowly,Utilize gait belt for transfers/ambulation    Elimination Interventions: Call light in reach,Elevated toilet seat,Patient to call for help with toileting needs,Stay With Me (per policy),Toilet paper/wipes in reach,Toileting schedule/hourly rounds              Problem: Patient Education: Go to Patient Education Activity  Goal: Patient/Family Education  2/12/2022 1407 by Arpit Moore RN  Outcome: Resolved/Met  2/12/2022 1102 by Arpit Moore RN  Outcome: Progressing Towards Goal     Problem: Patient Education: Go to Patient Education Activity  Goal: Patient/Family Education  2/12/2022 1407 by Arpit Moore RN  Outcome: Resolved/Met  2/12/2022 1102 by Arpit Moore RN  Outcome: Progressing Towards Goal     Problem: Patient Education: Go to Patient Education Activity  Goal: Patient/Family Education  2/12/2022 1407 by Arpit Moore RN  Outcome: Resolved/Met  2/12/2022 1102 by Arpit Moore RN  Outcome: Progressing Towards Goal     Problem: Complex Spine Procedure:  Day of Surgery  Goal: Off Pathway (Use only if patient is Off Pathway)  2/12/2022 1407 by Arpit Moore RN  Outcome: Resolved/Met  2/12/2022 1102 by Arpit Moore RN  Outcome: Progressing Towards Goal  Goal: Activity/Safety  2/12/2022 1407 by Arpit Moore RN  Outcome: Resolved/Met  2/12/2022 1102 by Arpit Moore RN  Outcome: Progressing Towards Goal  Goal: Consults, if ordered  2/12/2022 1407 by Arpit Moore RN  Outcome: Resolved/Met  2/12/2022 1102 by Arpit Moore RN  Outcome: Progressing Towards Goal  Goal: Diagnostic Test/Procedures  2/12/2022 1407 by Arpit Moore RN  Outcome: Resolved/Met  2/12/2022 1102 by Arpit Moore RN  Outcome: Progressing Towards Goal  Goal: Nutrition/Diet  2/12/2022 1407 by Arpit Moore RN  Outcome: Resolved/Met  2/12/2022 1102 by Arpit Moore RN  Outcome: Progressing Towards Goal  Goal: Discharge Planning  2/12/2022 1407 by Arpit Moore RN  Outcome: Resolved/Met  2/12/2022 1102 by Arpit Moore RN  Outcome: Progressing Towards Goal  Goal: Medications  2/12/2022 1407 by Arpit Moore RN  Outcome: Resolved/Met  2/12/2022 1102 by Micheline Barefoot, RN  Outcome: Progressing Towards Goal  Goal: Respiratory  2/12/2022 1407 by Micheline Barefoot, RN  Outcome: Resolved/Met  2/12/2022 1102 by Micheline Barefoot, RN  Outcome: Progressing Towards Goal  Goal: Treatments/Interventions/Procedures  2/12/2022 1407 by Micheline Barefoot, RN  Outcome: Resolved/Met  2/12/2022 1102 by Micheline Barefoot, RN  Outcome: Progressing Towards Goal  Goal: Psychosocial  2/12/2022 1407 by Micheline Barefoot, RN  Outcome: Resolved/Met  2/12/2022 1102 by Micheline Barefoot, RN  Outcome: Progressing Towards Goal  Goal: *Optimal pain control at patient's stated goal  2/12/2022 1407 by Micheline Barefoot, RN  Outcome: Resolved/Met  2/12/2022 1102 by Micheline Barefoot, RN  Outcome: Progressing Towards Goal  Goal: *Demonstrates progressive activity  2/12/2022 1407 by Micheline Barefoot, RN  Outcome: Resolved/Met  2/12/2022 1102 by Micheline Barefoot, RN  Outcome: Progressing Towards Goal  Goal: *Respiratory status stable  2/12/2022 1407 by Micheline Barefoot, RN  Outcome: Resolved/Met  2/12/2022 1102 by Micheline Barefoot, RN  Outcome: Progressing Towards Goal     Problem: Complex Spine Procedure:  Post Op Day 1  Goal: Off Pathway (Use only if patient is Off Pathway)  2/12/2022 1407 by Micheline Chanel, RN  Outcome: Resolved/Met  2/12/2022 1102 by Micheline Barefoot, RN  Outcome: Progressing Towards Goal  Goal: Activity/Safety  2/12/2022 1407 by Micheline Barefoot, RN  Outcome: Resolved/Met  2/12/2022 1102 by Micheline Barefoot, RN  Outcome: Progressing Towards Goal  Goal: Consults, if ordered  2/12/2022 1407 by Micheline Barefoot, RN  Outcome: Resolved/Met  2/12/2022 1102 by Micheline Barefoot, RN  Outcome: Progressing Towards Goal  Goal: Diagnostic Test/Procedures  2/12/2022 1407 by Micheline Barefoot, RN  Outcome: Resolved/Met  2/12/2022 1102 by Micheline Buchanan RN  Outcome: Progressing Towards Goal  Goal: Nutrition/Diet  2/12/2022 1407 by Adeel Rivera, Kristen Layne RN  Outcome: Resolved/Met  2/12/2022 1102 by Olya Romero RN  Outcome: Progressing Towards Goal  Goal: Discharge Planning  2/12/2022 1407 by Olya Romero RN  Outcome: Resolved/Met  2/12/2022 1102 by Olya Romero RN  Outcome: Progressing Towards Goal  Goal: Medications  2/12/2022 1407 by Olya Romero RN  Outcome: Resolved/Met  2/12/2022 1102 by Olya Romero RN  Outcome: Progressing Towards Goal  Goal: Respiratory  2/12/2022 1407 by Olya Romero RN  Outcome: Resolved/Met  2/12/2022 1102 by Olya Romero RN  Outcome: Progressing Towards Goal  Goal: Treatments/Interventions/Procedures  2/12/2022 1407 by Olya Romero RN  Outcome: Resolved/Met  2/12/2022 1102 by Olya Romero RN  Outcome: Progressing Towards Goal  Goal: Psychosocial  2/12/2022 1407 by Olya Romero RN  Outcome: Resolved/Met  2/12/2022 1102 by Olya Romero RN  Outcome: Progressing Towards Goal  Goal: *Progress independence mobility/activities (eg: Mobility precautions)  2/12/2022 1407 by Olya Romero RN  Outcome: Resolved/Met  2/12/2022 1102 by Olya Romero RN  Outcome: Progressing Towards Goal  Goal: *Verbalizes/demonstrates understanding of proper body mechanics and use of stabilization device if ordered  2/12/2022 1407 by Olya Romero RN  Outcome: Resolved/Met  2/12/2022 1102 by Olya Romero RN  Outcome: Progressing Towards Goal  Goal: *Optimal pain control at patient's stated goal  2/12/2022 1407 by Olya Romero RN  Outcome: Resolved/Met  2/12/2022 1102 by Olya Romero RN  Outcome: Progressing Towards Goal  Goal: *Resumes normal function of bladder and bowel  2/12/2022 1407 by Olya Romero RN  Outcome: Resolved/Met  2/12/2022 1102 by Olya Romero RN  Outcome: Progressing Towards Goal  Goal: *Hemodynamically stable  2/12/2022 1407 by Olya Romero RN  Outcome: Resolved/Met  2/12/2022 1102 by Olya Romero RN  Outcome: Progressing Towards Goal  Goal: *Tolerating diet  2/12/2022 1407 by Alexandre Will, RN  Outcome: Resolved/Met  2/12/2022 1102 by Alexandre Will, RN  Outcome: Progressing Towards Goal  Goal: *Labs within defined limits  2/12/2022 1407 by Alexandre Will, RN  Outcome: Resolved/Met  2/12/2022 1102 by Alexandre Will, RN  Outcome: Progressing Towards Goal     Problem: Complex Spine Procedure:  Post Op Day 2  Goal: Off Pathway (Use only if patient is Off Pathway)  2/12/2022 1407 by Alexandre Will, RN  Outcome: Resolved/Met  2/12/2022 1102 by Alexandre Will, RN  Outcome: Progressing Towards Goal  Goal: Activity/Safety  2/12/2022 1407 by Alexandre Will, RN  Outcome: Resolved/Met  2/12/2022 1102 by Alexandre Will, RN  Outcome: Progressing Towards Goal  Goal: Diagnostic Test/Procedures  2/12/2022 1407 by Alexandre Will, RN  Outcome: Resolved/Met  2/12/2022 1102 by Alexandre Will, RN  Outcome: Progressing Towards Goal  Goal: Nutrition/Diet  2/12/2022 1407 by Alexandre Will, RN  Outcome: Resolved/Met  2/12/2022 1102 by Alexandre Will, RN  Outcome: Progressing Towards Goal  Goal: Discharge Planning  2/12/2022 1407 by Alexandre Will, RN  Outcome: Resolved/Met  2/12/2022 1102 by Alexandre Will, RN  Outcome: Progressing Towards Goal  Goal: Medications  2/12/2022 1407 by Alexandre Will, RN  Outcome: Resolved/Met  2/12/2022 1102 by Alexandre Will, RN  Outcome: Progressing Towards Goal  Goal: Respiratory  2/12/2022 1407 by Alexandre Will, RN  Outcome: Resolved/Met  2/12/2022 1102 by Alexandre Will, RN  Outcome: Progressing Towards Goal  Goal: Treatments/Interventions/Procedures  2/12/2022 1407 by Alexandre Will, RN  Outcome: Resolved/Met  2/12/2022 1102 by Alexandre Will, RN  Outcome: Progressing Towards Goal  Goal: Psychosocial  2/12/2022 1407 by Alexandre Will, RN  Outcome: Resolved/Met  2/12/2022 1102 by Alexandre Will RN  Outcome: Progressing Towards Goal  Goal: *Progress independence mobility/activities (eg: Mobility precautions)  2/12/2022 1407 by Arpit Moore RN  Outcome: Resolved/Met  2/12/2022 1102 by Arpit Moore RN  Outcome: Progressing Towards Goal  Goal: *Verbalizes/demonstrates understanding of proper body mechanics and use of stabilization device if ordered  2/12/2022 1407 by Arpit Moore RN  Outcome: Resolved/Met  2/12/2022 1102 by Arpit Moore RN  Outcome: Progressing Towards Goal  Goal: *Optimal pain control at patient's stated goal  2/12/2022 1407 by Arpit Moore RN  Outcome: Resolved/Met  2/12/2022 1102 by Arpit Moore RN  Outcome: Progressing Towards Goal  Goal: *Resumes normal function of bladder and bowel  2/12/2022 1407 by Arpit Moore RN  Outcome: Resolved/Met  2/12/2022 1102 by Arpit Moore RN  Outcome: Progressing Towards Goal  Goal: *Hemodynamically stable  2/12/2022 1407 by Arpit Moore RN  Outcome: Resolved/Met  2/12/2022 1102 by Arpit Moore RN  Outcome: Progressing Towards Goal  Goal: *Tolerating diet  2/12/2022 1407 by Arpit Moore RN  Outcome: Resolved/Met  2/12/2022 1102 by Arpit Moore RN  Outcome: Progressing Towards Goal  Goal: *Labs within defined limits  2/12/2022 1407 by Arpit Moore RN  Outcome: Resolved/Met  2/12/2022 1102 by Arpit Moore RN  Outcome: Progressing Towards Goal  Goal: *Able to place stabilization device  2/12/2022 1407 by Arpit Moore RN  Outcome: Resolved/Met  2/12/2022 1102 by Arpit Moore RN  Outcome: Progressing Towards Goal     Problem: Complex Spine Procedure:  Post Op Day 3  Goal: Off Pathway (Use only if patient is Off Pathway)  2/12/2022 1407 by Arpit Moore RN  Outcome: Resolved/Met  2/12/2022 1102 by Arpit Moore RN  Outcome: Progressing Towards Goal  Goal: Activity/Safety  2/12/2022 1407 by Arpit Moore RN  Outcome: Resolved/Met  2/12/2022 1102 by Arpit Moore RN  Outcome: Progressing Towards Goal  Goal: Nutrition/Diet  2/12/2022 1407 by Arpit Moore RN  Outcome: Resolved/Met  2/12/2022 1102 by Arpit Moore RN  Outcome: Progressing Towards Goal  Goal: Discharge Planning  2/12/2022 1407 by Arpit Moore RN  Outcome: Resolved/Met  2/12/2022 1102 by Arpit Moore RN  Outcome: Progressing Towards Goal  Goal: Medications  2/12/2022 1407 by Arpit Moore RN  Outcome: Resolved/Met  2/12/2022 1102 by Arpit Moore RN  Outcome: Progressing Towards Goal  Goal: Respiratory  2/12/2022 1407 by Arpit Moore RN  Outcome: Resolved/Met  2/12/2022 1102 by Arpit Moore RN  Outcome: Progressing Towards Goal  Goal: Treatments/Interventions/Procedures  2/12/2022 1407 by Arpit Moore RN  Outcome: Resolved/Met  2/12/2022 1102 by Arpit Moroe RN  Outcome: Progressing Towards Goal  Goal: Psychosocial  2/12/2022 1407 by Arpit Moore RN  Outcome: Resolved/Met  2/12/2022 1102 by Arpit Moore RN  Outcome: Progressing Towards Goal     Problem: Complex Spine Procedure:  Discharge Outcomes  Goal: *Optimal pain control at patient's stated goal  2/12/2022 1407 by Arpit Moore RN  Outcome: Resolved/Met  2/12/2022 1102 by Arpit Moore RN  Outcome: Progressing Towards Goal  Goal: *Demonstrates ability to place and remove stabilization device  2/12/2022 1407 by Arpit Moore RN  Outcome: Resolved/Met  2/12/2022 1102 by Arpit Moore RN  Outcome: Progressing Towards Goal  Goal: *Progress independence mobility/activities (eg: Mobility precautions)  2/12/2022 1407 by Arpit Moore RN  Outcome: Resolved/Met  2/12/2022 1102 by Arpit Moore RN  Outcome: Progressing Towards Goal  Goal: *Resumes normal function of bladder and bowel  2/12/2022 1407 by Arpit Moore RN  Outcome: Resolved/Met  2/12/2022 1102 by Arpit Moore RN  Outcome: Progressing Towards Goal  Goal: *Lungs clear or at baseline  2/12/2022 1407 by Arpit Moore RN  Outcome: Resolved/Met  2/12/2022 1102 by Beth Gill RN  Outcome: Progressing Towards Goal  Goal: *Verbalizes name, dosage, time, side effects, and number of days to continue medications  2/12/2022 1407 by Beth Gill RN  Outcome: Resolved/Met  2/12/2022 1102 by Beth Gill RN  Outcome: Progressing Towards Goal  Goal: *Modified independence with transfers, ambulation on levels with assistance devices, stair climbing, ADL's  2/12/2022 1407 by Beth Gill RN  Outcome: Resolved/Met  2/12/2022 1102 by Beth Gill RN  Outcome: Progressing Towards Goal  Goal: *Describes follow-up/return visits to physicians  2/12/2022 1407 by Beth Gill RN  Outcome: Resolved/Met  2/12/2022 1102 by Beth Gill RN  Outcome: Progressing Towards Goal  Goal: *Describes available resources and support systems  2/12/2022 1407 by Beth Gill RN  Outcome: Resolved/Met  2/12/2022 1102 by Beth Gill RN  Outcome: Progressing Towards Goal  Goal: *Labs within defined limits  2/12/2022 1407 by Beth Gill RN  Outcome: Resolved/Met  2/12/2022 1102 by Beth Gill RN  Outcome: Progressing Towards Goal  Goal: *Tolerating diet  2/12/2022 1407 by Beth Gill RN  Outcome: Resolved/Met  2/12/2022 1102 by Beth Gill RN  Outcome: Progressing Towards Goal

## 2022-02-12 NOTE — PROGRESS NOTES
Problem: Falls - Risk of  Goal: *Absence of Falls  Description: Document Sam Tubbs Fall Risk and appropriate interventions in the flowsheet.   Outcome: Progressing Towards Goal  Note: Fall Risk Interventions:  Mobility Interventions: Assess mobility with egress test,Communicate number of staff needed for ambulation/transfer,OT consult for ADLs,Patient to call before getting OOB,PT Consult for assist device competence,Strengthening exercises (ROM-active/passive),PT Consult for mobility concerns,Utilize walker, cane, or other assistive device,Utilize gait belt for transfers/ambulation         Medication Interventions: Assess postural VS orthostatic hypotension,Evaluate medications/consider consulting pharmacy,Patient to call before getting OOB,Teach patient to arise slowly,Utilize gait belt for transfers/ambulation    Elimination Interventions: Call light in reach,Elevated toilet seat,Patient to call for help with toileting needs,Stay With Me (per policy),Toilet paper/wipes in reach,Toileting schedule/hourly rounds              Problem: Patient Education: Go to Patient Education Activity  Goal: Patient/Family Education  Outcome: Progressing Towards Goal     Problem: Patient Education: Go to Patient Education Activity  Goal: Patient/Family Education  Outcome: Progressing Towards Goal     Problem: Patient Education: Go to Patient Education Activity  Goal: Patient/Family Education  Outcome: Progressing Towards Goal     Problem: Complex Spine Procedure:  Day of Surgery  Goal: Off Pathway (Use only if patient is Off Pathway)  Outcome: Progressing Towards Goal  Goal: Activity/Safety  Outcome: Progressing Towards Goal  Goal: Consults, if ordered  Outcome: Progressing Towards Goal  Goal: Diagnostic Test/Procedures  Outcome: Progressing Towards Goal  Goal: Nutrition/Diet  Outcome: Progressing Towards Goal  Goal: Discharge Planning  Outcome: Progressing Towards Goal  Goal: Medications  Outcome: Progressing Towards Goal  Goal: Respiratory  Outcome: Progressing Towards Goal  Goal: Treatments/Interventions/Procedures  Outcome: Progressing Towards Goal  Goal: Psychosocial  Outcome: Progressing Towards Goal  Goal: *Optimal pain control at patient's stated goal  Outcome: Progressing Towards Goal  Goal: *Demonstrates progressive activity  Outcome: Progressing Towards Goal  Goal: *Respiratory status stable  Outcome: Progressing Towards Goal     Problem: Complex Spine Procedure:  Post Op Day 1  Goal: Off Pathway (Use only if patient is Off Pathway)  Outcome: Progressing Towards Goal  Goal: Activity/Safety  Outcome: Progressing Towards Goal  Goal: Consults, if ordered  Outcome: Progressing Towards Goal  Goal: Diagnostic Test/Procedures  Outcome: Progressing Towards Goal  Goal: Nutrition/Diet  Outcome: Progressing Towards Goal  Goal: Discharge Planning  Outcome: Progressing Towards Goal  Goal: Medications  Outcome: Progressing Towards Goal  Goal: Respiratory  Outcome: Progressing Towards Goal  Goal: Treatments/Interventions/Procedures  Outcome: Progressing Towards Goal  Goal: Psychosocial  Outcome: Progressing Towards Goal  Goal: *Progress independence mobility/activities (eg: Mobility precautions)  Outcome: Progressing Towards Goal  Goal: *Verbalizes/demonstrates understanding of proper body mechanics and use of stabilization device if ordered  Outcome: Progressing Towards Goal  Goal: *Optimal pain control at patient's stated goal  Outcome: Progressing Towards Goal  Goal: *Resumes normal function of bladder and bowel  Outcome: Progressing Towards Goal  Goal: *Hemodynamically stable  Outcome: Progressing Towards Goal  Goal: *Tolerating diet  Outcome: Progressing Towards Goal  Goal: *Labs within defined limits  Outcome: Progressing Towards Goal     Problem: Complex Spine Procedure:  Post Op Day 2  Goal: Off Pathway (Use only if patient is Off Pathway)  Outcome: Progressing Towards Goal  Goal: Activity/Safety  Outcome: Progressing Towards Goal  Goal: Diagnostic Test/Procedures  Outcome: Progressing Towards Goal  Goal: Nutrition/Diet  Outcome: Progressing Towards Goal  Goal: Discharge Planning  Outcome: Progressing Towards Goal  Goal: Medications  Outcome: Progressing Towards Goal  Goal: Respiratory  Outcome: Progressing Towards Goal  Goal: Treatments/Interventions/Procedures  Outcome: Progressing Towards Goal  Goal: Psychosocial  Outcome: Progressing Towards Goal  Goal: *Progress independence mobility/activities (eg: Mobility precautions)  Outcome: Progressing Towards Goal  Goal: *Verbalizes/demonstrates understanding of proper body mechanics and use of stabilization device if ordered  Outcome: Progressing Towards Goal  Goal: *Optimal pain control at patient's stated goal  Outcome: Progressing Towards Goal  Goal: *Resumes normal function of bladder and bowel  Outcome: Progressing Towards Goal  Goal: *Hemodynamically stable  Outcome: Progressing Towards Goal  Goal: *Tolerating diet  Outcome: Progressing Towards Goal  Goal: *Labs within defined limits  Outcome: Progressing Towards Goal  Goal: *Able to place stabilization device  Outcome: Progressing Towards Goal     Problem: Complex Spine Procedure:  Post Op Day 3  Goal: Off Pathway (Use only if patient is Off Pathway)  Outcome: Progressing Towards Goal  Goal: Activity/Safety  Outcome: Progressing Towards Goal  Goal: Nutrition/Diet  Outcome: Progressing Towards Goal  Goal: Discharge Planning  Outcome: Progressing Towards Goal  Goal: Medications  Outcome: Progressing Towards Goal  Goal: Respiratory  Outcome: Progressing Towards Goal  Goal: Treatments/Interventions/Procedures  Outcome: Progressing Towards Goal  Goal: Psychosocial  Outcome: Progressing Towards Goal     Problem: Complex Spine Procedure:  Discharge Outcomes  Goal: *Optimal pain control at patient's stated goal  Outcome: Progressing Towards Goal  Goal: *Demonstrates ability to place and remove stabilization device  Outcome: Progressing Towards Goal  Goal: *Progress independence mobility/activities (eg: Mobility precautions)  Outcome: Progressing Towards Goal  Goal: *Resumes normal function of bladder and bowel  Outcome: Progressing Towards Goal  Goal: *Lungs clear or at baseline  Outcome: Progressing Towards Goal  Goal: *Verbalizes name, dosage, time, side effects, and number of days to continue medications  Outcome: Progressing Towards Goal  Goal: *Modified independence with transfers, ambulation on levels with assistance devices, stair climbing, ADL's  Outcome: Progressing Towards Goal  Goal: *Describes follow-up/return visits to physicians  Outcome: Progressing Towards Goal  Goal: *Describes available resources and support systems  Outcome: Progressing Towards Goal  Goal: *Labs within defined limits  Outcome: Progressing Towards Goal  Goal: *Tolerating diet  Outcome: Progressing Towards Goal

## 2022-02-12 NOTE — PROGRESS NOTES
End of Shift Note    Bedside shift change report given to Brigitte CARY (oncoming nurse) by Young Barr RN (offgoing nurse). Report included the following information SBAR, Kardex, Intake/Output, MAR and Recent Results    Shift worked:  day     Shift summary and any significant changes:     blood cultures, PCR covid, UA, and labs collected and sent. Droplet plus dc due to pcr negative. VS stable, voiding without difficulties, up to [facility] with 1 assist and walker     Concerns for physician to address:       Zone phone for oncoming shift:   [phone]       Activity:  Activity Level: Up with Assistance  Number times ambulated in hallways past shift: 0  Number of times OOB to chair past shift: 2    Cardiac:   Cardiac Monitoring: No      Cardiac Rhythm: Sinus Rhythm    Access:   Current line(s): PIV     Genitourinary:   Urinary status: voiding    Respiratory:   O2 Device: None (Room air)  Chronic home O2 use?: NO  Incentive spirometer at bedside: YES  Actual Volume (ml): 1550 ml  GI:  Last Bowel Movement Date: 02/07/22  Current diet:  ADULT DIET Regular; 3 carb choices (45 gm/meal)  Passing flatus: YES  Tolerating current diet: YES       Pain Management:   Patient states pain is manageable on current regimen: YES    Skin:  Bhavik Score: 19  Interventions: float heels, increase time out of bed, PT/OT consult and limit briefs    Patient Safety:  Fall Score:  Total Score: 3  Interventions: assistive device (walker, cane, etc), gripper socks, pt to call before getting OOB and stay with me (per policy)  High Fall Risk: Yes    Length of Stay:  Expected LOS: 2d 16h  Actual LOS: 3      Johanne Snowden RN

## 2022-02-12 NOTE — DISCHARGE SUMMARY
ORTHO - Progress Note  Post Op day: [address] Days [address]     [id number]  female    73 y.o.    1950    Admit date:2022  Date of Surgery:2022   Procedures:Procedure(s):LUMBAR 5 TO SACRAL 1 POSTERIOR DECOMPRESSION AND FUSION  Surgeon:Surgeon(s) and Role:   Scot Hallman MD - Primary        SUBJECTIVE:     Gabriel Chester is a 73 y.o. female resting in the bed/chair. Patient has complaints of appropriate post-op pain, tolerating PO pain medications, Oxy 5mg. C/O Right buttock and thigh radiating pain- new since sx- worse with transitions-  Is moving well with PT. Pt states she is overall improved since surgery but frustrated about the RLE pain. Denies F/C, nausea, vomiting, dizziness, lightheadedness, chest pain, or shortness of breath. OBJECTIVE:       Physical Exam:  General: alert, cooperative, no distress. Gastrointestinal:  non-distended. Cardiovascular: equal pulses in the lower extremities,  Brisk cap refill in all distal extremities   Genitourinary: Voiding independently   Respiratory: No respiratory distress   Neurological:Neurovascular exam within normal limits. Senstion intact: LE bilat. Motor: + DF/PF/EHL. Musculoskeletal: Kari's sign negative in bilateral lower extremities. Calves soft, supple, non-tender upon palpation or with passive stretch. Dressing/Wound:  Clean, dry and intact. No significant erythema or swelling.     Vital Signs:       Patient Vitals for the past 8 hrs:   BP Temp Pulse Resp SpO2   22 0724 (!) 120/49 98.7 °F (37.1 °C) 92 16 95 %   22 0349 (!) 123/50 98.5 °F (36.9 °C) 98 16 95 %                                          Temp (24hrs), Av.4 °F (36.9 °C), Min:97.8 °F (36.6 °C), Max:98.7 °F (37.1 °C)    Date 22 0700 - 22 0659   Shift 6568-9414 1299-7721 7793-3139 24 Hour Total   INTAKE   I.V.(mL/kg/hr) 250   250   Shift Total(mL/kg) 250(3.8)   250(3.8)   OUTPUT   Shift Total(mL/kg)       Weight (kg) 65.2 65.2 65.2 65.2     Labs:        Recent Labs     02/11/22  0935   HCT 31.8*   HGB 10.3*     PT/OT:        Gait  Base of Support: Narrowed  Speed/Lolis: Pace decreased (<100 feet/min)  Step Length: Left shortened,Right shortened  Gait Abnormalities: Decreased step clearance (slightly guarded posture)  Ambulation - Level of Assistance: Contact guard assistance  Distance (ft): 80 Feet (ft)  Assistive Device: Brace/Splint,Gait belt,Walker, rolling  Rail Use: Left   Stairs - Level of Assistance: Stand-by assistance  Number of Stairs Trained: 8     ASSESSMENT / PLAN:   Principal Problem:    Spinal stenosis (2/8/2022)    Active Problems:    HTN (hypertension) (2/17/2011)      Type 2 diabetes with nephropathy ([facility] (9/27/2018)      Type 2 diabetes mellitus with diabetic neuropathy ([facility] (12/15/2020)       -  Pt continues to be afebrile  -  Septic work-up NO  obvious findings  -  Appreciate hospitalist consult/asssitance   -  Continue PT/OT WBAT  -  DVT prophylaxis- SCD when in bed  -  DC planning - DC today ----Home w/ HH/PT     Signed By: Lucia Aase, PA-C

## 2022-02-14 NOTE — PROGRESS NOTES
Physician Progress Note      PATIENT:               Peggy ORELLANA #:                  [id number]  :                       1950  ADMIT DATE:       2022 7:06 AM  [address] [place] DATE:        2022 2:08 PM  RESPONDING  PROVIDER #:        Cande Saez MD        QUERY TEXT:    Stage of Chronic Kidney Disease: Please provide further specificity, if known. Clinical indicators include: ckd, bun, creatinine, bun/creatinine, gfr  Options provided:  -- Chronic kidney disease stage 1  -- Chronic kidney disease stage 2  -- Chronic kidney disease stage 3  -- Chronic kidney disease stage 3a  -- Chronic kidney disease stage 3b  -- Chronic kidney disease stage 4  -- Chronic kidney disease stage 5  -- Chronic kidney disease stage 5, requiring dialysis  -- End stage renal disease  -- Other - I will add my own diagnosis  -- Disagree - Not applicable / Not valid  -- Disagree - Clinically Unable to determine / Unknown        PROVIDER RESPONSE TEXT:    The patient has chronic kidney disease stage 3.       Electronically signed by:  Cande Saez MD 2022 1:47 PM

## 2022-02-16 LAB
BACTERIA SPEC CULT: NORMAL
SERVICE CMNT-IMP: NORMAL

## 2022-02-17 NOTE — PROGRESS NOTES
Physician Progress Note      PATIENT:               Guille Avitia  CSN #:                  [id number]  :                       1950  ADMIT DATE:       2022 7:06 AM  [address] [place] DATE:        2022 2:08 PM  RESPONDING  PROVIDER #:        Shweta Vallecillo MD          QUERY TEXT:    Patient admitted with spinal surgery and noted to have elevated T, HR, RR, procalcitonin post procedure. If possible, please document in progress notes and discharge summary if you are evaluating and/or treating any of the following: The medical record reflects the following:  Risk Factors: 74 y/o female underwent spinal surgery, developed elevated Tachycardia, Temp post procedure. Clinical Indicators:   MD ANEL Flores): \"On 2/10 she was noted to be febrile with temperature of 102F and tachycardic, heart rate 100-120. On  we were asked to assist with the evaluation of her fever and tachycardia. With URI symptoms Levaquin was started as well as Vancomycin. Covid PCR sent and has returned negative. She does c/o of a little cough this AM w/o SOB or other cardiac or respiratory c/o. Septic work-up NO obvious findings\". Vitals:  2321 T 102.9 ; 2351 102.6, 2/10 0000 T 101.7  RR 20,    2022 09:35 Procalcitonin: 13.57    2022 03:38 Procalcitonin: 11.17     CXR:      Treatment: Levaquin, Vancomycin, Labs, urine (1+ bacteria), blood cx (No growth), Covid PCR (-), vitals per unit routine  Options provided:  -- SIRS of infectious origin due to upper respiratory infection  -- SIRS of non-infectious origin secondary to (please specify), (please specify).   -- Other - I will add my own diagnosis  -- Disagree - Not applicable / Not valid  -- Disagree - Clinically unable to determine / Unknown  -- Refer to Clinical Documentation Reviewer    PROVIDER RESPONSE TEXT:    URI    Query created by: Harry Anderson on 2022 9:33 AM      Electronically signed by:  Shweta Vallecillo MD 2022 12:45 PM

## 2022-02-22 NOTE — PROGRESS NOTES
Physician Progress Note      PATIENT:               Eliza Mayen  [facility] #:                  [id number]  :                       1950  ADMIT DATE:       2022 7:06 AM  [facility] DATE:        2022 2:08 PM  RESPONDING  PROVIDER #:        Anabella Spicer MD          QUERY TEXT:    Pt admitted with lumbar spondylosis. Pt noted to have spinal surgical procedure. If possible; please document in progress notes,  For clarification purposes was the procedure performed insertion of underbody fusion device with cage with bone graft for spinal fusion. The medical record reflects the followin y/o female with lumbar spondylosis admitted for spinal surgery    Clinical Indicators:  Per operative note \"The [employer] robotic system merged the images from fluoroscopy with the pre-operative CT and guided us for the placement of Celestina approaches. Subsequently, screws were placed with the following technique; the robotic arm guided the proper trajectory, the soft tissue was cleared with a dilator, a high speed drill was used to create an opening on the cortical bone in line with the pedicle, the pedicle was cannulated with a tap and finally the pre-measured screw was inserted. The placement of pedicle screws was repeated in the exact same manner from L5 through S1 bilaterally. Once the screw placement had been completed, the [employer] robotic system was removed and fluoroscopy was used on AP and lateral views to confirm proper placement of the screws\" Implants on operative note:    IMPLANTS:   Globus CREO pedicle screw system Globus Altera TLIF cage (interbody biomechanical device). Treatment: surgical procedure  PROCEDURES PERFORMED:  1. L5-S1 posterior arthrodesis with TLIF.  2. L5-S1 posterior instrumentation. 3. Bone marrow aspirate through separate fascial incision for spinal fusion augmentation.   4. Use of [employer] robotic system (Stereotactic computer-assisted system) for placement of pedicle screws. 5. Use of allograft bone for spinal fusion. 6. Insertion of interbody biomechanical device. 7. L4-5 laminotomy and medial facetectomy. 8. Use of local autograft bone for spinal fusion. Options provided:  -- insertion of interbody fusion device with cage with bone graft for spinal fusion  -- insertion of interbody fusion device with cage with bone graft not for to spinal fusion  -- Other - I will add my own diagnosis  -- Disagree - Not applicable / Not valid  -- Disagree - Clinically unable to determine / Unknown  -- Refer to Clinical Documentation Reviewer    PROVIDER RESPONSE TEXT:    This patient had insertion of interbody fusion device with cage with bone graft for spinal fusion.     Query created by: Brenda Kim on 2/17/2022 10:19 AM      Electronically signed by:  Amalia Day MD 2/22/2022 1:57 PM

## 2022-03-18 PROBLEM — E11.22 TYPE 2 DIABETES MELLITUS WITH CHRONIC KIDNEY DISEASE (HCC): Status: ACTIVE | Noted: 2021-12-15

## 2022-03-18 PROBLEM — F33.1 MAJOR DEPRESSIVE DISORDER, RECURRENT, MODERATE (HCC): Status: ACTIVE | Noted: 2021-07-21

## 2022-03-19 PROBLEM — M48.00 SPINAL STENOSIS: Status: ACTIVE | Noted: 2022-02-08

## 2022-03-19 PROBLEM — K83.8 BILIARY SLUDGE DETERMINED BY ULTRASOUND: Status: ACTIVE | Noted: 2017-04-19

## 2022-03-19 PROBLEM — E11.40 TYPE 2 DIABETES MELLITUS WITH DIABETIC NEUROPATHY (HCC): Status: ACTIVE | Noted: 2020-12-15

## 2022-03-19 PROBLEM — G25.0 ESSENTIAL TREMOR: Status: ACTIVE | Noted: 2022-01-17

## 2022-03-19 PROBLEM — E11.21 TYPE 2 DIABETES WITH NEPHROPATHY (HCC): Status: ACTIVE | Noted: 2018-09-27

## 2022-03-20 PROBLEM — F33.9 MAJOR DEPRESSIVE DISORDER, RECURRENT, UNSPECIFIED (HCC): Status: ACTIVE | Noted: 2021-07-21

## 2022-03-20 PROBLEM — F33.0 MAJOR DEPRESSIVE DISORDER, RECURRENT, MILD (HCC): Status: ACTIVE | Noted: 2021-07-21

## 2022-05-08 ENCOUNTER — APPOINTMENT (OUTPATIENT)
Dept: GENERAL RADIOLOGY | Age: 72
End: 2022-05-08
Attending: EMERGENCY MEDICINE
Payer: MEDICARE

## 2022-05-08 ENCOUNTER — HOSPITAL ENCOUNTER (EMERGENCY)
Age: 72
Discharge: HOME | End: 2022-05-08
Attending: EMERGENCY MEDICINE
Payer: MEDICARE

## 2022-05-08 VITALS
HEIGHT: 62 IN | TEMPERATURE: 97.6 F | SYSTOLIC BLOOD PRESSURE: 164 MMHG | WEIGHT: 143 LBS | HEART RATE: 66 BPM | DIASTOLIC BLOOD PRESSURE: 60 MMHG | RESPIRATION RATE: 14 BRPM | BODY MASS INDEX: 26.31 KG/M2 | OXYGEN SATURATION: 100 %

## 2022-05-08 DIAGNOSIS — M25.572 ACUTE BILATERAL ANKLE PAIN: Primary | ICD-10-CM

## 2022-05-08 DIAGNOSIS — M25.571 ACUTE BILATERAL ANKLE PAIN: Primary | ICD-10-CM

## 2022-05-08 PROCEDURE — 99284 EMERGENCY DEPT VISIT MOD MDM: CPT

## 2022-05-08 PROCEDURE — 73610 X-RAY EXAM OF ANKLE: CPT

## 2022-05-08 PROCEDURE — 96372 THER/PROPH/DIAG INJ SC/IM: CPT

## 2022-05-08 PROCEDURE — 74011250636 HC RX REV CODE- 250/636: Performed by: EMERGENCY MEDICINE

## 2022-05-08 RX ORDER — PREDNISONE 20 MG/1
40 TABLET ORAL DAILY
Qty: 10 TABLET | Refills: 0 | Status: SHIPPED | OUTPATIENT
Start: 2022-05-08 | End: 2022-05-13

## 2022-05-08 RX ORDER — GABAPENTIN 300 MG/1
300 CAPSULE ORAL
Qty: 21 CAPSULE | Refills: 0 | Status: SHIPPED | OUTPATIENT
Start: 2022-05-08

## 2022-05-08 RX ORDER — KETOROLAC TROMETHAMINE 30 MG/ML
15 INJECTION, SOLUTION INTRAMUSCULAR; INTRAVENOUS
Status: COMPLETED | OUTPATIENT
Start: 2022-05-08 | End: 2022-05-08

## 2022-05-08 RX ADMIN — KETOROLAC TROMETHAMINE 15 MG: 30 INJECTION, SOLUTION INTRAMUSCULAR at 13:29

## 2022-05-08 NOTE — ED NOTES
Patient given printed discharge instructions reviewed by the MD. Patient understands instructions/follow up recommendations. Patient ambulated out of ED into the care of family.

## 2022-05-08 NOTE — ED PROVIDER NOTES
EMERGENCY DEPARTMENT HISTORY AND PHYSICAL EXAM      Date: 5/8/2022  Patient Name: Jared Hitchcock    History of Presenting Illness     Chief Complaint   Patient presents with    Foot Pain     both feet swollen since thursday ; [facility] given Indomethacin for gout of right foot.  Arm Pain     painful to lift up arms both overhead, she noticed this when got up this morning when turned over in bed. History Provided By: Patient    HPI: Jared Hitchcock, 73 y.o. female presents to the ED with cc of arthralgias. 79-year-old female with a history of back pain status post surgery, diabetes, peptic ulcer disease presents to the emergency department with ankle pain. Patient reports symptoms began Thursday. [facility], patient reports pain in right ankle with some mild edema. No tick exposures. No fevers. No trauma. Patient reports pain worse with bearing weight and improved with nothing. [facility], had plain film which was reportedly negative, diagnosed with \"gout\" and discharged on indomethacin. Patient reports this morning began developed left    Ankle pain and swelling. Pain is located in the left ankle. Patient describes as a \"fullness and tightness. \"  No history of DVT or PE. No history of peripheral artery disease. Patient reports she was using her arms more and woke up with bilateral shoulder tenderness today. Worse with lifting her hands above her head. No back pain. There are no other complaints, changes, or physical findings at this time. PCP: Clifford Hernandez MD    No current facility-administered medications on file prior to encounter. Current Outpatient Medications on File Prior to Encounter   Medication Sig Dispense Refill    cholecalciferol, vitamin D3, (Vitamin D3) 50 mcg (2,000 unit) tab Take 2,000 Units by mouth daily.  primidone (MYSOLINE) 50 mg tablet Take 1 Tablet by mouth two (2) times a day.  (Patient taking differently: Take 50 mg by mouth two (2) times a day. Indications: essential tremor) 180 Tablet 1    gabapentin (NEURONTIN) 300 mg capsule Take 300 mg by mouth three (3) times daily as needed.  RABEprazole (ACIPHEX) 20 mg TbEC Take 20 mg by mouth two (2) times a day.  amLODIPine (NORVASC) 5 mg tablet Take 1 Tablet by mouth daily. 90 Tablet 1    atorvastatin (LIPITOR) 10 mg tablet Take 1 Tablet by mouth daily. For cholesterol (Patient taking differently: Take 10 mg by mouth nightly. For cholesterol) 90 Tablet 12    glucose blood VI test strips (Accu-Chek Wendy Plus test strp) strip USE TO TEST BLOOD SUGAR ONCE DAILY. DIAGNOSIS E11.40 100 Strip 4    metFORMIN (GLUCOPHAGE) 1,000 mg tablet Take 1 tablet in AM and 1 tablet in PM. For Diabetes (Patient taking differently: Take 1,000 mg by mouth two (2) times daily (with meals). Take 1 tablet in AM and 1 tablet in PM. For Diabetes) 180 Tablet 3    omeprazole (PRILOSEC) 40 mg capsule Take 1 Capsule by mouth every morning. 90 Capsule 3    alendronate (FOSAMAX) 35 mg tablet PLEASE SEE ATTACHED FOR DETAILED DIRECTIONS (Patient taking differently: Take 35 mg by mouth every seven (7) days. PLEASE SEE ATTACHED FOR DETAILED DIRECTIONS, mondays) 12 Tablet 3    Blood-Glucose Meter (ACCU-CHEK WENDY PLUS METER) misc Use as directed. E11.9 1 Each 0    lancets (ACCU-CHEK SOFTCLIX LANCETS) misc TEST 2 TIMES DAILY AS DIRECTED (E11.9) 200 Each 12    polyethylene glycol (MIRALAX) 17 gram packet Take 17 g by mouth daily as needed ('Constipation').  multivit,calc,mins/iron/folic (ONE-A-DAY WOMENS FORMULA PO) Take 1 Tab by mouth daily.          Past History     Past Medical History:  Past Medical History:   Diagnosis Date    Allergic rhinitis due to allergen 10/22/2014    Arthritis     spine    Asthma     many years ago, no inhaler or meds    Chronic pain     back pain    Depression     Diabetes (HCC)     Type II    GERD (gastroesophageal reflux disease)  Hepatitis B 1984    Hypercholesterolemia     Hypertension     Kidney stones     Positive PPD 2009    treated with INH       Past Surgical History:  Past Surgical History:   Procedure Laterality Date    COLONOSCOPY  -    manetas- n ormal    COLONOSCOPY N/A 2021    COLONOSCOPY, EGD performed by Charisse Ochoa [facility] at [facility] AM[facility]    EGD      man- esophageal ring    ENDOSCOPY, COLON, DIAGNOSTIC  2007    2 polyps    HX BREAST BIOPSY Bilateral     all benign cysts    HX BUNIONECTOMY Bilateral     HX CATARACT REMOVAL Bilateral 2018    HX CHOLECYSTECTOMY      HX COLONOSCOPY  May 2016    HX ENDOSCOPY  May 2016    HX GYN  1978    tubal laparoscopy-unblocked tubes    HX HEART CATHETERIZATION      negatve    HX ORTHOPAEDIC      had an injection in her back to help with leg pain    HX ORTHOPAEDIC Left 2017    foot surg.   bunion removal  \"Put a plate in\"    HX OTHER SURGICAL      bunionectomy    HX TONSIL AND ADENOIDECTOMY      approx 11years old   [address]      abcess bilateral    OK ESOPHAGEAL MOTILITY STUDY W/INTERP&RPT  2020    OK GERD TST W/ NASAL IMPEDENCE ELECTROD  2020       Family History:  Family History   Problem Relation Age of Onset    Diabetes Mother     Hypertension Mother     Stroke Mother     Cancer Father         lung    Diabetes Father     Cancer Paternal Aunt         colon    Cancer Maternal Aunt         colon, and lung    Hypertension Maternal Aunt     Diabetes Maternal Aunt     Hypertension Maternal Aunt     Stroke Maternal Aunt     Heart Disease Maternal Aunt     Heart Disease Maternal Uncle     Psychiatric Disorder Brother        Social History:  Social History     Tobacco Use    Smoking status: Former Smoker     Packs/day: 1.00     Years: 25.00     Pack years: 25.00     Types: Cigarettes     Quit date: 2010     Years since quittin.7    Smokeless tobacco: Never Used   Vaping Use    Vaping Use: Never used   Substance Use Topics    Alcohol use: Never    Drug use: Never       Allergies: Allergies   Allergen Reactions    Latex Itching    Lisinopril Swelling    Atorvastatin Other (comments)     20 mg causes muscle cramps    Sulfa (Sulfonamide Antibiotics) Itching     About 11 yo  Keflex=unsure         Review of Systems   Review of Systems   Constitutional: Negative for activity change, chills and fever. HENT: Negative for facial swelling and voice change. Eyes: Negative for redness. Respiratory: Negative for cough, shortness of breath and wheezing. Cardiovascular: Negative for chest pain and leg swelling. Gastrointestinal: Negative for abdominal pain, diarrhea, nausea and vomiting. Genitourinary: Negative for decreased urine volume. Musculoskeletal: Positive for arthralgias, gait problem and joint swelling. Negative for myalgias. Skin: Negative for pallor and rash. Neurological: Negative for tremors and facial asymmetry. Psychiatric/Behavioral: Negative for agitation. All other systems reviewed and are negative. Physical Exam   Physical Exam  Vitals and nursing note reviewed. Constitutional:       Comments: 73 YOF, resting in bed, no acute distress   HENT:      Head: Normocephalic and atraumatic. Cardiovascular:      Rate and Rhythm: Normal rate and regular rhythm. Heart sounds: No murmur heard. No friction rub. No gallop. Comments: 2+ DP and PT pulses  Pulmonary:      Effort: Pulmonary effort is normal.      Breath sounds: Normal breath sounds. Abdominal:      Palpations: Abdomen is soft. Tenderness: There is no abdominal tenderness. Musculoskeletal:         General: Swelling present. No tenderness. Normal range of motion. Cervical back: Normal range of motion. Comments: There is trace pedal edema RLE. Good pulses, no erythema or micromotion tenderness.     LLE with mild tenderness to palpation over dorsum of L foot, mild edema. Good pulses. No skin changes. There is bilateral tenderness over upper anterior shoulders over the bicep tendon. 2+ radial pulses bilaterally. Skin:     General: Skin is warm. Capillary Refill: Capillary refill takes less than 2 seconds. Neurological:      General: No focal deficit present. Mental Status: She is alert. Psychiatric:         Mood and Affect: Mood normal.         Diagnostic Study Results     Labs -   No results found for this or any previous visit (from the past 12 hour(s)). Radiologic Studies -   XR ANKLE LT MIN 3 V   Final Result   No acute abnormality. CT Results  (Last 48 hours)    None        CXR Results  (Last 48 hours)    None          Medical Decision Making   I am the first provider for this patient. I reviewed the vital signs, available nursing notes, past medical history, past surgical history, family history and social history. Vital Signs-Reviewed the patient's vital signs. Patient Vitals for the past 12 hrs:   Temp Pulse Resp BP SpO2   05/08/22 1241 97.6 °F (36.4 °C) 66 14 (!) 164/60 100 %     Records Reviewed: Nursing Notes and Old Medical Records    Provider Notes (Medical Decision Making):     72-year-old female presents emergency room with a chief complaint of bilateral ankle pain and swelling as well as bilateral upper extremity tenderness. Vitals are unremarkable. Afebrile in ED. Regarding patient's bilateral shoulder tenderness, this is reproducible and appears to be musculoskeletal.  Most likely biceps tendinitis or rotator cuff pathology likely overuse injury. Regarding patient's right lower extremity ankle pain and swelling, atraumatic, includes strain or sprain. Consider inflammatory arthropathy such as gout, certainly a consideration. I have a low suspicion for septic arthritis given patient's well appearance, no fever and no micromotion tenderness.   Discussed with patient, minimal swelling, I do not think that diagnostic arthrocentesis would be helpful or yield anything. Suspect patient's L ankle pain could be overuse injury vs. Polyarthropathy. No tick exposure. Likely add steroids and ortho follow-up; will order gabapentin. Return precautions discussed with patient and family. ED Course:   Initial assessment performed. The patients presenting problems have been discussed, and they are in agreement with the care plan formulated and outlined with them. I have encouraged them to ask questions as they arise throughout their visit. ED Course as of 05/08/22 1558   Sun May 08, 2022   1359 Plain film negative. [MB]      ED Course User Index  [MB] Cira Prince MD     Plain film negative, discussed with patient. Resting comfortably in bed. Pain control as above. Charisma Martinez MD      Disposition:    Discharged    DISCHARGE PLAN:  1. Discharge Medication List as of 5/8/2022  2:03 PM      START taking these medications    Details   predniSONE (DELTASONE) 20 mg tablet Take 40 mg by mouth daily for 5 days. With Breakfast, Normal, Disp-10 Tablet, R-0      !! gabapentin (NEURONTIN) 300 mg capsule Take 1 Capsule by mouth three (3) times daily as needed for Pain. Max Daily Amount: 900 mg., Normal, Disp-21 Capsule, R-0       !! - Potential duplicate medications found. Please discuss with provider. CONTINUE these medications which have NOT CHANGED    Details   cholecalciferol, vitamin D3, (Vitamin D3) 50 mcg (2,000 unit) tab Take 2,000 Units by mouth daily. , Historical Med      primidone (MYSOLINE) 50 mg tablet Take 1 Tablet by mouth two (2) times a day., Normal, Disp-180 Tablet, R-1      !! gabapentin (NEURONTIN) 300 mg capsule Take 300 mg by mouth three (3) times daily as needed., Historical Med      RABEprazole (ACIPHEX) 20 mg TbEC Take 20 mg by mouth two (2) times a day., Historical Med      amLODIPine (NORVASC) 5 mg tablet Take 1 Tablet by mouth daily. , Normal, Disp-90 Tablet, R-1      atorvastatin (LIPITOR) 10 mg tablet Take 1 Tablet by mouth daily. For cholesterol, Normal, Disp-90 Tablet, R-12      glucose blood VI test strips (Accu-Chek Wendy Plus test strp) strip USE TO TEST BLOOD SUGAR ONCE DAILY. DIAGNOSIS E11.40, Normal, Disp-100 Strip, R-4DX E11.40  PT REQUEST PRESCRIPTION FOR TEST STRIPS   WENDY PLUS   TEST TID.      metFORMIN (GLUCOPHAGE) 1,000 mg tablet Take 1 tablet in AM and 1 tablet in PM. For Diabetes, Normal, Disp-180 Tablet, R-3      omeprazole (PRILOSEC) 40 mg capsule Take 1 Capsule by mouth every morning., Normal, Disp-90 Capsule, R-3      alendronate (FOSAMAX) 35 mg tablet PLEASE SEE ATTACHED FOR DETAILED DIRECTIONS, Normal, Disp-12 Tablet, R-3Take with 8 ounces of water on empty stomach. Remain upright for 30 minutes afterwards. Take once weekly      Blood-Glucose Meter (ACCU-CHEK WENDY PLUS METER) misc Use as directed. E11.9, Normal, Disp-1 Each, R-0      lancets (ACCU-CHEK SOFTCLIX LANCETS) misc TEST 2 TIMES DAILY AS DIRECTED (E11.9), Normal, Disp-200 Each, R-12      polyethylene glycol (MIRALAX) 17 gram packet Take 17 g by mouth daily as needed ('Constipation'). , Historical Med      multivit,calc,mins/iron/folic (ONE-A-DAY WOMENS FORMULA PO) Take 1 Tab by mouth daily. , Historical Med       !! - Potential duplicate medications found. Please discuss with provider. 2.   Follow-up Information     Follow up With Specialties Details Why Contact Info    Reyes Dudley MD Family Medicine In 3 days  [address]       [facility] EMERGENCY DEPT Emergency Medicine  If symptoms worsen [address] [place] [address]  [phone]    Carmen Mckeon MD Orthopedic Surgery  As needed [address] [place] [address]  [phone]          3. Return to ED if worse     Diagnosis     Clinical Impression:   1.  Acute bilateral ankle pain        Attestations:    Charisma Martinez MD    Please note that this dictation was completed with [employer] voice recognition software. Quite often unanticipated grammatical, syntax, homophones, and other interpretive errors are inadvertently transcribed by the computer software. Please disregard these errors. Please excuse any errors that have escaped final proofreading. Thank you.

## 2022-05-08 NOTE — DISCHARGE INSTRUCTIONS
You are seen in the ER for your symptoms. Please use the steroids as well as anti-inflammatories. Please use the gabapentin for pain. Please return for new or worsening symptoms anytime.

## 2022-05-10 ENCOUNTER — HOSPITAL ENCOUNTER (EMERGENCY)
Age: 72
Discharge: HOME | End: 2022-05-10
Attending: EMERGENCY MEDICINE
Payer: MEDICARE

## 2022-05-10 ENCOUNTER — APPOINTMENT (OUTPATIENT)
Dept: GENERAL RADIOLOGY | Age: 72
End: 2022-05-10
Attending: EMERGENCY MEDICINE
Payer: MEDICARE

## 2022-05-10 VITALS
TEMPERATURE: 98.1 F | HEART RATE: 81 BPM | RESPIRATION RATE: 18 BRPM | DIASTOLIC BLOOD PRESSURE: 70 MMHG | OXYGEN SATURATION: 100 % | SYSTOLIC BLOOD PRESSURE: 150 MMHG

## 2022-05-10 DIAGNOSIS — R07.9 CHEST PAIN, UNSPECIFIED TYPE: ICD-10-CM

## 2022-05-10 DIAGNOSIS — E11.65 TYPE 2 DIABETES MELLITUS WITH HYPERGLYCEMIA, WITHOUT LONG-TERM CURRENT USE OF INSULIN (HCC): ICD-10-CM

## 2022-05-10 DIAGNOSIS — R73.9 HYPERGLYCEMIA: Primary | ICD-10-CM

## 2022-05-10 LAB
ALBUMIN SERPL-MCNC: 3.6 G/DL (ref 3.5–5)
ALBUMIN/GLOB SERPL: 0.9 {RATIO} (ref 1.1–2.2)
ALP SERPL-CCNC: 95 U/L (ref 45–117)
ALT SERPL-CCNC: 27 U/L (ref 12–78)
ANION GAP SERPL CALC-SCNC: 7 MMOL/L (ref 5–15)
AST SERPL-CCNC: 17 U/L (ref 15–37)
BASOPHILS # BLD: 0 K/UL (ref 0–0.1)
BASOPHILS NFR BLD: 0 % (ref 0–1)
BILIRUB SERPL-MCNC: 0.2 MG/DL (ref 0.2–1)
BUN SERPL-MCNC: 26 MG/DL (ref 6–20)
BUN/CREAT SERPL: 21 (ref 12–20)
CALCIUM SERPL-MCNC: 9.4 MG/DL (ref 8.5–10.1)
CHLORIDE SERPL-SCNC: 108 MMOL/L (ref 97–108)
CO2 SERPL-SCNC: 23 MMOL/L (ref 21–32)
COMMENT, HOLDF: NORMAL
CREAT SERPL-MCNC: 1.22 MG/DL (ref 0.55–1.02)
DIFFERENTIAL METHOD BLD: ABNORMAL
EOSINOPHIL # BLD: 0 K/UL (ref 0–0.4)
EOSINOPHIL NFR BLD: 0 % (ref 0–7)
ERYTHROCYTE [DISTWIDTH] IN BLOOD BY AUTOMATED COUNT: 14.3 % (ref 11.5–14.5)
GLOBULIN SER CALC-MCNC: 4.2 G/DL (ref 2–4)
GLUCOSE SERPL-MCNC: 240 MG/DL (ref 65–100)
HCT VFR BLD AUTO: 36.7 % (ref 35–47)
HGB BLD-MCNC: 11.6 G/DL (ref 11.5–16)
IMM GRANULOCYTES # BLD AUTO: 0 K/UL (ref 0–0.04)
IMM GRANULOCYTES NFR BLD AUTO: 0 % (ref 0–0.5)
LYMPHOCYTES # BLD: 0.7 K/UL (ref 0.8–3.5)
LYMPHOCYTES NFR BLD: 10 % (ref 12–49)
MCH RBC QN AUTO: 29.1 PG (ref 26–34)
MCHC RBC AUTO-ENTMCNC: 31.6 G/DL (ref 30–36.5)
MCV RBC AUTO: 92 FL (ref 80–99)
MONOCYTES # BLD: 0.1 K/UL (ref 0–1)
MONOCYTES NFR BLD: 2 % (ref 5–13)
NEUTS SEG # BLD: 6.5 K/UL (ref 1.8–8)
NEUTS SEG NFR BLD: 88 % (ref 32–75)
NRBC # BLD: 0 K/UL (ref 0–0.01)
NRBC BLD-RTO: 0 PER 100 WBC
PLATELET # BLD AUTO: 177 K/UL (ref 150–400)
PMV BLD AUTO: 11.8 FL (ref 8.9–12.9)
POTASSIUM SERPL-SCNC: 4.8 MMOL/L (ref 3.5–5.1)
PROT SERPL-MCNC: 7.8 G/DL (ref 6.4–8.2)
RBC # BLD AUTO: 3.99 M/UL (ref 3.8–5.2)
RBC MORPH BLD: ABNORMAL
SAMPLES BEING HELD,HOLD: NORMAL
SODIUM SERPL-SCNC: 138 MMOL/L (ref 136–145)
TROPONIN-HIGH SENSITIVITY: 4 NG/L (ref 0–51)
TROPONIN-HIGH SENSITIVITY: 4 NG/L (ref 0–51)
WBC # BLD AUTO: 7.3 K/UL (ref 3.6–11)

## 2022-05-10 PROCEDURE — 99285 EMERGENCY DEPT VISIT HI MDM: CPT

## 2022-05-10 PROCEDURE — 85025 COMPLETE CBC W/AUTO DIFF WBC: CPT

## 2022-05-10 PROCEDURE — 36415 COLL VENOUS BLD VENIPUNCTURE: CPT

## 2022-05-10 PROCEDURE — 71046 X-RAY EXAM CHEST 2 VIEWS: CPT

## 2022-05-10 PROCEDURE — 84484 ASSAY OF TROPONIN QUANT: CPT

## 2022-05-10 PROCEDURE — 80053 COMPREHEN METABOLIC PANEL: CPT

## 2022-05-10 PROCEDURE — 93005 ELECTROCARDIOGRAM TRACING: CPT

## 2022-05-10 PROCEDURE — 74011250636 HC RX REV CODE- 250/636: Performed by: EMERGENCY MEDICINE

## 2022-05-10 RX ADMIN — SODIUM CHLORIDE 1000 ML: 9 INJECTION, SOLUTION INTRAVENOUS at 20:15

## 2022-05-10 NOTE — ED TRIAGE NOTES
Pt arrives from home via EMS with substernal non-radiating CP, hyperglycemia, and nausea. EMS , 147/74, . Pt given one nitro which reduced pain from 7/10 to 2/10, and 324 ASA.

## 2022-05-11 LAB
ATRIAL RATE: 108 BPM
CALCULATED P AXIS, ECG09: 84 DEGREES
CALCULATED R AXIS, ECG10: -18 DEGREES
CALCULATED T AXIS, ECG11: 64 DEGREES
DIAGNOSIS, 93000: NORMAL
P-R INTERVAL, ECG05: 148 MS
Q-T INTERVAL, ECG07: 326 MS
QRS DURATION, ECG06: 74 MS
QTC CALCULATION (BEZET), ECG08: 436 MS
VENTRICULAR RATE, ECG03: 108 BPM

## 2022-05-11 NOTE — ED PROVIDER NOTES
79-year-old female with past medical history significant for non-insulin-dependent diabetes, GERD, hypertension, high cholesterol presents with complaints of substernal chest pressure associated with nausea this evening after taking 2 extra doses of metformin for elevated blood sugar. Patient reports her blood sugar was elevated after she started prednisone yesterday. Patient reports pain is now resolved. Denies diaphoresis, shortness of breath. Denies radiating pain. Pain resolved after aspirin 325 and 1 sublingual nitro given in route by EMS. Denies any recent illness, fever, chills, nausea, vomiting, diarrhea, constipation. Denies urinary complaints. Former smoker  Denies alcohol and drug use  Primary care-he can           Past Medical History:   Diagnosis Date    Allergic rhinitis due to allergen 10/22/2014    Arthritis     spine    Asthma     many years ago, no inhaler or meds    Chronic pain     back pain    Depression     Diabetes ([address]     Type II    GERD (gastroesophageal reflux disease)     Hepatitis B 1984    Hypercholesterolemia     Hypertension     Kidney stones 1990's    Positive PPD 2009    treated with INH       Past Surgical History:   Procedure Laterality Date    COLONOSCOPY  4-11    manetas- n ormal    COLONOSCOPY N/A 11/4/2021    COLONOSCOPY, EGD performed by Nolan Botello MD at [place] [address] EGD  4-11    manetas- esophageal ring    ENDOSCOPY, COLON, DIAGNOSTIC  12/2007    2 polyps    HX BREAST BIOPSY Bilateral 1980s    all benign cysts    HX BUNIONECTOMY Bilateral 1990s    HX CATARACT REMOVAL Bilateral 11/2018    HX CHOLECYSTECTOMY      HX COLONOSCOPY  May 2016    HX ENDOSCOPY  May 2016    HX GYN  1978    tubal laparoscopy-unblocked tubes    HX HEART CATHETERIZATION      negatve    HX ORTHOPAEDIC  2016    had an injection in her back to help with leg pain    HX ORTHOPAEDIC Left 12/12/2017    foot surg.   bunion removal  \"Put a plate in\"    HX OTHER SURGICAL  1997    bunionectomy    HX TONSIL AND ADENOIDECTOMY      approx 11years old    [place] BREAST SURGERY PROCEDURE UNLISTED      abcess bilateral    [place] ESOPHAGEAL MOTILITY STUDY W/INTERP&RPT  2020    [place] GERD TST W/ NASAL IMPEDENCE ELECTROD  2020         Family History:   Problem Relation Age of Onset    Diabetes Mother     Hypertension Mother     Stroke Mother     Cancer Father         lung    Diabetes Father     Cancer Paternal Aunt         colon    Cancer Maternal Aunt         colon, and lung    Hypertension Maternal Aunt     Diabetes Maternal Aunt     Hypertension Maternal Aunt     Stroke Maternal Aunt     Heart Disease Maternal Aunt     Heart Disease Maternal Uncle     Psychiatric Disorder Brother        Social History     Socioeconomic History    Marital status: SINGLE     Spouse name: Not on file    Number of children: Not on file    Years of education: Not on file    Highest education level: Not on file   Occupational History    Not on file   Tobacco Use    Smoking status: Former Smoker     Packs/day: 1.00     Years: 25.00     Pack years: 25.00     Types: Cigarettes     Quit date: 2010     Years since quittin.7    Smokeless tobacco: Never Used   Vaping Use    Vaping Use: Never used   Substance and Sexual Activity    Alcohol use: Never    Drug use: Never    Sexual activity: Not Currently     Partners: Male     Birth control/protection: None   Other Topics Concern    Not on file   Social History Narrative    Retired schoolteacher. Raised 2 nieces. Social Determinants of Health     Financial Resource Strain:     Difficulty of Paying Living Expenses: Not on file   Food Insecurity:     Worried About Running Out of Food in the Last Year: Not on file    Kitty of Food in the Last Year: Not on file   Transportation Needs:     Lack of Transportation (Medical): Not on file    Lack of Transportation (Non-Medical):  Not on file   Physical Activity:  Days of Exercise per Week: Not on file    Minutes of Exercise per Session: Not on file   Stress:     Feeling of Stress : Not on file   Social Connections:     Frequency of Communication with Friends and Family: Not on file    Frequency of Social Gatherings with Friends and Family: Not on file    Attends Orthodoxy Services: Not on file    Active Member of [address] [place] [address] [employer] or Organizations: Not on file    Attends Club or Organization Meetings: Not on file    Marital Status: Not on file   Intimate Partner Violence:     Fear of Current or Ex-Partner: Not on file    Emotionally Abused: Not on file    Physically Abused: Not on file    Sexually Abused: Not on file   Housing Stability:     Unable to Pay for Housing in the Last Year: Not on file    Number of [place] in the Last Year: Not on file    Unstable Housing in the Last Year: Not on file         ALLERGIES: Latex, Lisinopril, Atorvastatin, and Sulfa (sulfonamide antibiotics)    Review of Systems   Constitutional: Negative for chills and fever. HENT: Negative for congestion, nosebleeds and rhinorrhea. Eyes: Negative for pain and redness. Respiratory: Negative for cough and shortness of breath. Cardiovascular: Positive for chest pain. Negative for palpitations. Gastrointestinal: Positive for nausea. Negative for abdominal pain and vomiting. Genitourinary: Negative for dysuria, frequency, vaginal bleeding and vaginal pain. Musculoskeletal: Negative for myalgias. Skin: Negative for rash and wound. Neurological: Negative for seizures, syncope and weakness. Hematological: Does not bruise/bleed easily. Psychiatric/Behavioral: Negative for agitation, confusion, dysphoric mood and suicidal ideas. The patient is not nervous/anxious. Vitals:    05/10/22 1844   BP: (!) 154/73   Pulse: (!) 103   Resp: 18   Temp: 98.1 °F (36.7 °C)   SpO2: 97%            Physical Exam  Vitals and nursing note reviewed.    Constitutional:       Appearance: She is well-developed. HENT:      Head: Normocephalic and atraumatic. Eyes:      Pupils: Pupils are equal, round, and reactive to light. Neck:      Trachea: No tracheal deviation. Cardiovascular:      Rate and Rhythm: Normal rate and regular rhythm. Heart sounds: Normal heart sounds. Pulmonary:      Effort: Pulmonary effort is normal. No respiratory distress. Breath sounds: Normal breath sounds. No stridor. No wheezing or rales. Chest:      Chest wall: No tenderness. Abdominal:      General: Bowel sounds are normal. There is no distension. Palpations: Abdomen is soft. Tenderness: There is no abdominal tenderness. There is no rebound. Musculoskeletal:         General: No tenderness. Normal range of motion. Cervical back: Normal range of motion and neck supple. Skin:     General: Skin is warm and dry. Coloration: Skin is not pale. Findings: No rash. Neurological:      Mental Status: She is alert and oriented to person, place, and time. Cranial Nerves: No cranial nerve deficit. MDM  Number of Diagnoses or Management Options  Chest pain, unspecified type  Hyperglycemia  Type 2 diabetes mellitus with hyperglycemia, without long-term current use of insulin ([facility] [address]  Diagnosis management comments: 59-year-old female with history of hypertension, GERD, diabetes non-insulin-dependent presents complaints of substernal chest pressure after taking extra metformin today and starting prednisone yesterday for foot pain. Patient denies any current foot pain. Patient is well-appearing, no acute distress, hemodynamically stable, afebrile, nontoxic, no respiratory distress, clear to auscultation bilaterally. Plan-EKG, chest x-ray, CBC/CMP/cardiac enzymes. EKG, chest x-ray, labs unremarkable  Clinical impression consistent with GERD secondary to prednisone and extra metformin.        Amount and/or Complexity of Data Reviewed  Clinical lab tests: ordered and reviewed  Tests in the radiology section of CPT®: ordered and reviewed  Independent visualization of images, tracings, or specimens: yes           Procedures    ED EKG interpretation:  Rhythm: sinus tachycardia; and regular . Rate (approx.): 108; Axis: normal; P wave: normal; QRS interval: normal ; ST/T wave: normal; Other findings: no ischemia. This EKG was interpreted by Umair Corado MD,ED Provider. 11:02 PM  Patient's results have been reviewed with them. Patient and/or family have verbally conveyed their understanding and agreement of the patient's signs, symptoms, diagnosis, treatment and prognosis and additionally agree to follow up as recommended or return to the Emergency Room should their condition change prior to follow-up. Discharge instructions have also been provided to the patient with some educational information regarding their diagnosis as well a list of reasons why they would want to return to the ER prior to their follow-up appointment should their condition change.

## 2022-05-11 NOTE — ED NOTES
Onesimo Krueger MD reviewed discharge instructions with the patient. The patient verbalized understanding. All questions and concerns were addressed. The patient declined a wheelchair and is discharged ambulatory in the care of family members with instructions and prescriptions in hand. Pt is alert and oriented x 4. Respirations are clear and unlabored.

## 2022-05-12 ENCOUNTER — HOSPITAL ENCOUNTER (EMERGENCY)
Age: 72
Discharge: HOME | End: 2022-05-12
Attending: EMERGENCY MEDICINE
Payer: MEDICARE

## 2022-05-12 ENCOUNTER — APPOINTMENT (OUTPATIENT)
Dept: CT IMAGING | Age: 72
End: 2022-05-12
Attending: EMERGENCY MEDICINE
Payer: MEDICARE

## 2022-05-12 VITALS
SYSTOLIC BLOOD PRESSURE: 140 MMHG | OXYGEN SATURATION: 99 % | HEART RATE: 66 BPM | RESPIRATION RATE: 16 BRPM | TEMPERATURE: 97.8 F | WEIGHT: 141.31 LBS | BODY MASS INDEX: 26.01 KG/M2 | DIASTOLIC BLOOD PRESSURE: 56 MMHG | HEIGHT: 62 IN

## 2022-05-12 DIAGNOSIS — G44.201 ACUTE INTRACTABLE TENSION-TYPE HEADACHE: Primary | ICD-10-CM

## 2022-05-12 DIAGNOSIS — R07.0 THROAT PAIN: ICD-10-CM

## 2022-05-12 LAB
GLUCOSE BLD STRIP.AUTO-MCNC: 118 MG/DL (ref 65–117)
SERVICE CMNT-IMP: ABNORMAL

## 2022-05-12 PROCEDURE — 70450 CT HEAD/BRAIN W/O DYE: CPT

## 2022-05-12 PROCEDURE — 99284 EMERGENCY DEPT VISIT MOD MDM: CPT

## 2022-05-12 PROCEDURE — 74011250637 HC RX REV CODE- 250/637: Performed by: EMERGENCY MEDICINE

## 2022-05-12 PROCEDURE — 82962 GLUCOSE BLOOD TEST: CPT

## 2022-05-12 RX ORDER — METHOCARBAMOL 750 MG/1
750 TABLET, FILM COATED ORAL
Qty: 20 TABLET | Refills: 0 | Status: SHIPPED | OUTPATIENT
Start: 2022-05-12

## 2022-05-12 RX ORDER — PROCHLORPERAZINE MALEATE 5 MG
5 TABLET ORAL
Qty: 12 TABLET | Refills: 0 | Status: SHIPPED | OUTPATIENT
Start: 2022-05-12

## 2022-05-12 RX ORDER — KETOROLAC TROMETHAMINE 10 MG/1
5 TABLET, FILM COATED ORAL
Qty: 5 TABLET | Refills: 0 | Status: SHIPPED | OUTPATIENT
Start: 2022-05-12 | End: 2022-05-27 | Stop reason: ALTCHOICE

## 2022-05-12 RX ORDER — BUTALBITAL, ACETAMINOPHEN AND CAFFEINE 50; 325; 40 MG/1; MG/1; MG/1
1 TABLET ORAL
Status: COMPLETED | OUTPATIENT
Start: 2022-05-12 | End: 2022-05-12

## 2022-05-12 RX ORDER — METHOCARBAMOL 750 MG/1
750 TABLET, FILM COATED ORAL
Status: COMPLETED | OUTPATIENT
Start: 2022-05-12 | End: 2022-05-12

## 2022-05-12 RX ORDER — ONDANSETRON 4 MG/1
4 TABLET, ORALLY DISINTEGRATING ORAL
Status: COMPLETED | OUTPATIENT
Start: 2022-05-12 | End: 2022-05-12

## 2022-05-12 RX ADMIN — BUTALBITAL, ACETAMINOPHEN, AND CAFFEINE 1 TABLET: 50; 325; 40 TABLET ORAL at 12:31

## 2022-05-12 RX ADMIN — METHOCARBAMOL TABLETS 750 MG: 750 TABLET, COATED ORAL at 12:31

## 2022-05-12 RX ADMIN — ONDANSETRON 4 MG: 4 TABLET, ORALLY DISINTEGRATING ORAL at 12:31

## 2022-05-12 NOTE — Clinical Note
Brittany Regalado was seen and treated in our emergency department on 5/12/2022.     Patient may return to work on May 16, 2022    Lis Collier MD

## 2022-05-12 NOTE — ED PROVIDER NOTES
EMERGENCY DEPARTMENT HISTORY AND PHYSICAL EXAM      Date: 5/12/2022  Patient Name: Sabrina Schneider    History of Presenting Illness     Chief Complaint   Patient presents with    Headache     HA and sore throat since Tuesday. Came here then and her BS was high - treated and she went home. The HA has not gotten any better and now she has a sore throat.  Sore Throat       History Provided By: Patient    HPI: Sabrina Schneider, 73 y.o. female presents to the ED with cc of headache and throat pain. Patient symptoms started 2 days ago. She was seen at [address] [place] [address] 2 days ago for hyperglycemia and chest pain. Her glucose was in the 300s at that time. She had blurred vision,  which she attributed to hypoglycemia. She has had a constant headache, which is a 7 out of 10 in severity. She has taken Tylenol for pain which helps somewhat. She denies trauma, fever or chills. She denies posterior neck pain. She does not have pain when she swallows, but she feels like the outside of her anterior neck is sore. That is mild and it started today. She denies chest pain, shortness of breath, cough, lightheadedness, nausea, abdominal pain or dizziness. There are no other complaints, changes, or physical findings at this time. PCP: Anh Hanna MD    No current facility-administered medications on file prior to encounter. Current Outpatient Medications on File Prior to Encounter   Medication Sig Dispense Refill    predniSONE (DELTASONE) 20 mg tablet Take 40 mg by mouth daily for 5 days. With Breakfast 10 Tablet 0    gabapentin (NEURONTIN) 300 mg capsule Take 1 Capsule by mouth three (3) times daily as needed for Pain. Max Daily Amount: 900 mg. 21 Capsule 0    cholecalciferol, vitamin D3, (Vitamin D3) 50 mcg (2,000 unit) tab Take 2,000 Units by mouth daily.  primidone (MYSOLINE) 50 mg tablet Take 1 Tablet by mouth two (2) times a day. (Patient taking differently: Take 50 mg by mouth two (2) times a day. Indications: essential tremor) 180 Tablet 1    gabapentin (NEURONTIN) 300 mg capsule Take 300 mg by mouth three (3) times daily as needed.  RABEprazole (ACIPHEX) 20 mg TbEC Take 20 mg by mouth two (2) times a day.  amLODIPine (NORVASC) 5 mg tablet Take 1 Tablet by mouth daily. 90 Tablet 1    atorvastatin (LIPITOR) 10 mg tablet Take 1 Tablet by mouth daily. For cholesterol (Patient taking differently: Take 10 mg by mouth nightly. For cholesterol) 90 Tablet 12    glucose blood VI test strips (Accu-Chek Wendy Plus test strp) strip USE TO TEST BLOOD SUGAR ONCE DAILY. DIAGNOSIS E11.40 100 Strip 4    metFORMIN (GLUCOPHAGE) 1,000 mg tablet Take 1 tablet in AM and 1 tablet in PM. For Diabetes (Patient taking differently: Take 1,000 mg by mouth two (2) times daily (with meals). Take 1 tablet in AM and 1 tablet in PM. For Diabetes) 180 Tablet 3    omeprazole (PRILOSEC) 40 mg capsule Take 1 Capsule by mouth every morning. 90 Capsule 3    alendronate (FOSAMAX) 35 mg tablet PLEASE SEE ATTACHED FOR DETAILED DIRECTIONS (Patient taking differently: Take 35 mg by mouth every seven (7) days. PLEASE SEE ATTACHED FOR DETAILED DIRECTIONS, mondays) 12 Tablet 3    Blood-Glucose Meter (ACCU-CHEK WENDY PLUS METER) misc Use as directed. E11.9 1 Each 0    lancets (ACCU-CHEK SOFTCLIX LANCETS) misc TEST 2 TIMES DAILY AS DIRECTED (E11.9) 200 Each 12    polyethylene glycol (MIRALAX) 17 gram packet Take 17 g by mouth daily as needed ('Constipation').  multivit,calc,mins/iron/folic (ONE-A-DAY WOMENS FORMULA PO) Take 1 Tab by mouth daily.          Past History     Past Medical History:  Past Medical History:   Diagnosis Date    Allergic rhinitis due to allergen 10/22/2014    Arthritis     spine    Asthma     many years ago, no inhaler or meds    Chronic pain     back pain    Depression     Diabetes (HCC)     Type II    GERD (gastroesophageal reflux disease)     Hepatitis B 1984    Hypercholesterolemia     Hypertension     Kidney stones     Positive PPD 2009    treated with INH       Past Surgical History:  Past Surgical History:   Procedure Laterality Date    COLONOSCOPY      manetas- n ormal    COLONOSCOPY N/A 2021    COLONOSCOPY, EGD performed by Glenroy Gallagher MD at [facility] AM[facility] OR    EGD      manetas- esophageal ring    ENDOSCOPY, COLON, DIAGNOSTIC  2007    2 polyps    HX BREAST BIOPSY Bilateral     all benign cysts    HX BUNIONECTOMY Bilateral     HX CATARACT REMOVAL Bilateral 2018    HX CHOLECYSTECTOMY      HX COLONOSCOPY  May 2016    HX ENDOSCOPY  May 2016    HX GYN  1978    tubal laparoscopy-unblocked tubes    HX HEART CATHETERIZATION      negatve    HX ORTHOPAEDIC      had an injection in her back to help with leg pain    HX ORTHOPAEDIC Left 2017    foot surg.   bunion removal  \"Put a plate in\"    HX OTHER SURGICAL      bunionectomy    HX TONSIL AND ADENOIDECTOMY      approx 11years old   [address]      abcess bilateral    ME ESOPHAGEAL MOTILITY STUDY W/INTERP&RPT  2020    [place] GERD TST W/ NASAL IMPEDENCE ELECTROD  2020       Family History:  Family History   Problem Relation Age of Onset    Diabetes Mother     Hypertension Mother     Stroke Mother     Cancer Father         lung    Diabetes Father     Cancer Paternal Aunt         colon    Cancer Maternal Aunt         colon, and lung    Hypertension Maternal Aunt     Diabetes Maternal Aunt     Hypertension Maternal Aunt     Stroke Maternal Aunt     Heart Disease Maternal Aunt     Heart Disease Maternal Uncle     Psychiatric Disorder Brother        Social History:  Social History     Tobacco Use    Smoking status: Former Smoker     Packs/day: 1.00     Years: 25.00     Pack years: 25.00     Types: Cigarettes     Quit date: 2010     Years since quittin.7    Smokeless tobacco: Never Used   Vaping Use    Vaping Use: Never used   Substance Use Topics    Alcohol use: Never    Drug use: Never       Allergies: Allergies   Allergen Reactions    Latex Itching    Lisinopril Swelling    Atorvastatin Other (comments)     20 mg causes muscle cramps    Sulfa (Sulfonamide Antibiotics) Itching     About 11 yo  Keflex=unsure         Review of Systems   Review of Systems   Constitutional: Negative for fever. HENT: Positive for sore throat. Negative for congestion. Eyes: Negative. Respiratory: Negative for shortness of breath. Cardiovascular: Negative for chest pain. Gastrointestinal: Negative for abdominal pain. Endocrine: Negative for heat intolerance. Genitourinary: Negative. Musculoskeletal: Negative for back pain. Skin: Negative for rash. Allergic/Immunologic: Negative for immunocompromised state. Neurological: Positive for headaches. Hematological: Does not bruise/bleed easily. Psychiatric/Behavioral: Negative. All other systems reviewed and are negative. Physical Exam   Physical Exam  Vitals and nursing note reviewed. Constitutional:       General: She is not in acute distress. Appearance: She is well-developed. HENT:      Head: Normocephalic. Mouth/Throat:      Pharynx: Oropharynx is clear. Tonsils: No tonsillar exudate. Eyes:      Pupils: Pupils are equal, round, and reactive to light. Neck:      Comments: No posterior neck tenderness,  Cardiovascular:      Rate and Rhythm: Normal rate and regular rhythm. Heart sounds: Normal heart sounds. Pulmonary:      Effort: Pulmonary effort is normal.      Breath sounds: Normal breath sounds. Abdominal:      General: Bowel sounds are normal.      Palpations: Abdomen is soft. Tenderness: There is no abdominal tenderness. Musculoskeletal:         General: Normal range of motion. Cervical back: Normal range of motion and neck supple. Skin:     General: Skin is warm and dry.    Neurological:      General: No focal deficit present. Mental Status: She is alert and oriented to person, place, and time. Psychiatric:         Behavior: Behavior normal.         Diagnostic Study Results     Labs -     Recent Results (from the past 12 hour(s))   GLUCOSE, POC    Collection Time: 05/12/22 10:16 AM   Result Value Ref Range    Glucose (POC) 118 (H) 65 - 117 mg/dL    Performed by [facility]        Radiologic Studies -   CT HEAD WO CONT   Final Result   No acute abnormality              CT Results  (Last 48 hours)    None        CXR Results  (Last 48 hours)               05/10/22 2042  XR CHEST PA LAT Final result    Impression:  NORMAL CHEST. Narrative:  History: Chest pain. Frontal and lateral views of the chest show clear lungs. The heart, mediastinum   and pulmonary vasculature are normal.  The bony thorax is unremarkable. There   are clips in the upper abdomen. Medical Decision Making   I am the first provider for this patient. I reviewed the vital signs, available nursing notes, past medical history, past surgical history, family history and social history. Vital Signs-Reviewed the patient's vital signs. Patient Vitals for the past 12 hrs:   Temp Pulse Resp BP SpO2   05/12/22 1018 97.8 °F (36.6 °C) 66 16 (!) 140/56 99 %         Records Reviewed: Nursing Notes, Old Medical Records, Previous electrocardiograms, Previous Radiology Studies and Previous Laboratory Studies    Provider Notes (Medical Decision Making):   Headache, intracranial hemorrhage, lymphadenopathy    ED Course:   Initial assessment performed. The patients presenting problems have been discussed, and they are in agreement with the care plan formulated and outlined with them. I have encouraged them to ask questions as they arise throughout their visit. Progress note: The patient is feeling better. Her results were reviewed.   She is advised to follow-up and return to ER if worse           Critical Care Time:   0  Roderick Nayak MD      Disposition:  home    DISCHARGE PLAN:  1. Discharge Medication List as of 5/12/2022 12:50 PM      START taking these medications    Details   ketorolac (TORADOL) 10 mg tablet Take 0.5 Tablets by mouth every six (6) hours as needed for Pain., Normal, Disp-5 Tablet, R-0      prochlorperazine (Compazine) 5 mg tablet Take 1 Tablet by mouth every eight (8) hours as needed for Nausea., Normal, Disp-12 Tablet, R-0      methocarbamoL (Robaxin-750) 750 mg tablet Take 1 Tablet by mouth four (4) times daily as needed for Muscle Spasm(s). , Normal, Disp-20 Tablet, R-0         CONTINUE these medications which have NOT CHANGED    Details   predniSONE (DELTASONE) 20 mg tablet Take 40 mg by mouth daily for 5 days. With Breakfast, Normal, Disp-10 Tablet, R-0      !! gabapentin (NEURONTIN) 300 mg capsule Take 1 Capsule by mouth three (3) times daily as needed for Pain. Max Daily Amount: 900 mg., Normal, Disp-21 Capsule, R-0      cholecalciferol, vitamin D3, (Vitamin D3) 50 mcg (2,000 unit) tab Take 2,000 Units by mouth daily. , Historical Med      primidone (MYSOLINE) 50 mg tablet Take 1 Tablet by mouth two (2) times a day., Normal, Disp-180 Tablet, R-1      !! gabapentin (NEURONTIN) 300 mg capsule Take 300 mg by mouth three (3) times daily as needed., Historical Med      RABEprazole (ACIPHEX) 20 mg TbEC Take 20 mg by mouth two (2) times a day., Historical Med      amLODIPine (NORVASC) 5 mg tablet Take 1 Tablet by mouth daily. , Normal, Disp-90 Tablet, R-1      atorvastatin (LIPITOR) 10 mg tablet Take 1 Tablet by mouth daily. For cholesterol, Normal, Disp-90 Tablet, R-12      glucose blood VI test strips (Accu-Chek Wendy Plus test strp) strip USE TO TEST BLOOD SUGAR ONCE DAILY.   DIAGNOSIS E11.40, Normal, Disp-100 Strip, R-4DX E11.40  PT REQUEST PRESCRIPTION FOR TEST STRIPS   WENDY PLUS   TEST TID.      metFORMIN (GLUCOPHAGE) 1,000 mg tablet Take 1 tablet in AM and 1 tablet in PM. For Diabetes, Normal, Disp-180 Tablet, R-3      omeprazole (PRILOSEC) 40 mg capsule Take 1 Capsule by mouth every morning., Normal, Disp-90 Capsule, R-3      alendronate (FOSAMAX) 35 mg tablet PLEASE SEE ATTACHED FOR DETAILED DIRECTIONS, Normal, Disp-12 Tablet, R-3Take with 8 ounces of water on empty stomach. Remain upright for 30 minutes afterwards. Take once weekly      Blood-Glucose Meter (ACCU-CHEK SOFIA PLUS METER) misc Use as directed. E11.9, Normal, Disp-1 Each, R-0      lancets (ACCU-CHEK SOFTCLIX LANCETS) misc TEST 2 TIMES DAILY AS DIRECTED (E11.9), Normal, Disp-200 Each, R-12      polyethylene glycol (MIRALAX) 17 gram packet Take 17 g by mouth daily as needed ('Constipation'). , Historical Med      multivit,calc,mins/iron/folic (ONE-A-DAY WOMENS FORMULA PO) Take 1 Tab by mouth daily. , Historical Med       !! - Potential duplicate medications found. Please discuss with provider. 2.   Follow-up Information     Follow up With Specialties Details Why Contact Info    Silvina Wall MD Family Medicine  As needed [address]  [phone]      [facility] EMERGENCY DEPT Emergency Medicine  If symptoms worsen [address] [facility] [address]    Ann Nick MD Neurology  As needed [address]  [address] [phone]          3. Return to ED if worse     Diagnosis     Clinical Impression:   1. Acute intractable tension-type headache    2. Throat pain        Attestations:    Rajiv Loera MD    Please note that this dictation was completed with [employer], the "[employer]" voice recognition software. Quite often unanticipated grammatical, syntax, homophones, and other interpretive errors are inadvertently transcribed by the computer software. Please disregard these errors. Please excuse any errors that have escaped final proofreading. Thank you.

## 2022-05-27 ENCOUNTER — OFFICE VISIT (OUTPATIENT)
Dept: FAMILY MEDICINE CLINIC | Age: 72
End: 2022-05-27
Payer: MEDICARE

## 2022-05-27 ENCOUNTER — TELEPHONE (OUTPATIENT)
Dept: OTHER | Facility: CLINIC | Age: 72
End: 2022-05-27

## 2022-05-27 ENCOUNTER — NURSE TRIAGE (OUTPATIENT)
Dept: OTHER | Facility: CLINIC | Age: 72
End: 2022-05-27

## 2022-05-27 VITALS
HEIGHT: 62 IN | SYSTOLIC BLOOD PRESSURE: 125 MMHG | WEIGHT: 143.4 LBS | BODY MASS INDEX: 26.39 KG/M2 | OXYGEN SATURATION: 94 % | HEART RATE: 73 BPM | DIASTOLIC BLOOD PRESSURE: 89 MMHG | TEMPERATURE: 96.4 F | RESPIRATION RATE: 20 BRPM

## 2022-05-27 DIAGNOSIS — I10 ESSENTIAL HYPERTENSION: Primary | ICD-10-CM

## 2022-05-27 DIAGNOSIS — R53.1 WEAKNESS: ICD-10-CM

## 2022-05-27 DIAGNOSIS — F33.0 MAJOR DEPRESSIVE DISORDER, RECURRENT, MILD (HCC): ICD-10-CM

## 2022-05-27 DIAGNOSIS — E11.9 CONTROLLED TYPE 2 DIABETES MELLITUS WITHOUT COMPLICATION, WITHOUT LONG-TERM CURRENT USE OF INSULIN (HCC): ICD-10-CM

## 2022-05-27 PROBLEM — N18.30 CHRONIC RENAL DISEASE, STAGE III (HCC): Status: ACTIVE | Noted: 2022-05-27

## 2022-05-27 LAB — HBA1C MFR BLD HPLC: 7.3 %

## 2022-05-27 PROCEDURE — 3017F COLORECTAL CA SCREEN DOC REV: CPT | Performed by: FAMILY MEDICINE

## 2022-05-27 PROCEDURE — G8752 SYS BP LESS 140: HCPCS | Performed by: FAMILY MEDICINE

## 2022-05-27 PROCEDURE — G8427 DOCREV CUR MEDS BY ELIG CLIN: HCPCS | Performed by: FAMILY MEDICINE

## 2022-05-27 PROCEDURE — 1090F PRES/ABSN URINE INCON ASSESS: CPT | Performed by: FAMILY MEDICINE

## 2022-05-27 PROCEDURE — G8399 PT W/DXA RESULTS DOCUMENT: HCPCS | Performed by: FAMILY MEDICINE

## 2022-05-27 PROCEDURE — 2022F DILAT RTA XM EVC RTNOPTHY: CPT | Performed by: FAMILY MEDICINE

## 2022-05-27 PROCEDURE — G0463 HOSPITAL OUTPT CLINIC VISIT: HCPCS | Performed by: FAMILY MEDICINE

## 2022-05-27 PROCEDURE — 99213 OFFICE O/P EST LOW 20 MIN: CPT | Performed by: FAMILY MEDICINE

## 2022-05-27 PROCEDURE — 3051F HG A1C>EQUAL 7.0%<8.0%: CPT | Performed by: FAMILY MEDICINE

## 2022-05-27 PROCEDURE — G8417 CALC BMI ABV UP PARAM F/U: HCPCS | Performed by: FAMILY MEDICINE

## 2022-05-27 PROCEDURE — 83036 HEMOGLOBIN GLYCOSYLATED A1C: CPT | Performed by: FAMILY MEDICINE

## 2022-05-27 PROCEDURE — G8536 NO DOC ELDER MAL SCRN: HCPCS | Performed by: FAMILY MEDICINE

## 2022-05-27 PROCEDURE — 1123F ACP DISCUSS/DSCN MKR DOCD: CPT | Performed by: FAMILY MEDICINE

## 2022-05-27 PROCEDURE — G9717 DOC PT DX DEP/BP F/U NT REQ: HCPCS | Performed by: FAMILY MEDICINE

## 2022-05-27 PROCEDURE — G8754 DIAS BP LESS 90: HCPCS | Performed by: FAMILY MEDICINE

## 2022-05-27 PROCEDURE — 1101F PT FALLS ASSESS-DOCD LE1/YR: CPT | Performed by: FAMILY MEDICINE

## 2022-05-27 RX ORDER — BLOOD-GLUCOSE METER
EACH MISCELLANEOUS
Qty: 1 EACH | Refills: 0 | Status: SHIPPED | OUTPATIENT
Start: 2022-05-27

## 2022-05-27 NOTE — TELEPHONE ENCOUNTER
Received call from Calderon Hanna at [facility] with [employer].     Subjective: Caller states \"More tired and blood sugars higher than normal\"   Was on prednisone and stopped taking it after 3 days  Was on the prednisone because ER provider was ruling out arthritis     Current Symptoms: Tired - does not want to do anything, unsure when this started   Blood sugars still high 187 this morning, taking Metformin as scheduled. Blood sugars were in the 300's     Onset: a few weeks ago     Pain Severity: None that she is concerned with      Temperature: Denies fever, chills and sweats      What has been tried: Nothing     LMP: NA Pregnant: NA     Recommended disposition: See in Office Today     Care advice provided, patient verbalizes understanding; denies any other questions or concerns; instructed to call back for any new or worsening symptoms.     Patient/Caller agrees with recommended disposition; writer provided warm transfer to [place] at [facility] for appointment scheduling     Attention Provider: Thank you for allowing me to participate in the care of your patient. The patient was connected to triage in response to information provided to the [facility].   Please do not respond through this encounter as the response is not directed to a shared pool.       Reason for Disposition   Patient wants to be seen    Protocols used: DIABETES - HIGH BLOOD SUGAR-ADULT-[place]

## 2022-05-28 LAB
ALBUMIN SERPL-MCNC: 4.1 G/DL (ref 3.5–5)
ALBUMIN/GLOB SERPL: 1.2 {RATIO} (ref 1.1–2.2)
ALP SERPL-CCNC: 92 U/L (ref 45–117)
ALT SERPL-CCNC: 42 U/L (ref 12–78)
ANION GAP SERPL CALC-SCNC: 7 MMOL/L (ref 5–15)
AST SERPL-CCNC: 33 U/L (ref 15–37)
BASOPHILS # BLD: 0 K/UL (ref 0–0.1)
BASOPHILS NFR BLD: 1 % (ref 0–1)
BILIRUB SERPL-MCNC: 0.5 MG/DL (ref 0.2–1)
BUN SERPL-MCNC: 18 MG/DL (ref 6–20)
BUN/CREAT SERPL: 18 (ref 12–20)
CALCIUM SERPL-MCNC: 9.5 MG/DL (ref 8.5–10.1)
CHLORIDE SERPL-SCNC: 108 MMOL/L (ref 97–108)
CO2 SERPL-SCNC: 26 MMOL/L (ref 21–32)
CREAT SERPL-MCNC: 0.98 MG/DL (ref 0.55–1.02)
DIFFERENTIAL METHOD BLD: ABNORMAL
EOSINOPHIL # BLD: 0.1 K/UL (ref 0–0.4)
EOSINOPHIL NFR BLD: 1 % (ref 0–7)
ERYTHROCYTE [DISTWIDTH] IN BLOOD BY AUTOMATED COUNT: 15.3 % (ref 11.5–14.5)
GLOBULIN SER CALC-MCNC: 3.3 G/DL (ref 2–4)
GLUCOSE SERPL-MCNC: 158 MG/DL (ref 65–100)
HCT VFR BLD AUTO: 39.1 % (ref 35–47)
HGB BLD-MCNC: 12.3 G/DL (ref 11.5–16)
IMM GRANULOCYTES # BLD AUTO: 0 K/UL (ref 0–0.04)
IMM GRANULOCYTES NFR BLD AUTO: 0 % (ref 0–0.5)
LYMPHOCYTES # BLD: 1.2 K/UL (ref 0.8–3.5)
LYMPHOCYTES NFR BLD: 32 % (ref 12–49)
MCH RBC QN AUTO: 29.1 PG (ref 26–34)
MCHC RBC AUTO-ENTMCNC: 31.5 G/DL (ref 30–36.5)
MCV RBC AUTO: 92.4 FL (ref 80–99)
MONOCYTES # BLD: 0.4 K/UL (ref 0–1)
MONOCYTES NFR BLD: 9 % (ref 5–13)
NEUTS SEG # BLD: 2.2 K/UL (ref 1.8–8)
NEUTS SEG NFR BLD: 57 % (ref 32–75)
NRBC # BLD: 0 K/UL (ref 0–0.01)
NRBC BLD-RTO: 0 PER 100 WBC
PLATELET # BLD AUTO: 222 K/UL (ref 150–400)
PMV BLD AUTO: 12.5 FL (ref 8.9–12.9)
POTASSIUM SERPL-SCNC: 4 MMOL/L (ref 3.5–5.1)
PROT SERPL-MCNC: 7.4 G/DL (ref 6.4–8.2)
RBC # BLD AUTO: 4.23 M/UL (ref 3.8–5.2)
SODIUM SERPL-SCNC: 141 MMOL/L (ref 136–145)
T4 FREE SERPL-MCNC: 1 NG/DL (ref 0.8–1.5)
TSH SERPL DL<=0.05 MIU/L-ACNC: 1.53 UIU/ML (ref 0.36–3.74)
WBC # BLD AUTO: 3.8 K/UL (ref 3.6–11)

## 2022-06-13 RX ORDER — AMLODIPINE BESYLATE 5 MG/1
TABLET ORAL
Qty: 90 TABLET | Refills: 1 | Status: SHIPPED | OUTPATIENT
Start: 2022-06-13

## 2022-06-15 ENCOUNTER — OFFICE VISIT (OUTPATIENT)
Dept: FAMILY MEDICINE CLINIC | Age: 72
End: 2022-06-15
Payer: MEDICARE

## 2022-06-15 DIAGNOSIS — E78.00 HYPERCHOLESTEROLEMIA: Primary | ICD-10-CM

## 2022-06-15 DIAGNOSIS — Z23 ENCOUNTER FOR ADMINISTRATION OF COVID-19 VACCINE: ICD-10-CM

## 2022-06-15 PROCEDURE — 1090F PRES/ABSN URINE INCON ASSESS: CPT | Performed by: FAMILY MEDICINE

## 2022-06-15 PROCEDURE — G9717 DOC PT DX DEP/BP F/U NT REQ: HCPCS | Performed by: FAMILY MEDICINE

## 2022-06-15 PROCEDURE — 91300 PR SARSCOV2 VACCINE DIL RECON 30 MCG/0.3 ML IM USE: CPT | Performed by: FAMILY MEDICINE

## 2022-06-15 PROCEDURE — G9899 SCRN MAM PERF RSLTS DOC: HCPCS | Performed by: FAMILY MEDICINE

## 2022-06-15 PROCEDURE — 1101F PT FALLS ASSESS-DOCD LE1/YR: CPT | Performed by: FAMILY MEDICINE

## 2022-06-15 PROCEDURE — G8427 DOCREV CUR MEDS BY ELIG CLIN: HCPCS | Performed by: FAMILY MEDICINE

## 2022-06-15 PROCEDURE — G8399 PT W/DXA RESULTS DOCUMENT: HCPCS | Performed by: FAMILY MEDICINE

## 2022-06-15 PROCEDURE — G8536 NO DOC ELDER MAL SCRN: HCPCS | Performed by: FAMILY MEDICINE

## 2022-06-15 PROCEDURE — G8756 NO BP MEASURE DOC: HCPCS | Performed by: FAMILY MEDICINE

## 2022-06-15 PROCEDURE — 99213 OFFICE O/P EST LOW 20 MIN: CPT | Performed by: FAMILY MEDICINE

## 2022-06-15 PROCEDURE — 91305 COVID-19, PFIZER GRAY TOP, DO NOT DILUTE, TRIS-SUCROSE, (AGE 12 YRS+), PF, 30MCG/0.3 ML: CPT | Performed by: FAMILY MEDICINE

## 2022-06-15 PROCEDURE — 0054A COVID-19, PFIZER GRAY TOP, DO NOT DILUTE, TRIS-SUCROSE, (AGE 12 YRS+), PF, 30MCG/0.3 ML: CPT | Performed by: FAMILY MEDICINE

## 2022-06-15 PROCEDURE — 1123F ACP DISCUSS/DSCN MKR DOCD: CPT | Performed by: FAMILY MEDICINE

## 2022-06-15 PROCEDURE — G0463 HOSPITAL OUTPT CLINIC VISIT: HCPCS | Performed by: FAMILY MEDICINE

## 2022-06-15 PROCEDURE — G8417 CALC BMI ABV UP PARAM F/U: HCPCS | Performed by: FAMILY MEDICINE

## 2022-06-15 PROCEDURE — 3017F COLORECTAL CA SCREEN DOC REV: CPT | Performed by: FAMILY MEDICINE

## 2022-06-15 RX ORDER — GLIPIZIDE 5 MG/1
TABLET ORAL
COMMUNITY
Start: 2022-05-27 | End: 2022-06-15 | Stop reason: ALTCHOICE

## 2022-06-15 NOTE — PROGRESS NOTES
HISTORY OF PRESENT ILLNESS  Sabrina Schneider is a 73 y.o. female. HPI Pt. Comes in for blood pressure, diabetes,  and cholesterol check. Insurance is about to start covering jardiance $42 / month. Plans on stopping glipizide, which has caused hypoglycemia. No complaints of chest pain, shortness of breath, TIAs, claudication or edema. ROS    Physical Exam  Vitals and nursing note reviewed. Constitutional:       Appearance: She is well-developed. HENT:      Right Ear: External ear normal.      Left Ear: External ear normal.   Neck:      Thyroid: No thyromegaly. Cardiovascular:      Rate and Rhythm: Normal rate and regular rhythm. Heart sounds: Normal heart sounds. Pulmonary:      Breath sounds: Normal breath sounds. No wheezing. Abdominal:      General: Bowel sounds are normal. There is no distension. Palpations: Abdomen is soft. There is no mass. Tenderness: There is no abdominal tenderness. Lymphadenopathy:      Cervical: No cervical adenopathy. ASSESSMENT and PLAN  Orders Placed This Encounter    COVID-19, Pfizer The Down, DO NOT Dilute, Lluvia-Sucrose, 12+ yrs, PF, 30mcg/0.3 mL dose    LIPID PANEL     Diagnoses and all orders for this visit:    1. Hypercholesterolemia  -     LIPID PANEL; Future    2. Encounter for administration of COVID-19 vaccine  -     COVID-19, PFIZER GRAY TOP, DO NOT DILUTE, LLUVIA-SUCROSE, (AGE 12 YRS+), PF, 30MCG/0.3 ML      Follow-up and Dispositions    · Return in about 6 months (around 12/15/2022).

## 2022-06-15 NOTE — PROGRESS NOTES
Chief Complaint   Patient presents with    Diabetes    Hypertension    Follow Up Chronic Condition       1. \"Have you been to the ER, urgent care clinic since your last visit? Hospitalized since your last visit? \" No    2. \"Have you seen or consulted any other health care providers outside of the [address] [place] [address] since your last visit? \" Yes ORTHO     3. For patients aged 39-70: Has the patient had a colonoscopy / FIT/ Cologuard? Yes - no Care Gap present      If the patient is female:    4. For patients aged 41-77: Has the patient had a mammogram within the past 2 years? Yes - no Care Gap present      5. For patients aged 21-65: Has the patient had a pap smear? Yes - no Care Gap present    Health Maintenance Due   Topic Date Due    Low dose CT lung screening  Never done    Foot Exam Q1  11/15/2018    MICROALBUMIN Q1  07/08/2020     Verbal Order received from Dr. Colin Calabrese  for Rachel [id number]  given IM  in left arm.

## 2022-06-16 ENCOUNTER — OFFICE VISIT (OUTPATIENT)
Dept: FAMILY MEDICINE CLINIC | Age: 72
End: 2022-06-16

## 2022-06-16 DIAGNOSIS — E78.00 HYPERCHOLESTEROLEMIA: Primary | ICD-10-CM

## 2022-06-17 LAB
CHOLEST SERPL-MCNC: 135 MG/DL
HDLC SERPL-MCNC: 86 MG/DL
HDLC SERPL: 1.6 {RATIO} (ref 0–5)
LDLC SERPL CALC-MCNC: 34.8 MG/DL (ref 0–100)
TRIGL SERPL-MCNC: 71 MG/DL (ref ?–150)
VLDLC SERPL CALC-MCNC: 14.2 MG/DL

## 2022-07-18 RX ORDER — BLOOD SUGAR DIAGNOSTIC
STRIP MISCELLANEOUS
Qty: 100 STRIP | Refills: 4 | Status: SHIPPED | OUTPATIENT
Start: 2022-07-18

## 2022-07-18 RX ORDER — METFORMIN HYDROCHLORIDE 1000 MG/1
TABLET ORAL
Qty: 180 TABLET | Refills: 3 | Status: SHIPPED | OUTPATIENT
Start: 2022-07-18

## 2022-08-17 RX ORDER — GLIPIZIDE 5 MG/1
TABLET ORAL
Qty: 180 TABLET | Refills: 1 | Status: SHIPPED | OUTPATIENT
Start: 2022-08-17

## 2022-08-21 LAB — SARS-COV-2, NAA: NOT DETECTED

## 2022-09-22 RX ORDER — ALENDRONATE SODIUM 35 MG/1
TABLET ORAL
Qty: 12 TABLET | Refills: 3 | Status: SHIPPED | OUTPATIENT
Start: 2022-09-22 | End: 2022-09-27

## 2022-09-27 RX ORDER — ALENDRONATE SODIUM 35 MG/1
TABLET ORAL
Qty: 12 TABLET | Refills: 3 | Status: SHIPPED | OUTPATIENT
Start: 2022-09-27 | End: 2022-10-27 | Stop reason: SDUPTHER

## 2022-10-23 ENCOUNTER — APPOINTMENT (OUTPATIENT)
Dept: GENERAL RADIOLOGY | Age: 72
End: 2022-10-23
Attending: STUDENT IN AN ORGANIZED HEALTH CARE EDUCATION/TRAINING PROGRAM
Payer: MEDICARE

## 2022-10-23 ENCOUNTER — HOSPITAL ENCOUNTER (EMERGENCY)
Age: 72
Discharge: HOME | End: 2022-10-23
Attending: STUDENT IN AN ORGANIZED HEALTH CARE EDUCATION/TRAINING PROGRAM
Payer: MEDICARE

## 2022-10-23 VITALS
HEART RATE: 68 BPM | WEIGHT: 143 LBS | DIASTOLIC BLOOD PRESSURE: 64 MMHG | SYSTOLIC BLOOD PRESSURE: 140 MMHG | RESPIRATION RATE: 15 BRPM | OXYGEN SATURATION: 98 % | TEMPERATURE: 98.1 F | HEIGHT: 62 IN | BODY MASS INDEX: 26.31 KG/M2

## 2022-10-23 DIAGNOSIS — R07.9 CHEST PAIN, UNSPECIFIED TYPE: Primary | ICD-10-CM

## 2022-10-23 LAB
ALBUMIN SERPL-MCNC: 3.9 G/DL (ref 3.5–5)
ALBUMIN/GLOB SERPL: 1 {RATIO} (ref 1.1–2.2)
ALP SERPL-CCNC: 102 U/L (ref 45–117)
ALT SERPL-CCNC: 31 U/L (ref 12–78)
ANION GAP SERPL CALC-SCNC: 7 MMOL/L (ref 5–15)
AST SERPL-CCNC: 31 U/L (ref 15–37)
BASOPHILS # BLD: 0 K/UL (ref 0–0.1)
BASOPHILS NFR BLD: 1 % (ref 0–1)
BILIRUB SERPL-MCNC: 0.3 MG/DL (ref 0.2–1)
BNP SERPL-MCNC: 21 PG/ML
BUN SERPL-MCNC: 15 MG/DL (ref 6–20)
BUN/CREAT SERPL: 14 (ref 12–20)
CALCIUM SERPL-MCNC: 9.7 MG/DL (ref 8.5–10.1)
CHLORIDE SERPL-SCNC: 108 MMOL/L (ref 97–108)
CO2 SERPL-SCNC: 23 MMOL/L (ref 21–32)
CREAT SERPL-MCNC: 1.05 MG/DL (ref 0.55–1.02)
DIFFERENTIAL METHOD BLD: ABNORMAL
EOSINOPHIL # BLD: 0 K/UL (ref 0–0.4)
EOSINOPHIL NFR BLD: 1 % (ref 0–7)
ERYTHROCYTE [DISTWIDTH] IN BLOOD BY AUTOMATED COUNT: 15.8 % (ref 11.5–14.5)
GLOBULIN SER CALC-MCNC: 4.1 G/DL (ref 2–4)
GLUCOSE SERPL-MCNC: 154 MG/DL (ref 65–100)
HCT VFR BLD AUTO: 39.4 % (ref 35–47)
HGB BLD-MCNC: 12.6 G/DL (ref 11.5–16)
IMM GRANULOCYTES # BLD AUTO: 0 K/UL (ref 0–0.04)
IMM GRANULOCYTES NFR BLD AUTO: 0 % (ref 0–0.5)
LYMPHOCYTES # BLD: 1.5 K/UL (ref 0.8–3.5)
LYMPHOCYTES NFR BLD: 27 % (ref 12–49)
MCH RBC QN AUTO: 27.6 PG (ref 26–34)
MCHC RBC AUTO-ENTMCNC: 32 G/DL (ref 30–36.5)
MCV RBC AUTO: 86.2 FL (ref 80–99)
MONOCYTES # BLD: 0.4 K/UL (ref 0–1)
MONOCYTES NFR BLD: 7 % (ref 5–13)
NEUTS SEG # BLD: 3.4 K/UL (ref 1.8–8)
NEUTS SEG NFR BLD: 64 % (ref 32–75)
NRBC # BLD: 0 K/UL (ref 0–0.01)
NRBC BLD-RTO: 0 PER 100 WBC
PLATELET # BLD AUTO: 226 K/UL (ref 150–400)
PMV BLD AUTO: 11.1 FL (ref 8.9–12.9)
POTASSIUM SERPL-SCNC: 4.3 MMOL/L (ref 3.5–5.1)
PROT SERPL-MCNC: 8 G/DL (ref 6.4–8.2)
RBC # BLD AUTO: 4.57 M/UL (ref 3.8–5.2)
SODIUM SERPL-SCNC: 138 MMOL/L (ref 136–145)
TROPONIN-HIGH SENSITIVITY: 5 NG/L (ref 0–51)
TROPONIN-HIGH SENSITIVITY: 7 NG/L (ref 0–51)
WBC # BLD AUTO: 5.3 K/UL (ref 3.6–11)

## 2022-10-23 PROCEDURE — 36415 COLL VENOUS BLD VENIPUNCTURE: CPT

## 2022-10-23 PROCEDURE — 85025 COMPLETE CBC W/AUTO DIFF WBC: CPT

## 2022-10-23 PROCEDURE — 80053 COMPREHEN METABOLIC PANEL: CPT

## 2022-10-23 PROCEDURE — 83880 ASSAY OF NATRIURETIC PEPTIDE: CPT

## 2022-10-23 PROCEDURE — 93005 ELECTROCARDIOGRAM TRACING: CPT

## 2022-10-23 PROCEDURE — 84484 ASSAY OF TROPONIN QUANT: CPT

## 2022-10-23 PROCEDURE — 71045 X-RAY EXAM CHEST 1 VIEW: CPT

## 2022-10-23 PROCEDURE — 99285 EMERGENCY DEPT VISIT HI MDM: CPT

## 2022-10-23 NOTE — ED PROVIDER NOTES
EMERGENCY DEPARTMENT HISTORY AND PHYSICAL EXAM      Date: 10/23/2022  Patient Name: Rosa Maria Boyer    History of Presenting Illness     Chief Complaint   Patient presents with    Chest Pain       History Provided By: Patient    HPI: Rosa Maria Boyer, 81 y.o. female presents to the ED with cc of left sided chest pain, non-radiiating, describes as a pressure lasting approximately 30 minutes. 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 reports additional symptoms of shortness of breath with this. She denies any N/V or diaphoresis with this She states she had cardiology evaluation years ago for similar complaint but states that her entire work up was negative. She reports that she has been having chest pain when she exerts herself for the past several years but this has been stable  and this resolves when she rests. She states she has had not had any episodes of chest pain when she has been resting recently. Denies any fever, chills, abdominal pain, current nausea, vomiting, diarrhea. She denies any chest pain or shortness of breath at this time. She denies any leg swelling, history of heart failure, any recent long car trips or long plane rides. There are no other complaints, changes, or physical findings at this time. PCP: Joey Lewis MD    No current facility-administered medications on file prior to encounter. Current Outpatient Medications on File Prior to Encounter   Medication Sig Dispense Refill    alendronate (FOSAMAX) 35 mg tablet TAKE 1 TABLET BY MOUTH WEEKLY WITH 240 ML WATER ON EMPTY STOMACH,REMAIN UPRIGHT FOR 30 MIN AFTERWARD 12 Tablet 3    glipiZIDE (GLUCOTROL) 5 mg tablet TAKE 1 TABLET BY MOUTH TWICE A DAY FOR DIABETES 180 Tablet 1    Accu-Chek Wendy Plus test strp strip USE TO TEST BLOOD SUGAR ONCE DAILY. DIAGNOSIS E11.40 100 Strip 4    metFORMIN (GLUCOPHAGE) 1,000 mg tablet TAKE 1 TABLET IN MORNING AND 1 TABLET IN EVENING.  FOR DIABETES 180 Tablet 3    amLODIPine (NORVASC) 5 mg tablet TAKE 1 TABLET BY MOUTH EVERY DAY 90 Tablet 1    Blood-Glucose Meter (Accu-Chek Wendy Plus Meter) misc Use as directed. E11.9 1 Each 0    empagliflozin (JARDIANCE) 10 mg tablet Take 1 Tablet by mouth daily. For diabetes 30 Tablet 5    prochlorperazine (Compazine) 5 mg tablet Take 1 Tablet by mouth every eight (8) hours as needed for Nausea. 12 Tablet 0    methocarbamoL (Robaxin-750) 750 mg tablet Take 1 Tablet by mouth four (4) times daily as needed for Muscle Spasm(s). 20 Tablet 0    gabapentin (NEURONTIN) 300 mg capsule Take 1 Capsule by mouth three (3) times daily as needed for Pain. Max Daily Amount: 900 mg. (Patient not taking: Reported on 5/27/2022) 21 Capsule 0    cholecalciferol, vitamin D3, (Vitamin D3) 50 mcg (2,000 unit) tab Take 2,000 Units by mouth daily. primidone (MYSOLINE) 50 mg tablet Take 1 Tablet by mouth two (2) times a day. (Patient taking differently: Take 50 mg by mouth two (2) times a day. Indications: essential tremor) 180 Tablet 1    gabapentin (NEURONTIN) 300 mg capsule Take 300 mg by mouth three (3) times daily as needed. (Patient not taking: Reported on 5/27/2022)      RABEprazole (ACIPHEX) 20 mg TbEC Take 20 mg by mouth two (2) times a day. atorvastatin (LIPITOR) 10 mg tablet Take 1 Tablet by mouth daily. For cholesterol (Patient taking differently: Take 10 mg by mouth nightly. For cholesterol) 90 Tablet 12    omeprazole (PRILOSEC) 40 mg capsule Take 1 Capsule by mouth every morning. (Patient not taking: Reported on 5/27/2022) 90 Capsule 3    lancets (ACCU-CHEK SOFTCLIX LANCETS) misc TEST 2 TIMES DAILY AS DIRECTED (E11.9) 200 Each 12    polyethylene glycol (MIRALAX) 17 gram packet Take 17 g by mouth daily as needed ('Constipation'). multivit,calc,mins/iron/folic (ONE-A-DAY WOMENS FORMULA PO) Take 1 Tab by mouth daily.          Past History     Past Medical History:  Past Medical History:   Diagnosis Date    Allergic rhinitis due to allergen 10/22/2014    Arthritis spine    Asthma     many years ago, no inhaler or meds    Chronic pain     back pain    Depression     Diabetes ([address]     Type II    GERD (gastroesophageal reflux disease)     Hepatitis B 1984    Hypercholesterolemia     Hypertension     Kidney stones 1990's    Positive PPD 2009    treated with INH       Past Surgical History:  Past Surgical History:   Procedure Laterality Date    COLONOSCOPY  4-11    manetas- n ormal    COLONOSCOPY N/A 11/4/2021    COLONOSCOPY, EGD performed by Irene Pierce MD at [facility] AM[facility]    EGD  4-11    manetas- esophageal ring    ENDOSCOPY, COLON, DIAGNOSTIC  12/2007    2 polyps    HX BREAST BIOPSY Bilateral 1980s    all benign cysts    HX BUNIONECTOMY Bilateral 1990s    HX CATARACT REMOVAL Bilateral 11/2018    HX CHOLECYSTECTOMY      HX COLONOSCOPY  May 2016    HX ENDOSCOPY  May 2016    HX GYN  1978    tubal laparoscopy-unblocked tubes    HX HEART CATHETERIZATION      negatve    HX ORTHOPAEDIC  2016    had an injection in her back to help with leg pain    HX ORTHOPAEDIC Left 12/12/2017    foot surg.   bunion removal  \"Put a plate in\"    HX OTHER SURGICAL  1997    bunionectomy    HX TONSIL AND ADENOIDECTOMY      approx 11years old    RI BREAST SURGERY PROCEDURE UNLISTED  1990's    abcess bilateral    RI ESOPHAGEAL MOTILITY STUDY W/INTERP&RPT  1/27/2020    RI GERD TST W/ NASAL IMPEDENCE ELECTROD  1/27/2020       Family History:  Family History   Problem Relation Age of Onset    Diabetes Mother     Hypertension Mother     Stroke Mother     Cancer Father         lung    Diabetes Father     Cancer Paternal Aunt         colon    Cancer Maternal Aunt         colon, and lung    Hypertension Maternal Aunt     Diabetes Maternal Aunt     Hypertension Maternal Aunt     Stroke Maternal Aunt     Heart Disease Maternal Aunt     Heart Disease Maternal Uncle     Psychiatric Disorder Brother        Social History:  Social History     Tobacco Use    Smoking status: Former     Packs/day: 1.00 Years: 25.00     Pack years: 25.00     Types: Cigarettes     Quit date: 2010     Years since quittin.1    Smokeless tobacco: Never   Vaping Use    Vaping Use: Never used   Substance Use Topics    Alcohol use: Never    Drug use: Never       Allergies: Allergies   Allergen Reactions    Latex Itching    Lisinopril Swelling    Atorvastatin Other (comments)     20 mg causes muscle cramps    Sulfa (Sulfonamide Antibiotics) Itching     About 11 yo  Keflex=unsure         Review of Systems   Review of Systems   Constitutional:  Negative for chills and fever. HENT:  Negative for rhinorrhea and sore throat. Eyes:  Negative for visual disturbance. Respiratory:  Positive for shortness of breath. Negative for cough. Cardiovascular:  Positive for chest pain. Gastrointestinal:  Negative for abdominal pain, diarrhea, nausea and vomiting. Genitourinary:  Negative for dysuria and hematuria. Musculoskeletal:  Negative for arthralgias and myalgias. Skin:  Negative for rash. Neurological:  Negative for dizziness, seizures and weakness. All other systems reviewed and are negative. Physical Exam   Physical Exam  Vitals and nursing note reviewed. Constitutional:       Appearance: She is well-developed. HENT:      Head: Normocephalic and atraumatic. Cardiovascular:      Rate and Rhythm: Normal rate and regular rhythm. Pulmonary:      Effort: Pulmonary effort is normal.      Breath sounds: Normal breath sounds. No decreased breath sounds or wheezing. Abdominal:      General: There is no distension. Palpations: Abdomen is soft. Musculoskeletal:         General: Normal range of motion. Cervical back: Neck supple. Skin:     General: Skin is warm and dry. Neurological:      Mental Status: She is alert.    Psychiatric:         Mood and Affect: Mood normal.         Behavior: Behavior normal.       Diagnostic Study Results     Labs -     Recent Results (from the past 12 hour(s))   EKG, 12 LEAD, INITIAL    Collection Time: 10/23/22  1:14 PM   Result Value Ref Range    Ventricular Rate 72 BPM    Atrial Rate 72 BPM    P-R Interval 138 ms    QRS Duration 84 ms    Q-T Interval 390 ms    QTC Calculation (Bezet) 427 ms    Calculated P Axis 46 degrees    Calculated R Axis 43 degrees    Calculated T Axis 53 degrees    Diagnosis       Normal sinus rhythm  Nonspecific ST abnormality  When compared with ECG of 10-MAY-2022 18:45,  Vent. rate has decreased BY  36 BPM     CBC WITH AUTOMATED DIFF    Collection Time: 10/23/22  1:27 PM   Result Value Ref Range    WBC 5.3 3.6 - 11.0 K/uL    RBC 4.57 3.80 - 5.20 M/uL    HGB 12.6 11.5 - 16.0 g/dL    HCT 39.4 35.0 - 47.0 %    MCV 86.2 80.0 - 99.0 FL    MCH 27.6 26.0 - 34.0 PG    MCHC 32.0 30.0 - 36.5 g/dL    RDW 15.8 (H) 11.5 - 14.5 %    PLATELET 914 540 - 020 K/uL    MPV 11.1 8.9 - 12.9 FL    NRBC 0.0 0  WBC    ABSOLUTE NRBC 0.00 0.00 - 0.01 K/uL    NEUTROPHILS 64 32 - 75 %    LYMPHOCYTES 27 12 - 49 %    MONOCYTES 7 5 - 13 %    EOSINOPHILS 1 0 - 7 %    BASOPHILS 1 0 - 1 %    IMMATURE GRANULOCYTES 0 0.0 - 0.5 %    ABS. NEUTROPHILS 3.4 1.8 - 8.0 K/UL    ABS. LYMPHOCYTES 1.5 0.8 - 3.5 K/UL    ABS. MONOCYTES 0.4 0.0 - 1.0 K/UL    ABS. EOSINOPHILS 0.0 0.0 - 0.4 K/UL    ABS. BASOPHILS 0.0 0.0 - 0.1 K/UL    ABS. IMM. GRANS. 0.0 0.00 - 0.04 K/UL    DF AUTOMATED     METABOLIC PANEL, COMPREHENSIVE    Collection Time: 10/23/22  1:27 PM   Result Value Ref Range    Sodium 138 136 - 145 mmol/L    Potassium 4.3 3.5 - 5.1 mmol/L    Chloride 108 97 - 108 mmol/L    CO2 23 21 - 32 mmol/L    Anion gap 7 5 - 15 mmol/L    Glucose 154 (H) 65 - 100 mg/dL    BUN 15 6 - 20 MG/DL    Creatinine 1.05 (H) 0.55 - 1.02 MG/DL    BUN/Creatinine ratio 14 12 - 20      eGFR 57 (L) >60 ml/min/1.73m2    Calcium 9.7 8.5 - 10.1 MG/DL    Bilirubin, total 0.3 0.2 - 1.0 MG/DL    ALT (SGPT) 31 12 - 78 U/L    AST (SGOT) 31 15 - 37 U/L    Alk.  phosphatase 102 45 - 117 U/L    Protein, total 8.0 6.4 - 8.2 g/dL Albumin 3.9 3.5 - 5.0 g/dL    Globulin 4.1 (H) 2.0 - 4.0 g/dL    A-G Ratio 1.0 (L) 1.1 - 2.2     NT-PRO BNP    Collection Time: 10/23/22  1:27 PM   Result Value Ref Range    NT pro-BNP 21 <125 PG/ML   TROPONIN-HIGH SENSITIVITY    Collection Time: 10/23/22  1:27 PM   Result Value Ref Range    Troponin-High Sensitivity 5 0 - 51 ng/L   TROPONIN-HIGH SENSITIVITY    Collection Time: 10/23/22  3:49 PM   Result Value Ref Range    Troponin-High Sensitivity 7 0 - 51 ng/L       Radiologic Studies -   XR CHEST PORT   Final Result   No acute cardiopulmonary process. CT Results  (Last 48 hours)      None          CXR Results  (Last 48 hours)                 10/23/22 1336  XR CHEST PORT Final result    Impression:  No acute cardiopulmonary process. Narrative:  EXAM:  XR CHEST PORT       INDICATION:   chest pain       COMPARISON: Chest radiograph 5/10/2022. FINDINGS: AP radiograph of the chest was obtained. No evidence of focal consolidation. No pleural effusion or pneumothorax. Heart   size is normal. Calcifications of the thoracic aorta. No acute osseous   abnormalities. Cholecystectomy clips. Medical Decision Making   I am the first provider for this patient. I reviewed the vital signs, available nursing notes, past medical history, past surgical history, family history and social history. Vital Signs-Reviewed the patient's vital signs. Patient Vitals for the past 12 hrs:   Temp Pulse Resp BP SpO2   10/23/22 1659 -- 68 15 -- 98 %   10/23/22 1318 98.1 °F (36.7 °C) 67 16 (!) 140/64 97 %       EKG interpretation: (Preliminary)  Sinus rhythm, rate,'s 72, TN: Normal, QRS: Normal, no significant ST elevations or depressions    Records Reviewed: Nursing Notes, Old Medical Records, Previous Radiology Studies, and Previous Laboratory Studies    Provider Notes (Medical Decision Making):   Patient presents with CP.   No reported cardiac history but has had a cardiac work-up in the past that was reportedly negative although this any years ago. On exam, she is hemodynamically stable, vital signs stable, chest pain has resolved at this time. Well score is low risk. Low suspicion for dissection. Will obtain basic labs including troponin x2, chest x-ray. Disposition pending work-up. DDx:  ACS, Aortic dissection, PNA, PE, PTX, pericarditis, myocarditis, GERD, costochondritis, anxiety. ED Course:   Initial assessment performed. The patients presenting problems have been discussed, and they are in agreement with the care plan formulated and outlined with them. I have encouraged them to ask questions as they arise throughout their visit. ED Course as of 10/23/22 1823   Sun Oct 23, 2022   1803 NSR, rate 72, MN[de-identified] normal, QRS: normal, no significant ST elevations or depressions [NM]      ED Course User Index  [NM] Dillon Margarita Vines,        2nd troponin 7, patient has remained pain free. I completed a HEART Score to screen for Major Adverse Cardiac Event (MACE) in this patient. The evidence indicates that the patient is moderate risk for MACE and this is consistent with my clinical intuition. The patient's HEART Score was calculated to be 4 . I offered the patient admission for stress testing and cardiac evaluation. She did see Dr. Gadiel Tony this year prior to surgery and did not have any additional work up done at this time. She does have several risk factors (high cholesterol, HTN, DM) as well as her age. She has remained pain free for the duration of this ED stay and both troponins are negative. She declined admission and would prefer to follow up as an outpatient with Dr. Gadiel Tony. I have discussed with the patient my clinical impression and the result of the HEART Score to screen for MACE, as well as my recommendation for further testing and admission as her heart score puts her in the moderate risk category with risk of MACE to be 12-16%.   She declined and stated that she feels well as this time and would prefer to follow up as an outpatient. I strongly urged the patient to call Dr. Thompson Roman on Monday to discuss outpatient work up for her chest pain and she stated she would. Additionally if she were to experience any further episodes of chest pain or shortness of breath that she should not wait and should return to the ER. I recommended she avoid strenuous exertion or stressful situations until she was evaluated and she stated she would. Disposition:  DC- Adult Discharges: All of the diagnostic tests were reviewed and questions answered. Diagnosis, care plan and treatment options were discussed. The patient understands the instructions and will follow up as directed. The patients results have been reviewed with them. They have been counseled regarding their diagnosis. The patient verbally convey understanding and agreement of the signs, symptoms, diagnosis, treatment and prognosis and additionally agrees to follow up as recommended with their PCP in 24 - 48 hours. They also agree with the care-plan and convey that all of their questions have been answered. I have also put together some discharge instructions for them that include: 1) educational information regarding their diagnosis, 2) how to care for their diagnosis at home, as well a 3) list of reasons why they would want to return to the ED prior to their follow-up appointment, should their condition change. DC-The patient was given verbal follow-up instructions      DISCHARGE PLAN:  1. Discharge Medication List as of 10/23/2022  4:40 PM        2.    Follow-up Information       Follow up With Specialties Details Why Contact Sinan Agarwal MD Interventional Cardiology Physician, Cardiovascular Disease Physician, [address] [place] [address] Vascular Surgery Schedule an appointment as soon as possible for a visit in 1 day  2 [address] Tatiana Rodrigues MD [facility] Schedule an appointment as soon as possible for a visit in 1 day  [address] [place] [address]  [phone]            3. Return to ED if worse     Diagnosis     Clinical Impression:   1. Chest pain, unspecified type        Attestations:    Julio Reina, DO        Please note that this dictation was completed with [employer], the computer voice recognition software. Quite often unanticipated grammatical, syntax, homophones, and other interpretive errors are inadvertently transcribed by the computer software. Please disregard these errors. Please excuse any errors that have escaped final proofreading. Thank you.

## 2022-10-23 NOTE — DISCHARGE INSTRUCTIONS
Please call your cardiologist regarding your symptoms today. Please schedule an outpatient evaluation. If you have ANY worsening of your chest pain, shortness of breath or if your symptoms occur again without exerting yourself please return to the ER. If you experience any change or worsening of your symptoms, return to the ER.

## 2022-10-23 NOTE — ED TRIAGE NOTES
Pt arrived via POV c/o cp and shob X1 hour. Pt states that she noticed left sided cp then became more shob.  Denies NVD, cough, fever

## 2022-10-24 LAB
ATRIAL RATE: 72 BPM
CALCULATED P AXIS, ECG09: 46 DEGREES
CALCULATED R AXIS, ECG10: 43 DEGREES
CALCULATED T AXIS, ECG11: 53 DEGREES
DIAGNOSIS, 93000: NORMAL
P-R INTERVAL, ECG05: 138 MS
Q-T INTERVAL, ECG07: 390 MS
QRS DURATION, ECG06: 84 MS
QTC CALCULATION (BEZET), ECG08: 427 MS
VENTRICULAR RATE, ECG03: 72 BPM

## 2022-10-27 RX ORDER — ALENDRONATE SODIUM 35 MG/1
TABLET ORAL
Qty: 12 TABLET | Refills: 3 | Status: SHIPPED | OUTPATIENT
Start: 2022-10-27

## 2022-10-27 NOTE — TELEPHONE ENCOUNTER
Last visit:  Next visit:12/15/22  Previous refill   [facility] sent fax stating that they do not have prescription on file. Requested Prescriptions     Pending Prescriptions Disp Refills    alendronate (FOSAMAX) 35 mg tablet 12 Tablet 3     Sig: TAKE 1 TABLET BY MOUTH WEEKLY WITH 240 ML WATER ON EMPTY STOMACH,REMAIN UPRIGHT FOR 30 MIN AFTERWARD     For Pharmacy Admin Tracking Only    CPA in place:   Recommendation Provided To:    Intervention Detail: New Rx: 1, reason: Patient Preference  Gap Closed?:   Intervention Accepted By:   Time Spent (min): 5

## 2022-12-15 ENCOUNTER — OFFICE VISIT (OUTPATIENT)
Dept: FAMILY MEDICINE CLINIC | Age: 72
End: 2022-12-15
Payer: MEDICARE

## 2022-12-15 VITALS
RESPIRATION RATE: 16 BRPM | WEIGHT: 145.8 LBS | BODY MASS INDEX: 26.83 KG/M2 | SYSTOLIC BLOOD PRESSURE: 133 MMHG | OXYGEN SATURATION: 98 % | HEART RATE: 62 BPM | HEIGHT: 62 IN | DIASTOLIC BLOOD PRESSURE: 58 MMHG

## 2022-12-15 DIAGNOSIS — E11.9 DIABETES MELLITUS WITHOUT COMPLICATION (HCC): ICD-10-CM

## 2022-12-15 DIAGNOSIS — R53.1 WEAKNESS: ICD-10-CM

## 2022-12-15 DIAGNOSIS — Z00.00 ENCOUNTER FOR MEDICARE ANNUAL WELLNESS EXAM: Primary | ICD-10-CM

## 2022-12-15 DIAGNOSIS — E78.00 HYPERCHOLESTEROLEMIA: ICD-10-CM

## 2022-12-15 DIAGNOSIS — G25.0 ESSENTIAL TREMOR: ICD-10-CM

## 2022-12-15 DIAGNOSIS — I10 ESSENTIAL HYPERTENSION: ICD-10-CM

## 2022-12-15 DIAGNOSIS — R06.02 SHORTNESS OF BREATH: ICD-10-CM

## 2022-12-15 RX ORDER — TERCONAZOLE 4 MG/G
CREAM VAGINAL
COMMUNITY
Start: 2022-11-30

## 2022-12-15 RX ORDER — ONDANSETRON 4 MG/1
4 TABLET, FILM COATED ORAL
Qty: 30 TABLET | Refills: 3 | Status: SHIPPED | OUTPATIENT
Start: 2022-12-15

## 2022-12-15 RX ORDER — AMLODIPINE BESYLATE 5 MG/1
5 TABLET ORAL DAILY
Qty: 90 TABLET | Refills: 3 | Status: SHIPPED | OUTPATIENT
Start: 2022-12-15

## 2022-12-15 RX ORDER — ATORVASTATIN CALCIUM 10 MG/1
10 TABLET, FILM COATED ORAL
Qty: 90 TABLET | Refills: 3 | Status: SHIPPED | OUTPATIENT
Start: 2022-12-15

## 2022-12-15 RX ORDER — PRIMIDONE 50 MG/1
50 TABLET ORAL 2 TIMES DAILY
Qty: 180 TABLET | Refills: 3 | Status: SHIPPED | OUTPATIENT
Start: 2022-12-15

## 2022-12-15 RX ORDER — UREA 10 %
LOTION (ML) TOPICAL
COMMUNITY

## 2022-12-15 RX ORDER — RABEPRAZOLE SODIUM 20 MG/1
20 TABLET, DELAYED RELEASE ORAL 2 TIMES DAILY
Qty: 180 TABLET | Refills: 3 | Status: SHIPPED | OUTPATIENT
Start: 2022-12-15

## 2022-12-15 NOTE — PROGRESS NOTES
Identified pt with two pt identifiers(name and ). Reviewed record in preparation for visit and have obtained necessary documentation. Chief Complaint   Patient presents with    Follow-up    Diabetes    Cholesterol Problem    Hypertension          Health Maintenance Due   Topic    Low dose CT lung screening     Foot Exam Q1     MICROALBUMIN Q1     Flu Vaccine (1)    COVID-19 Vaccine (5 - Booster for [employer] series)    Medicare Yearly Exam       Visit Vitals  BP (!) 133/58 (BP 1 Location: Left upper arm, BP Patient Position: Sitting, BP Cuff Size: Large adult)   Pulse 62   Resp 16   Ht 5' 2\" (1.575 m)   Wt 145 lb 12.8 oz (66.1 kg)   LMP  (LMP Unknown)   SpO2 98%   BMI 26.67 kg/m²     Pain Scale: 0 - No pain/10    Coordination of Care Questionnaire:  :   1. Have you been to the ER, urgent care clinic since your last visit? Hospitalized since your last visit? [employer] 10/23/2022 for SOB and chest pain    2. Have you seen or consulted any other health care providers outside of the [address] [place] [address] since your last visit? Include any pap smears or colon screening.  No

## 2022-12-15 NOTE — PROGRESS NOTES
HISTORY OF PRESENT ILLNESS  Michelle Cornell is a 81 y.o. female. HPI Pt. Comes in for blood pressure, cholesterol, and diabetes check. Feels tired a lot. Some difficulty sleeping. Has had some chest pain and dyspnea, worse with exertion. Had a stress test and echo last week- checked out ok. Blood sugars can run into the 250s in the am. No hypoglycemic episodes. Also needs medicare wellness exam. Sees Dr. Lise Mares (cardiology), Dr. Laureano Quintero (orthopedics), Dr. Ginger Arevalo (gi), Dr. Tahir Hamilton (gyn). Has had mammogram this year. UTD on colonoscopy. UTD on immunizations. Would want ushas Mayer to be her MPOA if she became incapacitated. Still working at [email or web address]. Working 20-29 hours a week. Review of Systems   HENT:  Negative for hearing loss. Neurological:         No falls, canes. Independent in all adls. Memory good. Psychiatric/Behavioral:  Negative for depression. No alcohol     Physical Exam  Vitals and nursing note reviewed. Constitutional:       Appearance: She is well-developed. HENT:      Right Ear: External ear normal.      Left Ear: External ear normal.   Neck:      Thyroid: No thyromegaly. Cardiovascular:      Rate and Rhythm: Normal rate and regular rhythm. Heart sounds: Normal heart sounds. Pulmonary:      Breath sounds: Normal breath sounds. No wheezing. Abdominal:      General: Bowel sounds are normal. There is no distension. Palpations: Abdomen is soft. There is no mass. Tenderness: There is no abdominal tenderness. Lymphadenopathy:      Cervical: No cervical adenopathy.      ASSESSMENT and PLAN  Orders Placed This Encounter    XR CHEST PA LAT    CBC WITH AUTOMATED DIFF    METABOLIC PANEL, COMPREHENSIVE    LIPID PANEL    HEMOGLOBIN A1C WITH EAG    TSH 3RD GENERATION    T4, FREE    ondansetron hcl (Zofran) 4 mg tablet    amLODIPine (NORVASC) 5 mg tablet    empagliflozin (JARDIANCE) 10 mg tablet    primidone (MYSOLINE) 50 mg tablet    atorvastatin (LIPITOR) 10 mg tablet    RABEprazole (ACIPHEX) 20 mg TbEC     Diagnoses and all orders for this visit:    1. Shortness of breath  -     XR CHEST PA LAT; Future    2. Hypercholesterolemia  -     LIPID PANEL; Future  -     atorvastatin (LIPITOR) 10 mg tablet; Take 1 Tablet by mouth nightly. For cholesterol    3. Essential hypertension  -     CBC WITH AUTOMATED DIFF; Future  -     METABOLIC PANEL, COMPREHENSIVE; Future    4. Diabetes mellitus without complication ([facility] [address]  -     HEMOGLOBIN A1C WITH EAG; Future    5. Weakness  -     TSH 3RD GENERATION; Future  -     T4, FREE; Future    6. Essential tremor  -     primidone (MYSOLINE) 50 mg tablet; Take 1 Tablet by mouth two (2) times a day. 7. Encounter for Medicare annual wellness exam    Other orders  -     ondansetron hcl (Zofran) 4 mg tablet; Take 1 Tablet by mouth every eight (8) hours as needed for Nausea. -     amLODIPine (NORVASC) 5 mg tablet; Take 1 Tablet by mouth daily. -     empagliflozin (JARDIANCE) 10 mg tablet; Take 1 Tablet by mouth daily. For diabetes  -     RABEprazole (ACIPHEX) 20 mg TbEC; Take 1 Tablet by mouth two (2) times a day.

## 2022-12-16 LAB
ALBUMIN SERPL-MCNC: 4.6 G/DL (ref 3.8–4.8)
ALBUMIN/GLOB SERPL: 1.8 {RATIO} (ref 1.2–2.2)
ALP SERPL-CCNC: 101 IU/L (ref 44–121)
ALT SERPL-CCNC: 24 IU/L (ref 0–32)
AST SERPL-CCNC: 27 IU/L (ref 0–40)
BASOPHILS # BLD AUTO: 0 X10E3/UL (ref 0–0.2)
BASOPHILS NFR BLD AUTO: 1 %
BILIRUB SERPL-MCNC: <0.2 MG/DL (ref 0–1.2)
BUN SERPL-MCNC: 13 MG/DL (ref 8–27)
BUN/CREAT SERPL: 15 (ref 12–28)
CALCIUM SERPL-MCNC: 9.8 MG/DL (ref 8.7–10.3)
CHLORIDE SERPL-SCNC: 102 MMOL/L (ref 96–106)
CHOLEST SERPL-MCNC: 135 MG/DL (ref 100–199)
CO2 SERPL-SCNC: 22 MMOL/L (ref 20–29)
CREAT SERPL-MCNC: 0.89 MG/DL (ref 0.57–1)
EGFR: 70 ML/MIN/1.73
EOSINOPHIL # BLD AUTO: 0.1 X10E3/UL (ref 0–0.4)
EOSINOPHIL NFR BLD AUTO: 2 %
ERYTHROCYTE [DISTWIDTH] IN BLOOD BY AUTOMATED COUNT: 15.8 % (ref 11.7–15.4)
EST. AVERAGE GLUCOSE BLD GHB EST-MCNC: 186 MG/DL
GLOBULIN SER CALC-MCNC: 2.6 G/DL (ref 1.5–4.5)
GLUCOSE SERPL-MCNC: 128 MG/DL (ref 70–99)
HBA1C MFR BLD: 8.1 % (ref 4.8–5.6)
HCT VFR BLD AUTO: 38.9 % (ref 34–46.6)
HDLC SERPL-MCNC: 67 MG/DL
HGB BLD-MCNC: 12.8 G/DL (ref 11.1–15.9)
IMM GRANULOCYTES # BLD AUTO: 0 X10E3/UL (ref 0–0.1)
IMM GRANULOCYTES NFR BLD AUTO: 0 %
IMP & REVIEW OF LAB RESULTS: NORMAL
LDLC SERPL CALC-MCNC: 51 MG/DL (ref 0–99)
LYMPHOCYTES # BLD AUTO: 1.5 X10E3/UL (ref 0.7–3.1)
LYMPHOCYTES NFR BLD AUTO: 40 %
MCH RBC QN AUTO: 28.1 PG (ref 26.6–33)
MCHC RBC AUTO-ENTMCNC: 32.9 G/DL (ref 31.5–35.7)
MCV RBC AUTO: 85 FL (ref 79–97)
MONOCYTES # BLD AUTO: 0.4 X10E3/UL (ref 0.1–0.9)
MONOCYTES NFR BLD AUTO: 9 %
NEUTROPHILS # BLD AUTO: 1.8 X10E3/UL (ref 1.4–7)
NEUTROPHILS NFR BLD AUTO: 48 %
PLATELET # BLD AUTO: 203 X10E3/UL (ref 150–450)
POTASSIUM SERPL-SCNC: 4 MMOL/L (ref 3.5–5.2)
PROT SERPL-MCNC: 7.2 G/DL (ref 6–8.5)
RBC # BLD AUTO: 4.56 X10E6/UL (ref 3.77–5.28)
SODIUM SERPL-SCNC: 141 MMOL/L (ref 134–144)
T4 FREE SERPL-MCNC: 1.29 NG/DL (ref 0.82–1.77)
TRIGL SERPL-MCNC: 94 MG/DL (ref 0–149)
TSH SERPL DL<=0.005 MIU/L-ACNC: 1.79 UIU/ML (ref 0.45–4.5)
VLDLC SERPL CALC-MCNC: 17 MG/DL (ref 5–40)
WBC # BLD AUTO: 3.8 X10E3/UL (ref 3.4–10.8)

## 2022-12-20 ENCOUNTER — TELEPHONE (OUTPATIENT)
Dept: FAMILY MEDICINE CLINIC | Age: 72
End: 2022-12-20

## 2022-12-22 RX ORDER — AZITHROMYCIN 250 MG/1
TABLET, FILM COATED ORAL
Qty: 6 TABLET | Refills: 0 | Status: SHIPPED | OUTPATIENT
Start: 2022-12-22 | End: 2022-12-27

## 2022-12-22 RX ORDER — PROMETHAZINE HYDROCHLORIDE AND DEXTROMETHORPHAN HYDROBROMIDE 6.25; 15 MG/5ML; MG/5ML
5 SYRUP ORAL
Qty: 473 ML | Refills: 1 | Status: SHIPPED | OUTPATIENT
Start: 2022-12-22 | End: 2022-12-29

## 2022-12-22 NOTE — TELEPHONE ENCOUNTER
[facility] They prescribed her medicine for a cough but she stated it's not working and she still doesn't feel any better. She stated she feels like she's experiencing cold and flu symptoms. She requested a call back in regards to what she do. She can be reached at [phone].

## 2023-01-25 ENCOUNTER — OFFICE VISIT (OUTPATIENT)
Dept: NEUROLOGY | Age: 73
End: 2023-01-25
Payer: MEDICARE

## 2023-01-25 VITALS
SYSTOLIC BLOOD PRESSURE: 122 MMHG | OXYGEN SATURATION: 98 % | HEART RATE: 68 BPM | BODY MASS INDEX: 26.68 KG/M2 | HEIGHT: 62 IN | WEIGHT: 145 LBS | DIASTOLIC BLOOD PRESSURE: 84 MMHG | RESPIRATION RATE: 15 BRPM

## 2023-01-25 DIAGNOSIS — G25.0 ESSENTIAL TREMOR: Primary | ICD-10-CM

## 2023-01-25 DIAGNOSIS — E11.40 TYPE 2 DIABETES MELLITUS WITH DIABETIC NEUROPATHY, WITHOUT LONG-TERM CURRENT USE OF INSULIN (HCC): ICD-10-CM

## 2023-01-25 PROCEDURE — G8536 NO DOC ELDER MAL SCRN: HCPCS | Performed by: PSYCHIATRY & NEUROLOGY

## 2023-01-25 PROCEDURE — G8399 PT W/DXA RESULTS DOCUMENT: HCPCS | Performed by: PSYCHIATRY & NEUROLOGY

## 2023-01-25 PROCEDURE — G9899 SCRN MAM PERF RSLTS DOC: HCPCS | Performed by: PSYCHIATRY & NEUROLOGY

## 2023-01-25 PROCEDURE — 2022F DILAT RTA XM EVC RTNOPTHY: CPT | Performed by: PSYCHIATRY & NEUROLOGY

## 2023-01-25 PROCEDURE — 3046F HEMOGLOBIN A1C LEVEL >9.0%: CPT | Performed by: PSYCHIATRY & NEUROLOGY

## 2023-01-25 PROCEDURE — 1090F PRES/ABSN URINE INCON ASSESS: CPT | Performed by: PSYCHIATRY & NEUROLOGY

## 2023-01-25 PROCEDURE — G9717 DOC PT DX DEP/BP F/U NT REQ: HCPCS | Performed by: PSYCHIATRY & NEUROLOGY

## 2023-01-25 PROCEDURE — 99214 OFFICE O/P EST MOD 30 MIN: CPT | Performed by: PSYCHIATRY & NEUROLOGY

## 2023-01-25 PROCEDURE — G8417 CALC BMI ABV UP PARAM F/U: HCPCS | Performed by: PSYCHIATRY & NEUROLOGY

## 2023-01-25 PROCEDURE — 1123F ACP DISCUSS/DSCN MKR DOCD: CPT | Performed by: PSYCHIATRY & NEUROLOGY

## 2023-01-25 PROCEDURE — 3079F DIAST BP 80-89 MM HG: CPT | Performed by: PSYCHIATRY & NEUROLOGY

## 2023-01-25 PROCEDURE — G8427 DOCREV CUR MEDS BY ELIG CLIN: HCPCS | Performed by: PSYCHIATRY & NEUROLOGY

## 2023-01-25 PROCEDURE — 1101F PT FALLS ASSESS-DOCD LE1/YR: CPT | Performed by: PSYCHIATRY & NEUROLOGY

## 2023-01-25 PROCEDURE — 3074F SYST BP LT 130 MM HG: CPT | Performed by: PSYCHIATRY & NEUROLOGY

## 2023-01-25 PROCEDURE — 3017F COLORECTAL CA SCREEN DOC REV: CPT | Performed by: PSYCHIATRY & NEUROLOGY

## 2023-01-25 NOTE — PROGRESS NOTES
[facility] [address]  In Office FOLLOW-UP VISIT         Ana Cristina Cruz is a 81 y.o. female who presents today for the following:  Chief Complaint   Patient presents with    Tremors     Annual- pt denies any symptoms           ASSESSMENT AND PLAN      1. Essential tremor  Assessment & Plan:   Well-controlled continue on primidone also known as Mysoline 50 mg 1 tablet twice a day    PCP sent in prescription in December 2022 with years worth of refills so I did not refill the medication at this time    We discussed possibly trying to wean down a little bit off the medication but patient feels as though she is finally well controlled and does not wish to change anything and that is reasonable at this time    Per patient request she would like to follow-up with us on an annual visit for routine check and  2. Type 2 diabetes mellitus with diabetic neuropathy, without long-term current use of insulin ([facility]  Assessment & Plan:  Patient reports blood sugars are well controlled  No real symptoms or complaints at today's office visit patient is not on any medication      Patient and/or family was given time to ask questions and voice concerns. I believe all questions concerns were adequately addressed at this  office visit. Patient and/or family also verbalized agreement and understanding of the above-stated plan    Complex neurologic decision making secondary any or all of the following to include unclear etiology, and /or polypharmacy, and/or significant comorbid conditions, and/or use of high-risk medications which complicate the decision making process related to patient's neurologic diagnosis          ICD-10-CM ICD-9-CM    1. Essential tremor  G25.0 333.1       2.  Type 2 diabetes mellitus with diabetic neuropathy, without long-term current use of insulin ([facility])  E11.40 250.60      357.2               HPI  Historical Data  Patient is known to the practice and was previously seen by  Bhavna Luke    Patient is right-handed    Neurologic diagnosis  Essential tremor   Onset around 2020 with gradual progression   Initially started in the right hand and then gradually progressed to the left    Other significant comorbid conditions/concerns  Hypertension  Diabetes  Depression  Chronic back pain      Interim Data:     Essential tremor  Current medications:  Mysoline 50 mg the a.m. 50 mg in the p.m. Patient reports being well controlled having no real evidence of tremor on day-to-day activities  Denies side effects to medications        Diabetic neuropathy  History of mild tingling in her feet and legs  At 1 point she was on gabapentin presently not on it  No real complaints at today's visit      Pertinent diagnostic data    Lab Results   Component Value Date/Time    Hemoglobin A1c 8.1 (H) 12/15/2022 12:00 AM    Hemoglobin A1c (POC) 7.3 05/27/2022 11:30 AM     Lab Results   Component Value Date/Time    LDL, calculated 51 12/15/2022 12:00 AM    LDL, calculated 34.8 06/16/2022 01:31 PM         Results from Hospital Encounter encounter on 01/04/22    MRI LUMB SPINE WO CONT    Impression  1. Severe chronic disc degeneration adjacent endplate changes at E4-A2 with  bilateral right more than left foraminal narrowing. Allergies   Allergen Reactions    Latex Itching    Amoxicillin Anaphylaxis     Itching and swelling in mouth    Lisinopril Swelling    Atorvastatin Other (comments)     20 mg causes muscle cramps    Sulfa (Sulfonamide Antibiotics) Itching     About 11 yo  Keflex=unsure       Current Outpatient Medications   Medication Sig    empagliflozin (JARDIANCE) 25 mg tablet Take 1 Tablet by mouth daily.  For diabetes    terconazole (TERAZOL 7) 0.4 % vaginal cream INSERT 1 APPLICATORFUL INTRAVAGINALLY EVERY WEEK AS DIRECTED    docosahexanoic acid-eicosapent 120-180 mg cap Fish Oil   one tablet by mouth daily    cyanocobalamin (VITAMIN B12) 100 mcg tablet Vitamin B12   one tablet by mouth daily ondansetron hcl (Zofran) 4 mg tablet Take 1 Tablet by mouth every eight (8) hours as needed for Nausea. amLODIPine (NORVASC) 5 mg tablet Take 1 Tablet by mouth daily. primidone (MYSOLINE) 50 mg tablet Take 1 Tablet by mouth two (2) times a day. atorvastatin (LIPITOR) 10 mg tablet Take 1 Tablet by mouth nightly. For cholesterol    RABEprazole (ACIPHEX) 20 mg TbEC Take 1 Tablet by mouth two (2) times a day. alendronate (FOSAMAX) 35 mg tablet TAKE 1 TABLET BY MOUTH WEEKLY WITH 240 ML WATER ON EMPTY STOMACH,REMAIN UPRIGHT FOR 30 MIN AFTERWARD    Accu-Chek Wendy Plus test strp strip USE TO TEST BLOOD SUGAR ONCE DAILY. DIAGNOSIS E11.40    metFORMIN (GLUCOPHAGE) 1,000 mg tablet TAKE 1 TABLET IN MORNING AND 1 TABLET IN EVENING. FOR DIABETES    Blood-Glucose Meter (Accu-Chek Wendy Plus Meter) misc Use as directed. E11.9    cholecalciferol, vitamin D3, 50 mcg (2,000 unit) tab Take 2,000 Units by mouth daily. lancets (ACCU-CHEK SOFTCLIX LANCETS) misc TEST 2 TIMES DAILY AS DIRECTED (E11.9)    polyethylene glycol (MIRALAX) 17 gram packet Take 17 g by mouth daily as needed ('Constipation'). multivit,calc,mins/iron/folic (ONE-A-DAY WOMENS FORMULA PO) Take 1 Tab by mouth daily. No current facility-administered medications for this visit.        Past medical history/surgical history, family history, and social history have been reviewed for today's visit      ROS    A ten system review of constitutional, cardiovascular, respiratory, musculoskeletal, endocrine, skin, SHEENT, genitourinary, psychiatric and neurologic systems was obtained and is unremarkable except as mentioned under HPI          EXAMINATION:     Visit Vitals  /84 (BP 1 Location: Left upper arm, BP Patient Position: Sitting, BP Cuff Size: Adult)   Pulse 68   Resp 15   Ht 5' 2\" (1.575 m)   Wt 145 lb (65.8 kg)   LMP  (LMP Unknown)   SpO2 98%   BMI 26.52 kg/m²         General appearance: Patient is well-developed and well-nourished in no apparent distress and well groomed. Psych/mental health:  Affect: Appropriate    PHQ  3 most recent PHQ Screens 1/25/2023   Little interest or pleasure in doing things Not at all   Feeling down, depressed, irritable, or hopeless Not at all   Total Score PHQ 2 0   Trouble falling or staying asleep, or sleeping too much -   Feeling tired or having little energy -   Poor appetite, weight loss, or overeating -   Feeling bad about yourself - or that you are a failure or have let yourself or your family down -   Trouble concentrating on things such as school, work, reading, or watching TV -   Moving or speaking so slowly that other people could have noticed; or the opposite being so fidgety that others notice -   Thoughts of being better off dead, or hurting yourself in some way -   PHQ 9 Score -   How difficult have these problems made it for you to do your work, take care of your home and get along with others -       HEENT:   Normocephalic  With evidence of trauma: No  Full range of motion head neck: Yes  Tenderness to palpation of the head neck region: No      Cardiovascular:     Extremities warm to touch: Yes  Extremity swelling: No  Discoloration: No  Evidence of PVD: No    Respiratory:   Dyspnea on exertion: No   Abnormal effort on casual observation: No   Use of portable oxygen: No   Evidence of cyanosis: No     Musculoskeletal:   Evidence of significant bone deformities: No   Spinal curvature: No     Integumentary:    Obvious bruising: No   Lacerations or discoloration on casual observation: No       Neurological Examination:   Mental Status:        MMSE  No flowsheet data found. Formal testing was not completed    there was nothing concerning on general observation and discussion.    Alert oriented and appropriate to general conversation  Normal processing on general observation  Followed conversation and responded seemingly appropriate throughout the office visit  No word finding difficulties noted on casual observation  Able to follow directions without difficulty     Cranial Nerves:    Grossly intact    Motor:   Normal bulk and tone  No tremor noted on today's exam  No abnormal movements appreciated on today's exam  Moves extremities spontaneously and with purpose  5/5 x 4      Sensation: Intact to light touch    Coordination/Cerebellar:   Grossly intact    Gait: Ambulates independently    Reflexes: Not tested    Fall risk assessment  Fall Risk Assessment, last 12 mths 1/25/2023   Able to walk? Yes   Fall in past 12 months? 0   Do you feel unsteady? -   Are you worried about falling -         Follow-up and Dispositions    Return in about 1 year (around 1/25/2024) for Virtual visit, med check.                Nai Cleaning MS, ANP-BC, [facility]

## 2023-01-25 NOTE — PROGRESS NOTES
1. Have you been to the ER, urgent care clinic since your last visit? Hospitalized since your last visit? No.    2. Have you seen or consulted any other health care providers outside of the [address] [place] [address] since your last visit? Include any pap smears or colon screening.    No.        Chief Complaint   Patient presents with    Tremors     Annual- pt denies any symptoms

## 2023-01-25 NOTE — LETTER
1/25/2023    Patient: Fanny Maldonado   YOB: 1950   Date of Visit: 1/25/2023     Ken Alcala MD  [address] [place] [address]  [facility] [place]    Dear Ken Alcala MD,      Thank you for referring Ms. Bisi Addison to [address] [place] [address] for evaluation. My notes for this consultation are attached. If you have questions, please do not hesitate to call me. I look forward to following your patient along with you.       Sincerely,    Marichuy Gamez, ANP-C

## 2023-01-25 NOTE — ASSESSMENT & PLAN NOTE
Well-controlled continue on primidone also known as Mysoline 50 mg 1 tablet twice a day    PCP sent in prescription in December 2022 with years worth of refills so I did not refill the medication at this time    We discussed possibly trying to wean down a little bit off the medication but patient feels as though she is finally well controlled and does not wish to change anything and that is reasonable at this time    Per patient request she would like to follow-up with us on an annual visit for routine check and

## 2023-01-25 NOTE — PATIENT INSTRUCTIONS
As per discussion    Overall you are doing really well regarding your essential tremor continue on the Mysoline also known as primidone 50 mg twice a day    It looks like your primary care sent in a 90-day prescription back in December 2022 with refills times a year so you will be good on that prescription until December 2023    At this point we will just set you up for annual check-in's as we discussed and were available to you sooner should that be necessary    Have a fabulous 2023 and I will see you in 2024 for :-)      Office Policies      Appointments  Please make sure that you arrive for your next appointment at least 15 minutes prior to your appointment time. If for some reason you are going to be late please notify the office to determine if you need to be rescheduled or we can adjust your appointment time      Phone calls/patient messages:  Please allow up to 24 hours for someone in the office to contact you about your call or message. Be mindful your provider may be out of the office or your message may require further review. We encourage you to use [employer] for your messages as this is a faster, more efficient way to communicate with our office    Medication Refills:  Prescription medications require up to 48 business hours to process. We encourage you to use [employer] for your refills. For controlled medications: Please allow up to 72 business hours to process. Certain medications may require you to  a written prescription at our office. NO narcotic/controlled medications will be prescribed after 4pm Monday through Friday or on weekends    Form/Paperwork Completion:  We ask that you allow 7-14 business days. You may also download your forms to [employer] to have your doctor print off.

## 2023-01-25 NOTE — ASSESSMENT & PLAN NOTE
Patient reports blood sugars are well controlled  No real symptoms or complaints at today's office visit patient is not on any medication

## 2023-02-11 ENCOUNTER — OFFICE VISIT (OUTPATIENT)
Dept: URGENT CARE | Age: 73
End: 2023-02-11
Payer: MEDICARE

## 2023-02-11 VITALS
DIASTOLIC BLOOD PRESSURE: 62 MMHG | TEMPERATURE: 98 F | WEIGHT: 142.1 LBS | HEIGHT: 61 IN | BODY MASS INDEX: 26.83 KG/M2 | RESPIRATION RATE: 99 BRPM | HEART RATE: 62 BPM | SYSTOLIC BLOOD PRESSURE: 143 MMHG

## 2023-02-11 DIAGNOSIS — H61.23 BILATERAL IMPACTED CERUMEN: Primary | ICD-10-CM

## 2023-02-11 DIAGNOSIS — J06.9 UPPER RESPIRATORY TRACT INFECTION, UNSPECIFIED TYPE: ICD-10-CM

## 2023-02-11 NOTE — PROGRESS NOTES
Fatigue  This is a new problem. The current episode started more than 2 days ago. The problem occurs constantly. Pertinent negatives include no headaches. Associated symptoms comments: C/o chills and mild ache   Congested - no cough   No fever   No GI symptoms . Nothing aggravates the symptoms. Nothing relieves the symptoms. She has tried nothing for the symptoms. Past Medical History:   Diagnosis Date    Allergic rhinitis due to allergen 10/22/2014    Arthritis     spine    Asthma     many years ago, no inhaler or meds    Chronic pain     back pain    Depression     Diabetes ([address]     Type II    GERD (gastroesophageal reflux disease)     Hepatitis B 1984    Hypercholesterolemia     Hypertension     Kidney stones 1990's    Positive PPD 2009    treated with INH        Past Surgical History:   Procedure Laterality Date    COLONOSCOPY  4-11    manetas- n ormal    COLONOSCOPY N/A 11/4/2021    COLONOSCOPY, EGD performed by Margoth Hernandez MD at hospitals AMBULATORY OR    EGD  4-11    manetas- esophageal ring    ENDOSCOPY, COLON, DIAGNOSTIC  12/2007    2 polyps    HX BREAST BIOPSY Bilateral 1980s    all benign cysts    HX BUNIONECTOMY Bilateral 1990s    HX CATARACT REMOVAL Bilateral 11/2018    HX CHOLECYSTECTOMY      HX COLONOSCOPY  May 2016    HX ENDOSCOPY  May 2016    HX GYN  1978    tubal laparoscopy-unblocked tubes    HX HEART CATHETERIZATION      negatve    HX ORTHOPAEDIC  2016    had an injection in her back to help with leg pain    HX ORTHOPAEDIC Left 12/12/2017    foot surg.   bunion removal  \"Put a plate in\"    HX OTHER SURGICAL  1997    bunionectomy    HX TONSIL AND ADENOIDECTOMY      approx 11years old    DE ESOPHAGEAL MOTILITY STUDY W/INTERP&RPT  1/27/2020    DE GASTROESOPHAG REFLX TEST W/INTRLUML IMPED ELTRD  1/27/2020    DE UNLISTED PROCEDURE BREAST  1990's    abcess bilateral         Family History   Problem Relation Age of Onset    Diabetes Mother     Hypertension Mother     Stroke Mother     Cancer Father         lung    Diabetes Father     Cancer Paternal Aunt         colon    Cancer Maternal Aunt         colon, and lung    Hypertension Maternal Aunt     Diabetes Maternal Aunt     Hypertension Maternal Aunt     Stroke Maternal Aunt     Heart Disease Maternal Aunt     Heart Disease Maternal Uncle     Psychiatric Disorder Brother         Social History     Socioeconomic History    Marital status: SINGLE     Spouse name: Not on file    Number of children: Not on file    Years of education: Not on file    Highest education level: Not on file   Occupational History    Not on file   Tobacco Use    Smoking status: Former     Packs/day: 1.00     Years: 25.00     Pack years: 25.00     Types: Cigarettes     Quit date: 2010     Years since quittin.4    Smokeless tobacco: Never   Vaping Use    Vaping Use: Never used   Substance and Sexual Activity    Alcohol use: Never    Drug use: Never    Sexual activity: Not Currently     Partners: Male     Birth control/protection: None   Other Topics Concern    Not on file   Social History Narrative    Retired schoolteacher. Raised 2 nieces. Social Determinants of Health     Financial Resource Strain: Low Risk     Difficulty of Paying Living Expenses: Not very hard   Food Insecurity: No Food Insecurity    Worried About Running Out of Food in the Last Year: Never true    Ran Out of Food in the Last Year: Never true   Transportation Needs: Not on file   Physical Activity: Not on file   Stress: Not on file   Social Connections: Not on file   Intimate Partner Violence: Not on file   Housing Stability: Not on file                ALLERGIES: Latex, Amoxicillin, Lisinopril, Atorvastatin, and Sulfa (sulfonamide antibiotics)    Review of Systems   Constitutional:  Positive for chills and fatigue. Gastrointestinal:  Positive for nausea. Musculoskeletal:  Positive for myalgias. Neurological:  Negative for headaches. All other systems reviewed and are negative.     Vitals: 02/11/23 1335 02/11/23 1337   BP: (!) 145/67 (!) 143/62   Pulse: 66 62   Resp: (!) 99    Temp: 98 °F (36.7 °C)    Weight: 142 lb 1.6 oz (64.5 kg)    Height: 5' 1\" (1.549 m)        Physical Exam  Vitals and nursing note reviewed. Constitutional:       General: She is not in acute distress. HENT:      Right Ear: There is impacted cerumen. Left Ear: There is impacted cerumen. Ears:      Comments: Post irrigation TM - no erythema      Nose: Nose normal.      Mouth/Throat:      Pharynx: No oropharyngeal exudate or posterior oropharyngeal erythema. Eyes:      General:         Right eye: No discharge. Left eye: No discharge. Conjunctiva/sclera: Conjunctivae normal.   Pulmonary:      Effort: Pulmonary effort is normal. No respiratory distress. Breath sounds: Normal breath sounds. No wheezing, rhonchi or rales. Musculoskeletal:      Cervical back: Neck supple. Lymphadenopathy:      Cervical: No cervical adenopathy. Skin:     Findings: No rash. MDM    Procedures        ICD-10-CM ICD-9-CM    1. Bilateral impacted cerumen  H61.23 380.4 REMOVE IMPACTED EAR WAX      2. Upper respiratory tract infection, unspecified type  J06.9 465.9           Symptomatic Rx  Ear irrigation done and large amount of wax removed from both ears   Tolerated procedure well     No orders of the defined types were placed in this encounter. No results found for any visits on 02/11/23. The patients condition was discussed with the patient and they understand. The patient is to follow up with primary care doctor. If signs and symptoms become worse the pt is to go to the ER. The patient is to take medications as prescribed.

## 2023-03-23 ENCOUNTER — HOSPITAL ENCOUNTER (OUTPATIENT)
Age: 73
Setting detail: OUTPATIENT SURGERY
Discharge: HOME | End: 2023-03-23
Attending: INTERNAL MEDICINE | Admitting: INTERNAL MEDICINE
Payer: MEDICARE

## 2023-03-23 VITALS
HEART RATE: 65 BPM | SYSTOLIC BLOOD PRESSURE: 161 MMHG | OXYGEN SATURATION: 98 % | RESPIRATION RATE: 16 BRPM | DIASTOLIC BLOOD PRESSURE: 59 MMHG

## 2023-03-23 PROCEDURE — 2709999900 HC NON-CHARGEABLE SUPPLY: Performed by: INTERNAL MEDICINE

## 2023-03-23 PROCEDURE — 76040000007: Performed by: INTERNAL MEDICINE

## 2023-03-23 PROCEDURE — 77030040810 HC CATH BLLN ANORECT EXPULSN MUIS -B: Performed by: INTERNAL MEDICINE

## 2023-03-23 NOTE — DISCHARGE INSTRUCTIONS
Zack Diaz  [id number]  1950      MANOMETRY DISCHARGE INSTRUCTION    You may resume your regular diet as tolerated. You may resume your normal daily activities. Call your Physician if you have any complications or questions. I have reviewed discharge instructions with the patient. The patient verbalized understanding. [employer] Activation    Thank you for requesting access to [facility]. Please follow the instructions below to securely access and download your online medical record. [facility] allows you to send messages to your doctor, view your test results, renew your prescriptions, schedule appointments, and more. How Do I Sign Up? In your internet browser, go to [email or web address]  Click on the First Time User? Click Here link in the Sign In box. You will be redirect to the New Member Sign Up page. Enter your [facility] Access Code exactly as it appears below. You will not need to use this code after youve completed the sign-up process. If you do not sign up before the expiration date, you must request a new code. [facility] Access Code: Activation code not generated  Current [facility] Status: Active (This is the date your [facility] access code will )    Enter the last four digits of your Social Security Number (xxxx) and Date of Birth (mm/dd/yyyy) as indicated and click Submit. You will be taken to the next sign-up page. Create a [facility] ID. This will be your [facility] login ID and cannot be changed, so think of one that is secure and easy to remember. Create a [facility] password. You can change your password at any time. Enter your Password Reset Question and Answer. This can be used at a later time if you forget your password. Enter your e-mail address. You will receive e-mail notification when new information is available in [address] Click Sign Up. You can now view and download portions of your medical record.   Click the [place] link to download a portable copy of your medical information. Additional Information    If you have questions, please visit the Frequently Asked Questions section of the [employer] website at [email or web address] Remember, [employer] is NOT to be used for urgent needs. For medical emergencies, dial 911.

## 2023-03-23 NOTE — PROGRESS NOTES
Rectal exam done by Kyra Osorio RN. Anal manometry catheter inserted into rectum. Manometry procedure complete. Catheter inserted into rectum. Balloon filled with 40cc of luke warm H2O, and pt escorted to bathroom for 5 min expulsion test.  Pt was not able to expel balloon. Balloon deflated and catheter removed. Pt tolerated procedures well.

## 2023-03-28 ENCOUNTER — TELEPHONE (OUTPATIENT)
Dept: FAMILY MEDICINE CLINIC | Age: 73
End: 2023-03-28

## 2023-03-28 ENCOUNTER — APPOINTMENT (OUTPATIENT)
Dept: GENERAL RADIOLOGY | Age: 73
End: 2023-03-28
Attending: EMERGENCY MEDICINE
Payer: MEDICARE

## 2023-03-28 ENCOUNTER — HOSPITAL ENCOUNTER (EMERGENCY)
Age: 73
Discharge: HOME | End: 2023-03-29
Attending: STUDENT IN AN ORGANIZED HEALTH CARE EDUCATION/TRAINING PROGRAM
Payer: MEDICARE

## 2023-03-28 VITALS
HEIGHT: 61 IN | HEART RATE: 90 BPM | WEIGHT: 143.96 LBS | TEMPERATURE: 98.7 F | BODY MASS INDEX: 27.18 KG/M2 | SYSTOLIC BLOOD PRESSURE: 128 MMHG | DIASTOLIC BLOOD PRESSURE: 79 MMHG | RESPIRATION RATE: 18 BRPM | OXYGEN SATURATION: 99 %

## 2023-03-28 DIAGNOSIS — M54.32 LEFT SIDED SCIATICA: Primary | ICD-10-CM

## 2023-03-28 PROCEDURE — 74011250637 HC RX REV CODE- 250/637: Performed by: EMERGENCY MEDICINE

## 2023-03-28 PROCEDURE — 74011000250 HC RX REV CODE- 250: Performed by: EMERGENCY MEDICINE

## 2023-03-28 PROCEDURE — 72100 X-RAY EXAM L-S SPINE 2/3 VWS: CPT

## 2023-03-28 PROCEDURE — 99283 EMERGENCY DEPT VISIT LOW MDM: CPT

## 2023-03-28 RX ORDER — LIDOCAINE 4 G/100G
2 PATCH TOPICAL
Status: DISCONTINUED | OUTPATIENT
Start: 2023-03-28 | End: 2023-03-29 | Stop reason: HOSPADM

## 2023-03-28 RX ORDER — GABAPENTIN 100 MG/1
100 CAPSULE ORAL 3 TIMES DAILY
Status: DISCONTINUED | OUTPATIENT
Start: 2023-03-29 | End: 2023-03-29 | Stop reason: HOSPADM

## 2023-03-28 RX ORDER — NAPROXEN 250 MG/1
375 TABLET ORAL
Status: COMPLETED | OUTPATIENT
Start: 2023-03-28 | End: 2023-03-28

## 2023-03-28 RX ORDER — LIDOCAINE 4 G/100G
2 PATCH TOPICAL EVERY 24 HOURS
Status: DISCONTINUED | OUTPATIENT
Start: 2023-03-28 | End: 2023-03-28

## 2023-03-28 RX ORDER — NAPROXEN 250 MG/1
375 TABLET ORAL 2 TIMES DAILY WITH MEALS
Status: DISCONTINUED | OUTPATIENT
Start: 2023-03-29 | End: 2023-03-29 | Stop reason: HOSPADM

## 2023-03-28 RX ORDER — GABAPENTIN 100 MG/1
100 CAPSULE ORAL
Status: COMPLETED | OUTPATIENT
Start: 2023-03-28 | End: 2023-03-28

## 2023-03-28 RX ADMIN — NAPROXEN 375 MG: 250 TABLET ORAL at 22:49

## 2023-03-28 RX ADMIN — GABAPENTIN 100 MG: 100 CAPSULE ORAL at 22:49

## 2023-03-28 NOTE — Clinical Note
[facility] EMERGENCY DEPT  [address]  [facility] [address]  [phone]    Work/School Note    Date: 3/28/2023    To Whom It May concern:      Raya Hartley was seen and treated today in the emergency room by the following provider(s):  Attending Provider: Kaci Parish MD.      Raya Hartley is excused from work/school on 03/29/23. She is clear to return to work/school on 03/30/23.         Sincerely,          Kamila Walton MD

## 2023-03-28 NOTE — TELEPHONE ENCOUNTER
Message left on patient voice mail letting patient know go to ER for evaluation if unable to wait for an open appointment. Patient has appt on 6/15/23 scheduled.

## 2023-03-28 NOTE — TELEPHONE ENCOUNTER
Patient called stating she is experiencing pain and tingling in her leg. She also stated that it is cold to the touch. She requested a call back to the nurse to discuss what she should do. She can be reached at [phone].

## 2023-03-29 RX ORDER — LIDOCAINE 50 MG/G
PATCH TOPICAL
Qty: 1 EACH | Refills: 0 | Status: SHIPPED | OUTPATIENT
Start: 2023-03-29

## 2023-03-29 RX ORDER — GABAPENTIN 100 MG/1
100 CAPSULE ORAL 3 TIMES DAILY
Qty: 90 CAPSULE | Refills: 0 | Status: SHIPPED | OUTPATIENT
Start: 2023-03-28

## 2023-03-29 RX ORDER — METHYLPREDNISOLONE 4 MG/1
TABLET ORAL
Qty: 1 DOSE PACK | Refills: 0 | Status: SHIPPED | OUTPATIENT
Start: 2023-03-28

## 2023-03-29 RX ORDER — NAPROXEN 500 MG/1
500 TABLET ORAL 2 TIMES DAILY WITH MEALS
Qty: 20 TABLET | Refills: 0 | Status: SHIPPED | OUTPATIENT
Start: 2023-03-29 | End: 2023-04-08

## 2023-03-29 NOTE — ED NOTES
Pt does report that she had a fall this morning she woke up from the pain and went to get some medication and just slipped. Denies LOC, denies any other pain, denies blood thinners.  Been taking tylenol for pain last dose at 3pm.

## 2023-03-29 NOTE — ED PROVIDER NOTES
[facility] EMERGENCY DEPT  EMERGENCY DEPARTMENT ENCOUNTER       Pt Name: Lucinda Escobar  MRN: [id number]  [place] 1950  Date of evaluation: 3/28/2023  Provider: Everett Dunaway MD   PCP: Robb Webster MD  Note Started: 11:36 PM 3/28/23     CHIEF COMPLAINT       Chief Complaint   Patient presents with    Leg Pain     Pt to ED c/o left leg pain radiating from the back of her thigh down to her foot. Pt describes shooting/burning pain that woke her up from her sleep. Pt describes \"pins and needles\" sensation that has not been alleviated by pain medication. Hx DM II, HTN         HISTORY OF PRESENT ILLNESS: 1 or more elements      History From: Patient  HPI Limitations : None     Lucinda Escobar is a 81 y.o. female with history of GERD, gastroparesis, type 2 diabetes, essential tremor, hyperlipidemia, hypertension, lumbar spondylosis s/p surgery at L5-S1 by Dr. Roland Sharp in February 2022 who presents to the ED with left leg burning pain that started at 3 AM this morning and woke her up from sleep. Patient reports pins and and needles that extend down the back of her left leg and a burning type pain. She denies any low back pain. She has been taking Tylenol every 6 hours without significant relief. She has been able to walk. Denies any urinary incontinence. She does report she has been having some fecal incontinence but that has been going on for the last 3 to 4 months and she is being evaluated by GI for the symptoms. She had rectal manometry last week. Denies any leg swelling or redness. Denies any recent travel. REVIEW OF SYSTEMS      Review of Systems     Positives and Pertinent negatives as per HPI.     PAST HISTORY     Past Medical History:  Past Medical History:   Diagnosis Date    Allergic rhinitis due to allergen 10/22/2014    Arthritis     spine    Asthma     many years ago, no inhaler or meds    Chronic pain     back pain    Depression     Diabetes ([address]     Type II    GERD (gastroesophageal reflux disease)     Hepatitis B 1984    Hypercholesterolemia     Hypertension     Kidney stones     Positive PPD 2009    treated with INH       Past Surgical History:  Past Surgical History:   Procedure Laterality Date    COLONOSCOPY      manetas- n ormal    COLONOSCOPY N/A 2021    COLONOSCOPY, EGD performed by Sean Lam MD at [facility] AMBUL[facility] OR    EGD      manetas- esophageal ring    ENDOSCOPY, COLON, DIAGNOSTIC  2007    2 polyps    HX BREAST BIOPSY Bilateral     all benign cysts    HX BUNIONECTOMY Bilateral     HX CATARACT REMOVAL Bilateral 2018    HX CHOLECYSTECTOMY      HX COLONOSCOPY  May 2016    HX ENDOSCOPY  May 2016    HX GYN  1978    tubal laparoscopy-unblocked tubes    HX HEART CATHETERIZATION      negatve    HX ORTHOPAEDIC      had an injection in her back to help with leg pain    HX ORTHOPAEDIC Left 2017    foot surg.   bunion removal  \"Put a plate in\"    HX OTHER SURGICAL      bunionectomy    HX TONSIL AND ADENOIDECTOMY      approx 11years old    OH ESOPHAGEAL MOTILITY STUDY W/INTERP&RPT  2020    OH GASTROESOPHAG REFLX TEST W/INTRLUML IMPED ELTRD  2020    OH UNLISTED PROCEDURE BREAST      abcess bilateral       Family History:  Family History   Problem Relation Age of Onset    Diabetes Mother     Hypertension Mother     Stroke Mother     Cancer Father         lung    Diabetes Father     Cancer Paternal Aunt         colon    Cancer Maternal Aunt         colon, and lung    Hypertension Maternal Aunt     Diabetes Maternal Aunt     Hypertension Maternal Aunt     Stroke Maternal Aunt     Heart Disease Maternal Aunt     Heart Disease Maternal Uncle     Psychiatric Disorder Brother        Social History:  Social History     Tobacco Use    Smoking status: Former     Packs/day: 1.00     Years: 25.00     Pack years: 25.00     Types: Cigarettes     Quit date: 2010     Years since quittin.5    Smokeless tobacco: Never   Vaping Use    Vaping Use: Never used   Substance Use Topics    Alcohol use: Never    Drug use: Never       Allergies: Allergies   Allergen Reactions    Latex Itching    Amoxicillin Anaphylaxis     Itching and swelling in mouth    Lisinopril Swelling    Atorvastatin Other (comments)     20 mg causes muscle cramps    Sulfa (Sulfonamide Antibiotics) Itching     About 11 yo  Keflex=unsure       CURRENT MEDICATIONS      Previous Medications    ACCU-CHEK SOFIA PLUS TEST STRP STRIP    USE TO TEST BLOOD SUGAR ONCE DAILY. DIAGNOSIS E11.40    ALENDRONATE (FOSAMAX) 35 MG TABLET    TAKE 1 TABLET BY MOUTH WEEKLY WITH 240 ML WATER ON EMPTY STOMACH,REMAIN UPRIGHT FOR 30 MIN AFTERWARD    AMLODIPINE (NORVASC) 5 MG TABLET    Take 1 Tablet by mouth daily. ATORVASTATIN (LIPITOR) 10 MG TABLET    Take 1 Tablet by mouth nightly. For cholesterol    BLOOD-GLUCOSE METER (ACCU-CHEK SOFIA PLUS METER) MISC    Use as directed. E11.9    CHOLECALCIFEROL, VITAMIN D3, 50 MCG (2,000 UNIT) TAB    Take 2,000 Units by mouth daily. CYANOCOBALAMIN (VITAMIN B12) 100 MCG TABLET    Vitamin B12   one tablet by mouth daily    DOCOSAHEXANOIC ACID-EICOSAPENT 120-180 MG CAP    Fish Oil   one tablet by mouth daily    EMPAGLIFLOZIN (JARDIANCE) 25 MG TABLET    Take 1 Tablet by mouth daily. For diabetes    LANCETS (ACCU-CHEK SOFTCLIX LANCETS) MISC    TEST 2 TIMES DAILY AS DIRECTED (E11.9)    METFORMIN (GLUCOPHAGE) 1,000 MG TABLET    TAKE 1 TABLET IN MORNING AND 1 TABLET IN EVENING. FOR DIABETES    MULTIVIT,CALC,MINS/IRON/FOLIC (ONE-A-DAY WOMENS FORMULA PO)    Take 1 Tab by mouth daily. ONDANSETRON HCL (ZOFRAN) 4 MG TABLET    Take 1 Tablet by mouth every eight (8) hours as needed for Nausea. POLYETHYLENE GLYCOL (MIRALAX) 17 GRAM PACKET    Take 17 g by mouth daily as needed ('Constipation'). PRIMIDONE (MYSOLINE) 50 MG TABLET    Take 1 Tablet by mouth two (2) times a day. RABEPRAZOLE (ACIPHEX) 20 MG TBEC    Take 1 Tablet by mouth two (2) times a day.     TERCONAZOLE (TERAZOL 7) 0.4 % VAGINAL CREAM    INSERT 1 APPLICATORFUL INTRAVAGINALLY EVERY WEEK AS DIRECTED       PHYSICAL EXAM      ED Triage Vitals [03/28/23 2016]   ED Encounter Vitals Group      /79      Pulse (Heart Rate) 90      Resp Rate 18      Temp 98.7 °F (37.1 °C)      Temp src       O2 Sat (%) 99 %      Weight 143 lb 15.4 oz      Height 5' 1\"        Physical Exam  Vitals and nursing note reviewed. Constitutional:       Appearance: Normal appearance. She is normal weight. Cardiovascular:      Rate and Rhythm: Normal rate and regular rhythm. Pulses: Normal pulses. Heart sounds: Normal heart sounds. No murmur heard. Pulmonary:      Effort: Pulmonary effort is normal.      Breath sounds: No wheezing or rales. Abdominal:      Palpations: Abdomen is soft. Tenderness: There is no abdominal tenderness. There is no guarding. Musculoskeletal:         General: Tenderness present. No swelling, deformity or signs of injury. Cervical back: Normal range of motion and neck supple. No tenderness. Right lower leg: No edema. Left lower leg: No edema. Comments: Tender to palpation left anterior and posterior leg from mid thigh down, no tenderness in left SI joint, no lumbar tenderness   Skin:     General: Skin is warm. Capillary Refill: Capillary refill takes less than 2 seconds. Neurological:      General: No focal deficit present. Mental Status: She is alert and oriented to person, place, and time. Motor: No weakness.    Psychiatric:         Mood and Affect: Mood normal.          DIAGNOSTIC RESULTS        RADIOLOGY:  Non-plain film images such as CT, Ultrasound and MRI are read by the radiologist. Sam Marquez radiographic images are visualized and preliminarily interpreted by the ED Provider with the below findings:     Lumbar spine x-ray at 11:38 PM on 3/28/2023 interpreted by me, no fracture, hardware appears intact     Interpretation per the Radiologist below, if available at the time of this note:     XR SPINE LUMB 2 OR 3 V    Result Date: 3/28/2023  INDICATION: History lumbar spine surgery, left leg pain COMPARISON: 2/9/2022 FINDINGS: AP, lateral, and coned down lateral views of the lumbar spine demonstrate no acute fracture or subluxation. Posterior fusion L5-S1. The sacroiliac joints are intact. No acute process. Posterior fusion L5-S1. EMERGENCY DEPARTMENT COURSE and DIFFERENTIAL DIAGNOSIS/MDM   Vitals:    Vitals:    03/28/23 2016   BP: 128/79   Pulse: 90   Resp: 18   Temp: 98.7 °F (37.1 °C)   SpO2: 99%   Weight: 65.3 kg (143 lb 15.4 oz)   Height: 5' 1\" (1.549 m)        Patient was given the following medications:  Medications   lidocaine 4 % patch 2 Patch (2 Patches TransDERmal Apply Patch 3/28/23 2249)   naproxen (NAPROSYN) tablet 375 mg (has no administration in time range)   gabapentin (NEURONTIN) capsule 100 mg (has no administration in time range)   gabapentin (NEURONTIN) capsule 100 mg (100 mg Oral Given 3/28/23 2249)   naproxen (NAPROSYN) tablet 375 mg (375 mg Oral Given 3/28/23 2249)       CONSULTS: (Who and What was discussed)  None          Records Reviewed (source and summary of external notes): Reviewed lumbar spine MRI from 1/4/2022. Patient has severe chronic disc degeneration with adjacent endplate changes at B2-H3 with bilateral right more than left foraminal narrowing    Reviewed op note from 2/8/2022 from :   PROCEDURES PERFORMED:  1. L5-S1 posterior arthrodesis with TLIF.  2. L5-S1 posterior instrumentation. 3. Bone marrow aspirate through separate fascial incision for spinal fusion augmentation. 4. Use of [employer] robotic system (Stereotactic computer-assisted system) for placement of pedicle screws. 5. Use of allograft bone for spinal fusion. 6. Insertion of interbody biomechanical device. 7. L4-5 laminotomy and medial facetectomy. 8. Use of local autograft bone for spinal fusion.     CC/HPI Summary, DDx, ED Course, and Reassessment: Patient presents with severe left leg pain that radiates from her posterior thigh down her leg. She is ambulatory. Denies numbness, tingling, weakness. X-ray does not show any acute findings. Patient is feeling much better after meds in the ED. Will discharge with instructions to follow-up with Dr. Jonatan [place]. Return if she has any new bowel or bladder dysfunction, saddle anesthesia, weakness, fever or inability to walk. ED Course as of 03/30/23 1402   Wed Mar 29, 2023   0006 Pt is feeling better after meds in ED. REady for DC [AO]      ED Course User Index  [AO] Jalyn Suarez MD           FINAL IMPRESSION     1. Left sided sciatica          DISPOSITION/PLAN   Discharged         PATIENT REFERRED TO:  Follow-up Information       Follow up With Specialties Details Why Contact Info    Jewell Hull MD [facility] Schedule an appointment as soon as possible for a visit   [address] [place] [id number]  [phone]      [facility] EMERGENCY DEPT Emergency Medicine  If symptoms worsen [address] [place] [address]  [phone]              DISCHARGE MEDICATIONS:  Current Discharge Medication List        START taking these medications    Details   methylPREDNISolone (Medrol, Tang,) 4 mg tablet As directed  Qty: 1 Dose Pack, Refills: 0  Start date: 3/28/2023      gabapentin (NEURONTIN) 100 mg capsule Take 1 Capsule by mouth three (3) times daily. Max Daily Amount: 300 mg. Qty: 90 Capsule, Refills: 0  Start date: 3/28/2023    Associated Diagnoses: Left sided sciatica      naproxen (Naprosyn) 500 mg tablet Take 1 Tablet by mouth two (2) times daily (with meals) for 10 days. Qty: 20 Tablet, Refills: 0  Start date: 3/29/2023, End date: 4/8/2023      lidocaine (Lidoderm) 5 % Apply patch to the affected area for 12 hours a day and remove for 12 hours a day.   Qty: 1 Each, Refills: 0  Start date: 3/29/2023               DISCONTINUED MEDICATIONS:  Current Discharge Medication List          I am the Primary Clinician of Record. Evelyn Vega MD (electronically signed)    (Please note that parts of this dictation were completed with voice recognition software. Quite often unanticipated grammatical, syntax, homophones, and other interpretive errors are inadvertently transcribed by the computer software. Please disregards these errors.  Please excuse any errors that have escaped final proofreading.)

## 2023-05-11 ENCOUNTER — TRANSCRIBE ORDERS (OUTPATIENT)
Facility: HOSPITAL | Age: 73
End: 2023-05-11

## 2023-05-11 DIAGNOSIS — I20.8 OTHER FORMS OF ANGINA PECTORIS (HCC): ICD-10-CM

## 2023-05-11 DIAGNOSIS — R06.09 OTHER FORMS OF DYSPNEA: Primary | ICD-10-CM

## 2023-05-30 SDOH — ECONOMIC STABILITY: HOUSING INSECURITY
IN THE LAST 12 MONTHS, WAS THERE A TIME WHEN YOU DID NOT HAVE A STEADY PLACE TO SLEEP OR SLEPT IN A SHELTER (INCLUDING NOW)?: NO

## 2023-05-30 SDOH — ECONOMIC STABILITY: FOOD INSECURITY: WITHIN THE PAST 12 MONTHS, YOU WORRIED THAT YOUR FOOD WOULD RUN OUT BEFORE YOU GOT MONEY TO BUY MORE.: NEVER TRUE

## 2023-05-30 SDOH — ECONOMIC STABILITY: FOOD INSECURITY: WITHIN THE PAST 12 MONTHS, THE FOOD YOU BOUGHT JUST DIDN'T LAST AND YOU DIDN'T HAVE MONEY TO GET MORE.: NEVER TRUE

## 2023-05-30 SDOH — ECONOMIC STABILITY: INCOME INSECURITY: HOW HARD IS IT FOR YOU TO PAY FOR THE VERY BASICS LIKE FOOD, HOUSING, MEDICAL CARE, AND HEATING?: NOT VERY HARD

## 2023-05-30 SDOH — ECONOMIC STABILITY: TRANSPORTATION INSECURITY
IN THE PAST 12 MONTHS, HAS LACK OF TRANSPORTATION KEPT YOU FROM MEETINGS, WORK, OR FROM GETTING THINGS NEEDED FOR DAILY LIVING?: NO

## 2023-06-01 ENCOUNTER — OFFICE VISIT (OUTPATIENT)
Age: 73
End: 2023-06-01
Payer: MEDICARE

## 2023-06-01 VITALS
HEART RATE: 85 BPM | TEMPERATURE: 97.8 F | HEIGHT: 61 IN | OXYGEN SATURATION: 98 % | SYSTOLIC BLOOD PRESSURE: 121 MMHG | WEIGHT: 149.4 LBS | RESPIRATION RATE: 16 BRPM | BODY MASS INDEX: 28.21 KG/M2 | DIASTOLIC BLOOD PRESSURE: 68 MMHG

## 2023-06-01 DIAGNOSIS — Z79.4 TYPE 2 DIABETES MELLITUS WITHOUT COMPLICATION, WITH LONG-TERM CURRENT USE OF INSULIN (HCC): Primary | ICD-10-CM

## 2023-06-01 DIAGNOSIS — E11.9 TYPE 2 DIABETES MELLITUS WITHOUT COMPLICATION, WITH LONG-TERM CURRENT USE OF INSULIN (HCC): Primary | ICD-10-CM

## 2023-06-01 PROCEDURE — 1090F PRES/ABSN URINE INCON ASSESS: CPT | Performed by: FAMILY MEDICINE

## 2023-06-01 PROCEDURE — 99213 OFFICE O/P EST LOW 20 MIN: CPT | Performed by: FAMILY MEDICINE

## 2023-06-01 PROCEDURE — 3046F HEMOGLOBIN A1C LEVEL >9.0%: CPT | Performed by: FAMILY MEDICINE

## 2023-06-01 PROCEDURE — 1123F ACP DISCUSS/DSCN MKR DOCD: CPT | Performed by: FAMILY MEDICINE

## 2023-06-01 PROCEDURE — 3017F COLORECTAL CA SCREEN DOC REV: CPT | Performed by: FAMILY MEDICINE

## 2023-06-01 PROCEDURE — G8427 DOCREV CUR MEDS BY ELIG CLIN: HCPCS | Performed by: FAMILY MEDICINE

## 2023-06-01 PROCEDURE — 3074F SYST BP LT 130 MM HG: CPT | Performed by: FAMILY MEDICINE

## 2023-06-01 PROCEDURE — 2022F DILAT RTA XM EVC RTNOPTHY: CPT | Performed by: FAMILY MEDICINE

## 2023-06-01 PROCEDURE — G8399 PT W/DXA RESULTS DOCUMENT: HCPCS | Performed by: FAMILY MEDICINE

## 2023-06-01 PROCEDURE — G8419 CALC BMI OUT NRM PARAM NOF/U: HCPCS | Performed by: FAMILY MEDICINE

## 2023-06-01 PROCEDURE — 3078F DIAST BP <80 MM HG: CPT | Performed by: FAMILY MEDICINE

## 2023-06-01 PROCEDURE — 1036F TOBACCO NON-USER: CPT | Performed by: FAMILY MEDICINE

## 2023-06-01 RX ORDER — ACETAMINOPHEN 500 MG
TABLET ORAL
COMMUNITY

## 2023-06-01 RX ORDER — BLOOD SUGAR DIAGNOSTIC
STRIP MISCELLANEOUS
Qty: 100 EACH | Refills: 5 | Status: SHIPPED | OUTPATIENT
Start: 2023-06-01

## 2023-06-01 RX ORDER — INSULIN GLARGINE 100 [IU]/ML
INJECTION, SOLUTION SUBCUTANEOUS
COMMUNITY
Start: 2023-04-13

## 2023-06-01 RX ORDER — ATENOLOL 25 MG/1
TABLET ORAL
COMMUNITY

## 2023-06-01 RX ORDER — BLOOD SUGAR DIAGNOSTIC
STRIP MISCELLANEOUS
COMMUNITY
Start: 2023-04-02 | End: 2023-06-01 | Stop reason: SDUPTHER

## 2023-06-01 RX ORDER — FLURBIPROFEN SODIUM 0.3 MG/ML
SOLUTION/ DROPS OPHTHALMIC
COMMUNITY
Start: 2023-04-13

## 2023-06-01 RX ORDER — GLIPIZIDE 5 MG/1
TABLET ORAL
COMMUNITY
Start: 2022-05-27

## 2023-06-01 RX ORDER — ONDANSETRON 4 MG/1
4 TABLET, ORALLY DISINTEGRATING ORAL EVERY 6 HOURS PRN
COMMUNITY
Start: 2023-03-31 | End: 2023-06-01 | Stop reason: SDUPTHER

## 2023-06-01 RX ORDER — ALBUTEROL SULFATE 90 UG/1
2 AEROSOL, METERED RESPIRATORY (INHALATION) EVERY 6 HOURS PRN
COMMUNITY

## 2023-06-01 RX ORDER — CETIRIZINE HYDROCHLORIDE 10 MG/1
10 TABLET ORAL DAILY
COMMUNITY

## 2023-06-01 NOTE — PROGRESS NOTES
Zak Win (:  1950) is a 81 y.o. female,Established patient, here for evaluation of the following chief complaint(s):  Hypertension, Diabetes, and Follow-up Chronic Condition         ASSESSMENT/PLAN:  1. Type 2 diabetes mellitus without complication, with long-term current use of insulin (HCC)  -     Lipid Panel; Future  -     Hemoglobin A1C; Future      Return in about 4 months (around 10/1/2023). Subjective   SUBJECTIVE/OBJECTIVE:  HPI In for diabetes check. Blood sugars have been between . No hypoglycemic episodes. No complaints of chest pain, shortness of breath, TIAs, claudication or edema. Still working at [employer]. 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 Planning some tyler in yard this summer. Review of Systems       Objective   Physical Exam  Cardiovascular:      Rate and Rhythm: Normal rate and regular rhythm. Heart sounds: Normal heart sounds. No murmur heard. Pulmonary:      Effort: Pulmonary effort is normal.      Breath sounds: Normal breath sounds. Abdominal:      General: Abdomen is flat. Bowel sounds are normal.      Palpations: Abdomen is soft. Musculoskeletal:      Right lower leg: No edema. Left lower leg: No edema. An electronic signature was used to authenticate this note.     --Angelic Arellano MD

## 2023-06-01 NOTE — PROGRESS NOTES
Chief Complaint   Patient presents with    Hypertension    Diabetes    Follow-up Chronic Condition         1. \"Have you been to the ER, urgent care clinic since your last visit? Hospitalized since your last visit? \"[facility]    2. \"Have you seen or consulted any other health care providers outside of the [address] [place] [address] since your last visit? \" YES - DR. GONZALEZ AND GI - DR. CUNHA    3. For patients aged 39-70: Has the patient had a colonoscopy / FIT/ Cologuard? na      If the patient is female:    4. For patients aged 41-77: Has the patient had a mammogram within the past 2 years? nan      5.  For patients aged 21-65: Has the patient had a pap smear? na      Health Maintenance Due   Topic Date Due    Hepatitis C screen  Never done    Low dose CT lung screening  Never done    Diabetic foot exam  11/15/2018    Diabetic Alb to Cr ratio (uACR) test  07/08/2020    COVID-19 Vaccine (5 - Booster for Pfizer series) 08/10/2022

## 2023-06-02 LAB
CHOLEST SERPL-MCNC: 144 MG/DL
EST. AVERAGE GLUCOSE BLD GHB EST-MCNC: 169 MG/DL
HBA1C MFR BLD: 7.5 % (ref 4–5.6)
HDLC SERPL-MCNC: 84 MG/DL
HDLC SERPL: 1.7 (ref 0–5)
LDLC SERPL CALC-MCNC: 33.8 MG/DL (ref 0–100)
TRIGL SERPL-MCNC: 131 MG/DL
VLDLC SERPL CALC-MCNC: 26.2 MG/DL

## 2023-08-08 ENCOUNTER — HOSPITAL ENCOUNTER (OUTPATIENT)
Age: 73
Discharge: HOME | End: 2023-08-11
Payer: MEDICARE

## 2023-08-08 DIAGNOSIS — R06.02 SHORTNESS OF BREATH: ICD-10-CM

## 2023-08-08 PROCEDURE — 71046 X-RAY EXAM CHEST 2 VIEWS: CPT

## 2023-09-12 NOTE — PROGRESS NOTES
Chief Complaint   Patient presents with    High Blood Sugar      -245         1. \"Have you been to the ER, urgent care clinic since your last visit? Hospitalized since your last visit? \" yes [facility] elevated BS    2. \"Have you seen or consulted any other health care providers outside of the [address] [place] [address] since your last visit? \"yes [facility] Back surgery     3. For patients aged 39-70: Has the patient had a colonoscopy / FIT/ Cologuard? Yes - no Care Gap present      If the patient is female:    4. For patients aged 41-77: Has the patient had a mammogram within the past 2 years? Yes - no Care Gap present      5. For patients aged 21-65: Has the patient had a pap smear?  NA - based on age or sex
HISTORY OF PRESENT ILLNESS  Miguel Kerns is a 73 y.o. female. HPI Has been feeling tired and blood sugar has been high. Was on prednisone for 2 days for the gout 3 weeks ago, sugars went to 395. Some polydipsia and polyuria. Some blurred vision also. Currently taking metformin 1000 mg bid for diabetes. No unusual stress. . no blood in stools, hematuria, chest pains, cough. ROS    Physical Exam  Vitals and nursing note reviewed. Constitutional:       Appearance: She is well-developed. HENT:      Right Ear: External ear normal.      Left Ear: External ear normal.   Neck:      Thyroid: No thyromegaly. Cardiovascular:      Rate and Rhythm: Normal rate and regular rhythm. Heart sounds: Normal heart sounds. Pulmonary:      Breath sounds: Normal breath sounds. No wheezing. Abdominal:      General: Bowel sounds are normal. There is no distension. Palpations: Abdomen is soft. There is no mass. Tenderness: There is no abdominal tenderness. Lymphadenopathy:      Cervical: No cervical adenopathy. ASSESSMENT and PLAN  Orders Placed This Encounter    CBC WITH AUTOMATED DIFF    TSH 3RD GENERATION    T4, FREE    METABOLIC PANEL, COMPREHENSIVE    AMB POC HEMOGLOBIN A1C    Blood-Glucose Meter (Accu-Chek Wendy Plus Meter) misc    empagliflozin (JARDIANCE) 10 mg tablet     Diagnoses and all orders for this visit:    1. Essential hypertension  -     CBC WITH AUTOMATED DIFF; Future  -     METABOLIC PANEL, COMPREHENSIVE; Future    2. Controlled type 2 diabetes mellitus without complication, without long-term current use of insulin (HCC)  -     AMB POC HEMOGLOBIN A1C    3. Weakness  -     TSH 3RD GENERATION; Future  -     T4, FREE; Future    4. Major depressive disorder, recurrent, mild (HCC)    Other orders  -     Blood-Glucose Meter (Accu-Chek Wendy Plus Meter) misc; Use as directed. E11.9  -     empagliflozin (JARDIANCE) 10 mg tablet; Take 1 Tablet by mouth daily.  For diabetes
PAST SURGICAL HISTORY:  H/O right cataract extraction     History of dental surgery     History of total right hip replacement     S/P ectopic pregnancy X 2

## 2023-10-02 ENCOUNTER — OFFICE VISIT (OUTPATIENT)
Age: 73
End: 2023-10-02
Payer: MEDICARE

## 2023-10-02 VITALS
HEART RATE: 81 BPM | TEMPERATURE: 97.7 F | RESPIRATION RATE: 16 BRPM | DIASTOLIC BLOOD PRESSURE: 58 MMHG | SYSTOLIC BLOOD PRESSURE: 126 MMHG | HEIGHT: 61 IN | BODY MASS INDEX: 28.43 KG/M2 | OXYGEN SATURATION: 96 % | WEIGHT: 150.6 LBS

## 2023-10-02 DIAGNOSIS — I10 ESSENTIAL (PRIMARY) HYPERTENSION: Primary | ICD-10-CM

## 2023-10-02 DIAGNOSIS — Z79.4 TYPE 2 DIABETES MELLITUS WITHOUT COMPLICATION, WITH LONG-TERM CURRENT USE OF INSULIN (HCC): ICD-10-CM

## 2023-10-02 DIAGNOSIS — E11.9 TYPE 2 DIABETES MELLITUS WITHOUT COMPLICATION, WITH LONG-TERM CURRENT USE OF INSULIN (HCC): ICD-10-CM

## 2023-10-02 DIAGNOSIS — Z23 ENCOUNTER FOR IMMUNIZATION: ICD-10-CM

## 2023-10-02 LAB
ANION GAP SERPL CALC-SCNC: 4 MMOL/L (ref 5–15)
BUN SERPL-MCNC: 11 MG/DL (ref 6–20)
BUN/CREAT SERPL: 10 (ref 12–20)
CALCIUM SERPL-MCNC: 9.3 MG/DL (ref 8.5–10.1)
CHLORIDE SERPL-SCNC: 110 MMOL/L (ref 97–108)
CO2 SERPL-SCNC: 28 MMOL/L (ref 21–32)
CREAT SERPL-MCNC: 1.05 MG/DL (ref 0.55–1.02)
CREAT UR-MCNC: 80 MG/DL
EST. AVERAGE GLUCOSE BLD GHB EST-MCNC: 146 MG/DL
GLUCOSE SERPL-MCNC: 111 MG/DL (ref 65–100)
HBA1C MFR BLD: 6.7 % (ref 4–5.6)
MICROALBUMIN UR-MCNC: 0.95 MG/DL
MICROALBUMIN/CREAT UR-RTO: 12 MG/G (ref 0–30)
POTASSIUM SERPL-SCNC: 4 MMOL/L (ref 3.5–5.1)
SODIUM SERPL-SCNC: 142 MMOL/L (ref 136–145)

## 2023-10-02 PROCEDURE — 99213 OFFICE O/P EST LOW 20 MIN: CPT | Performed by: FAMILY MEDICINE

## 2023-10-02 PROCEDURE — G8419 CALC BMI OUT NRM PARAM NOF/U: HCPCS | Performed by: FAMILY MEDICINE

## 2023-10-02 PROCEDURE — G8427 DOCREV CUR MEDS BY ELIG CLIN: HCPCS | Performed by: FAMILY MEDICINE

## 2023-10-02 PROCEDURE — 2022F DILAT RTA XM EVC RTNOPTHY: CPT | Performed by: FAMILY MEDICINE

## 2023-10-02 PROCEDURE — 90694 VACC AIIV4 NO PRSRV 0.5ML IM: CPT | Performed by: FAMILY MEDICINE

## 2023-10-02 PROCEDURE — PBSHW INFLUENZA, FLUAD, (AGE 65 Y+), IM, PF, 0.5 ML: Performed by: FAMILY MEDICINE

## 2023-10-02 PROCEDURE — 1123F ACP DISCUSS/DSCN MKR DOCD: CPT | Performed by: FAMILY MEDICINE

## 2023-10-02 PROCEDURE — 3017F COLORECTAL CA SCREEN DOC REV: CPT | Performed by: FAMILY MEDICINE

## 2023-10-02 PROCEDURE — 1036F TOBACCO NON-USER: CPT | Performed by: FAMILY MEDICINE

## 2023-10-02 PROCEDURE — 1090F PRES/ABSN URINE INCON ASSESS: CPT | Performed by: FAMILY MEDICINE

## 2023-10-02 PROCEDURE — 3051F HG A1C>EQUAL 7.0%<8.0%: CPT | Performed by: FAMILY MEDICINE

## 2023-10-02 PROCEDURE — G8399 PT W/DXA RESULTS DOCUMENT: HCPCS | Performed by: FAMILY MEDICINE

## 2023-10-02 PROCEDURE — G8484 FLU IMMUNIZE NO ADMIN: HCPCS | Performed by: FAMILY MEDICINE

## 2023-10-02 PROCEDURE — 3074F SYST BP LT 130 MM HG: CPT | Performed by: FAMILY MEDICINE

## 2023-10-02 PROCEDURE — 3078F DIAST BP <80 MM HG: CPT | Performed by: FAMILY MEDICINE

## 2023-10-02 NOTE — PROGRESS NOTES
Allie Castro (:  1950) is a 72 y.o. female,Established patient, here for evaluation of the following chief complaint(s):  Follow-up         ASSESSMENT/PLAN:  1. Essential (primary) hypertension  -     Basic Metabolic Panel; Future  2. Encounter for immunization  -     Influenza, FLUAD, (age 72 y+), IM, Preservative Free, 0.5 mL  3. Type 2 diabetes mellitus without complication, with long-term current use of insulin (HCC)  -     Hemoglobin A1C; Future  -     Microalbumin / Creatinine Urine Ratio; Future      No follow-ups on file. Subjective   SUBJECTIVE/OBJECTIVE:  HPI In for blood pressure, cholesterol and diabetes scheck. No complaints of chest pain, shortness of breath, TIAs, claudication or edema. Still working 4 hours a day 4 days a week. Had a normal cardiac cath in 2018. Review of Systems       Objective   Physical Exam  Cardiovascular:      Rate and Rhythm: Normal rate and regular rhythm. Heart sounds: Normal heart sounds. No murmur heard. Pulmonary:      Effort: Pulmonary effort is normal.      Breath sounds: Normal breath sounds. Abdominal:      General: Abdomen is flat. Bowel sounds are normal.      Palpations: Abdomen is soft. Musculoskeletal:      Right lower leg: No edema. Left lower leg: No edema. An electronic signature was used to authenticate this note.     --Parveen Garces MD

## 2023-10-02 NOTE — PROGRESS NOTES
Chief Complaint   Patient presents with    Follow-up    1. Have you been to the ER, urgent care clinic since your last visit? Hospitalized since your last visit? No    2. Have you seen or consulted any other health care providers outside of the [address] [place] [address] since your last visit? Include any pap smears or colon screening. No    After obtaining consent, and per orders of Dr. petey brohters by Addy Brooks LPN. Patient instructed to remain in clinic for 20 minutes afterwards, and to report any adverse reaction to me immediately.

## 2023-11-14 RX ORDER — ALENDRONATE SODIUM 35 MG/1
TABLET ORAL
Qty: 12 TABLET | Refills: 3 | Status: SHIPPED | OUTPATIENT
Start: 2023-11-14

## 2023-11-14 NOTE — TELEPHONE ENCOUNTER
Last appointment: 10/2/23  Next appointment: 2/2/24  Previous refill encounter(s): 10/27/22    Requested Prescriptions     Pending Prescriptions Disp Refills    alendronate (FOSAMAX) 35 MG tablet [Pharmacy Med Name: ALENDRONATE SODIUM 35 MG TAB] 12 tablet 3     Sig: TAKE 1 TABLET BY MOUTH WEEKLY WITH 240 ML WATER ON EMPTY STOMACH,REMAIN UPRIGHT FOR 30 MIN AFTERWARD         For Pharmacy Admin Tracking Only    Program: Medication Refill  CPA in place:    Recommendation Provided To:    Intervention Detail: New Rx: 1, reason: Patient Preference  Intervention Accepted By:   Tuan Wahl Closed?:    Time Spent (min): 5

## 2023-12-02 ENCOUNTER — OFFICE VISIT (OUTPATIENT)
Age: 73
End: 2023-12-02

## 2023-12-02 VITALS
WEIGHT: 151 LBS | SYSTOLIC BLOOD PRESSURE: 138 MMHG | BODY MASS INDEX: 28.53 KG/M2 | OXYGEN SATURATION: 99 % | DIASTOLIC BLOOD PRESSURE: 84 MMHG | TEMPERATURE: 98.4 F | HEART RATE: 69 BPM | RESPIRATION RATE: 16 BRPM

## 2023-12-02 DIAGNOSIS — R11.0 NAUSEA: ICD-10-CM

## 2023-12-02 DIAGNOSIS — R10.13 EPIGASTRIC PAIN: Primary | ICD-10-CM

## 2023-12-02 DIAGNOSIS — J01.90 ACUTE BACTERIAL SINUSITIS: ICD-10-CM

## 2023-12-02 DIAGNOSIS — B96.89 ACUTE BACTERIAL SINUSITIS: ICD-10-CM

## 2023-12-02 LAB
BILIRUBIN, URINE, POC: NEGATIVE
BLOOD URINE, POC: NEGATIVE
GLUCOSE URINE, POC: ABNORMAL
KETONES, URINE, POC: NEGATIVE
LEUKOCYTE ESTERASE, URINE, POC: NEGATIVE
NITRITE, URINE, POC: NEGATIVE
PH, URINE, POC: 5.5 (ref 4.6–8)
PROTEIN,URINE, POC: NEGATIVE
SPECIFIC GRAVITY, URINE, POC: 1.02 (ref 1–1.03)
URINALYSIS CLARITY, POC: CLEAR
URINALYSIS COLOR, POC: YELLOW
UROBILINOGEN, POC: NORMAL

## 2023-12-02 RX ORDER — ONDANSETRON 4 MG/1
4 TABLET, ORALLY DISINTEGRATING ORAL 3 TIMES DAILY PRN
Qty: 10 TABLET | Refills: 0 | Status: SHIPPED | OUTPATIENT
Start: 2023-12-02

## 2023-12-02 RX ORDER — AZITHROMYCIN 250 MG/1
250 TABLET, FILM COATED ORAL SEE ADMIN INSTRUCTIONS
Qty: 6 TABLET | Refills: 0 | Status: SHIPPED | OUTPATIENT
Start: 2023-12-02 | End: 2023-12-07

## 2023-12-02 RX ORDER — ONDANSETRON 4 MG/1
4 TABLET, ORALLY DISINTEGRATING ORAL 3 TIMES DAILY PRN
Qty: 10 TABLET | Refills: 0 | Status: SHIPPED | OUTPATIENT
Start: 2023-12-02 | End: 2023-12-02 | Stop reason: SDUPTHER

## 2023-12-02 RX ORDER — AZITHROMYCIN 250 MG/1
250 TABLET, FILM COATED ORAL SEE ADMIN INSTRUCTIONS
Qty: 6 TABLET | Refills: 0 | Status: SHIPPED | OUTPATIENT
Start: 2023-12-02 | End: 2023-12-02 | Stop reason: SDUPTHER

## 2023-12-24 DIAGNOSIS — E78.00 PURE HYPERCHOLESTEROLEMIA, UNSPECIFIED: ICD-10-CM

## 2023-12-26 RX ORDER — AMLODIPINE BESYLATE 5 MG/1
5 TABLET ORAL DAILY
Qty: 90 TABLET | Refills: 3 | Status: SHIPPED | OUTPATIENT
Start: 2023-12-26

## 2023-12-26 RX ORDER — ATORVASTATIN CALCIUM 10 MG/1
TABLET, FILM COATED ORAL
Qty: 90 TABLET | Refills: 3 | Status: SHIPPED | OUTPATIENT
Start: 2023-12-26

## 2023-12-27 NOTE — PERIOP NOTES
Pt tolerating liquids, vss. Blood sugar by fingerstick is 104. Pt denies any pain. 1033-D/c instructions reviewed with family. All questions answered. Reviewed when to call the doctor, diet, activity. Reviewed can take tylenol for pain if needed. 1049-Transported via w/c to awaiting transportation. No complaints noted at time of d/c home. 1.96

## 2024-01-02 NOTE — TELEPHONE ENCOUNTER
Last appointment: 10/2/23  Next appointment: 2/2/24    Requested Prescriptions     Pending Prescriptions Disp Refills    empagliflozin (JARDIANCE) 25 MG tablet [Pharmacy Med Name: JARDIANCE 25 MG TABLET] 90 tablet 3     Sig: Take 1 tablet by mouth daily         For Pharmacy Admin Tracking Only    Program: Medication Refill  CPA in place:    Recommendation Provided To:   Intervention Detail: New Rx: 1, reason: Patient Preference  Intervention Accepted By:   Gap Closed?:    Time Spent (min): 5

## 2024-01-05 ENCOUNTER — OFFICE VISIT (OUTPATIENT)
Age: 74
End: 2024-01-05

## 2024-01-05 VITALS
HEIGHT: 62 IN | HEART RATE: 59 BPM | WEIGHT: 150.9 LBS | RESPIRATION RATE: 18 BRPM | OXYGEN SATURATION: 99 % | TEMPERATURE: 98.1 F | DIASTOLIC BLOOD PRESSURE: 71 MMHG | SYSTOLIC BLOOD PRESSURE: 130 MMHG | BODY MASS INDEX: 27.77 KG/M2

## 2024-01-05 DIAGNOSIS — R42 DIZZINESS: Primary | ICD-10-CM

## 2024-01-05 LAB — GLUCOSE, POC: 89 MG/DL

## 2024-01-05 RX ORDER — MECLIZINE HYDROCHLORIDE 25 MG/1
12.5-25 TABLET ORAL 3 TIMES DAILY PRN
Qty: 15 TABLET | Refills: 0 | Status: SHIPPED | OUTPATIENT
Start: 2024-01-05 | End: 2024-01-15

## 2024-01-05 ASSESSMENT — ENCOUNTER SYMPTOMS
DIARRHEA: 0
COUGH: 0
SHORTNESS OF BREATH: 0
VOMITING: 0
ABDOMINAL PAIN: 0
NAUSEA: 1
WHEEZING: 0

## 2024-01-05 NOTE — PROGRESS NOTES
visit on 01/05/24.     Physical Exam  Constitutional:       General: She is not in acute distress.     Appearance: Normal appearance. She is not ill-appearing or toxic-appearing.   HENT:      Head: Normocephalic and atraumatic.      Right Ear: Tympanic membrane, ear canal and external ear normal.      Left Ear: Tympanic membrane, ear canal and external ear normal.      Nose: Nose normal.      Mouth/Throat:      Mouth: Mucous membranes are moist.      Pharynx: Oropharynx is clear.   Eyes:      Extraocular Movements: Extraocular movements intact.      Conjunctiva/sclera: Conjunctivae normal.      Pupils: Pupils are equal, round, and reactive to light.   Cardiovascular:      Rate and Rhythm: Normal rate.   Pulmonary:      Effort: Pulmonary effort is normal.      Breath sounds: Normal breath sounds.   Abdominal:      General: Abdomen is flat. Bowel sounds are normal. There is no distension.      Palpations: There is no mass.      Tenderness: There is no abdominal tenderness. There is no guarding or rebound.   Musculoskeletal:      Cervical back: Normal range of motion and neck supple.   Lymphadenopathy:      Cervical: No cervical adenopathy.   Skin:     General: Skin is warm and dry.   Neurological:      General: No focal deficit present.      Mental Status: She is alert and oriented to person, place, and time.      Cranial Nerves: No cranial nerve deficit.      Motor: No weakness.      Coordination: Coordination normal.      Gait: Gait normal.        Results for orders placed or performed in visit on 01/05/24   AMB POC GLUCOSE BLOOD, BY GLUCOSE MONITORING DEVICE   Result Value Ref Range    Glucose, POC 89 MG/DL     12 lead EKG - NSR, compared to EKG 10/23/2022, no changes      An electronic signature was used to authenticate this note.    KELBY Mazariegos NP

## 2024-01-25 ENCOUNTER — TELEMEDICINE (OUTPATIENT)
Age: 74
End: 2024-01-25
Payer: MEDICARE

## 2024-01-25 DIAGNOSIS — E11.40 TYPE 2 DIABETES MELLITUS WITH DIABETIC NEUROPATHY, WITH LONG-TERM CURRENT USE OF INSULIN (HCC): ICD-10-CM

## 2024-01-25 DIAGNOSIS — Z79.4 TYPE 2 DIABETES MELLITUS WITH DIABETIC NEUROPATHY, WITH LONG-TERM CURRENT USE OF INSULIN (HCC): ICD-10-CM

## 2024-01-25 DIAGNOSIS — G25.0 ESSENTIAL TREMOR: Primary | ICD-10-CM

## 2024-01-25 PROCEDURE — 1123F ACP DISCUSS/DSCN MKR DOCD: CPT | Performed by: PSYCHIATRY & NEUROLOGY

## 2024-01-25 PROCEDURE — 99214 OFFICE O/P EST MOD 30 MIN: CPT | Performed by: PSYCHIATRY & NEUROLOGY

## 2024-01-25 RX ORDER — PRIMIDONE 50 MG/1
50 TABLET ORAL 3 TIMES DAILY
Qty: 90 TABLET | Refills: 11 | Status: SHIPPED | OUTPATIENT
Start: 2024-01-25

## 2024-01-25 ASSESSMENT — PATIENT HEALTH QUESTIONNAIRE - PHQ9
SUM OF ALL RESPONSES TO PHQ QUESTIONS 1-9: 0
1. LITTLE INTEREST OR PLEASURE IN DOING THINGS: 0
2. FEELING DOWN, DEPRESSED OR HOPELESS: 0
SUM OF ALL RESPONSES TO PHQ9 QUESTIONS 1 & 2: 0
SUM OF ALL RESPONSES TO PHQ QUESTIONS 1-9: 0

## 2024-01-25 ASSESSMENT — ENCOUNTER SYMPTOMS: RESPIRATORY NEGATIVE: 1

## 2024-01-25 NOTE — ASSESSMENT & PLAN NOTE
No complaints at today's office visit patient has worked hard to get her blood sugars under good control A1c is improved patient is not on any medication to manage neuropathy symptoms at this time and does not feel as though she needs any intervention at this time

## 2024-01-25 NOTE — PROGRESS NOTES
Alma Bautista, was evaluated through a synchronous (real-time) audio-video encounter. The patient (or guardian if applicable) is aware that this is a billable service, which includes applicable co-pays. This Virtual Visit was conducted with patient's (and/or legal guardian's) consent. Patient identification was verified, and a caregiver was present when appropriate.   The patient was located at Home: [address] [facility] [facility] [id number]  Provider was located at Facility (Appt Dept): [address] [facility] [address]  [place] [address]      Alma Bautista (:  1950) is a Established patient, presenting virtually for evaluation of the following:  Chief Complaint   Patient presents with    Tremors     Annual for tremors, tremors are getting a little worse         Assessment & Plan   Below is the assessment and plan developed based on review of pertinent history, physical exam, labs, studies, and medications.  1. Essential tremor  Assessment & Plan:  Patient with some breakthrough symptoms were going to liberalize the primidone to 50 mg 3 times a day.   Symptoms may have increased as she is no longer on her beta-blocker for cardiac reasons     we also talked about behavioral interventions.  Details of that can be seen on the AVS  We discussed potential side effects to the primidone patient will let me know if she is having any problems or concerns   Orders:  -     primidone (MYSOLINE) 50 MG tablet; Take 1 tablet by mouth 3 times daily, Disp-90 tablet, R-11Normal  2. Type 2 diabetes mellitus with diabetic neuropathy, with long-term current use of insulin ([facility])  Assessment & Plan:  No complaints at today's office visit patient has worked hard to get her blood sugars under good control A1c is improved patient is not on any medication to manage neuropathy symptoms at this time and does not feel as though she needs any intervention at this time    Return in about 6 months (around 2024) for Or

## 2024-01-25 NOTE — PATIENT INSTRUCTIONS
As per discussion  I have liberalized the primidone which is also known as Mysoline 50 mg you can take up to 3 tablets a day.  The prescription reads 1 tablet 3 times a day but as we talked about on the phone you can do 1 in the morning 2 at night you can continue 1 tablet twice a day just take the third 1 as needed so you can again play around with that you could even do 2 at night and maybe 1 at lunchtime you will need to experiment to see what works best for you    In addition we have also talked about behavioral interventions such as getting weighted utensils or weighted utensils with swivel heads I would also recommend a mouse pad that has a wrist support and may be getting a mouse that is curved or sometimes they have the mice that are more like a controller and gives your hands more support.  You can look at them online maybe order 1 or 2 of them and see which one works better for you    Keep up the good work regarding your blood sugar and I will see you this summer        Office Policies    Phone calls/patient messages:  Please allow up to 24 hours for someone in the office to contact you about your call or message. Be mindful your provider may be out of the office or your message may require further review. We encourage you to use [facility] for your messages as this is a faster, more efficient way to communicate with our office    Medication Refills:  Prescription medications require up to 48 business hours to process. We encourage you to use [facility] for your refills.     For controlled medications: Please allow up to 72 business hours to process. Certain medications may require you to  a written prescription at our office.    NO narcotic/controlled medications will be prescribed after 4pm Monday through Friday or on weekends    Form/Paperwork Completion:  We ask that you allow 7-14 business days. You may also download your forms to [facility] to have your doctor print off.

## 2024-01-25 NOTE — ASSESSMENT & PLAN NOTE
Patient with some breakthrough symptoms were going to liberalize the primidone to 50 mg 3 times a day.   Symptoms may have increased as she is no longer on her beta-blocker for cardiac reasons     we also talked about behavioral interventions.  Details of that can be seen on the AVS  We discussed potential side effects to the primidone patient will let me know if she is having any problems or concerns

## 2024-02-02 ENCOUNTER — OFFICE VISIT (OUTPATIENT)
Age: 74
End: 2024-02-02
Payer: MEDICARE

## 2024-02-02 VITALS
WEIGHT: 151.2 LBS | TEMPERATURE: 98 F | HEIGHT: 61 IN | BODY MASS INDEX: 28.55 KG/M2 | HEART RATE: 62 BPM | OXYGEN SATURATION: 96 % | DIASTOLIC BLOOD PRESSURE: 53 MMHG | RESPIRATION RATE: 16 BRPM | SYSTOLIC BLOOD PRESSURE: 120 MMHG

## 2024-02-02 DIAGNOSIS — E11.9 TYPE 2 DIABETES MELLITUS WITHOUT COMPLICATION, WITH LONG-TERM CURRENT USE OF INSULIN (HCC): ICD-10-CM

## 2024-02-02 DIAGNOSIS — Z12.31 ENCOUNTER FOR SCREENING MAMMOGRAM FOR MALIGNANT NEOPLASM OF BREAST: ICD-10-CM

## 2024-02-02 DIAGNOSIS — Z00.00 ENCOUNTER FOR MEDICARE ANNUAL WELLNESS EXAM: Primary | ICD-10-CM

## 2024-02-02 DIAGNOSIS — E78.00 PURE HYPERCHOLESTEROLEMIA, UNSPECIFIED: ICD-10-CM

## 2024-02-02 DIAGNOSIS — Z79.4 TYPE 2 DIABETES MELLITUS WITHOUT COMPLICATION, WITH LONG-TERM CURRENT USE OF INSULIN (HCC): ICD-10-CM

## 2024-02-02 PROCEDURE — G0439 PPPS, SUBSEQ VISIT: HCPCS | Performed by: FAMILY MEDICINE

## 2024-02-02 PROCEDURE — 99213 OFFICE O/P EST LOW 20 MIN: CPT | Performed by: FAMILY MEDICINE

## 2024-02-02 PROCEDURE — 3078F DIAST BP <80 MM HG: CPT | Performed by: FAMILY MEDICINE

## 2024-02-02 PROCEDURE — 1123F ACP DISCUSS/DSCN MKR DOCD: CPT | Performed by: FAMILY MEDICINE

## 2024-02-02 PROCEDURE — 3074F SYST BP LT 130 MM HG: CPT | Performed by: FAMILY MEDICINE

## 2024-02-02 ASSESSMENT — PATIENT HEALTH QUESTIONNAIRE - PHQ9
1. LITTLE INTEREST OR PLEASURE IN DOING THINGS: 0
SUM OF ALL RESPONSES TO PHQ QUESTIONS 1-9: 0
4. FEELING TIRED OR HAVING LITTLE ENERGY: 0
SUM OF ALL RESPONSES TO PHQ9 QUESTIONS 1 & 2: 0
6. FEELING BAD ABOUT YOURSELF - OR THAT YOU ARE A FAILURE OR HAVE LET YOURSELF OR YOUR FAMILY DOWN: 0
9. THOUGHTS THAT YOU WOULD BE BETTER OFF DEAD, OR OF HURTING YOURSELF: 0
5. POOR APPETITE OR OVEREATING: 0
3. TROUBLE FALLING OR STAYING ASLEEP: 0
8. MOVING OR SPEAKING SO SLOWLY THAT OTHER PEOPLE COULD HAVE NOTICED. OR THE OPPOSITE, BEING SO FIGETY OR RESTLESS THAT YOU HAVE BEEN MOVING AROUND A LOT MORE THAN USUAL: 0
7. TROUBLE CONCENTRATING ON THINGS, SUCH AS READING THE NEWSPAPER OR WATCHING TELEVISION: 0
2. FEELING DOWN, DEPRESSED OR HOPELESS: 0
SUM OF ALL RESPONSES TO PHQ QUESTIONS 1-9: 0
10. IF YOU CHECKED OFF ANY PROBLEMS, HOW DIFFICULT HAVE THESE PROBLEMS MADE IT FOR YOU TO DO YOUR WORK, TAKE CARE OF THINGS AT HOME, OR GET ALONG WITH OTHER PEOPLE: 0
SUM OF ALL RESPONSES TO PHQ QUESTIONS 1-9: 0
SUM OF ALL RESPONSES TO PHQ QUESTIONS 1-9: 0

## 2024-02-02 ASSESSMENT — LIFESTYLE VARIABLES
HOW MANY STANDARD DRINKS CONTAINING ALCOHOL DO YOU HAVE ON A TYPICAL DAY: PATIENT DOES NOT DRINK
HOW OFTEN DO YOU HAVE A DRINK CONTAINING ALCOHOL: NEVER

## 2024-02-02 NOTE — PROGRESS NOTES
Chief Complaint   Patient presents with    Medicare AWV         1. \"Have you been to the ER, urgent care clinic since your last visit?  Hospitalized since your last visit?\" 12/2/23-[facility]- EPIGASTRIC PAIN AND 1/5/24-DIZZINESS    2. \"Have you seen or consulted any other health care providers outside of the [facility] since your last visit?\" 10/11/23- upper endoscopy- Dr. Perry and ORTHO- Dr. Romel Schmitt    3. For patients aged 45-75: Has the patient had a colonoscopy / FIT/ Cologuard? N/A      If the patient is female:    4. For patients aged 40-74: Has the patient had a mammogram within the past 2 years? YES      5. For patients aged 21-65: Has the patient had a pap smear? N/A      Health Maintenance Due   Topic Date Due    Hepatitis C screen  Never done    Low dose CT lung screening &/or counseling  Never done    Diabetic foot exam  11/15/2018    Diabetic retinal exam  10/03/2023    Annual Wellness Visit (Medicare Advantage)  01/01/2024

## 2024-02-02 NOTE — PROGRESS NOTES
Alma Bautista (:  1950) is a 73 y.o. female,Established patient, here for evaluation of the following chief complaint(s):  Medicare AWV         ASSESSMENT/PLAN:  1. Encounter for Medicare annual wellness exam  2. Encounter for screening mammogram for malignant neoplasm of breast  -     SERINA DIGITAL SCREEN W OR WO CAD BILATERAL; Future  3. Type 2 diabetes mellitus without complication, with long-term current use of insulin (HCC)  -     Hemoglobin A1C; Future  4. Pure hypercholesterolemia, unspecified  -     Lipid Panel; Future      Return in about 6 months (around 2024).         Subjective   SUBJECTIVE/OBJECTIVE:  HPI In for medicare wellness exam. Seeing Dr. Parrish (neurology), Dr. Schmitt (ortho), Dr. Deal (ophthalmology). Needs mammogram. UTD on colonoscopy. UTD on immunizations. Would want her nieces Josee Mathis and Stephany Marlow. 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     Review of Systems   HENT:  Positive for hearing loss (mild).    Neurological:         No falls, canes. Independent in all adls. Memory ok   Psychiatric/Behavioral:          Not depressed. No alcohol.           Objective   Physical Exam  Cardiovascular:      Rate and Rhythm: Normal rate and regular rhythm.      Heart sounds: Normal heart sounds. No murmur heard.  Pulmonary:      Effort: Pulmonary effort is normal.      Breath sounds: Normal breath sounds.   Abdominal:      General: Abdomen is flat. Bowel sounds are normal.      Palpations: Abdomen is soft.   Musculoskeletal:      Right lower leg: No edema.      Left lower leg: No edema.                  An electronic signature was used to authenticate this note.    --Myron Rivera MD

## 2024-02-03 DIAGNOSIS — G25.0 ESSENTIAL TREMOR: ICD-10-CM

## 2024-02-03 LAB
CHOLEST SERPL-MCNC: 148 MG/DL
EST. AVERAGE GLUCOSE BLD GHB EST-MCNC: 143 MG/DL
HBA1C MFR BLD: 6.6 % (ref 4–5.6)
HDLC SERPL-MCNC: 77 MG/DL
HDLC SERPL: 1.9 (ref 0–5)
LDLC SERPL CALC-MCNC: 52.6 MG/DL (ref 0–100)
TRIGL SERPL-MCNC: 92 MG/DL
VLDLC SERPL CALC-MCNC: 18.4 MG/DL

## 2024-02-05 RX ORDER — PRIMIDONE 50 MG/1
50 TABLET ORAL 2 TIMES DAILY
Qty: 180 TABLET | Refills: 3 | OUTPATIENT
Start: 2024-02-05

## 2024-02-05 NOTE — TELEPHONE ENCOUNTER
New Primidone dose sent on 1/25/24 for a 30 d/s with 11 refills      For Pharmacy Admin Tracking Only    Program: Medication Refill  CPA in place:    Recommendation Provided To:   Intervention Detail: Discontinued Rx: 1, reason: Therapy Complete  Intervention Accepted By:   Gap Closed?:    Time Spent (min): 5

## 2024-02-28 ENCOUNTER — HOSPITAL ENCOUNTER (OUTPATIENT)
Age: 74
Discharge: HOME | End: 2024-03-02
Payer: MEDICARE

## 2024-02-28 VITALS — HEIGHT: 62 IN | BODY MASS INDEX: 27.23 KG/M2 | WEIGHT: 148 LBS

## 2024-02-28 DIAGNOSIS — Z12.31 ENCOUNTER FOR SCREENING MAMMOGRAM FOR MALIGNANT NEOPLASM OF BREAST: ICD-10-CM

## 2024-02-28 PROCEDURE — 77067 SCR MAMMO BI INCL CAD: CPT

## 2024-04-16 ENCOUNTER — APPOINTMENT (OUTPATIENT)
Facility: HOSPITAL | Age: 74
End: 2024-04-16
Payer: MEDICARE

## 2024-04-16 ENCOUNTER — HOSPITAL ENCOUNTER (EMERGENCY)
Facility: HOSPITAL | Age: 74
Discharge: HOME | End: 2024-04-16
Attending: STUDENT IN AN ORGANIZED HEALTH CARE EDUCATION/TRAINING PROGRAM
Payer: MEDICARE

## 2024-04-16 VITALS
DIASTOLIC BLOOD PRESSURE: 74 MMHG | SYSTOLIC BLOOD PRESSURE: 106 MMHG | WEIGHT: 149.03 LBS | HEIGHT: 62 IN | BODY MASS INDEX: 27.43 KG/M2 | HEART RATE: 101 BPM | OXYGEN SATURATION: 94 % | TEMPERATURE: 99.3 F | RESPIRATION RATE: 16 BRPM

## 2024-04-16 DIAGNOSIS — R19.7 DIARRHEA, UNSPECIFIED TYPE: ICD-10-CM

## 2024-04-16 DIAGNOSIS — R11.2 NAUSEA AND VOMITING, UNSPECIFIED VOMITING TYPE: Primary | ICD-10-CM

## 2024-04-16 LAB
ALBUMIN SERPL-MCNC: 3.7 G/DL (ref 3.5–5)
ALBUMIN/GLOB SERPL: 0.8 (ref 1.1–2.2)
ALP SERPL-CCNC: 114 U/L (ref 45–117)
ALT SERPL-CCNC: 39 U/L (ref 12–78)
ANION GAP SERPL CALC-SCNC: 7 MMOL/L (ref 5–15)
APPEARANCE UR: CLEAR
AST SERPL-CCNC: 23 U/L (ref 15–37)
BACTERIA URNS QL MICRO: NEGATIVE /HPF
BASOPHILS # BLD: 0 K/UL (ref 0–0.1)
BASOPHILS NFR BLD: 0 % (ref 0–1)
BILIRUB SERPL-MCNC: 0.4 MG/DL (ref 0.2–1)
BILIRUB UR QL: NEGATIVE
BUN SERPL-MCNC: 17 MG/DL (ref 6–20)
BUN/CREAT SERPL: 13 (ref 12–20)
CALCIUM SERPL-MCNC: 10.2 MG/DL (ref 8.5–10.1)
CHLORIDE SERPL-SCNC: 107 MMOL/L (ref 97–108)
CO2 SERPL-SCNC: 26 MMOL/L (ref 21–32)
COLOR UR: ABNORMAL
CREAT SERPL-MCNC: 1.3 MG/DL (ref 0.55–1.02)
DIFFERENTIAL METHOD BLD: ABNORMAL
EOSINOPHIL # BLD: 0 K/UL (ref 0–0.4)
EOSINOPHIL NFR BLD: 0 % (ref 0–7)
EPITH CASTS URNS QL MICRO: ABNORMAL /LPF
ERYTHROCYTE [DISTWIDTH] IN BLOOD BY AUTOMATED COUNT: 15.1 % (ref 11.5–14.5)
GLOBULIN SER CALC-MCNC: 4.4 G/DL (ref 2–4)
GLUCOSE SERPL-MCNC: 150 MG/DL (ref 65–100)
GLUCOSE UR STRIP.AUTO-MCNC: >1000 MG/DL
HCT VFR BLD AUTO: 46.6 % (ref 35–47)
HGB BLD-MCNC: 14.6 G/DL (ref 11.5–16)
HGB UR QL STRIP: NEGATIVE
HYALINE CASTS URNS QL MICRO: ABNORMAL /LPF (ref 0–2)
IMM GRANULOCYTES # BLD AUTO: 0 K/UL (ref 0–0.04)
IMM GRANULOCYTES NFR BLD AUTO: 0 % (ref 0–0.5)
KETONES UR QL STRIP.AUTO: ABNORMAL MG/DL
LACTATE BLD-SCNC: 0.86 MMOL/L (ref 0.4–2)
LEUKOCYTE ESTERASE UR QL STRIP.AUTO: NEGATIVE
LIPASE SERPL-CCNC: 24 U/L (ref 13–75)
LYMPHOCYTES # BLD: 0.4 K/UL (ref 0.8–3.5)
LYMPHOCYTES NFR BLD: 4 % (ref 12–49)
MCH RBC QN AUTO: 29.1 PG (ref 26–34)
MCHC RBC AUTO-ENTMCNC: 31.3 G/DL (ref 30–36.5)
MCV RBC AUTO: 92.8 FL (ref 80–99)
MONOCYTES # BLD: 0.3 K/UL (ref 0–1)
MONOCYTES NFR BLD: 3 % (ref 5–13)
NEUTS SEG # BLD: 8.5 K/UL (ref 1.8–8)
NEUTS SEG NFR BLD: 93 % (ref 32–75)
NITRITE UR QL STRIP.AUTO: NEGATIVE
NRBC # BLD: 0 K/UL (ref 0–0.01)
NRBC BLD-RTO: 0 PER 100 WBC
PH UR STRIP: 5.5 (ref 5–8)
PLATELET # BLD AUTO: 217 K/UL (ref 150–400)
PMV BLD AUTO: 11.4 FL (ref 8.9–12.9)
POTASSIUM SERPL-SCNC: 4.1 MMOL/L (ref 3.5–5.1)
PROT SERPL-MCNC: 8.1 G/DL (ref 6.4–8.2)
PROT UR STRIP-MCNC: NEGATIVE MG/DL
RBC # BLD AUTO: 5.02 M/UL (ref 3.8–5.2)
RBC #/AREA URNS HPF: ABNORMAL /HPF (ref 0–5)
RBC MORPH BLD: ABNORMAL
SODIUM SERPL-SCNC: 140 MMOL/L (ref 136–145)
SP GR UR REFRACTOMETRY: 1.01 (ref 1–1.03)
URINE CULTURE IF INDICATED: ABNORMAL
UROBILINOGEN UR QL STRIP.AUTO: 0.2 EU/DL (ref 0.2–1)
WBC # BLD AUTO: 9.2 K/UL (ref 3.6–11)
WBC URNS QL MICRO: ABNORMAL /HPF (ref 0–4)

## 2024-04-16 PROCEDURE — 6360000004 HC RX CONTRAST MEDICATION: Performed by: STUDENT IN AN ORGANIZED HEALTH CARE EDUCATION/TRAINING PROGRAM

## 2024-04-16 PROCEDURE — 6360000002 HC RX W HCPCS: Performed by: STUDENT IN AN ORGANIZED HEALTH CARE EDUCATION/TRAINING PROGRAM

## 2024-04-16 PROCEDURE — 81001 URINALYSIS AUTO W/SCOPE: CPT

## 2024-04-16 PROCEDURE — 2500000003 HC RX 250 WO HCPCS: Performed by: STUDENT IN AN ORGANIZED HEALTH CARE EDUCATION/TRAINING PROGRAM

## 2024-04-16 PROCEDURE — 36415 COLL VENOUS BLD VENIPUNCTURE: CPT

## 2024-04-16 PROCEDURE — 6370000000 HC RX 637 (ALT 250 FOR IP): Performed by: STUDENT IN AN ORGANIZED HEALTH CARE EDUCATION/TRAINING PROGRAM

## 2024-04-16 PROCEDURE — 83605 ASSAY OF LACTIC ACID: CPT

## 2024-04-16 PROCEDURE — 80053 COMPREHEN METABOLIC PANEL: CPT

## 2024-04-16 PROCEDURE — 99285 EMERGENCY DEPT VISIT HI MDM: CPT

## 2024-04-16 PROCEDURE — 96374 THER/PROPH/DIAG INJ IV PUSH: CPT

## 2024-04-16 PROCEDURE — 74177 CT ABD & PELVIS W/CONTRAST: CPT

## 2024-04-16 PROCEDURE — 2580000003 HC RX 258: Performed by: STUDENT IN AN ORGANIZED HEALTH CARE EDUCATION/TRAINING PROGRAM

## 2024-04-16 PROCEDURE — 83690 ASSAY OF LIPASE: CPT

## 2024-04-16 PROCEDURE — 85025 COMPLETE CBC W/AUTO DIFF WBC: CPT

## 2024-04-16 PROCEDURE — 96375 TX/PRO/DX INJ NEW DRUG ADDON: CPT

## 2024-04-16 RX ORDER — 0.9 % SODIUM CHLORIDE 0.9 %
1000 INTRAVENOUS SOLUTION INTRAVENOUS ONCE
Status: COMPLETED | OUTPATIENT
Start: 2024-04-16 | End: 2024-04-16

## 2024-04-16 RX ORDER — ONDANSETRON 2 MG/ML
4 INJECTION INTRAMUSCULAR; INTRAVENOUS ONCE
Status: COMPLETED | OUTPATIENT
Start: 2024-04-16 | End: 2024-04-16

## 2024-04-16 RX ORDER — 0.9 % SODIUM CHLORIDE 0.9 %
500 INTRAVENOUS SOLUTION INTRAVENOUS ONCE
Status: COMPLETED | OUTPATIENT
Start: 2024-04-16 | End: 2024-04-16

## 2024-04-16 RX ORDER — ONDANSETRON 4 MG/1
4 TABLET, FILM COATED ORAL 3 TIMES DAILY PRN
Qty: 15 TABLET | Refills: 0 | Status: SHIPPED | OUTPATIENT
Start: 2024-04-16

## 2024-04-16 RX ORDER — DICYCLOMINE HYDROCHLORIDE 10 MG/1
10 CAPSULE ORAL
Qty: 30 CAPSULE | Refills: 0 | Status: SHIPPED | OUTPATIENT
Start: 2024-04-16

## 2024-04-16 RX ORDER — OXYCODONE HYDROCHLORIDE 5 MG/1
5 TABLET ORAL EVERY 6 HOURS PRN
Qty: 6 TABLET | Refills: 0 | Status: SHIPPED | OUTPATIENT
Start: 2024-04-16 | End: 2024-04-19

## 2024-04-16 RX ORDER — MORPHINE SULFATE 4 MG/ML
4 INJECTION, SOLUTION INTRAMUSCULAR; INTRAVENOUS ONCE
Status: COMPLETED | OUTPATIENT
Start: 2024-04-16 | End: 2024-04-16

## 2024-04-16 RX ADMIN — MORPHINE SULFATE 4 MG: 4 INJECTION INTRAVENOUS at 12:29

## 2024-04-16 RX ADMIN — ONDANSETRON 4 MG: 2 INJECTION INTRAMUSCULAR; INTRAVENOUS at 12:29

## 2024-04-16 RX ADMIN — SODIUM CHLORIDE, PRESERVATIVE FREE 20 MG: 5 INJECTION INTRAVENOUS at 15:44

## 2024-04-16 RX ADMIN — IOPAMIDOL 100 ML: 755 INJECTION, SOLUTION INTRAVENOUS at 13:30

## 2024-04-16 RX ADMIN — SODIUM CHLORIDE 500 ML: 9 INJECTION, SOLUTION INTRAVENOUS at 15:42

## 2024-04-16 RX ADMIN — SODIUM CHLORIDE 1000 ML: 9 INJECTION, SOLUTION INTRAVENOUS at 12:28

## 2024-04-16 RX ADMIN — LIDOCAINE HYDROCHLORIDE 40 ML: 20 SOLUTION ORAL at 15:43

## 2024-04-16 ASSESSMENT — PAIN SCALES - GENERAL
PAINLEVEL_OUTOF10: 8
PAINLEVEL_OUTOF10: 8

## 2024-04-16 ASSESSMENT — PAIN DESCRIPTION - LOCATION: LOCATION: ABDOMEN

## 2024-04-16 ASSESSMENT — PAIN - FUNCTIONAL ASSESSMENT: PAIN_FUNCTIONAL_ASSESSMENT: 0-10

## 2024-04-16 NOTE — ED PROVIDER NOTES
[facility] EMERGENCY DEPT  EMERGENCY DEPARTMENT ENCOUNTER       Pt Name: Alma Bautista  MRN: [id number]  Birthdate 1950  Date of evaluation: 4/16/2024  Provider: Phoenix Sullivan DO   PCP: Myron Rivera MD  Note Started: 8:48 PM 4/16/24     CHIEF COMPLAINT       Chief Complaint   Patient presents with    Abdominal Pain     Patient ambulatory to triage w c/o acute onset of abdominal pain, patient reports she was evaluated at [facility] they referred her here to r/o diverticulitis.        HISTORY OF PRESENT ILLNESS: 1 or more elements      History From: Patient  None     Alma Bautista is a 73 y.o. female who presents with cc of LLQ abdominal pain x yesterday. Denies any fever, reports vomiting and diarrhea, non-bloody. She was seen at [facility], referred to the ER for evaluation. She denies any chest pain or shortness of breath. She denies any urinary complaints.      Nursing Notes were all reviewed and agreed with or any disagreements were addressed in the HPI.       PAST HISTORY     Past Medical History:  Past Medical History:   Diagnosis Date    Allergic rhinitis due to allergen 10/22/2014    Arthritis     spine    Asthma     many years ago, no inhaler or meds    Chronic pain     back pain    Depression     Diabetes (HCC)     Type II    GERD (gastroesophageal reflux disease)     Hepatitis B 1984    Hypercholesterolemia     Hypertension     Kidney stones 1990's    Positive PPD 2009    treated with INH         Past Surgical History:  Past Surgical History:   Procedure Laterality Date    BREAST BIOPSY Bilateral 1980s    all benign cysts    BREAST SURGERY  1990's    abcess bilateral    BUNIONECTOMY Bilateral 1990s    CARDIAC CATHETERIZATION      negatve    CATARACT REMOVAL Bilateral 11/2018    CHOLECYSTECTOMY      COLONOSCOPY  12/2007    2 polyps    COLONOSCOPY N/A 11/4/2021    COLONOSCOPY, EGD performed by Myron Rosales MD at [facility] AMBULATORY OR    COLONOSCOPY  May 2016    COLONOSCOPY  4-11

## 2024-04-20 LAB
EKG ATRIAL RATE: 110 BPM
EKG DIAGNOSIS: NORMAL
EKG P AXIS: 45 DEGREES
EKG P-R INTERVAL: 162 MS
EKG Q-T INTERVAL: 316 MS
EKG QRS DURATION: 74 MS
EKG QTC CALCULATION (BAZETT): 427 MS
EKG R AXIS: -20 DEGREES
EKG T AXIS: 54 DEGREES
EKG VENTRICULAR RATE: 110 BPM

## 2024-05-11 ENCOUNTER — TRANSCRIBE ORDERS (OUTPATIENT)
Facility: HOSPITAL | Age: 74
End: 2024-05-11

## 2024-05-11 DIAGNOSIS — E13.69 OTHER SPECIFIED DIABETES MELLITUS WITH OTHER SPECIFIED COMPLICATION, UNSPECIFIED WHETHER LONG TERM INSULIN USE (HCC): ICD-10-CM

## 2024-05-11 DIAGNOSIS — K59.02 DYSSYNERGIC DEFECATION: Primary | ICD-10-CM

## 2024-05-11 DIAGNOSIS — R19.7 DIARRHEA, UNSPECIFIED TYPE: ICD-10-CM

## 2024-05-11 DIAGNOSIS — K31.84 GASTROPARESIS: ICD-10-CM

## 2024-05-11 DIAGNOSIS — R15.9 INCONTINENCE OF FECES, UNSPECIFIED FECAL INCONTINENCE TYPE: ICD-10-CM

## 2024-05-28 ENCOUNTER — ANCILLARY PROCEDURE (OUTPATIENT)
Age: 74
End: 2024-05-28
Payer: MEDICARE

## 2024-05-28 ENCOUNTER — OFFICE VISIT (OUTPATIENT)
Age: 74
End: 2024-05-28
Payer: MEDICARE

## 2024-05-28 ENCOUNTER — TELEPHONE (OUTPATIENT)
Age: 74
End: 2024-05-28

## 2024-05-28 VITALS
HEART RATE: 78 BPM | HEIGHT: 62 IN | OXYGEN SATURATION: 98 % | DIASTOLIC BLOOD PRESSURE: 51 MMHG | SYSTOLIC BLOOD PRESSURE: 115 MMHG | BODY MASS INDEX: 28.34 KG/M2 | WEIGHT: 154 LBS | RESPIRATION RATE: 18 BRPM | TEMPERATURE: 97.8 F

## 2024-05-28 DIAGNOSIS — E11.9 TYPE 2 DIABETES MELLITUS WITHOUT COMPLICATION, WITH LONG-TERM CURRENT USE OF INSULIN (HCC): ICD-10-CM

## 2024-05-28 DIAGNOSIS — R06.02 SHORTNESS OF BREATH: ICD-10-CM

## 2024-05-28 DIAGNOSIS — R07.9 EXERTIONAL CHEST PAIN: ICD-10-CM

## 2024-05-28 DIAGNOSIS — F33.1 MAJOR DEPRESSIVE DISORDER, RECURRENT, MODERATE (HCC): ICD-10-CM

## 2024-05-28 DIAGNOSIS — R07.9 CHEST PAIN, UNSPECIFIED TYPE: ICD-10-CM

## 2024-05-28 DIAGNOSIS — E78.00 PURE HYPERCHOLESTEROLEMIA, UNSPECIFIED: ICD-10-CM

## 2024-05-28 DIAGNOSIS — R07.2 PRECORDIAL PAIN: Primary | ICD-10-CM

## 2024-05-28 DIAGNOSIS — F33.0 MAJOR DEPRESSIVE DISORDER, RECURRENT, MILD (HCC): ICD-10-CM

## 2024-05-28 DIAGNOSIS — Z79.4 TYPE 2 DIABETES MELLITUS WITHOUT COMPLICATION, WITH LONG-TERM CURRENT USE OF INSULIN (HCC): ICD-10-CM

## 2024-05-28 PROCEDURE — 3078F DIAST BP <80 MM HG: CPT | Performed by: FAMILY MEDICINE

## 2024-05-28 PROCEDURE — 99214 OFFICE O/P EST MOD 30 MIN: CPT | Performed by: FAMILY MEDICINE

## 2024-05-28 PROCEDURE — 3074F SYST BP LT 130 MM HG: CPT | Performed by: FAMILY MEDICINE

## 2024-05-28 PROCEDURE — 1123F ACP DISCUSS/DSCN MKR DOCD: CPT | Performed by: FAMILY MEDICINE

## 2024-05-28 PROCEDURE — 93010 ELECTROCARDIOGRAM REPORT: CPT | Performed by: FAMILY MEDICINE

## 2024-05-28 PROCEDURE — 3044F HG A1C LEVEL LT 7.0%: CPT | Performed by: FAMILY MEDICINE

## 2024-05-28 PROCEDURE — 93005 ELECTROCARDIOGRAM TRACING: CPT | Performed by: FAMILY MEDICINE

## 2024-05-28 PROCEDURE — 71046 X-RAY EXAM CHEST 2 VIEWS: CPT

## 2024-05-28 RX ORDER — ASPIRIN 81 MG/1
81 TABLET ORAL DAILY
Qty: 90 TABLET | Refills: 3 | Status: SHIPPED | OUTPATIENT
Start: 2024-05-28

## 2024-05-28 RX ORDER — METOPROLOL SUCCINATE 25 MG/1
25 TABLET, EXTENDED RELEASE ORAL 2 TIMES DAILY
Qty: 60 TABLET | Refills: 5 | Status: SHIPPED | OUTPATIENT
Start: 2024-05-28

## 2024-05-28 SDOH — ECONOMIC STABILITY: FOOD INSECURITY: WITHIN THE PAST 12 MONTHS, YOU WORRIED THAT YOUR FOOD WOULD RUN OUT BEFORE YOU GOT MONEY TO BUY MORE.: NEVER TRUE

## 2024-05-28 SDOH — ECONOMIC STABILITY: INCOME INSECURITY: HOW HARD IS IT FOR YOU TO PAY FOR THE VERY BASICS LIKE FOOD, HOUSING, MEDICAL CARE, AND HEATING?: NOT HARD AT ALL

## 2024-05-28 SDOH — ECONOMIC STABILITY: FOOD INSECURITY: WITHIN THE PAST 12 MONTHS, THE FOOD YOU BOUGHT JUST DIDN'T LAST AND YOU DIDN'T HAVE MONEY TO GET MORE.: NEVER TRUE

## 2024-05-28 ASSESSMENT — ANXIETY QUESTIONNAIRES
7. FEELING AFRAID AS IF SOMETHING AWFUL MIGHT HAPPEN: NOT AT ALL
6. BECOMING EASILY ANNOYED OR IRRITABLE: NOT AT ALL
2. NOT BEING ABLE TO STOP OR CONTROL WORRYING: NOT AT ALL
1. FEELING NERVOUS, ANXIOUS, OR ON EDGE: NOT AT ALL
3. WORRYING TOO MUCH ABOUT DIFFERENT THINGS: NOT AT ALL
GAD7 TOTAL SCORE: 0
4. TROUBLE RELAXING: NOT AT ALL
GAD7 TOTAL SCORE: 0
5. BEING SO RESTLESS THAT IT IS HARD TO SIT STILL: NOT AT ALL
IF YOU CHECKED OFF ANY PROBLEMS ON THIS QUESTIONNAIRE, HOW DIFFICULT HAVE THESE PROBLEMS MADE IT FOR YOU TO DO YOUR WORK, TAKE CARE OF THINGS AT HOME, OR GET ALONG WITH OTHER PEOPLE: NOT DIFFICULT AT ALL

## 2024-05-28 ASSESSMENT — PATIENT HEALTH QUESTIONNAIRE - PHQ9
SUM OF ALL RESPONSES TO PHQ9 QUESTIONS 1 & 2: 0
SUM OF ALL RESPONSES TO PHQ QUESTIONS 1-9: 0
10. IF YOU CHECKED OFF ANY PROBLEMS, HOW DIFFICULT HAVE THESE PROBLEMS MADE IT FOR YOU TO DO YOUR WORK, TAKE CARE OF THINGS AT HOME, OR GET ALONG WITH OTHER PEOPLE: NOT DIFFICULT AT ALL
SUM OF ALL RESPONSES TO PHQ QUESTIONS 1-9: 0
5. POOR APPETITE OR OVEREATING: NOT AT ALL
1. LITTLE INTEREST OR PLEASURE IN DOING THINGS: NOT AT ALL
9. THOUGHTS THAT YOU WOULD BE BETTER OFF DEAD, OR OF HURTING YOURSELF: NOT AT ALL
2. FEELING DOWN, DEPRESSED OR HOPELESS: NOT AT ALL
SUM OF ALL RESPONSES TO PHQ QUESTIONS 1-9: 0
6. FEELING BAD ABOUT YOURSELF - OR THAT YOU ARE A FAILURE OR HAVE LET YOURSELF OR YOUR FAMILY DOWN: NOT AT ALL
2. FEELING DOWN, DEPRESSED OR HOPELESS: NOT AT ALL
SUM OF ALL RESPONSES TO PHQ QUESTIONS 1-9: 0
4. FEELING TIRED OR HAVING LITTLE ENERGY: NOT AT ALL
7. TROUBLE CONCENTRATING ON THINGS, SUCH AS READING THE NEWSPAPER OR WATCHING TELEVISION: NOT AT ALL
SUM OF ALL RESPONSES TO PHQ QUESTIONS 1-9: 0
8. MOVING OR SPEAKING SO SLOWLY THAT OTHER PEOPLE COULD HAVE NOTICED. OR THE OPPOSITE, BEING SO FIGETY OR RESTLESS THAT YOU HAVE BEEN MOVING AROUND A LOT MORE THAN USUAL: NOT AT ALL
SUM OF ALL RESPONSES TO PHQ QUESTIONS 1-9: 0
1. LITTLE INTEREST OR PLEASURE IN DOING THINGS: NOT AT ALL
SUM OF ALL RESPONSES TO PHQ9 QUESTIONS 1 & 2: 0
3. TROUBLE FALLING OR STAYING ASLEEP: NOT AT ALL

## 2024-05-28 NOTE — PROGRESS NOTES
Chief Complaint   Patient presents with    Follow-up     Here for follow up ED visit for fatigue and clogged left ear.        1. Have you been to the ER, urgent care clinic since your last visit?  Hospitalized since your last visit?No    2. Have you seen or consulted any other health care providers outside of the [facility] since your last visit?  Include any pap smears or colon screening. No

## 2024-05-28 NOTE — PROGRESS NOTES
Alma Bautista (:  1950) is a 73 y.o. female,Established patient, here for evaluation of the following chief complaint(s):  Follow-up      Assessment & Plan   1. Precordial pain  -     AMB POC EKG ROUTINE  2. Shortness of breath  -     CBC with Auto Differential; Future  -     Brain Natriuretic Peptide; Future  -     XR CHEST (2 VIEWS); Future  3. Type 2 diabetes mellitus without complication, with long-term current use of insulin (HCC)  -     Comprehensive Metabolic Panel; Future  -     Hemoglobin A1C; Future  4. Pure hypercholesterolemia, unspecified  -     Lipid Panel; Future  5. Major depressive disorder, recurrent, mild (HCC)  6. Major depressive disorder, recurrent, moderate (HCC)  7. Exertional chest pain  -     AFL - Mahesh Caputo MD, Cardiology, [place]  8. Chest pain, unspecified type  -     Troponin; Future  Appt with cardiology on Thursday    No follow-ups on file.       Subjective   HPI In with complaints of fatigue. Occasional chest tightness when goes up stairs. Also gets chest tightness when is out walking. Can also get these symptoms if is talking too fast. Mild dyspnea at times also. No weight loss. Had some stomach pains a few weeks ago, seen in Er. Has appt with GI in . No blood in stoosl, melena. No hematuria, vaginal bleeding. Sugars have been up and down. No hypoglycemic episodes. Sleeping ok at night. Denies being stressed. No irritability. Used to smoke 1/2-1 ppd, for 30 years quit in .     Review of Systems       Objective   Physical Exam  Cardiovascular:      Rate and Rhythm: Normal rate and regular rhythm.      Heart sounds: Normal heart sounds. No murmur heard.  Pulmonary:      Effort: Pulmonary effort is normal.      Breath sounds: Normal breath sounds.   Abdominal:      General: Abdomen is flat. Bowel sounds are normal.      Palpations: Abdomen is soft.   Musculoskeletal:      Right lower leg: No edema.      Left lower leg: No edema.                An

## 2024-05-28 NOTE — TELEPHONE ENCOUNTER
Patient contacted per  request. Patient states that she will come in office tomorrow to have lab test drawn.

## 2024-05-29 ENCOUNTER — OFFICE VISIT (OUTPATIENT)
Age: 74
End: 2024-05-29

## 2024-05-29 DIAGNOSIS — R07.9 CHEST PAIN, UNSPECIFIED TYPE: ICD-10-CM

## 2024-05-29 LAB
ALBUMIN SERPL-MCNC: 3.9 G/DL (ref 3.5–5)
ALBUMIN/GLOB SERPL: 1 (ref 1.1–2.2)
ALP SERPL-CCNC: 113 U/L (ref 45–117)
ALT SERPL-CCNC: 27 U/L (ref 12–78)
ANION GAP SERPL CALC-SCNC: 6 MMOL/L (ref 5–15)
AST SERPL-CCNC: 20 U/L (ref 15–37)
BASOPHILS # BLD: 0 K/UL (ref 0–0.1)
BASOPHILS NFR BLD: 1 % (ref 0–1)
BILIRUB SERPL-MCNC: 0.2 MG/DL (ref 0.2–1)
BUN SERPL-MCNC: 13 MG/DL (ref 6–20)
BUN/CREAT SERPL: 11 (ref 12–20)
CALCIUM SERPL-MCNC: 9.8 MG/DL (ref 8.5–10.1)
CHLORIDE SERPL-SCNC: 111 MMOL/L (ref 97–108)
CHOLEST SERPL-MCNC: 138 MG/DL
CO2 SERPL-SCNC: 23 MMOL/L (ref 21–32)
COMMENT:: NORMAL
CREAT SERPL-MCNC: 1.18 MG/DL (ref 0.55–1.02)
DIFFERENTIAL METHOD BLD: ABNORMAL
EOSINOPHIL # BLD: 0 K/UL (ref 0–0.4)
EOSINOPHIL NFR BLD: 1 % (ref 0–7)
ERYTHROCYTE [DISTWIDTH] IN BLOOD BY AUTOMATED COUNT: 15.3 % (ref 11.5–14.5)
EST. AVERAGE GLUCOSE BLD GHB EST-MCNC: 148 MG/DL
GLOBULIN SER CALC-MCNC: 3.8 G/DL (ref 2–4)
GLUCOSE SERPL-MCNC: 183 MG/DL (ref 65–100)
HBA1C MFR BLD: 6.8 % (ref 4–5.6)
HCT VFR BLD AUTO: 41.5 % (ref 35–47)
HDLC SERPL-MCNC: 66 MG/DL
HDLC SERPL: 2.1 (ref 0–5)
HGB BLD-MCNC: 14.1 G/DL (ref 11.5–16)
IMM GRANULOCYTES # BLD AUTO: 0 K/UL (ref 0–0.04)
IMM GRANULOCYTES NFR BLD AUTO: 0 % (ref 0–0.5)
LDLC SERPL CALC-MCNC: 36.2 MG/DL (ref 0–100)
LYMPHOCYTES # BLD: 1.8 K/UL (ref 0.8–3.5)
LYMPHOCYTES NFR BLD: 44 % (ref 12–49)
MCH RBC QN AUTO: 31.2 PG (ref 26–34)
MCHC RBC AUTO-ENTMCNC: 34 G/DL (ref 30–36.5)
MCV RBC AUTO: 91.8 FL (ref 80–99)
MONOCYTES # BLD: 0.4 K/UL (ref 0–1)
MONOCYTES NFR BLD: 10 % (ref 5–13)
NEUTS SEG # BLD: 1.7 K/UL (ref 1.8–8)
NEUTS SEG NFR BLD: 44 % (ref 32–75)
NRBC # BLD: 0 K/UL (ref 0–0.01)
NRBC BLD-RTO: 0 PER 100 WBC
NT PRO BNP: 28 PG/ML
PLATELET # BLD AUTO: 233 K/UL (ref 150–400)
PMV BLD AUTO: 12.7 FL (ref 8.9–12.9)
POTASSIUM SERPL-SCNC: 4 MMOL/L (ref 3.5–5.1)
PROT SERPL-MCNC: 7.7 G/DL (ref 6.4–8.2)
RBC # BLD AUTO: 4.52 M/UL (ref 3.8–5.2)
SODIUM SERPL-SCNC: 140 MMOL/L (ref 136–145)
SPECIMEN HOLD: NORMAL
TRIGL SERPL-MCNC: 179 MG/DL
VLDLC SERPL CALC-MCNC: 35.8 MG/DL
WBC # BLD AUTO: 4 K/UL (ref 3.6–11)

## 2024-05-29 NOTE — PROGRESS NOTES
Chief Complaint   Patient presents with    Labs Only       1 The patient, Alma Bautista's, identity was verified by name and .

## 2024-05-30 LAB — TROPONIN I SERPL HS-MCNC: 4 NG/L (ref 0–51)

## 2024-06-04 ENCOUNTER — TELEPHONE (OUTPATIENT)
Age: 74
End: 2024-06-04

## 2024-06-11 ENCOUNTER — TRANSCRIBE ORDERS (OUTPATIENT)
Facility: HOSPITAL | Age: 74
End: 2024-06-11

## 2024-06-11 ENCOUNTER — HOSPITAL ENCOUNTER (OUTPATIENT)
Facility: HOSPITAL | Age: 74
Discharge: HOME | End: 2024-06-14

## 2024-06-11 DIAGNOSIS — K59.02 DYSSYNERGIC DEFECATION: ICD-10-CM

## 2024-06-11 DIAGNOSIS — E13.69 OTHER SPECIFIED DIABETES MELLITUS WITH OTHER SPECIFIED COMPLICATION, UNSPECIFIED WHETHER LONG TERM INSULIN USE (HCC): ICD-10-CM

## 2024-06-11 DIAGNOSIS — R15.9 INCONTINENCE OF FECES, UNSPECIFIED FECAL INCONTINENCE TYPE: ICD-10-CM

## 2024-06-11 DIAGNOSIS — R15.9 FULL INCONTINENCE OF FECES: ICD-10-CM

## 2024-06-11 DIAGNOSIS — K31.84 GASTROPARESIS: ICD-10-CM

## 2024-06-11 DIAGNOSIS — E11.9 DIABETES MELLITUS WITHOUT COMPLICATION (HCC): ICD-10-CM

## 2024-06-11 DIAGNOSIS — R19.7 DIARRHEA, UNSPECIFIED TYPE: ICD-10-CM

## 2024-06-11 DIAGNOSIS — K59.02 DYSSYNERGIC DEFECATION: Primary | ICD-10-CM

## 2024-06-12 ENCOUNTER — ENROLLMENT (OUTPATIENT)
Dept: PHARMACY | Facility: CLINIC | Age: 74
End: 2024-06-12

## 2024-06-13 ENCOUNTER — OFFICE VISIT (OUTPATIENT)
Age: 74
End: 2024-06-13
Payer: MEDICARE

## 2024-06-13 VITALS
SYSTOLIC BLOOD PRESSURE: 111 MMHG | WEIGHT: 154 LBS | RESPIRATION RATE: 16 BRPM | BODY MASS INDEX: 28.34 KG/M2 | HEART RATE: 63 BPM | TEMPERATURE: 98 F | DIASTOLIC BLOOD PRESSURE: 54 MMHG | HEIGHT: 62 IN | OXYGEN SATURATION: 98 %

## 2024-06-13 DIAGNOSIS — R07.2 PRECORDIAL PAIN: ICD-10-CM

## 2024-06-13 PROCEDURE — 99213 OFFICE O/P EST LOW 20 MIN: CPT | Performed by: FAMILY MEDICINE

## 2024-06-13 PROCEDURE — 3074F SYST BP LT 130 MM HG: CPT | Performed by: FAMILY MEDICINE

## 2024-06-13 PROCEDURE — 3078F DIAST BP <80 MM HG: CPT | Performed by: FAMILY MEDICINE

## 2024-06-13 PROCEDURE — 1123F ACP DISCUSS/DSCN MKR DOCD: CPT | Performed by: FAMILY MEDICINE

## 2024-06-13 RX ORDER — NITROGLYCERIN 0.4 MG/1
0.4 TABLET SUBLINGUAL EVERY 5 MIN PRN
Qty: 30 TABLET | Refills: 3 | Status: SHIPPED | OUTPATIENT
Start: 2024-06-13

## 2024-06-13 RX ORDER — METOPROLOL SUCCINATE 25 MG/1
25 TABLET, EXTENDED RELEASE ORAL DAILY
Qty: 60 TABLET | Refills: 5 | Status: SHIPPED | OUTPATIENT
Start: 2024-06-13

## 2024-06-13 NOTE — PROGRESS NOTES
Alma Bautista (:  1950) is a 73 y.o. female,Established patient, here for evaluation of the following chief complaint(s):  Follow-up (3 week follow up)      Assessment & Plan   1. Precordial pain  -     metoprolol succinate (TOPROL XL) 25 MG extended release tablet; Take 1 tablet by mouth daily For heart, Disp-60 tablet, R-5Print  -     nitroGLYCERIN (NITROSTAT) 0.4 MG SL tablet; Place 1 tablet under the tongue every 5 minutes as needed for Chest pain up to max of 3 total doses. If no relief after 1 dose, call 911., Disp-30 tablet, R-3Normal      No follow-ups on file.       Subjective   HPI in for followup. Still getting some pressure in chest when is walking. Often will sit down and catch breat- that seems to help some. Occasionally comes on when sitting still, but not that often. 5-7 minutes is the longest a spell has lasted. No orthopnea, edema. Occ gets some palpitations. Stress tests is next Friday    Review of Systems       Objective   Physical Exam  Cardiovascular:      Rate and Rhythm: Normal rate and regular rhythm.      Heart sounds: Normal heart sounds. No murmur heard.  Pulmonary:      Effort: Pulmonary effort is normal.      Breath sounds: Normal breath sounds.   Abdominal:      General: Abdomen is flat. Bowel sounds are normal.      Palpations: Abdomen is soft.   Musculoskeletal:      Right lower leg: No edema.      Left lower leg: No edema.                An electronic signature was used to authenticate this note.    --Myron Rivera MD   
Chief Complaint   Patient presents with    Follow-up         1. \"Have you been to the ER, urgent care clinic since your last visit?  Hospitalized since your last visit?\" NO    2. \"Have you seen or consulted any other health care providers outside of the [facility] since your last visit?\" CARDIOLOGY  AND GYN - DR COURTNEY GILLIS    3. For patients aged 45-75: Has the patient had a colonoscopy / FIT/ Cologuard? N/A      If the patient is female:    4. For patients aged 40-74: Has the patient had a mammogram within the past 2 years? N/A      5. For patients aged 21-65: Has the patient had a pap smear? N/A      Health Maintenance Due   Topic Date Due    Hepatitis C screen  Never done    Low dose CT lung screening &/or counseling  Never done    Diabetic foot exam  11/15/2018    Diabetic retinal exam  10/03/2023      
MODERATE

## 2024-06-24 NOTE — TELEPHONE ENCOUNTER
Last office visit with pcp 6/13/24.  Next office visit  with pcp 8/2/24.     Quality 431: Preventive Care And Screening: Unhealthy Alcohol Use - Screening: Patient screened for unhealthy alcohol use using a single question and scores less than 2 times per year Quality 474: Zoster Vaccination Status: Shingrix Vaccination Administered or Previously Received Detail Level: Detailed Quality 226: Preventive Care And Screening: Tobacco Use: Screening And Cessation Intervention: Patient screened for tobacco use and is an ex/non-smoker Quality 131: Pain Assessment And Follow-Up: Pain assessment using a standardized tool is documented as negative, no follow-up plan required

## 2024-07-15 ENCOUNTER — TELEPHONE (OUTPATIENT)
Age: 74
End: 2024-07-15

## 2024-07-25 RX ORDER — INSULIN GLARGINE 100 [IU]/ML
INJECTION, SOLUTION SUBCUTANEOUS
Refills: 0 | OUTPATIENT
Start: 2024-07-25

## 2024-07-25 RX ORDER — FLURBIPROFEN SODIUM 0.3 MG/ML
SOLUTION/ DROPS OPHTHALMIC
Qty: 100 EACH | Refills: 3 | Status: SHIPPED | OUTPATIENT
Start: 2024-07-25

## 2024-07-25 RX ORDER — INSULIN GLARGINE 100 [IU]/ML
20 INJECTION, SOLUTION SUBCUTANEOUS DAILY
Qty: 15 ML | Refills: 4 | Status: SHIPPED | OUTPATIENT
Start: 2024-07-25

## 2024-07-25 NOTE — TELEPHONE ENCOUNTER
Last appointment: 6/13/24  Next appointment: 8/2/24  Previous refill encounter(s): 4/13/23    Requested Prescriptions     Pending Prescriptions Disp Refills    insulin glargine (LANTUS SOLOSTAR) 100 UNIT/ML injection pen [Pharmacy Med Name: LANTUS SOLOSTAR 100 UNIT/ML] 15 mL 4     Sig: Inject 20 Units into the skin Daily    B-D UF III MINI PEN NEEDLES 31G X 5 MM MISC [Pharmacy Med Name: BD UF MINI PEN NEEDLE [id number]] 100 each 3     Sig: USE TO INJECT INSULIN DAILY         For Pharmacy Admin Tracking Only    Program: Medication Refill  CPA in place:    Recommendation Provided To:   Intervention Detail: New Rx: 2, reason: Patient Preference  Intervention Accepted By:   Gap Closed?:    Time Spent (min): 5

## 2024-08-02 ENCOUNTER — OFFICE VISIT (OUTPATIENT)
Age: 74
End: 2024-08-02
Payer: MEDICARE

## 2024-08-02 VITALS
TEMPERATURE: 97.1 F | OXYGEN SATURATION: 97 % | RESPIRATION RATE: 16 BRPM | HEART RATE: 66 BPM | HEIGHT: 62 IN | WEIGHT: 153.8 LBS | DIASTOLIC BLOOD PRESSURE: 56 MMHG | BODY MASS INDEX: 28.3 KG/M2 | SYSTOLIC BLOOD PRESSURE: 137 MMHG

## 2024-08-02 DIAGNOSIS — I20.1 VASOSPASTIC ANGINA (HCC): Primary | ICD-10-CM

## 2024-08-02 DIAGNOSIS — I10 ESSENTIAL (PRIMARY) HYPERTENSION: ICD-10-CM

## 2024-08-02 PROCEDURE — 99213 OFFICE O/P EST LOW 20 MIN: CPT | Performed by: FAMILY MEDICINE

## 2024-08-02 PROCEDURE — 1123F ACP DISCUSS/DSCN MKR DOCD: CPT | Performed by: FAMILY MEDICINE

## 2024-08-02 PROCEDURE — 3078F DIAST BP <80 MM HG: CPT | Performed by: FAMILY MEDICINE

## 2024-08-02 PROCEDURE — 3075F SYST BP GE 130 - 139MM HG: CPT | Performed by: FAMILY MEDICINE

## 2024-08-02 RX ORDER — PHENOL 1.4 %
1 AEROSOL, SPRAY (ML) MUCOUS MEMBRANE DAILY
COMMUNITY

## 2024-08-02 RX ORDER — ISOSORBIDE MONONITRATE 30 MG/1
30 TABLET, EXTENDED RELEASE ORAL DAILY
COMMUNITY

## 2024-08-02 NOTE — PROGRESS NOTES
Alma Bautista (:  1950) is a 73 y.o. female,Established patient, here for evaluation of the following chief complaint(s):  Hypertension, Diabetes, and Follow-up Chronic Condition      Assessment & Plan   1. Vasospastic angina (HCC)  2. Essential (primary) hypertension    Continue current meds. I called Dr. Montgomery-s office- they will fax me cath report and records. Recheck pt in 3 months.   Return in about 3 months (around 2024).       Subjective   HPI In for followup. Still gets some mild dyspnea and chest pains if walks for 30 minutes. Saw Dr. Reveles, has had heart cath at [facility]. There were no blocked arteries , but was told that was having some heart spasms. Was changed from amlodipine to verapamil. . Was also told to use the nitroglycerin prn. Is walking 30 minutes a day. Has 2 chihuahuas.     Review of Systems       Objective   Physical Exam  Cardiovascular:      Rate and Rhythm: Normal rate and regular rhythm.      Heart sounds: Normal heart sounds. No murmur heard.  Pulmonary:      Effort: Pulmonary effort is normal.      Breath sounds: Normal breath sounds.   Abdominal:      General: Abdomen is flat. Bowel sounds are normal.      Palpations: Abdomen is soft.   Musculoskeletal:      Right lower leg: No edema.      Left lower leg: No edema.                An electronic signature was used to authenticate this note.    --Myron Rivera MD    normal gait and station, no deformities noted

## 2024-08-02 NOTE — PROGRESS NOTES
Chief Complaint   Patient presents with    Hypertension    Diabetes    Follow-up Chronic Condition       \"Have you been to the ER, urgent care clinic since your last visit?  Hospitalized since your last visit?\"    24 - [facility]    “Have you seen or consulted any other health care providers outside of [facility] since your last visit?”    7/3/24 - 7/3/24 - ORTHO-DR. EUGENE and Dr. Gleason  and also , Ramila Page NP - [facility] CARDIOLOGY            Click Here for Release of Records Request       Vitals:    24 0943   BP: (!) 137/56   Pulse: 66   Resp: 16   Temp: 97.1 °F (36.2 °C)   SpO2: 97%      Health Maintenance Due   Topic Date Due    Hepatitis C screen  Never done    Lung Cancer Screening &/or Counseling  Never done    Diabetic foot exam  11/15/2018    Diabetic retinal exam  10/03/2023    Flu vaccine (1) 2024    A1C test (Diabetic or Prediabetic)  2024        The patient, Alma Bautista, identity was verified by name and .

## 2024-08-05 ENCOUNTER — OFFICE VISIT (OUTPATIENT)
Age: 74
End: 2024-08-05

## 2024-08-05 ENCOUNTER — TELEMEDICINE (OUTPATIENT)
Age: 74
End: 2024-08-05
Payer: MEDICARE

## 2024-08-05 VITALS
WEIGHT: 154.6 LBS | RESPIRATION RATE: 18 BRPM | BODY MASS INDEX: 29.19 KG/M2 | DIASTOLIC BLOOD PRESSURE: 78 MMHG | TEMPERATURE: 98.1 F | HEART RATE: 65 BPM | SYSTOLIC BLOOD PRESSURE: 146 MMHG | OXYGEN SATURATION: 98 % | HEIGHT: 61 IN

## 2024-08-05 DIAGNOSIS — M25.512 ACUTE PAIN OF LEFT SHOULDER: Primary | ICD-10-CM

## 2024-08-05 DIAGNOSIS — G25.0 ESSENTIAL TREMOR: ICD-10-CM

## 2024-08-05 PROCEDURE — 99214 OFFICE O/P EST MOD 30 MIN: CPT | Performed by: PSYCHIATRY & NEUROLOGY

## 2024-08-05 PROCEDURE — 1123F ACP DISCUSS/DSCN MKR DOCD: CPT | Performed by: PSYCHIATRY & NEUROLOGY

## 2024-08-05 RX ORDER — PRIMIDONE 50 MG/1
TABLET ORAL
Qty: 180 TABLET | Refills: 5 | Status: SHIPPED | OUTPATIENT
Start: 2024-08-05

## 2024-08-05 ASSESSMENT — PATIENT HEALTH QUESTIONNAIRE - PHQ9
1. LITTLE INTEREST OR PLEASURE IN DOING THINGS: NOT AT ALL
SUM OF ALL RESPONSES TO PHQ9 QUESTIONS 1 & 2: 0
2. FEELING DOWN, DEPRESSED OR HOPELESS: NOT AT ALL
SUM OF ALL RESPONSES TO PHQ QUESTIONS 1-9: 0

## 2024-08-05 NOTE — PATIENT INSTRUCTIONS
If symptoms worsens or fail to improve follow-up with PCP or ER.    Thank you for visiting [facility] today.    -Treatment generally involves rest, altering your activities, and physical therapy to help you improve shoulder strength and flexibility.  Avoid overexertion or overdoing activities in which you normally do not participate can help to prevent shoulder pain.  -ITylenol as needed for pain  -Heat  -Muscle relaxers if prescribed    Follow up with PCP or orthopedist if symptoms persist or worsen

## 2024-08-05 NOTE — ASSESSMENT & PLAN NOTE
Patient is still having some breakthrough tremor most consistent with stress or fatigue but does interfere with her ability to perform her duties at work or as a  at times.  She is having no side effects from the primidone.  We will liberalize the primidone 50 mg to take as directed up to 1 or 2 tablets 3 times a day.  I would like her to stay on the baseline of 1 tablet 3 times a day but can take additional if needed.    Patient is to continue with her behavioral interventions at this time we talked about this previously and was discussed on previous AVS  We did review these again at today's office visit

## 2024-08-05 NOTE — PROGRESS NOTES
[facility]  [facility]  [address]  [place] [address]  Phone: [phone] fax: [phone]      Alma Bautista, was evaluated through a synchronous (real-time) audio-video encounter. The patient (or guardian if applicable) is aware that this is a billable service, which includes applicable co-pays. This Virtual Visit was conducted with patient's (and/or legal guardian's) consent. Patient identification was verified, and a caregiver was present when appropriate.   The patient was located at Other: Patient is at work in the [employer]  Provider was located at Facility (Appt Dept): [address]  [place] [address]  Confirm you are appropriately licensed, registered, or certified to deliver care in the state where the patient is located as indicated above. If you are not or unsure, please re-schedule the visit: Yes, I confirm.     Alma Bautista (:  1950) is a Established patient, presenting virtually for evaluation of the following:   Chief Complaint   Patient presents with    Follow-up     Follow up on essential tremor states that it is about the same.  Sometimes she cannot tell when it will be aggrevated.          Assessment & Plan     Below is the assessment and plan developed based on review of pertinent history, physical exam, labs, studies, and medications.  1. Essential tremor  Assessment & Plan:  Patient is still having some breakthrough tremor most consistent with stress or fatigue but does interfere with her ability to perform her duties at work or as a  at times.  She is having no side effects from the primidone.  We will liberalize the primidone 50 mg to take as directed up to 1 or 2 tablets 3 times a day.  I would like her to stay on the baseline of 1 tablet 3 times a day but can take additional if needed.    Patient is to continue with her behavioral interventions at this time we talked

## 2024-08-05 NOTE — PATIENT INSTRUCTIONS
As a reminder:   Please come to your appointment 15 minutes before your office appointment.  This way, you can get checked in at the  and checked in by the nursing staff so you have the full allotment of time with your provider for your visit.  Please bring an up-to-date and accurate list of all your medications.  Or bring all your active prescription bottles with you at the time of your office visit and this includes over-the-counter medications so we can make sure that your medication list is up-to-date.  If you are scheduled for a virtual visit, please be aware that the  will need to check you in and usually the day before to verify insurance and collect co-pays as appropriate.  Please be prepared for the second call which will be from the nurse to go over your medications and any other vital information.  This will probably be done 30 minutes prior to your visit.  The reason why we do this early is that you can get the full benefit of your appointment time with your provider.  Finally you will be given the link for your virtual visit please click into your link 10 minutes prior to your appointment and please wait patiently for the provider to join you        As per discussion  I have liberalized the primidone also known as Mysoline to 50 mg 1 or 2 tablets up to 3 times a day stay at the 1 3 times a day routinely but you can increase it should you find that you are having a great deal of breakthrough tremor or you need to give a presentation or there are some other issue but you do not have to take the increased dose routinely.  However if you find taking the increased dose routinely is more beneficial and you are not having side effects you can certainly do so    I have sent a new prescription with the increased dose to your local pharmacy    And as we discussed essential tremor is not the precursor to Parkinson's.  You can still get Parkinson's but it is not because of the essential

## 2024-08-05 NOTE — PROGRESS NOTES
Alma Bautista (:  1950) is a 73 y.o. female,New patient, here for evaluation of the following chief complaint(s):  Other (Left shoulder, no trauma, lost some ROM, 4 weeks of pain, just woke up one day and it was hurting. )    Assessment & Plan :  Visit Diagnoses and Associated Orders       Acute pain of left shoulder    -  Primary    XR SHOULDER LEFT (MIN 2 VIEWS) [11013 CPT(R)]   - Future Order                 Below is the assessment and plan developed based on review of history, physical exam, and images.    1. Acute pain of left shoulder  Patient appears well, VSS, afebrile, SPO2 98% on room air. No distress noted. Radiologist noted no fracture or acute shoulder findings on x-ray.  However, if symptoms persist, follow up with an orthopedist for further evaluation and possible further specialized imaging might be necessary.  In the meantime, patient encouraged to follow RICE therapy for management.  Sling placed for additional comfort and support. Patient referred to Orthopedist for further evaluation.     Treatment generally involves rest, altering your activities, and physical therapy to help you improve shoulder strength and flexibility.  Avoid overexertion or overdoing activities in which you normally do not participate can help to prevent shoulder pain. Advised if symptoms fail to improve or worsens to follow-up with PCP or ER. Patient verbalized understanding, no questions or concerns at this time.    - XR SHOULDER LEFT (MIN 2 VIEWS); Future  - [employer] Arm Sling, Left; L; Arm Sling, Left     1. Handout given with care instructions.     2. OTC for symptom management. Increase fluid intake.     3. Follow-up with PCP as needed.     4. Go to ED with development of any acute symptoms.         Follow-up    Follow-up with PCP or ER immediately for any new worsening or changes or if symptoms are not improving over the next 5-7 days.      Subjective :  HPI    73 y.o. female

## 2024-08-06 ENCOUNTER — TELEPHONE (OUTPATIENT)
Age: 74
End: 2024-08-06

## 2024-08-06 ENCOUNTER — PATIENT MESSAGE (OUTPATIENT)
Age: 74
End: 2024-08-06

## 2024-08-06 NOTE — TELEPHONE ENCOUNTER
Reached out to the patient to inform her that her urgent care follow up appt for her shoulder pain for 1:00 pm today had been assigned to GERONIMO Diaz however the patient she needed to cancel the appt and would call back if she needed to reschedule.

## 2024-08-07 ENCOUNTER — PHARMACY VISIT (OUTPATIENT)
Age: 74
End: 2024-08-07

## 2024-08-07 DIAGNOSIS — E11.21 TYPE 2 DIABETES WITH NEPHROPATHY (HCC): Primary | ICD-10-CM

## 2024-08-07 NOTE — PROGRESS NOTES
Pharmacy Progress Notes - Sample Medication     Patient provided with sample medication of: Jardiance 25 mg. Patient has [facility] Medicare insurance and will be using samples yo help offset costs while she is in the Medicare coverage gap.    Confirmed medication, dosage and instructions with patient and/or nurse. Patient was provided with 8 box(es) of medication for a 56 day supply. Sample was entered in Epic, logged, and signed out per policy.     Orders Placed This Encounter    empagliflozin (JARDIANCE) 25 MG tablet     Sig: Take 1 tablet by mouth daily     Dispense:  56 tablet     Refill:  0     Order Specific Question:   Expiration Date     Answer:   6/30/2026     Order Specific Question:   Lot#     Answer:   [id number]     Order Specific Question:        Answer:   BI/Vickie     Order Specific Question:   NDC#     Answer:   [id number]         Thank you,  Aiden Farr, PHARMD, BCPS  Clinical Pharmacist   [facility]       For Pharmacy Admin Tracking Only    Program: Medical Group  CPA in place:  Yes  Recommendation Provided To: Patient/Caregiver: 1 via In person  Intervention Detail: Patient Access Assistance/Sample Provided  Intervention Accepted By: Patient/Caregiver: 1  Gap Closed?: No   Time Spent (min): 15

## 2024-08-07 NOTE — TELEPHONE ENCOUNTER
Spoke with patient and she is over the income limit for patient assistance (Jardiance and Farxiga - $68k/yr). Will provide patient with samples to help offset costs until she is out of the coverage gap.     Thank you,  Aiden Farr, PHARMD, [facility]  Clinical Pharmacist   [facility]       For Pharmacy Admin Tracking Only    Program: Medical Group  CPA in place:  Yes  Recommendation Provided To: Patient/Caregiver: 1 via Telephone  Intervention Detail: Scheduled Appointment  Intervention Accepted By: Patient/Caregiver: 1  Gap Closed?: No   Time Spent (min): 10

## 2024-08-26 DIAGNOSIS — R07.2 PRECORDIAL PAIN: ICD-10-CM

## 2024-08-26 RX ORDER — METOPROLOL SUCCINATE 25 MG/1
TABLET, EXTENDED RELEASE ORAL
Qty: 180 TABLET | Refills: 1 | Status: SHIPPED | OUTPATIENT
Start: 2024-08-26

## 2024-08-28 ENCOUNTER — TELEPHONE (OUTPATIENT)
Age: 74
End: 2024-08-28

## 2024-08-28 NOTE — TELEPHONE ENCOUNTER
[phone] - Patient is requesting pre[facility] be completed and faxed back before scheduled procedure on 9/6. Form placed in providers box

## 2024-08-28 NOTE — TELEPHONE ENCOUNTER
Form placed on Dr. Nicole cheek for review.  Patient last seen on 8/2/24 for follow up on hypertension and vasospastic angina .  Was seen by Urgent Care on 8/5/24 for shoulder pain.  Seen by ortho- Dr. Reid on 8/5/24.

## 2024-08-29 ENCOUNTER — CLINICAL DOCUMENTATION (OUTPATIENT)
Age: 74
End: 2024-08-29

## 2024-08-29 NOTE — PROGRESS NOTES
Received the medical clearance from [facility].  As she has recently been seen by cardio due to heart issues, patient will have to be cleared by cardio, before Dr. Rivera can clear her for procedure.  Called Dr. Schmitt's office and spoke with GUEVARA Lira; made her aware of this.  She will call patient to have cardiologist clear her for procedure.

## 2024-09-04 ENCOUNTER — TELEPHONE (OUTPATIENT)
Age: 74
End: 2024-09-04

## 2024-09-04 NOTE — TELEPHONE ENCOUNTER
Pls call Renetta saravia/Andrew Thomas   RE: pre op form       Best number to reach her is [phone]  Fax [phone]

## 2024-09-05 ENCOUNTER — TELEPHONE (OUTPATIENT)
Age: 74
End: 2024-09-05

## 2024-09-05 ENCOUNTER — CLINICAL DOCUMENTATION (OUTPATIENT)
Age: 74
End: 2024-09-05

## 2024-09-05 NOTE — TELEPHONE ENCOUNTER
Natali rodriguez needed asap for sary pritchard  [phone]      Fax the form to [phone]    Noon today is the time frame     Renetta curry office calling

## 2024-09-05 NOTE — PROGRESS NOTES
Mrs. Bautista came into office with cardiology clearance for procedure by ortho tomorrow.  Dr. Rivera reviewed and cleared the rest for procedure.  Both forms, along with last office notes, were faxed to number provided by ortho.  Originals given to patient.

## 2024-09-16 ENCOUNTER — HOSPITAL ENCOUNTER (EMERGENCY)
Facility: HOSPITAL | Age: 74
Discharge: HOME | End: 2024-09-16
Attending: EMERGENCY MEDICINE
Payer: MEDICARE

## 2024-09-16 VITALS
HEART RATE: 65 BPM | RESPIRATION RATE: 16 BRPM | BODY MASS INDEX: 28.3 KG/M2 | OXYGEN SATURATION: 97 % | WEIGHT: 149.91 LBS | DIASTOLIC BLOOD PRESSURE: 60 MMHG | HEIGHT: 61 IN | TEMPERATURE: 98.3 F | SYSTOLIC BLOOD PRESSURE: 125 MMHG

## 2024-09-16 DIAGNOSIS — M79.10 MYALGIA: Primary | ICD-10-CM

## 2024-09-16 LAB
ALBUMIN SERPL-MCNC: 3.9 G/DL (ref 3.5–5)
ALBUMIN/GLOB SERPL: 0.9 (ref 1.1–2.2)
ALP SERPL-CCNC: 122 U/L (ref 45–117)
ALT SERPL-CCNC: 32 U/L (ref 12–78)
ANION GAP SERPL CALC-SCNC: 15 MMOL/L (ref 2–12)
AST SERPL-CCNC: 24 U/L (ref 15–37)
BASOPHILS # BLD: 0 K/UL (ref 0–0.1)
BASOPHILS NFR BLD: 1 % (ref 0–1)
BILIRUB SERPL-MCNC: 0.2 MG/DL (ref 0.2–1)
BUN SERPL-MCNC: 17 MG/DL (ref 6–20)
BUN/CREAT SERPL: 13 (ref 12–20)
CALCIUM SERPL-MCNC: 9.4 MG/DL (ref 8.5–10.1)
CHLORIDE SERPL-SCNC: 101 MMOL/L (ref 97–108)
CO2 SERPL-SCNC: 24 MMOL/L (ref 21–32)
CREAT SERPL-MCNC: 1.26 MG/DL (ref 0.55–1.02)
DIFFERENTIAL METHOD BLD: ABNORMAL
EOSINOPHIL # BLD: 0.1 K/UL (ref 0–0.4)
EOSINOPHIL NFR BLD: 1 % (ref 0–7)
ERYTHROCYTE [DISTWIDTH] IN BLOOD BY AUTOMATED COUNT: 15.4 % (ref 11.5–14.5)
GLOBULIN SER CALC-MCNC: 4.4 G/DL (ref 2–4)
GLUCOSE SERPL-MCNC: 116 MG/DL (ref 65–100)
HCT VFR BLD AUTO: 42.5 % (ref 35–47)
HGB BLD-MCNC: 13.9 G/DL (ref 11.5–16)
IMM GRANULOCYTES # BLD AUTO: 0 K/UL (ref 0–0.04)
IMM GRANULOCYTES NFR BLD AUTO: 0 % (ref 0–0.5)
LYMPHOCYTES # BLD: 1.9 K/UL (ref 0.8–3.5)
LYMPHOCYTES NFR BLD: 27 % (ref 12–49)
MAGNESIUM SERPL-MCNC: 1.8 MG/DL (ref 1.6–2.4)
MCH RBC QN AUTO: 29.2 PG (ref 26–34)
MCHC RBC AUTO-ENTMCNC: 32.7 G/DL (ref 30–36.5)
MCV RBC AUTO: 89.3 FL (ref 80–99)
MONOCYTES # BLD: 0.6 K/UL (ref 0–1)
MONOCYTES NFR BLD: 8 % (ref 5–13)
NEUTS SEG # BLD: 4.4 K/UL (ref 1.8–8)
NEUTS SEG NFR BLD: 63 % (ref 32–75)
NRBC # BLD: 0 K/UL (ref 0–0.01)
NRBC BLD-RTO: 0 PER 100 WBC
PLATELET # BLD AUTO: 217 K/UL (ref 150–400)
PMV BLD AUTO: 11.9 FL (ref 8.9–12.9)
POTASSIUM SERPL-SCNC: 3.7 MMOL/L (ref 3.5–5.1)
PROT SERPL-MCNC: 8.3 G/DL (ref 6.4–8.2)
RBC # BLD AUTO: 4.76 M/UL (ref 3.8–5.2)
SODIUM SERPL-SCNC: 140 MMOL/L (ref 136–145)
WBC # BLD AUTO: 7.1 K/UL (ref 3.6–11)

## 2024-09-16 PROCEDURE — 80053 COMPREHEN METABOLIC PANEL: CPT

## 2024-09-16 PROCEDURE — 83735 ASSAY OF MAGNESIUM: CPT

## 2024-09-16 PROCEDURE — 85025 COMPLETE CBC W/AUTO DIFF WBC: CPT

## 2024-09-16 PROCEDURE — 36415 COLL VENOUS BLD VENIPUNCTURE: CPT

## 2024-09-16 PROCEDURE — 99283 EMERGENCY DEPT VISIT LOW MDM: CPT

## 2024-09-16 RX ORDER — FUROSEMIDE 20 MG
20 TABLET ORAL DAILY
COMMUNITY
Start: 2024-08-26

## 2024-09-16 RX ORDER — FLUCONAZOLE 100 MG/1
TABLET ORAL
COMMUNITY
Start: 2024-09-13

## 2024-09-16 ASSESSMENT — ENCOUNTER SYMPTOMS
SHORTNESS OF BREATH: 0
VOMITING: 0
BACK PAIN: 0
CONSTIPATION: 0
NAUSEA: 0
DIARRHEA: 0
COLOR CHANGE: 0
ABDOMINAL PAIN: 0

## 2024-09-25 ENCOUNTER — HOSPITAL ENCOUNTER (EMERGENCY)
Facility: HOSPITAL | Age: 74
Discharge: HOME | End: 2024-09-26
Attending: STUDENT IN AN ORGANIZED HEALTH CARE EDUCATION/TRAINING PROGRAM
Payer: MEDICARE

## 2024-09-25 DIAGNOSIS — R25.2 MUSCLE CRAMPING: Primary | ICD-10-CM

## 2024-09-25 LAB
ALBUMIN SERPL-MCNC: 3.8 G/DL (ref 3.5–5)
ALBUMIN/GLOB SERPL: 0.8 (ref 1.1–2.2)
ALP SERPL-CCNC: 120 U/L (ref 45–117)
ALT SERPL-CCNC: 32 U/L (ref 12–78)
ANION GAP SERPL CALC-SCNC: 9 MMOL/L (ref 2–12)
AST SERPL-CCNC: 29 U/L (ref 15–37)
BASOPHILS # BLD: 0 K/UL (ref 0–0.1)
BASOPHILS NFR BLD: 1 % (ref 0–1)
BILIRUB SERPL-MCNC: 0.2 MG/DL (ref 0.2–1)
BUN SERPL-MCNC: 21 MG/DL (ref 6–20)
BUN/CREAT SERPL: 17 (ref 12–20)
CALCIUM SERPL-MCNC: 9.5 MG/DL (ref 8.5–10.1)
CHLORIDE SERPL-SCNC: 105 MMOL/L (ref 97–108)
CK SERPL-CCNC: 124 U/L (ref 26–192)
CO2 SERPL-SCNC: 23 MMOL/L (ref 21–32)
COMMENT:: NORMAL
COMMENT:: NORMAL
CREAT SERPL-MCNC: 1.25 MG/DL (ref 0.55–1.02)
DIFFERENTIAL METHOD BLD: ABNORMAL
EOSINOPHIL # BLD: 0 K/UL (ref 0–0.4)
EOSINOPHIL NFR BLD: 1 % (ref 0–7)
ERYTHROCYTE [DISTWIDTH] IN BLOOD BY AUTOMATED COUNT: 15.2 % (ref 11.5–14.5)
GLOBULIN SER CALC-MCNC: 4.7 G/DL (ref 2–4)
GLUCOSE SERPL-MCNC: 128 MG/DL (ref 65–100)
HCT VFR BLD AUTO: 42.9 % (ref 35–47)
HGB BLD-MCNC: 13.9 G/DL (ref 11.5–16)
IMM GRANULOCYTES # BLD AUTO: 0 K/UL (ref 0–0.04)
IMM GRANULOCYTES NFR BLD AUTO: 0 % (ref 0–0.5)
LYMPHOCYTES # BLD: 1.4 K/UL (ref 0.8–3.5)
LYMPHOCYTES NFR BLD: 23 % (ref 12–49)
MAGNESIUM SERPL-MCNC: 1.7 MG/DL (ref 1.6–2.4)
MCH RBC QN AUTO: 28.8 PG (ref 26–34)
MCHC RBC AUTO-ENTMCNC: 32.4 G/DL (ref 30–36.5)
MCV RBC AUTO: 89 FL (ref 80–99)
MONOCYTES # BLD: 0.4 K/UL (ref 0–1)
MONOCYTES NFR BLD: 7 % (ref 5–13)
NEUTS SEG # BLD: 4 K/UL (ref 1.8–8)
NEUTS SEG NFR BLD: 68 % (ref 32–75)
NRBC # BLD: 0 K/UL (ref 0–0.01)
NRBC BLD-RTO: 0 PER 100 WBC
PLATELET # BLD AUTO: 194 K/UL (ref 150–400)
PMV BLD AUTO: 11.9 FL (ref 8.9–12.9)
POTASSIUM SERPL-SCNC: 3.7 MMOL/L (ref 3.5–5.1)
PROT SERPL-MCNC: 8.5 G/DL (ref 6.4–8.2)
RBC # BLD AUTO: 4.82 M/UL (ref 3.8–5.2)
SODIUM SERPL-SCNC: 137 MMOL/L (ref 136–145)
SPECIMEN HOLD: NORMAL
SPECIMEN HOLD: NORMAL
WBC # BLD AUTO: 5.9 K/UL (ref 3.6–11)

## 2024-09-25 PROCEDURE — 83735 ASSAY OF MAGNESIUM: CPT

## 2024-09-25 PROCEDURE — 82550 ASSAY OF CK (CPK): CPT

## 2024-09-25 PROCEDURE — 80053 COMPREHEN METABOLIC PANEL: CPT

## 2024-09-25 PROCEDURE — 36415 COLL VENOUS BLD VENIPUNCTURE: CPT

## 2024-09-25 PROCEDURE — 85025 COMPLETE CBC W/AUTO DIFF WBC: CPT

## 2024-09-25 PROCEDURE — 82330 ASSAY OF CALCIUM: CPT

## 2024-09-25 PROCEDURE — 99283 EMERGENCY DEPT VISIT LOW MDM: CPT

## 2024-09-25 ASSESSMENT — PAIN SCALES - GENERAL: PAINLEVEL_OUTOF10: 4

## 2024-09-25 ASSESSMENT — PAIN DESCRIPTION - DESCRIPTORS: DESCRIPTORS: CRAMPING

## 2024-09-25 ASSESSMENT — PAIN DESCRIPTION - LOCATION: LOCATION: HAND;FOOT

## 2024-09-25 ASSESSMENT — PAIN DESCRIPTION - ORIENTATION: ORIENTATION: RIGHT;LEFT

## 2024-09-25 ASSESSMENT — PAIN - FUNCTIONAL ASSESSMENT: PAIN_FUNCTIONAL_ASSESSMENT: 0-10

## 2024-09-26 ENCOUNTER — TELEPHONE (OUTPATIENT)
Age: 74
End: 2024-09-26

## 2024-09-26 VITALS
RESPIRATION RATE: 13 BRPM | BODY MASS INDEX: 28.05 KG/M2 | SYSTOLIC BLOOD PRESSURE: 166 MMHG | HEIGHT: 61 IN | DIASTOLIC BLOOD PRESSURE: 76 MMHG | TEMPERATURE: 97.8 F | WEIGHT: 148.59 LBS | HEART RATE: 55 BPM | OXYGEN SATURATION: 100 %

## 2024-09-26 PROCEDURE — 6370000000 HC RX 637 (ALT 250 FOR IP): Performed by: EMERGENCY MEDICINE

## 2024-09-26 RX ORDER — CYCLOBENZAPRINE HCL 10 MG
10 TABLET ORAL 3 TIMES DAILY PRN
Qty: 21 TABLET | Refills: 0 | Status: SHIPPED | OUTPATIENT
Start: 2024-09-26 | End: 2024-10-06

## 2024-09-26 RX ORDER — CYCLOBENZAPRINE HCL 10 MG
10 TABLET ORAL ONCE
Status: COMPLETED | OUTPATIENT
Start: 2024-09-26 | End: 2024-09-26

## 2024-09-26 RX ADMIN — CYCLOBENZAPRINE 10 MG: 10 TABLET, FILM COATED ORAL at 01:51

## 2024-09-26 ASSESSMENT — PAIN - FUNCTIONAL ASSESSMENT: PAIN_FUNCTIONAL_ASSESSMENT: 0-10

## 2024-09-26 ASSESSMENT — PAIN SCALES - GENERAL: PAINLEVEL_OUTOF10: 1

## 2024-09-27 LAB — CA-I SERPL ISE-MCNC: 4.9 MG/DL (ref 4.5–5.6)

## 2024-10-24 ENCOUNTER — HOSPITAL ENCOUNTER (OUTPATIENT)
Facility: HOSPITAL | Age: 74
Discharge: HOME | End: 2024-10-27
Payer: MEDICARE

## 2024-10-24 DIAGNOSIS — E11.9 DIABETES MELLITUS WITHOUT COMPLICATION (HCC): ICD-10-CM

## 2024-10-24 DIAGNOSIS — R15.9 FULL INCONTINENCE OF FECES: ICD-10-CM

## 2024-10-24 DIAGNOSIS — K59.02 DYSSYNERGIC DEFECATION: ICD-10-CM

## 2024-10-24 DIAGNOSIS — R19.7 DIARRHEA, UNSPECIFIED TYPE: ICD-10-CM

## 2024-10-24 DIAGNOSIS — K31.84 GASTROPARESIS: ICD-10-CM

## 2024-10-24 PROCEDURE — 72195 MRI PELVIS W/O DYE: CPT

## 2024-11-05 DIAGNOSIS — E78.00 PURE HYPERCHOLESTEROLEMIA, UNSPECIFIED: ICD-10-CM

## 2024-11-05 RX ORDER — ATORVASTATIN CALCIUM 10 MG/1
10 TABLET, FILM COATED ORAL DAILY
Qty: 100 TABLET | Refills: 3 | Status: SHIPPED | OUTPATIENT
Start: 2024-11-05

## 2024-11-05 NOTE — TELEPHONE ENCOUNTER
40-76yo   []ASCVD >7.5%   []ASCVD  []LDL>70mg/dL 74 y.o.  The 10-year ASCVD risk score (Addy MAYER, et al., 2019) is: 32.8%   Lab Results   Component Value Date    LDL 36.2 2024        Diabetes Medication dispensed: Insulin, Jardiance    Status in portal:  Y    Per chart review:   Chart Review: Statin is marked as \"Taking\" and Last office visit and active on patient medication list.    Allergies   Allergen Reactions    Latex Itching and Rash    Amoxicillin Anaphylaxis     Itching and swelling in mouth    Lisinopril Swelling    Sulfa Antibiotics Itching and Rash     About 10 yo    Keflex=unsure    Atorvastatin Other (See Comments)     20 mg causes muscle cramps    Meloxicam Nausea And Vomiting    Nsaids Anxiety and Nausea Only     GI Distress       Adherence:  [x]Statin on medication list:YES- ATORVASTATIN 10 mg   [x]Has refills remaining   [x]\"TAKING\" at LOV 2024  [x]Needs refills - will  2024   []Patient no longer taking/MAHESH's   [][facility]     LIPID EVALUATION AND GUIDELINE ASSESSMENT  Lab Results   Component Value Date/Time    CHOL 138 2024 02:44 PM    TRIG 179 2024 02:44 PM    HDL 66 2024 02:44 PM    LDL 36.2 2024 02:44 PM    LDL 52.6 2024 11:09 AM    VLDL 35.8 2024 02:44 PM    VLDL 17 12/15/2022 12:00 AM    ALT 32 2024 02:54 PM    AST 29 2024 02:54 PM     The 10-year ASCVD risk score (Addy MAYER, et al., 2019) is: 32.8%    Values used to calculate the score:      Age: 74 years      Sex: Female      Is Non- : Yes      Diabetic: Yes      Tobacco smoker: No      Systolic Blood Pressure: 166 mmHg      Is BP treated: Yes      HDL Cholesterol: 66 MG/DL      Total Cholesterol: 138 MG/DL     ADA /  ACC Guidelines indicate the patient is a candidate for a moderate-intensity statin.    2018 ACC/AHA/  ADA Guidelines:  >/= 40-74 yo ; Patient has:Adults 40 to 75 years of age With Diabetes (without

## 2024-11-06 ENCOUNTER — OFFICE VISIT (OUTPATIENT)
Age: 74
End: 2024-11-06
Payer: MEDICARE

## 2024-11-06 VITALS
HEART RATE: 63 BPM | RESPIRATION RATE: 16 BRPM | TEMPERATURE: 96.9 F | BODY MASS INDEX: 28.13 KG/M2 | WEIGHT: 149 LBS | DIASTOLIC BLOOD PRESSURE: 63 MMHG | SYSTOLIC BLOOD PRESSURE: 124 MMHG | HEIGHT: 61 IN | OXYGEN SATURATION: 99 %

## 2024-11-06 DIAGNOSIS — E11.21 TYPE 2 DIABETES WITH NEPHROPATHY (HCC): Primary | ICD-10-CM

## 2024-11-06 DIAGNOSIS — Z79.4 TYPE 2 DIABETES MELLITUS WITHOUT COMPLICATION, WITH LONG-TERM CURRENT USE OF INSULIN (HCC): ICD-10-CM

## 2024-11-06 DIAGNOSIS — E11.9 TYPE 2 DIABETES MELLITUS WITHOUT COMPLICATION, WITH LONG-TERM CURRENT USE OF INSULIN (HCC): ICD-10-CM

## 2024-11-06 DIAGNOSIS — N18.31 CHRONIC KIDNEY DISEASE, STAGE 3A (HCC): ICD-10-CM

## 2024-11-06 DIAGNOSIS — E78.00 HYPERCHOLESTEROLEMIA: ICD-10-CM

## 2024-11-06 LAB
BASOPHILS # BLD: 0.1 K/UL (ref 0–0.1)
BASOPHILS NFR BLD: 1 % (ref 0–1)
CHOLEST SERPL-MCNC: 172 MG/DL
COMMENT:: NORMAL
DIFFERENTIAL METHOD BLD: ABNORMAL
EOSINOPHIL # BLD: 0.1 K/UL (ref 0–0.4)
EOSINOPHIL NFR BLD: 2 % (ref 0–7)
ERYTHROCYTE [DISTWIDTH] IN BLOOD BY AUTOMATED COUNT: 15.7 % (ref 11.5–14.5)
EST. AVERAGE GLUCOSE BLD GHB EST-MCNC: 154 MG/DL
HBA1C MFR BLD: 7 % (ref 4–5.6)
HCT VFR BLD AUTO: 42.3 % (ref 35–47)
HDLC SERPL-MCNC: 87 MG/DL
HDLC SERPL: 2 (ref 0–5)
HGB BLD-MCNC: 13.2 G/DL (ref 11.5–16)
IMM GRANULOCYTES # BLD AUTO: 0 K/UL (ref 0–0.04)
IMM GRANULOCYTES NFR BLD AUTO: 0 % (ref 0–0.5)
LDLC SERPL CALC-MCNC: 66 MG/DL (ref 0–100)
LYMPHOCYTES # BLD: 1.5 K/UL (ref 0.8–3.5)
LYMPHOCYTES NFR BLD: 27 % (ref 12–49)
MCH RBC QN AUTO: 29.3 PG (ref 26–34)
MCHC RBC AUTO-ENTMCNC: 31.2 G/DL (ref 30–36.5)
MCV RBC AUTO: 93.8 FL (ref 80–99)
MONOCYTES # BLD: 0.4 K/UL (ref 0–1)
MONOCYTES NFR BLD: 8 % (ref 5–13)
NEUTS SEG # BLD: 3.5 K/UL (ref 1.8–8)
NEUTS SEG NFR BLD: 62 % (ref 32–75)
NRBC # BLD: 0 K/UL (ref 0–0.01)
NRBC BLD-RTO: 0 PER 100 WBC
PLATELET # BLD AUTO: 176 K/UL (ref 150–400)
PMV BLD AUTO: 12.8 FL (ref 8.9–12.9)
RBC # BLD AUTO: 4.51 M/UL (ref 3.8–5.2)
SPECIMEN HOLD: NORMAL
TRIGL SERPL-MCNC: 95 MG/DL
VLDLC SERPL CALC-MCNC: 19 MG/DL
WBC # BLD AUTO: 5.6 K/UL (ref 3.6–11)

## 2024-11-06 PROCEDURE — 99213 OFFICE O/P EST LOW 20 MIN: CPT | Performed by: FAMILY MEDICINE

## 2024-11-06 NOTE — PROGRESS NOTES
No chief complaint on file.  Hand and feet cramping - random maybe with gripping and chest pain- with excertion -     \"Have you been to the ER, urgent care clinic since your last visit?  Hospitalized since your last visit?\"        “Have you seen or consulted any other health care providers outside of [facility] since your last visit?”                Click Here for Release of Records Request       Vitals:    24 1016   BP: 124/63   Pulse: 63   Resp: 16   Temp: 96.9 °F (36.1 °C)   SpO2: 99%      Health Maintenance Due   Topic Date Due    Hepatitis C screen  Never done    Lung Cancer Screening &/or Counseling  Never done    Diabetic foot exam  11/15/2018    Diabetic retinal exam  10/03/2023    Flu vaccine (1) 2024    A1C test (Diabetic or Prediabetic)  2024    COVID-19 Vaccine ( season) 2024    Diabetic Alb to Cr ratio (uACR) test  10/02/2024        The patient, Alma Bautista, identity was verified by name and .
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
## 2024-12-04 ENCOUNTER — PHARMACY VISIT (OUTPATIENT)
Age: 74
End: 2024-12-04

## 2024-12-04 DIAGNOSIS — E11.21 TYPE 2 DIABETES WITH NEPHROPATHY (HCC): Primary | ICD-10-CM

## 2024-12-04 NOTE — PROGRESS NOTES
Pharmacy Progress Notes - Sample Medication     Patient provided with sample medication of: Jardiance 15 mg. Patient has [facility] Medicare insurance and will be using samples in the interim while she is in the coverage gap. Patient is over the income limit for patient assistance for both Jardiance and Farxiga. Discussed that we can reevaluate costs at the beginning of the year when her Medicare benefits restart and there is no longer a coverage gap. She expressed understanding.     Confirmed medication, dosage and instructions with patient and/or nurse. Patient was provided with 5 box(es) of medication for a 35 day supply. Sample was entered in Epic, logged, and signed out per policy.     Orders Placed This Encounter    empagliflozin (JARDIANCE) 25 MG tablet     Sig: Take 1 tablet by mouth daily     Dispense:  35 tablet     Refill:  0     Order Specific Question:   Expiration Date     Answer:   11/30/2026     Order Specific Question:   Lot#     Answer:   [id number]     Order Specific Question:        Answer:   BI     Order Specific Question:   NDC#     Answer:   [id number]         Thank you,  Aiden Farr, PHARMD, BCPS  Clinical Pharmacist   [facility]         For Pharmacy Admin Tracking Only    Program: Medical Group  CPA in place:  Yes  Recommendation Provided To: Patient/Caregiver: 1 via In person  Intervention Detail: Patient Access Assistance/Sample Provided  Intervention Accepted By: Patient/Caregiver: 1  Gap Closed?: No   Time Spent (min): 30

## 2024-12-26 ENCOUNTER — HOSPITAL ENCOUNTER (OUTPATIENT)
Facility: HOSPITAL | Age: 74
Discharge: HOME | End: 2024-12-29
Attending: ORTHOPAEDIC SURGERY
Payer: MEDICARE

## 2024-12-26 DIAGNOSIS — M75.02 ADHESIVE BURSITIS OF LEFT SHOULDER: ICD-10-CM

## 2024-12-26 DIAGNOSIS — G89.29 CHRONIC LEFT SHOULDER PAIN: ICD-10-CM

## 2024-12-26 DIAGNOSIS — M25.512 CHRONIC LEFT SHOULDER PAIN: ICD-10-CM

## 2024-12-26 DIAGNOSIS — S43.402D SPRAIN OF LEFT SHOULDER, SUBSEQUENT ENCOUNTER: ICD-10-CM

## 2024-12-26 PROCEDURE — 73221 MRI JOINT UPR EXTREM W/O DYE: CPT

## 2025-01-02 NOTE — PROGRESS NOTES
Orders Placed This Encounter    empagliflozin (JARDIANCE) 25 MG tablet     Sig: Take 1 tablet by mouth daily     Dispense:  90 tablet     Refill:  1        Thank you,  Aiden Farr, PHARMD, BCPS  Clinical Pharmacist   [facility]         For Pharmacy Admin Tracking Only    Program: Medical Group  CPA in place:  Yes  Recommendation Provided To: Patient/Caregiver: 1 via [email or web address][employer] Message  Intervention Detail: Refill(s) Provided  Intervention Accepted By: Patient/Caregiver: 1  Gap Closed?: No   Time Spent (min): 15

## 2025-01-14 SDOH — ECONOMIC STABILITY: FOOD INSECURITY: WITHIN THE PAST 12 MONTHS, YOU WORRIED THAT YOUR FOOD WOULD RUN OUT BEFORE YOU GOT MONEY TO BUY MORE.: NEVER TRUE

## 2025-01-14 SDOH — ECONOMIC STABILITY: FOOD INSECURITY: WITHIN THE PAST 12 MONTHS, THE FOOD YOU BOUGHT JUST DIDN'T LAST AND YOU DIDN'T HAVE MONEY TO GET MORE.: NEVER TRUE

## 2025-01-14 SDOH — ECONOMIC STABILITY: INCOME INSECURITY: IN THE LAST 12 MONTHS, WAS THERE A TIME WHEN YOU WERE NOT ABLE TO PAY THE MORTGAGE OR RENT ON TIME?: NO

## 2025-01-14 SDOH — ECONOMIC STABILITY: TRANSPORTATION INSECURITY
IN THE PAST 12 MONTHS, HAS THE LACK OF TRANSPORTATION KEPT YOU FROM MEDICAL APPOINTMENTS OR FROM GETTING MEDICATIONS?: NO

## 2025-01-15 ENCOUNTER — OFFICE VISIT (OUTPATIENT)
Age: 75
End: 2025-01-15
Payer: MEDICARE

## 2025-01-15 VITALS
DIASTOLIC BLOOD PRESSURE: 56 MMHG | WEIGHT: 150.4 LBS | OXYGEN SATURATION: 98 % | RESPIRATION RATE: 16 BRPM | HEART RATE: 60 BPM | SYSTOLIC BLOOD PRESSURE: 139 MMHG | BODY MASS INDEX: 28.4 KG/M2 | TEMPERATURE: 96.1 F | HEIGHT: 61 IN

## 2025-01-15 DIAGNOSIS — E11.21 TYPE 2 DIABETES WITH NEPHROPATHY (HCC): Primary | ICD-10-CM

## 2025-01-15 DIAGNOSIS — N18.31 CHRONIC KIDNEY DISEASE, STAGE 3A (HCC): ICD-10-CM

## 2025-01-15 DIAGNOSIS — S33.5XXD LUMBAR SPRAIN, SUBSEQUENT ENCOUNTER: ICD-10-CM

## 2025-01-15 DIAGNOSIS — S13.9XXD NECK SPRAIN, SUBSEQUENT ENCOUNTER: ICD-10-CM

## 2025-01-15 PROBLEM — F33.1 MAJOR DEPRESSIVE DISORDER, RECURRENT, MODERATE (HCC): Status: RESOLVED | Noted: 2021-07-21 | Resolved: 2025-01-15

## 2025-01-15 LAB
GLUCOSE, POC: 121 MG/DL
HBA1C MFR BLD: 6.7 %

## 2025-01-15 PROCEDURE — 1036F TOBACCO NON-USER: CPT | Performed by: FAMILY MEDICINE

## 2025-01-15 PROCEDURE — 82962 GLUCOSE BLOOD TEST: CPT | Performed by: FAMILY MEDICINE

## 2025-01-15 PROCEDURE — 99213 OFFICE O/P EST LOW 20 MIN: CPT | Performed by: FAMILY MEDICINE

## 2025-01-15 PROCEDURE — 3046F HEMOGLOBIN A1C LEVEL >9.0%: CPT | Performed by: FAMILY MEDICINE

## 2025-01-15 PROCEDURE — G8419 CALC BMI OUT NRM PARAM NOF/U: HCPCS | Performed by: FAMILY MEDICINE

## 2025-01-15 PROCEDURE — G8399 PT W/DXA RESULTS DOCUMENT: HCPCS | Performed by: FAMILY MEDICINE

## 2025-01-15 PROCEDURE — 83036 HEMOGLOBIN GLYCOSYLATED A1C: CPT | Performed by: FAMILY MEDICINE

## 2025-01-15 PROCEDURE — 1125F AMNT PAIN NOTED PAIN PRSNT: CPT | Performed by: FAMILY MEDICINE

## 2025-01-15 PROCEDURE — G8427 DOCREV CUR MEDS BY ELIG CLIN: HCPCS | Performed by: FAMILY MEDICINE

## 2025-01-15 PROCEDURE — PBSHW AMB POC GLUCOSE BLOOD, BY GLUCOSE MONITORING DEVICE: Performed by: FAMILY MEDICINE

## 2025-01-15 PROCEDURE — 3075F SYST BP GE 130 - 139MM HG: CPT | Performed by: FAMILY MEDICINE

## 2025-01-15 PROCEDURE — 1090F PRES/ABSN URINE INCON ASSESS: CPT | Performed by: FAMILY MEDICINE

## 2025-01-15 PROCEDURE — 2022F DILAT RTA XM EVC RTNOPTHY: CPT | Performed by: FAMILY MEDICINE

## 2025-01-15 PROCEDURE — 1123F ACP DISCUSS/DSCN MKR DOCD: CPT | Performed by: FAMILY MEDICINE

## 2025-01-15 PROCEDURE — PBSHW AMB POC HEMOGLOBIN A1C: Performed by: FAMILY MEDICINE

## 2025-01-15 PROCEDURE — 3017F COLORECTAL CA SCREEN DOC REV: CPT | Performed by: FAMILY MEDICINE

## 2025-01-15 PROCEDURE — 1159F MED LIST DOCD IN RCRD: CPT | Performed by: FAMILY MEDICINE

## 2025-01-15 PROCEDURE — 3078F DIAST BP <80 MM HG: CPT | Performed by: FAMILY MEDICINE

## 2025-01-15 RX ORDER — METHOCARBAMOL 750 MG/1
750 TABLET, FILM COATED ORAL EVERY 8 HOURS PRN
Qty: 40 TABLET | Refills: 0 | Status: SHIPPED | OUTPATIENT
Start: 2025-01-15 | End: 2025-01-25

## 2025-01-15 RX ORDER — TIZANIDINE HYDROCHLORIDE 4 MG/1
4 CAPSULE, GELATIN COATED ORAL NIGHTLY PRN
COMMUNITY
Start: 2025-01-09

## 2025-01-15 RX ORDER — DEXAMETHASONE 4 MG/1
4 TABLET ORAL DAILY
COMMUNITY
Start: 2025-01-09

## 2025-01-15 NOTE — PROGRESS NOTES
Chief Complaint   Patient presents with    Diabetes    Follow-up       \"Have you been to the ER, urgent care clinic since your last visit?  Hospitalized since your last visit?\"       25 - [facility] ER - [facility]    “Have you seen or consulted any other health care providers outside of [facility] since your last visit?”       25 - DR MOHAN NOWAK   24 - DR. SAMANTHA MELO             Click Here for Release of Records Request       Vitals:    01/15/25 1051   BP: (!) 139/56   Pulse: 60   Resp: 16   Temp: (!) 96.1 °F (35.6 °C)   SpO2: 98%      Health Maintenance Due   Topic Date Due    Hepatitis C screen  Never done    Lung Cancer Screening &/or Counseling  Never done    Diabetic foot exam  11/15/2018    Diabetic Alb to Cr ratio (uACR) test  10/02/2024    A1C test (Diabetic or Prediabetic)  2025        The patient, Alma Bautista, identity was verified by name and .
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
## 2025-01-15 NOTE — ASSESSMENT & PLAN NOTE
Orders:    AMB POC GLUCOSE BLOOD, BY GLUCOSE MONITORING DEVICE    AMB POC HEMOGLOBIN A1C    HM DIABETES FOOT EXAM    Albumin/Creatinine Ratio, Urine; Future  to increase lantus to 25 units daily until blood sugars return to normal.

## 2025-01-23 ENCOUNTER — TELEPHONE (OUTPATIENT)
Age: 75
End: 2025-01-23

## 2025-01-23 NOTE — TELEPHONE ENCOUNTER
Pt would like to extend her leave of absence from work. She would like a call back at [phone].

## 2025-01-23 NOTE — TELEPHONE ENCOUNTER
Patient contacted and stated she is still unable to move a whole lot. Voiced she starts Physical Therapy tomorrow. Patient is requesting to extend work note and return on Feb 3rd. Will make provider aware.

## 2025-02-04 RX ORDER — ALENDRONATE SODIUM 35 MG/1
TABLET ORAL
Qty: 12 TABLET | Refills: 3 | Status: SHIPPED | OUTPATIENT
Start: 2025-02-04

## 2025-02-14 ENCOUNTER — HOSPITAL ENCOUNTER (EMERGENCY)
Facility: HOSPITAL | Age: 75
Discharge: HOME | End: 2025-02-14
Payer: MEDICARE

## 2025-02-14 ENCOUNTER — APPOINTMENT (OUTPATIENT)
Facility: HOSPITAL | Age: 75
End: 2025-02-14
Payer: MEDICARE

## 2025-02-14 VITALS
DIASTOLIC BLOOD PRESSURE: 72 MMHG | WEIGHT: 149 LBS | SYSTOLIC BLOOD PRESSURE: 155 MMHG | OXYGEN SATURATION: 95 % | TEMPERATURE: 97.7 F | RESPIRATION RATE: 18 BRPM | HEART RATE: 69 BPM | HEIGHT: 60 IN | BODY MASS INDEX: 29.25 KG/M2

## 2025-02-14 DIAGNOSIS — S80.01XA CONTUSION OF RIGHT KNEE, INITIAL ENCOUNTER: ICD-10-CM
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
## 2025-02-14 DIAGNOSIS — M17.11 PRIMARY OSTEOARTHRITIS OF RIGHT KNEE: ICD-10-CM

## 2025-02-14 DIAGNOSIS — S80.211A ABRASION OF RIGHT KNEE, INITIAL ENCOUNTER: ICD-10-CM

## 2025-02-14 DIAGNOSIS — S20.211A CONTUSION OF RIB ON RIGHT SIDE, INITIAL ENCOUNTER: Primary | ICD-10-CM

## 2025-02-14 PROCEDURE — 72125 CT NECK SPINE W/O DYE: CPT

## 2025-02-14 PROCEDURE — 70450 CT HEAD/BRAIN W/O DYE: CPT

## 2025-02-14 PROCEDURE — 99284 EMERGENCY DEPT VISIT MOD MDM: CPT

## 2025-02-14 PROCEDURE — 6370000000 HC RX 637 (ALT 250 FOR IP): Performed by: PHYSICIAN ASSISTANT

## 2025-02-14 PROCEDURE — 73562 X-RAY EXAM OF KNEE 3: CPT

## 2025-02-14 PROCEDURE — 71250 CT THORAX DX C-: CPT

## 2025-02-14 RX ORDER — ACETAMINOPHEN 500 MG
1000 TABLET ORAL EVERY 6 HOURS PRN
Qty: 20 TABLET | Refills: 0 | Status: SHIPPED | OUTPATIENT
Start: 2025-02-14

## 2025-02-14 RX ORDER — HYDROCODONE BITARTRATE AND ACETAMINOPHEN 5; 325 MG/1; MG/1
1 TABLET ORAL
Status: DISCONTINUED | OUTPATIENT
Start: 2025-02-14 | End: 2025-02-14

## 2025-02-14 RX ORDER — ACETAMINOPHEN 500 MG
1000 TABLET ORAL
Status: COMPLETED | OUTPATIENT
Start: 2025-02-14 | End: 2025-02-14

## 2025-02-14 RX ADMIN — ACETAMINOPHEN 1000 MG: 500 TABLET, FILM COATED ORAL at 15:35

## 2025-02-14 ASSESSMENT — VISUAL ACUITY: OU: 1

## 2025-02-14 ASSESSMENT — ENCOUNTER SYMPTOMS
SHORTNESS OF BREATH: 0
BACK PAIN: 0
EYE PAIN: 0
VOMITING: 0
RHINORRHEA: 0
PHOTOPHOBIA: 0
DIARRHEA: 0
WHEEZING: 0
CHEST TIGHTNESS: 0
SORE THROAT: 0
NAUSEA: 0
ABDOMINAL PAIN: 0
COUGH: 0

## 2025-02-14 ASSESSMENT — PAIN DESCRIPTION - ORIENTATION: ORIENTATION: RIGHT

## 2025-02-14 ASSESSMENT — PAIN DESCRIPTION - DESCRIPTORS: DESCRIPTORS: ACHING

## 2025-02-14 ASSESSMENT — PAIN - FUNCTIONAL ASSESSMENT
PAIN_FUNCTIONAL_ASSESSMENT: 0-10
PAIN_FUNCTIONAL_ASSESSMENT: PREVENTS OR INTERFERES SOME ACTIVE ACTIVITIES AND ADLS

## 2025-02-14 ASSESSMENT — LIFESTYLE VARIABLES
HOW OFTEN DO YOU HAVE A DRINK CONTAINING ALCOHOL: NEVER
HOW MANY STANDARD DRINKS CONTAINING ALCOHOL DO YOU HAVE ON A TYPICAL DAY: PATIENT DOES NOT DRINK

## 2025-02-14 ASSESSMENT — PAIN DESCRIPTION - PAIN TYPE: TYPE: ACUTE PAIN

## 2025-02-14 ASSESSMENT — PAIN DESCRIPTION - LOCATION: LOCATION: RIB CAGE

## 2025-02-14 ASSESSMENT — PAIN DESCRIPTION - ONSET: ONSET: ON-GOING

## 2025-02-14 ASSESSMENT — PAIN SCALES - GENERAL: PAINLEVEL_OUTOF10: 8

## 2025-02-14 ASSESSMENT — PAIN DESCRIPTION - FREQUENCY: FREQUENCY: CONTINUOUS

## 2025-02-14 NOTE — ED PROVIDER NOTES
abnormality.      Electronically signed by Jose Sanders MD      CT HEAD WO CONTRAST   Final Result      No acute process.      Electronically signed by Alberto Rodriguez      CT CERVICAL SPINE WO CONTRAST   Final Result   1. No acute abnormality         Electronically signed by Katja Damian      CT CHEST WO CONTRAST   Final Result      1. No acute thoracic pathology.   2. No evidence of rib fracture or other acute osseous abnormality.   3. No acute airspace disease.      Electronically signed by Jose Sanders MD            PROCEDURES   Unless otherwise noted below, none  Procedures     CRITICAL CARE TIME   none    EMERGENCY DEPARTMENT COURSE and DIFFERENTIAL DIAGNOSIS/MDM   Initial assessment performed. The patients presenting problems have been discussed, and they are in agreement with the care plan formulated and outlined with them.  I have encouraged them to ask questions as they arise throughout their visit.    Vitals:    Vitals:    02/14/25 1450 02/14/25 1452   BP:  (!) 155/72   Pulse:  69   Resp:  18   Temp:  97.7 °F (36.5 °C)   SpO2:  95%   Weight: 67.6 kg (149 lb)    Height: 1.524 m (5')         Patient was given the following medications:  Medications   acetaminophen (TYLENOL) tablet 1,000 mg (1,000 mg Oral Given 2/14/25 1535)       CONSULTS: (Who and What was discussed)  None      Chronic Conditions: hypertension, kidney stones, hepatitis B, depression, diabetes, asthma, chronic pain, arthritis, hypercholesterolemia, GERD     Social Determinants affecting Dx or Tx: None    Records Reviewed (source and summary): Nursing Notes, Old Medical Records, Previous Radiology Studies, and Previous Laboratory Studies    CC/HPI Summary, DDx, ED Course, and Reassessment: Well-appearing afebrile hemodynamically stable ambulatory 74-year-old female presented acute moderate aching and sharp right inferior anterior rib pain and mild right knee pain secondary to ground-level fall at approximately 1317 today.  Patient states that  MOUTH WEEKLY WITH 240 ML WATER ON EMPTY STOMACH,REMAIN UPRIGHT FOR 30 MIN AFTERWARD     aspirin 81 MG EC tablet  Take 1 tablet by mouth daily     atorvastatin 10 MG tablet  Commonly known as: LIPITOR  Take 1 tablet by mouth daily     calcium carbonate 600 MG Tabs tablet     cetirizine 10 MG tablet  Commonly known as: ZYRTEC     Cholecalciferol 50 MCG (2000 UT) Tabs     cyanocobalamin 100 MCG tablet     dexAMETHasone 4 MG tablet  Commonly known as: DECADRON     empagliflozin 25 MG tablet  Commonly known as: Jardiance  Take 1 tablet by mouth daily     fish oil 1000 MG capsule     furosemide 20 MG tablet  Commonly known as: LASIX     isosorbide mononitrate 30 MG extended release tablet  Commonly known as: IMDUR     Lantus SoloStar 100 UNIT/ML injection pen  Generic drug: insulin glargine  Inject 20 Units into the skin Daily     metFORMIN 1000 MG tablet  Commonly known as: GLUCOPHAGE  TAKE 1 TABLET IN MORNING AND 1 TABLET IN EVENING. FOR DIABETES     metoprolol succinate 25 MG extended release tablet  Commonly known as: TOPROL XL  TAKE 1 TABLET BY MOUTH 2 TIMES DAILY FOR HEART     nitroGLYCERIN 0.4 MG SL tablet  Commonly known as: Nitrostat  Place 1 tablet under the tongue every 5 minutes as needed for Chest pain up to max of 3 total doses. If no relief after 1 dose, call 911.     ondansetron 4 MG tablet  Commonly known as: ZOFRAN  Take 1 tablet by mouth 3 times daily as needed for Nausea or Vomiting     polyethylene glycol 17 GM/SCOOP powder  Commonly known as: GLYCOLAX     primidone 50 MG tablet  Commonly known as: MYSOLINE  1 to 2 tablets 3 times a day for tremor     RABEprazole 20 MG tablet  Commonly known as: ACIPHEX     terconazole 0.8 % vaginal cream  Commonly known as: TERAZOL 3     tiZANidine 4 MG capsule  Commonly known as: ZANAFLEX     verapamil 120 MG extended release tablet  Commonly known as: CALAN SR               Where to Get Your Medications        These medications were sent to [facility]/pharmacy #[id number] -

## 2025-02-18 ENCOUNTER — TELEMEDICINE (OUTPATIENT)
Age: 75
End: 2025-02-18
Payer: MEDICARE

## 2025-02-18 DIAGNOSIS — E11.40 TYPE 2 DIABETES MELLITUS WITH DIABETIC NEUROPATHY, WITH LONG-TERM CURRENT USE OF INSULIN (HCC): ICD-10-CM

## 2025-02-18 DIAGNOSIS — Z79.4 TYPE 2 DIABETES MELLITUS WITH DIABETIC NEUROPATHY, WITH LONG-TERM CURRENT USE OF INSULIN (HCC): ICD-10-CM

## 2025-02-18 DIAGNOSIS — Z91.81 HISTORY OF RECENT FALL: ICD-10-CM

## 2025-02-18 DIAGNOSIS — G25.0 ESSENTIAL TREMOR: Primary | ICD-10-CM

## 2025-02-18 PROCEDURE — 3017F COLORECTAL CA SCREEN DOC REV: CPT | Performed by: PSYCHIATRY & NEUROLOGY

## 2025-02-18 PROCEDURE — 3044F HG A1C LEVEL LT 7.0%: CPT | Performed by: PSYCHIATRY & NEUROLOGY

## 2025-02-18 PROCEDURE — 1160F RVW MEDS BY RX/DR IN RCRD: CPT | Performed by: PSYCHIATRY & NEUROLOGY

## 2025-02-18 PROCEDURE — G8419 CALC BMI OUT NRM PARAM NOF/U: HCPCS | Performed by: PSYCHIATRY & NEUROLOGY

## 2025-02-18 PROCEDURE — G8399 PT W/DXA RESULTS DOCUMENT: HCPCS | Performed by: PSYCHIATRY & NEUROLOGY

## 2025-02-18 PROCEDURE — 2022F DILAT RTA XM EVC RTNOPTHY: CPT | Performed by: PSYCHIATRY & NEUROLOGY

## 2025-02-18 PROCEDURE — G8427 DOCREV CUR MEDS BY ELIG CLIN: HCPCS | Performed by: PSYCHIATRY & NEUROLOGY

## 2025-02-18 PROCEDURE — 1036F TOBACCO NON-USER: CPT | Performed by: PSYCHIATRY & NEUROLOGY

## 2025-02-18 PROCEDURE — 1090F PRES/ABSN URINE INCON ASSESS: CPT | Performed by: PSYCHIATRY & NEUROLOGY

## 2025-02-18 PROCEDURE — 1123F ACP DISCUSS/DSCN MKR DOCD: CPT | Performed by: PSYCHIATRY & NEUROLOGY

## 2025-02-18 PROCEDURE — 3046F HEMOGLOBIN A1C LEVEL >9.0%: CPT | Performed by: PSYCHIATRY & NEUROLOGY

## 2025-02-18 PROCEDURE — 1159F MED LIST DOCD IN RCRD: CPT | Performed by: PSYCHIATRY & NEUROLOGY

## 2025-02-18 PROCEDURE — 99214 OFFICE O/P EST MOD 30 MIN: CPT | Performed by: PSYCHIATRY & NEUROLOGY

## 2025-02-18 RX ORDER — PRIMIDONE 50 MG/1
TABLET ORAL
Qty: 360 TABLET | Refills: 3 | Status: SHIPPED | OUTPATIENT
Start: 2025-02-18

## 2025-02-18 ASSESSMENT — PATIENT HEALTH QUESTIONNAIRE - PHQ9
SUM OF ALL RESPONSES TO PHQ QUESTIONS 1-9: 0
2. FEELING DOWN, DEPRESSED OR HOPELESS: NOT AT ALL
SUM OF ALL RESPONSES TO PHQ QUESTIONS 1-9: 0
1. LITTLE INTEREST OR PLEASURE IN DOING THINGS: NOT AT ALL
SUM OF ALL RESPONSES TO PHQ9 QUESTIONS 1 & 2: 0
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
## 2025-02-18 NOTE — PATIENT INSTRUCTIONS
As a reminder:   Please come to your appointment 15 minutes before your office appointment.  This way, you can get checked in at the  and checked in by the nursing staff so you have the full allotment of time with your provider for your visit.  Please bring an up-to-date and accurate list of all your medications.  Or bring all your active prescription bottles with you at the time of your office visit and this includes over-the-counter medications so we can make sure that your medication list is up-to-date.  If you are scheduled for a virtual visit, please be aware that the  will need to check you in and usually the day before to verify insurance and collect co-pays as appropriate.  Please be prepared for the second call which will be from the nurse to go over your medications and any other vital information.  This will probably be done 30 minutes prior to your visit.  The reason why we do this early is that you can get the full benefit of your appointment time with your provider.  Finally you will be given the link for your virtual visit please click into your link 10 minutes prior to your appointment and please wait patiently for the provider to join you        As per discussion  Overall from a neurologic perspective you are stable and doing well continue on the primidone also known as Mysoline 50 mg taking 1 in the morning and 3 at bedtime.  You can take up to a total of 6 tablets/day in divided doses if needed    We talked about behavioral intervention regarding tremor management such as holding things closer to you but if you are at a social event where the using paper plates you may want to consider bringing your own plate or a compact tray that you can put your place on.  Be creative with solutions    Unclear what went on with your recent fall.  You are getting MRI scans of your neck and lower back for other reasons but this may give you some insight into what may or may not have caused the

## 2025-02-18 NOTE — ASSESSMENT & PLAN NOTE
Stable. She typically takes 1 tablet in the morning and 3 at night. She reports that her tremors are most noticeable in her hands, especially when eating or holding objects like a mouse or paper. She has declined other therapies in the past.  - Continue primidone 50 mg, 1 to 2 tablets up to three times a day  - Advised to keep objects closer to the body to reduce tremor magnification from gravity  - Suggested use of a portable tray or heavier paper/plastic plates for better control during events  - Prescription for a 90-day supply of primidone with a year's worth of refills to be sent to [facility]

## 2025-02-18 NOTE — PROGRESS NOTES
[facility]  [facility]  [address]  [place] [address]  Phone: [phone] fax: [phone]      Alma Bautista, was evaluated through a synchronous (real-time) audio-video encounter. The patient (or guardian if applicable) is aware that this is a billable service, which includes applicable co-pays. This Virtual Visit was conducted with patient's (and/or legal guardian's) consent. Patient identification was verified, and a caregiver was present when appropriate.   The patient was located at Other: Patient is at work in the [facility] [employer] [facility]  Provider was located at Facility (Appt Dept): [address]  [place] [address]  Confirm you are appropriately licensed, registered, or certified to deliver care in the state where the patient is located as indicated above. If you are not or unsure, please re-schedule the visit: Yes, I confirm.     Alma Bautista (:  1950) is a Established patient, presenting virtually for evaluation of the following:   Chief Complaint   Patient presents with    Tremors         Assessment & Plan     Below is the assessment and plan developed based on review of pertinent history, physical exam, labs, studies, and medications.  1. Essential tremor  Assessment & Plan:  Stable. She typically takes 1 tablet in the morning and 3 at night. She reports that her tremors are most noticeable in her hands, especially when eating or holding objects like a mouse or paper. She has declined other therapies in the past.  - Continue primidone 50 mg, 1 to 2 tablets up to three times a day  - Advised to keep objects closer to the body to reduce tremor magnification from gravity  - Suggested use of a portable tray or heavier paper/plastic plates for better control during events  - Prescription for a 90-day supply of primidone with a year's worth of refills to be sent to [facility]    Orders:  -     primidone

## 2025-02-24 ENCOUNTER — OFFICE VISIT (OUTPATIENT)
Age: 75
End: 2025-02-24
Payer: MEDICARE

## 2025-02-24 VITALS
SYSTOLIC BLOOD PRESSURE: 114 MMHG | HEART RATE: 60 BPM | RESPIRATION RATE: 16 BRPM | DIASTOLIC BLOOD PRESSURE: 54 MMHG | HEIGHT: 60 IN | OXYGEN SATURATION: 98 % | WEIGHT: 151 LBS | TEMPERATURE: 98 F | BODY MASS INDEX: 29.64 KG/M2

## 2025-02-24 DIAGNOSIS — Z00.00 ENCOUNTER FOR MEDICARE ANNUAL WELLNESS EXAM: Primary | ICD-10-CM

## 2025-02-24 DIAGNOSIS — R07.81 RIB PAIN ON RIGHT SIDE: ICD-10-CM

## 2025-02-24 PROCEDURE — 99213 OFFICE O/P EST LOW 20 MIN: CPT | Performed by: FAMILY MEDICINE

## 2025-02-24 RX ORDER — HYDROCODONE BITARTRATE AND ACETAMINOPHEN 5; 325 MG/1; MG/1
TABLET ORAL
Qty: 56 TABLET | Refills: 0 | Status: SHIPPED | OUTPATIENT
Start: 2025-02-24 | End: 2025-03-10

## 2025-02-24 ASSESSMENT — PATIENT HEALTH QUESTIONNAIRE - PHQ9
1. LITTLE INTEREST OR PLEASURE IN DOING THINGS: NOT AT ALL
SUM OF ALL RESPONSES TO PHQ9 QUESTIONS 1 & 2: 0
7. TROUBLE CONCENTRATING ON THINGS, SUCH AS READING THE NEWSPAPER OR WATCHING TELEVISION: NOT AT ALL
SUM OF ALL RESPONSES TO PHQ QUESTIONS 1-9: 1
6. FEELING BAD ABOUT YOURSELF - OR THAT YOU ARE A FAILURE OR HAVE LET YOURSELF OR YOUR FAMILY DOWN: NOT AT ALL
3. TROUBLE FALLING OR STAYING ASLEEP: NOT AT ALL
SUM OF ALL RESPONSES TO PHQ QUESTIONS 1-9: 1
SUM OF ALL RESPONSES TO PHQ QUESTIONS 1-9: 1
9. THOUGHTS THAT YOU WOULD BE BETTER OFF DEAD, OR OF HURTING YOURSELF: NOT AT ALL
2. FEELING DOWN, DEPRESSED OR HOPELESS: NOT AT ALL
5. POOR APPETITE OR OVEREATING: NOT AT ALL
4. FEELING TIRED OR HAVING LITTLE ENERGY: NOT AT ALL
10. IF YOU CHECKED OFF ANY PROBLEMS, HOW DIFFICULT HAVE THESE PROBLEMS MADE IT FOR YOU TO DO YOUR WORK, TAKE CARE OF THINGS AT HOME, OR GET ALONG WITH OTHER PEOPLE: NOT DIFFICULT AT ALL
SUM OF ALL RESPONSES TO PHQ QUESTIONS 1-9: 1
8. MOVING OR SPEAKING SO SLOWLY THAT OTHER PEOPLE COULD HAVE NOTICED. OR THE OPPOSITE, BEING SO FIGETY OR RESTLESS THAT YOU HAVE BEEN MOVING AROUND A LOT MORE THAN USUAL: SEVERAL DAYS

## 2025-02-24 NOTE — PROGRESS NOTES
Alma Bautista (:  1950) is a 74 y.o. female,Established patient, here for evaluation of the following chief complaint(s):  Medicare AWV         Assessment & Plan  Rib pain on right side       Orders:    HYDROcodone-acetaminophen (NORCO) 5-325 MG per tablet; May take 1 tablet by mouth every 6 hours as needed for Pain. May also take 1 tablet every 6 hours as needed for Pain. Do all this for 14 days. Max Daily Amount: 8 tablets.    Encounter for Medicare annual wellness exam              No follow-ups on file.       Subjective   HPIFell tryihng to take dogs to the spa on . Having pain on this side since then, fairly severe. Hurts laugh or turn. Has been taking tylenol. Went to ER, had xrays taken. Needs diabetes checked.   Needs medicare wellness exam. Sees Dr. Landeros (ortho), Dr. Dominguez (ortho), Esperanza Jennings (gi0, Noemi Parrish (tremor), Dr. Dyson (ophthalmology), would want Josee Mathis (pts niece ) to be her mPOA if she became incapacitated.  Stil working.   Has a mammogram scheduled in April. Had colonoscopy in - repeat 5 years. UTD on immunizations.   Review of Systems   HENT:  Negative for hearing loss.    Neurological:         No falls,canes, independent in all adls. Memory ok    Psychiatric/Behavioral:          Not depressed. No alcohol.           Objective   Physical Exam  Cardiovascular:      Rate and Rhythm: Normal rate and regular rhythm.      Heart sounds: Normal heart sounds. No murmur heard.  Pulmonary:      Effort: Pulmonary effort is normal.      Breath sounds: Normal breath sounds.   Abdominal:      General: Abdomen is flat. Bowel sounds are normal.      Palpations: Abdomen is soft.   Musculoskeletal:      Right lower leg: No edema.      Left lower leg: No edema.      Comments: Marked tenderness R anterior ribs                  An electronic signature was used to authenticate this note.    --Myron Rivera MD

## 2025-02-24 NOTE — PROGRESS NOTES
Chief Complaint   Patient presents with    Medicare AWV       \"Have you been to the ER, urgent care clinic since your last visit?  Hospitalized since your last visit?\"      25 - [facility] ER -CONTUSION RIGHT RIB AND RIGHT KNEE    “Have you seen or consulted any other health care providers outside of [facility] since your last visit?”      25 - VIRTUAL VISIT - AMANDA OCAMPO,NP  25 - ORTHO - DR. GUILLERMO NOWAK    PHYSICAL THERAPY            Click Here for Release of Records Request       Vitals:    25 1054   BP: (!) 114/54   Pulse: 60   Resp: 16   Temp: 98 °F (36.7 °C)   SpO2: 98%      Health Maintenance Due   Topic Date Due    Hepatitis C screen  Never done    Diabetic Alb to Cr ratio (uACR) test  10/02/2024    Annual Wellness Visit (Medicare)  2025        The patient, Alma Bautista, identity was verified by name and .

## 2025-02-25 ENCOUNTER — HOSPITAL ENCOUNTER (OUTPATIENT)
Facility: HOSPITAL | Age: 75
Discharge: HOME | End: 2025-02-28
Payer: MEDICARE

## 2025-02-25 DIAGNOSIS — M54.12 CERVICAL RADICULITIS: ICD-10-CM

## 2025-02-25 DIAGNOSIS — Z98.1 STATUS POST LUMBAR SPINAL FUSION: ICD-10-CM

## 2025-02-25 DIAGNOSIS — M51.360 DISCOGENIC LOW BACK PAIN: ICD-10-CM

## 2025-02-25 DIAGNOSIS — M54.31 BILATERAL SCIATICA: ICD-10-CM

## 2025-02-25 DIAGNOSIS — M50.30 DISCOGENIC CERVICAL PAIN: ICD-10-CM

## 2025-02-25 DIAGNOSIS — M47.816 LUMBAR SPONDYLOSIS: ICD-10-CM

## 2025-02-25 DIAGNOSIS — S39.012A STRAIN OF LUMBAR REGION, INITIAL ENCOUNTER: ICD-10-CM

## 2025-02-25 DIAGNOSIS — S16.1XXA STRAIN OF NECK MUSCLE, INITIAL ENCOUNTER: ICD-10-CM

## 2025-02-25 DIAGNOSIS — M47.812 CERVICAL SPONDYLOSIS: ICD-10-CM

## 2025-02-25 DIAGNOSIS — M54.32 BILATERAL SCIATICA: ICD-10-CM

## 2025-02-25 PROCEDURE — 72148 MRI LUMBAR SPINE W/O DYE: CPT

## 2025-02-25 PROCEDURE — 72141 MRI NECK SPINE W/O DYE: CPT

## 2025-03-08 ENCOUNTER — TRANSCRIBE ORDERS (OUTPATIENT)
Facility: HOSPITAL | Age: 75
End: 2025-03-08

## 2025-03-08 DIAGNOSIS — S46.012D TRAUMATIC TEAR OF LEFT ROTATOR CUFF, UNSPECIFIED TEAR EXTENT, SUBSEQUENT ENCOUNTER: ICD-10-CM

## 2025-03-08 DIAGNOSIS — S43.402D SPRAIN OF LEFT SHOULDER, UNSPECIFIED SHOULDER SPRAIN TYPE, SUBSEQUENT ENCOUNTER: ICD-10-CM

## 2025-03-08 DIAGNOSIS — M25.512 LEFT SHOULDER PAIN, UNSPECIFIED CHRONICITY: Primary | ICD-10-CM

## 2025-03-17 ENCOUNTER — HOSPITAL ENCOUNTER (OUTPATIENT)
Facility: HOSPITAL | Age: 75
Discharge: HOME | End: 2025-03-20
Attending: ORTHOPAEDIC SURGERY
Payer: MEDICARE

## 2025-03-17 DIAGNOSIS — S43.402D SPRAIN OF LEFT SHOULDER, UNSPECIFIED SHOULDER SPRAIN TYPE, SUBSEQUENT ENCOUNTER: ICD-10-CM

## 2025-03-17 DIAGNOSIS — S46.012D TRAUMATIC TEAR OF LEFT ROTATOR CUFF, UNSPECIFIED TEAR EXTENT, SUBSEQUENT ENCOUNTER: ICD-10-CM

## 2025-03-17 DIAGNOSIS — M25.512 LEFT SHOULDER PAIN, UNSPECIFIED CHRONICITY: ICD-10-CM

## 2025-03-17 PROCEDURE — 73221 MRI JOINT UPR EXTREM W/O DYE: CPT

## 2025-04-11 ENCOUNTER — CLINICAL DOCUMENTATION (OUTPATIENT)
Age: 75
End: 2025-04-11

## 2025-04-17 ENCOUNTER — OFFICE VISIT (OUTPATIENT)
Age: 75
End: 2025-04-17

## 2025-04-17 VITALS
OXYGEN SATURATION: 96 % | WEIGHT: 152.4 LBS | SYSTOLIC BLOOD PRESSURE: 131 MMHG | HEART RATE: 72 BPM | TEMPERATURE: 98.7 F | DIASTOLIC BLOOD PRESSURE: 78 MMHG | RESPIRATION RATE: 16 BRPM | BODY MASS INDEX: 29.76 KG/M2

## 2025-04-17 DIAGNOSIS — R73.9 HYPERGLYCEMIA: Primary | ICD-10-CM

## 2025-04-17 LAB
BILIRUBIN, URINE, POC: NEGATIVE
BLOOD URINE, POC: NEGATIVE
GLUCOSE DOSE-GTT, POC: 233
GLUCOSE URINE, POC: NORMAL
KETONES, URINE, POC: NEGATIVE
LEUKOCYTE ESTERASE, URINE, POC: NEGATIVE
NITRITE, URINE, POC: NEGATIVE
PH, URINE, POC: 5.5 (ref 4.6–8)
PROTEIN,URINE, POC: NEGATIVE
SPECIFIC GRAVITY, URINE, POC: 1.01 (ref 1–1.03)
URINALYSIS CLARITY, POC: CLEAR
URINALYSIS COLOR, POC: NORMAL
UROBILINOGEN, POC: NORMAL MG/DL

## 2025-04-17 NOTE — PATIENT INSTRUCTIONS
Blood sugar in clinic today 233.  The urine dip was normal, no signs of an infection    Steroids can stay in your system about 3 weeks, although most of the steroid is gone much sooner than that.    You can adjust your Lantus by about 3 units at a time every 2 days.  Tonight take 33 units of Lantus.  After 2 nights of Lantus at this dose, you can increase again if needed to 36 units of Lantus.      Continue to monitor your blood sugars 3 times a day.  Once your fasting blood sugars get into the 130s, I recommend decreasing your Lantus back to your previous dose    I recommend that you follow-up with your primary care who is responsible for adjusting your insulin doses.  It is significantly more dangerous for you to have low blood sugars and for your blood sugars to run in the 200s and 300s.      You already have an appointment scheduled to see your primary care provider on 4/28/2025 at 8 AM

## 2025-04-17 NOTE — PROGRESS NOTES
Alma Bautista (:  1950) is a 74 y.o. female,Established patient, here for evaluation of the following chief complaint(s):  Hyperglycemia (High blood sugar last read was 395)      Assessment & Plan :  Visit Diagnoses and Associated Orders         Hyperglycemia    -  Primary    AMB POC GLUCOSE TEST [03602 CPT(R)]      AMB POC URINALYSIS DIP STICK MANUAL W/O MICRO [57679 CPT(R)]                   Blood sugar in clinic today 233.  The urine dip was normal, no signs of an infection    Steroids can stay in your system about 3 weeks, although most of the steroid is gone much sooner than that.    You can adjust your Lantus by about 3 units at a time every 2 days.  Tonight take 33 units of Lantus.  After 2 nights of Lantus at this dose, you can increase again if needed to 36 units of Lantus.      Continue to monitor your blood sugars 3 times a day.  Once your fasting blood sugars get into the 130s, I recommend decreasing your Lantus back to your previous dose    I recommend that you follow-up with your primary care who is responsible for adjusting your insulin doses.  It is significantly more dangerous for you to have low blood sugars and for your blood sugars to run in the 200s and 300s.      You already have an appointment scheduled to see your primary care provider on 2025 at 8 AM      Subjective :    Hyperglycemia       74 y.o. female presents with complaints of hyperglycemia for the past several days.  She reports that she was on 1 week of oral steroids and took her last dose on 4/15/2025 when she had an epidural injection.      Her sugars have been elevated much higher than her usual.  Her fasting blood sugar this morning was 226.  Her blood sugar earlier this afternoon was 395.  Blood sugar when she arrived in clinic was 233.  She normally takes Lantus 20 units at night, but last night she took Lantus 30 units to see if this would improve her blood sugars.  She normally checks her blood sugars 3 times

## 2025-04-28 ENCOUNTER — OFFICE VISIT (OUTPATIENT)
Age: 75
End: 2025-04-28
Payer: MEDICARE

## 2025-04-28 DIAGNOSIS — E11.21 TYPE 2 DIABETES WITH NEPHROPATHY (HCC): Primary | ICD-10-CM

## 2025-04-28 DIAGNOSIS — Z11.59 ENCOUNTER FOR HEPATITIS C SCREENING TEST FOR LOW RISK PATIENT: ICD-10-CM

## 2025-04-28 LAB
ALBUMIN SERPL-MCNC: 3.7 G/DL (ref 3.5–5)
ALBUMIN/GLOB SERPL: 1 (ref 1.1–2.2)
ALP SERPL-CCNC: 117 U/L (ref 45–117)
ALT SERPL-CCNC: 26 U/L (ref 12–78)
ANION GAP SERPL CALC-SCNC: 5 MMOL/L (ref 2–12)
AST SERPL-CCNC: 19 U/L (ref 15–37)
BILIRUB SERPL-MCNC: 0.2 MG/DL (ref 0.2–1)
BUN SERPL-MCNC: 16 MG/DL (ref 6–20)
BUN/CREAT SERPL: 18 (ref 12–20)
CALCIUM SERPL-MCNC: 9.5 MG/DL (ref 8.5–10.1)
CHLORIDE SERPL-SCNC: 110 MMOL/L (ref 97–108)
CO2 SERPL-SCNC: 26 MMOL/L (ref 21–32)
CREAT SERPL-MCNC: 0.9 MG/DL (ref 0.55–1.02)
CREAT UR-MCNC: 74.8 MG/DL
GLOBULIN SER CALC-MCNC: 3.8 G/DL (ref 2–4)
GLUCOSE SERPL-MCNC: 118 MG/DL (ref 65–100)
GLUCOSE, POC: 150 MG/DL
HBA1C MFR BLD: 8.2 %
HCV AB SER IA-ACNC: 0.14 INDEX
HCV AB SERPL QL IA: NONREACTIVE
MICROALBUMIN UR-MCNC: 2.09 MG/DL
MICROALBUMIN/CREAT UR-RTO: 28 MG/G (ref 0–30)
POTASSIUM SERPL-SCNC: 4 MMOL/L (ref 3.5–5.1)
PROT SERPL-MCNC: 7.5 G/DL (ref 6.4–8.2)
SODIUM SERPL-SCNC: 141 MMOL/L (ref 136–145)

## 2025-04-28 PROCEDURE — 2022F DILAT RTA XM EVC RTNOPTHY: CPT | Performed by: FAMILY MEDICINE

## 2025-04-28 PROCEDURE — 83036 HEMOGLOBIN GLYCOSYLATED A1C: CPT | Performed by: FAMILY MEDICINE

## 2025-04-28 PROCEDURE — 3017F COLORECTAL CA SCREEN DOC REV: CPT | Performed by: FAMILY MEDICINE

## 2025-04-28 PROCEDURE — 1090F PRES/ABSN URINE INCON ASSESS: CPT | Performed by: FAMILY MEDICINE

## 2025-04-28 PROCEDURE — 99213 OFFICE O/P EST LOW 20 MIN: CPT | Performed by: FAMILY MEDICINE

## 2025-04-28 PROCEDURE — 1123F ACP DISCUSS/DSCN MKR DOCD: CPT | Performed by: FAMILY MEDICINE

## 2025-04-28 PROCEDURE — 82962 GLUCOSE BLOOD TEST: CPT | Performed by: FAMILY MEDICINE

## 2025-04-28 PROCEDURE — 1159F MED LIST DOCD IN RCRD: CPT | Performed by: FAMILY MEDICINE

## 2025-04-28 PROCEDURE — PBSHW AMB POC HEMOGLOBIN A1C: Performed by: FAMILY MEDICINE

## 2025-04-28 PROCEDURE — 1036F TOBACCO NON-USER: CPT | Performed by: FAMILY MEDICINE

## 2025-04-28 PROCEDURE — 3052F HG A1C>EQUAL 8.0%<EQUAL 9.0%: CPT | Performed by: FAMILY MEDICINE

## 2025-04-28 PROCEDURE — G8427 DOCREV CUR MEDS BY ELIG CLIN: HCPCS | Performed by: FAMILY MEDICINE

## 2025-04-28 PROCEDURE — G8419 CALC BMI OUT NRM PARAM NOF/U: HCPCS | Performed by: FAMILY MEDICINE

## 2025-04-28 PROCEDURE — G8399 PT W/DXA RESULTS DOCUMENT: HCPCS | Performed by: FAMILY MEDICINE

## 2025-04-28 PROCEDURE — PBSHW AMB POC GLUCOSE BLOOD, BY GLUCOSE MONITORING DEVICE: Performed by: FAMILY MEDICINE

## 2025-04-28 PROCEDURE — 3046F HEMOGLOBIN A1C LEVEL >9.0%: CPT | Performed by: FAMILY MEDICINE

## 2025-04-28 RX ORDER — INSULIN GLARGINE 100 [IU]/ML
20 INJECTION, SOLUTION SUBCUTANEOUS DAILY
Qty: 4 ADJUSTABLE DOSE PRE-FILLED PEN SYRINGE | Refills: 12 | Status: SHIPPED | OUTPATIENT
Start: 2025-04-28 | End: 2025-04-28 | Stop reason: SDUPTHER

## 2025-04-28 RX ORDER — INSULIN GLARGINE 100 [IU]/ML
INJECTION, SOLUTION SUBCUTANEOUS
Qty: 4 ADJUSTABLE DOSE PRE-FILLED PEN SYRINGE | Refills: 12 | Status: SHIPPED | OUTPATIENT
Start: 2025-04-28

## 2025-04-28 NOTE — ASSESSMENT & PLAN NOTE
Orders:    AMB POC GLUCOSE BLOOD, BY GLUCOSE MONITORING DEVICE    AMB POC HEMOGLOBIN A1C    insulin glargine (LANTUS SOLOSTAR) 100 UNIT/ML injection pen; Inject 20 Units into the skin Daily 39 units sc daily4    Albumin/Creatinine Ratio, Urine; Future    Comprehensive Metabolic Panel; Future  a1c 8.2 today. I think it will go down as steroid effect wears off. Continue current dose of lantus

## 2025-04-28 NOTE — PROGRESS NOTES
Chief Complaint   Patient presents with    Diabetes    Follow-up       \"Have you been to the ER, urgent care clinic since your last visit?  Hospitalized since your last visit?\"    NO    “Have you seen or consulted any other health care providers outside of [facility] since your last visit?”    NO            Click Here for Release of Records Request       There were no vitals filed for this visit.   Health Maintenance Due   Topic Date Due    Hepatitis C screen  Never done    Diabetic Alb to Cr ratio (uACR) test  10/02/2024    COVID-19 Vaccine ( season) 2025    A1C test (Diabetic or Prediabetic)  04/15/2025        The patient, Alma Bautista, identity was verified by name and .

## 2025-04-28 NOTE — PROGRESS NOTES
Alma Bautista (:  1950) is a 74 y.o. female,Established patient, here for evaluation of the following chief complaint(s):  No chief complaint on file.         Assessment & Plan  Type 2 diabetes with nephropathy (HCC)       Orders:    AMB POC GLUCOSE BLOOD, BY GLUCOSE MONITORING DEVICE    AMB POC HEMOGLOBIN A1C    insulin glargine (LANTUS SOLOSTAR) 100 UNIT/ML injection pen; Inject 20 Units into the skin Daily 39 units sc daily4    Albumin/Creatinine Ratio, Urine; Future    Comprehensive Metabolic Panel; Future  a1c 8.2 today. I think it will go down as steroid effect wears off. Continue current dose of lantus  Encounter for hepatitis C screening test for low risk patient       Orders:    Hepatitis C Antibody; Future      No follow-ups on file.       Subjective   HPIIn for A1c and hepatitis C antibody level. Has had 2 epidural steroid injections since last seen. A1c went from 6.7 in January to 8.2 today. Pt has also noted that sugars have been much higher than usual since these 2 shots.     Review of Systems       Objective   Physical Exam  Cardiovascular:      Rate and Rhythm: Normal rate and regular rhythm.      Heart sounds: Normal heart sounds. No murmur heard.  Pulmonary:      Effort: Pulmonary effort is normal.      Breath sounds: Normal breath sounds.   Abdominal:      General: Abdomen is flat. Bowel sounds are normal.      Palpations: Abdomen is soft.   Musculoskeletal:      Right lower leg: No edema.      Left lower leg: No edema.                  An electronic signature was used to authenticate this note.    --Myron Rivera MD

## 2025-05-02 ENCOUNTER — RESULTS FOLLOW-UP (OUTPATIENT)
Age: 75
End: 2025-05-02

## 2025-05-15 RX ORDER — BLOOD SUGAR DIAGNOSTIC
STRIP MISCELLANEOUS
Qty: 300 STRIP | Refills: 3 | Status: SHIPPED | OUTPATIENT
Start: 2025-05-15

## 2025-05-15 RX ORDER — BLOOD-GLUCOSE METER
EACH MISCELLANEOUS
Qty: 1 KIT | Refills: 0 | Status: SHIPPED | OUTPATIENT
Start: 2025-05-15

## 2025-05-15 NOTE — TELEPHONE ENCOUNTER
Pt's meter broke so she is requesting a new one with strips also.    Last appointment: 4/28/25  Next appointment: 6/24/25, 8/27/25    Requested Prescriptions     Pending Prescriptions Disp Refills    Blood Glucose Monitoring Suppl (ACCU-CHEK AIDA PLUS) w/Device KIT 1 kit 0     Sig: USE TO TEST BLOOD SUGAR 3 TIMES DAILY. DIAGNOSIS E11.40    ACCU-CHEK AIDA PLUS strip 300 strip 3     Sig: USE TO TEST BLOOD SUGAR 3 times DAILY. DIAGNOSIS E11.40         For Pharmacy Admin Tracking Only    Program: Medication Refill  CPA in place:    Recommendation Provided To:   Intervention Detail: New Rx: 2, reason: Patient Preference  Intervention Accepted By:   Gap Closed?:    Time Spent (min): 5

## 2025-05-19 RX ORDER — BLOOD-GLUCOSE METER
EACH MISCELLANEOUS
Qty: 1 KIT | Refills: 0 | OUTPATIENT
Start: 2025-05-19

## 2025-05-19 RX ORDER — BLOOD SUGAR DIAGNOSTIC
STRIP MISCELLANEOUS
Qty: 100 EACH | Refills: 3 | OUTPATIENT
Start: 2025-05-19

## 2025-05-19 NOTE — TELEPHONE ENCOUNTER
Per pharmacy, pt uses Accu Chek Guide. New Rx's pended.        For Pharmacy Admin Tracking Only    Program: Medication Refill  CPA in place:    Recommendation Provided To:   Intervention Detail: New Rx: 2, reason: Patient Preference  Intervention Accepted By:   Gap Closed?:    Time Spent (min): 5

## 2025-06-18 ENCOUNTER — TRANSCRIBE ORDERS (OUTPATIENT)
Facility: HOSPITAL | Age: 75
End: 2025-06-18

## 2025-06-18 DIAGNOSIS — R29.898 RIGHT LEG WEAKNESS: ICD-10-CM

## 2025-06-18 DIAGNOSIS — M54.16 LUMBAR RADICULOPATHY: ICD-10-CM

## 2025-06-18 DIAGNOSIS — M47.816 LUMBAR SPONDYLOSIS: Primary | ICD-10-CM

## 2025-06-19 ENCOUNTER — HOSPITAL ENCOUNTER (OUTPATIENT)
Facility: HOSPITAL | Age: 75
Discharge: HOME | End: 2025-06-22
Payer: MEDICARE

## 2025-06-19 DIAGNOSIS — M47.816 LUMBAR SPONDYLOSIS: ICD-10-CM

## 2025-06-19 DIAGNOSIS — R29.898 RIGHT LEG WEAKNESS: ICD-10-CM

## 2025-06-19 DIAGNOSIS — M54.16 LUMBAR RADICULOPATHY: ICD-10-CM

## 2025-06-19 PROCEDURE — 72148 MRI LUMBAR SPINE W/O DYE: CPT

## 2025-06-24 ENCOUNTER — OFFICE VISIT (OUTPATIENT)
Age: 75
End: 2025-06-24
Payer: MEDICARE

## 2025-06-24 VITALS
HEIGHT: 61 IN | HEART RATE: 69 BPM | OXYGEN SATURATION: 97 % | RESPIRATION RATE: 16 BRPM | BODY MASS INDEX: 28.32 KG/M2 | WEIGHT: 150 LBS | TEMPERATURE: 98 F | DIASTOLIC BLOOD PRESSURE: 60 MMHG | SYSTOLIC BLOOD PRESSURE: 133 MMHG

## 2025-06-24 DIAGNOSIS — E78.00 HYPERCHOLESTEROLEMIA: Primary | ICD-10-CM

## 2025-06-24 DIAGNOSIS — I10 ESSENTIAL HYPERTENSION: ICD-10-CM

## 2025-06-24 DIAGNOSIS — E11.9 DIABETES MELLITUS WITHOUT COMPLICATION (HCC): ICD-10-CM

## 2025-06-24 DIAGNOSIS — M54.42 CHRONIC BILATERAL LOW BACK PAIN WITH SCIATICA, SCIATICA LATERALITY UNSPECIFIED: ICD-10-CM

## 2025-06-24 DIAGNOSIS — M54.41 CHRONIC BILATERAL LOW BACK PAIN WITH SCIATICA, SCIATICA LATERALITY UNSPECIFIED: ICD-10-CM

## 2025-06-24 DIAGNOSIS — G89.29 CHRONIC BILATERAL LOW BACK PAIN WITH SCIATICA, SCIATICA LATERALITY UNSPECIFIED: ICD-10-CM

## 2025-06-24 DIAGNOSIS — R29.818 NEUROGENIC CLAUDICATION: ICD-10-CM

## 2025-06-24 DIAGNOSIS — E78.00 HYPERCHOLESTEROLEMIA: ICD-10-CM

## 2025-06-24 LAB
BASOPHILS # BLD: 0.05 K/UL (ref 0–0.1)
BASOPHILS NFR BLD: 1 % (ref 0–1)
CHOLEST SERPL-MCNC: 156 MG/DL
DIFFERENTIAL METHOD BLD: ABNORMAL
EOSINOPHIL # BLD: 0.09 K/UL (ref 0–0.4)
EOSINOPHIL NFR BLD: 1.8 % (ref 0–7)
ERYTHROCYTE [DISTWIDTH] IN BLOOD BY AUTOMATED COUNT: 15.6 % (ref 11.5–14.5)
EST. AVERAGE GLUCOSE BLD GHB EST-MCNC: 160 MG/DL
HBA1C MFR BLD: 7.2 % (ref 4–5.6)
HCT VFR BLD AUTO: 44 % (ref 35–47)
HDLC SERPL-MCNC: 83 MG/DL
HDLC SERPL: 1.9 (ref 0–5)
HGB BLD-MCNC: 13.4 G/DL (ref 11.5–16)
IMM GRANULOCYTES # BLD AUTO: 0.01 K/UL (ref 0–0.04)
IMM GRANULOCYTES NFR BLD AUTO: 0.2 % (ref 0–0.5)
LDLC SERPL CALC-MCNC: 43.6 MG/DL (ref 0–100)
LYMPHOCYTES # BLD: 1.36 K/UL (ref 0.8–3.5)
LYMPHOCYTES NFR BLD: 27.2 % (ref 12–49)
MCH RBC QN AUTO: 29.5 PG (ref 26–34)
MCHC RBC AUTO-ENTMCNC: 30.5 G/DL (ref 30–36.5)
MCV RBC AUTO: 96.7 FL (ref 80–99)
MONOCYTES # BLD: 0.48 K/UL (ref 0–1)
MONOCYTES NFR BLD: 9.6 % (ref 5–13)
NEUTS SEG # BLD: 3.01 K/UL (ref 1.8–8)
NEUTS SEG NFR BLD: 60.2 % (ref 32–75)
NRBC # BLD: 0 K/UL (ref 0–0.01)
NRBC BLD-RTO: 0 PER 100 WBC
PLATELET # BLD AUTO: 187 K/UL (ref 150–400)
PMV BLD AUTO: 12 FL (ref 8.9–12.9)
RBC # BLD AUTO: 4.55 M/UL (ref 3.8–5.2)
TRIGL SERPL-MCNC: 147 MG/DL
VLDLC SERPL CALC-MCNC: 29.4 MG/DL
WBC # BLD AUTO: 5 K/UL (ref 3.6–11)

## 2025-06-24 PROCEDURE — 3046F HEMOGLOBIN A1C LEVEL >9.0%: CPT | Performed by: FAMILY MEDICINE

## 2025-06-24 PROCEDURE — G8419 CALC BMI OUT NRM PARAM NOF/U: HCPCS | Performed by: FAMILY MEDICINE

## 2025-06-24 PROCEDURE — 3017F COLORECTAL CA SCREEN DOC REV: CPT | Performed by: FAMILY MEDICINE

## 2025-06-24 PROCEDURE — 1090F PRES/ABSN URINE INCON ASSESS: CPT | Performed by: FAMILY MEDICINE

## 2025-06-24 PROCEDURE — 3052F HG A1C>EQUAL 8.0%<EQUAL 9.0%: CPT | Performed by: FAMILY MEDICINE

## 2025-06-24 PROCEDURE — 1159F MED LIST DOCD IN RCRD: CPT | Performed by: FAMILY MEDICINE

## 2025-06-24 PROCEDURE — 1125F AMNT PAIN NOTED PAIN PRSNT: CPT | Performed by: FAMILY MEDICINE

## 2025-06-24 PROCEDURE — 1123F ACP DISCUSS/DSCN MKR DOCD: CPT | Performed by: FAMILY MEDICINE

## 2025-06-24 PROCEDURE — 3078F DIAST BP <80 MM HG: CPT | Performed by: FAMILY MEDICINE

## 2025-06-24 PROCEDURE — 99214 OFFICE O/P EST MOD 30 MIN: CPT | Performed by: FAMILY MEDICINE

## 2025-06-24 PROCEDURE — 1036F TOBACCO NON-USER: CPT | Performed by: FAMILY MEDICINE

## 2025-06-24 PROCEDURE — G8399 PT W/DXA RESULTS DOCUMENT: HCPCS | Performed by: FAMILY MEDICINE

## 2025-06-24 PROCEDURE — 3075F SYST BP GE 130 - 139MM HG: CPT | Performed by: FAMILY MEDICINE

## 2025-06-24 PROCEDURE — G8427 DOCREV CUR MEDS BY ELIG CLIN: HCPCS | Performed by: FAMILY MEDICINE

## 2025-06-24 PROCEDURE — 2022F DILAT RTA XM EVC RTNOPTHY: CPT | Performed by: FAMILY MEDICINE

## 2025-06-24 RX ORDER — VERAPAMIL HYDROCHLORIDE 240 MG/1
240 TABLET, FILM COATED, EXTENDED RELEASE ORAL NIGHTLY
COMMUNITY
Start: 2025-05-27

## 2025-06-24 RX ORDER — NAPROXEN SODIUM 220 MG/1
TABLET, FILM COATED ORAL
Qty: 60 TABLET | Refills: 3 | Status: SHIPPED | OUTPATIENT
Start: 2025-06-24

## 2025-06-24 RX ORDER — BACLOFEN 10 MG/1
10 TABLET ORAL 2 TIMES DAILY
COMMUNITY
Start: 2025-06-18

## 2025-06-24 RX ORDER — NYSTATIN AND TRIAMCINOLONE ACETONIDE 100000; 1 [USP'U]/G; MG/G
CREAM TOPICAL
COMMUNITY
Start: 2025-06-04

## 2025-06-24 RX ORDER — HYDROCODONE BITARTRATE AND ACETAMINOPHEN 5; 325 MG/1; MG/1
1 TABLET ORAL EVERY 6 HOURS PRN
COMMUNITY
Start: 2025-06-18 | End: 2025-06-25

## 2025-06-24 RX ORDER — PREDNISOLONE ACETATE 10 MG/ML
1 SUSPENSION/ DROPS OPHTHALMIC 3 TIMES DAILY
COMMUNITY
Start: 2025-06-04

## 2025-06-24 NOTE — PROGRESS NOTES
Chief Complaint   Patient presents with    Follow-up Chronic Condition       \"Have you been to the ER, urgent care clinic since your last visit?  Hospitalized since your last visit?\"    NO    “Have you seen or consulted any other health care providers outside of [facility] since your last visit?”         - CARDIOLOGY - DR. JUAN PADGETT  25 - ORTHO ON CALL - GERONIMO PHILLIPS-NIKI- BACK -   25 -  ORTHO - AUSTIN DEAN NP  5/15/25 - ORTHO - DR. RAMOS - LEFT SHOULDER  25 ORTHO - DR. RANDY MARTINEZ  25 - ORTHO - DR. GUILLERMO NOWAK  25 - ORTHO - DR. OMI VASQUEZ        Click Here for Release of Records Request       Vitals:    25 0958   BP: 133/60   Pulse: 69   Resp: 16   Temp: 98 °F (36.7 °C)   SpO2: 97%      Health Maintenance Due   Topic Date Due    COVID-19 Vaccine ( season) 2025        The patient, Alma Bautista, identity was verified by name and .
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
## 2025-06-29 ENCOUNTER — RESULTS FOLLOW-UP (OUTPATIENT)
Age: 75
End: 2025-06-29

## 2025-07-03 ENCOUNTER — TELEPHONE (OUTPATIENT)
Age: 75
End: 2025-07-03

## 2025-07-03 NOTE — TELEPHONE ENCOUNTER
Patient notified that pcp is out of office until 7/7/25.  Will check with provider upon return to office.  Will call patient back on Monday to touch back with patient.

## 2025-07-03 NOTE — TELEPHONE ENCOUNTER
Pt was admitted into [facility] Friday and released yesterday. They gave her Levofloxacin 750 mg and it is making her have nausea and vomiting. She is wanting a call back at [phone] to see if she could get something else.

## 2025-07-14 ENCOUNTER — TRANSCRIBE ORDERS (OUTPATIENT)
Facility: HOSPITAL | Age: 75
End: 2025-07-14

## 2025-07-14 DIAGNOSIS — M54.32 LEFT SIDED SCIATICA: ICD-10-CM

## 2025-07-14 DIAGNOSIS — M47.816 FACET ARTHROPATHY, LUMBAR: ICD-10-CM

## 2025-07-14 DIAGNOSIS — M54.31 RIGHT SIDED SCIATICA: ICD-10-CM

## 2025-07-14 DIAGNOSIS — M47.816 LUMBAR SPONDYLOSIS: ICD-10-CM

## 2025-07-14 DIAGNOSIS — M54.50 LUMBAR PAIN: Primary | ICD-10-CM

## 2025-07-14 DIAGNOSIS — M54.16 LUMBAR RADICULOPATHY: ICD-10-CM

## 2025-07-14 DIAGNOSIS — M51.369 DEGENERATION OF INTERVERTEBRAL DISC OF LUMBAR REGION, UNSPECIFIED WHETHER PAIN PRESENT: ICD-10-CM

## 2025-07-14 DIAGNOSIS — Z98.1 LUMBAR SPINAL STENOSIS DUE TO ADJACENT SEGMENT DISEASE AFTER FUSION PROCEDURE: ICD-10-CM

## 2025-07-14 DIAGNOSIS — M48.061 FORAMINAL STENOSIS OF LUMBAR REGION: ICD-10-CM

## 2025-07-14 DIAGNOSIS — M43.26 FUSION OF SPINE OF LUMBAR REGION: ICD-10-CM

## 2025-07-14 DIAGNOSIS — M48.062 SPINAL STENOSIS OF LUMBAR REGION WITH NEUROGENIC CLAUDICATION: ICD-10-CM

## 2025-07-14 DIAGNOSIS — M48.061 LUMBAR SPINAL STENOSIS DUE TO ADJACENT SEGMENT DISEASE AFTER FUSION PROCEDURE: ICD-10-CM

## 2025-07-14 DIAGNOSIS — M51.369 LUMBAR SPINAL STENOSIS DUE TO ADJACENT SEGMENT DISEASE AFTER FUSION PROCEDURE: ICD-10-CM

## 2025-07-15 ENCOUNTER — CLINICAL DOCUMENTATION (OUTPATIENT)
Age: 75
End: 2025-07-15

## 2025-07-15 NOTE — PROGRESS NOTES
Spoke with patient and her Home Health nurse spoke with on call physician 7/4/25 and antibiotic was changed to Doxycycline.

## 2025-07-18 ENCOUNTER — HOSPITAL ENCOUNTER (OUTPATIENT)
Facility: HOSPITAL | Age: 75
Discharge: HOME | End: 2025-07-21
Attending: ORTHOPAEDIC SURGERY
Payer: MEDICARE

## 2025-07-18 DIAGNOSIS — M51.369 LUMBAR SPINAL STENOSIS DUE TO ADJACENT SEGMENT DISEASE AFTER FUSION PROCEDURE: ICD-10-CM

## 2025-07-18 DIAGNOSIS — M47.816 LUMBAR SPONDYLOSIS: ICD-10-CM

## 2025-07-18 DIAGNOSIS — M54.16 LUMBAR RADICULOPATHY: ICD-10-CM

## 2025-07-18 DIAGNOSIS — M43.26 FUSION OF SPINE OF LUMBAR REGION: ICD-10-CM

## 2025-07-18 DIAGNOSIS — Z98.1 LUMBAR SPINAL STENOSIS DUE TO ADJACENT SEGMENT DISEASE AFTER FUSION PROCEDURE: ICD-10-CM

## 2025-07-18 DIAGNOSIS — M48.061 FORAMINAL STENOSIS OF LUMBAR REGION: ICD-10-CM

## 2025-07-18 DIAGNOSIS — M48.062 SPINAL STENOSIS OF LUMBAR REGION WITH NEUROGENIC CLAUDICATION: ICD-10-CM

## 2025-07-18 DIAGNOSIS — M48.061 LUMBAR SPINAL STENOSIS DUE TO ADJACENT SEGMENT DISEASE AFTER FUSION PROCEDURE: ICD-10-CM

## 2025-07-18 DIAGNOSIS — M54.31 RIGHT SIDED SCIATICA: ICD-10-CM

## 2025-07-18 DIAGNOSIS — M51.369 DEGENERATION OF INTERVERTEBRAL DISC OF LUMBAR REGION, UNSPECIFIED WHETHER PAIN PRESENT: ICD-10-CM

## 2025-07-18 DIAGNOSIS — M47.816 FACET ARTHROPATHY, LUMBAR: ICD-10-CM

## 2025-07-18 DIAGNOSIS — M54.50 LUMBAR PAIN: ICD-10-CM

## 2025-07-18 DIAGNOSIS — M54.32 LEFT SIDED SCIATICA: ICD-10-CM

## 2025-07-18 PROCEDURE — 72131 CT LUMBAR SPINE W/O DYE: CPT

## 2025-07-21 NOTE — TELEPHONE ENCOUNTER
Last appointment: 6/24/25  Next appointment: 7/23/25, 9/24/25  Previous refill encounter(s): 1/2/25 #90 with 1 refill    Requested Prescriptions     Pending Prescriptions Disp Refills    empagliflozin (JARDIANCE) 25 MG tablet 90 tablet 3     Sig: Take 1 tablet by mouth daily         For Pharmacy Admin Tracking Only    Program: Medication Refill  CPA in place:    Recommendation Provided To:   Intervention Detail: New Rx: 1, reason: Patient Preference  Intervention Accepted By:   Gap Closed?:    Time Spent (min): 5

## 2025-07-23 ENCOUNTER — OFFICE VISIT (OUTPATIENT)
Age: 75
End: 2025-07-23
Payer: MEDICARE

## 2025-07-23 VITALS
BODY MASS INDEX: 28.43 KG/M2 | HEIGHT: 60 IN | OXYGEN SATURATION: 99 % | SYSTOLIC BLOOD PRESSURE: 115 MMHG | RESPIRATION RATE: 16 BRPM | DIASTOLIC BLOOD PRESSURE: 78 MMHG | WEIGHT: 144.8 LBS | TEMPERATURE: 98.5 F | HEART RATE: 71 BPM

## 2025-07-23 DIAGNOSIS — J98.4 PNEUMONITIS: Primary | ICD-10-CM

## 2025-07-23 DIAGNOSIS — H61.23 BILATERAL IMPACTED CERUMEN: ICD-10-CM

## 2025-07-23 PROCEDURE — 1123F ACP DISCUSS/DSCN MKR DOCD: CPT | Performed by: FAMILY MEDICINE

## 2025-07-23 PROCEDURE — 1036F TOBACCO NON-USER: CPT | Performed by: FAMILY MEDICINE

## 2025-07-23 PROCEDURE — 99213 OFFICE O/P EST LOW 20 MIN: CPT | Performed by: FAMILY MEDICINE

## 2025-07-23 PROCEDURE — 3017F COLORECTAL CA SCREEN DOC REV: CPT | Performed by: FAMILY MEDICINE

## 2025-07-23 PROCEDURE — 3078F DIAST BP <80 MM HG: CPT | Performed by: FAMILY MEDICINE

## 2025-07-23 PROCEDURE — G8419 CALC BMI OUT NRM PARAM NOF/U: HCPCS | Performed by: FAMILY MEDICINE

## 2025-07-23 PROCEDURE — 1125F AMNT PAIN NOTED PAIN PRSNT: CPT | Performed by: FAMILY MEDICINE

## 2025-07-23 PROCEDURE — 1090F PRES/ABSN URINE INCON ASSESS: CPT | Performed by: FAMILY MEDICINE

## 2025-07-23 PROCEDURE — G8427 DOCREV CUR MEDS BY ELIG CLIN: HCPCS | Performed by: FAMILY MEDICINE

## 2025-07-23 PROCEDURE — 1159F MED LIST DOCD IN RCRD: CPT | Performed by: FAMILY MEDICINE

## 2025-07-23 PROCEDURE — G8399 PT W/DXA RESULTS DOCUMENT: HCPCS | Performed by: FAMILY MEDICINE

## 2025-07-23 PROCEDURE — 3074F SYST BP LT 130 MM HG: CPT | Performed by: FAMILY MEDICINE

## 2025-07-23 RX ORDER — ONDANSETRON 4 MG/1
4 TABLET, FILM COATED ORAL 3 TIMES DAILY PRN
Qty: 30 TABLET | Refills: 1 | Status: SHIPPED | OUTPATIENT
Start: 2025-07-23

## 2025-07-23 RX ORDER — LIDOCAINE 4 G/G
1 PATCH TOPICAL DAILY PRN
COMMUNITY
Start: 2025-01-04

## 2025-07-23 NOTE — PROGRESS NOTES
Chief Complaint   Patient presents with    Follow-Up from Hospital       \"Have you been to the ER, urgent care clinic since your last visit?  Hospitalized since your last visit?\"      25-25 - [facility]- [facility] - PNEUMONIA AND LEG PAIN    “Have you seen or consulted any other health care providers outside of [facility] since your last visit?”      ORTHO - DR. OMI VASQUEZ - LUMBAR, DR. HARMONY HU-LUMBAR, DR. ELIAS RAMOS- LEFT SHOULDER            Click Here for Release of Records Request       There were no vitals filed for this visit.   Health Maintenance Due   Topic Date Due    COVID-19 Vaccine ( season) 2025        The patient, Alma Bautista, identity was verified by name and .

## 2025-07-23 NOTE — PROGRESS NOTES
Alma Bautista (:  1950) is a 74 y.o. female,Established patient, here for evaluation of the following chief complaint(s):  Follow-Up from Hospital and Ear Pain (LEFT )         Assessment & Plan  Pneumonitis       Orders:    XR CHEST (2 VIEWS); Future  xray scheduled for 6 weeks from now.   Bilateral impacted cerumen   Ears irrigated to clear tms            Return in about 5 weeks (around 2025).       Subjective   HPI Comes in today with sister Alesia. Went to [facility] on [address] with leg weakness on  until . Pt was dxed with pneumonia. Had a fever to 101 on admission. Overall improving now. No longer has postnasal drainge. Legs still feel somewhat weak. Walking with a walker now. PT, OT are coming to her house. Had some mild nighttime cough. Appetite is coming back. Also had a CT of back while in the hospital. Had surgery on lumbar spine 2 years ago, Dr. Hernandez is considering further surgery. Also co ears being stopped up left worse than right    Review of Systems       Objective   Physical Exam  HENT:      Ears:      Comments: Cerumen bilaterally  Cardiovascular:      Rate and Rhythm: Normal rate and regular rhythm.      Heart sounds: Normal heart sounds. No murmur heard.  Pulmonary:      Effort: Pulmonary effort is normal.      Breath sounds: Normal breath sounds. Rales: umen impaction.   Abdominal:      General: Abdomen is flat. Bowel sounds are normal.      Palpations: Abdomen is soft.   Musculoskeletal:      Right lower leg: No edema.      Left lower leg: No edema.      Comments: Walking with walker                  An electronic signature was used to authenticate this note.    --Myron Rivera MD

## 2025-08-04 RX ORDER — FLURBIPROFEN SODIUM 0.3 MG/ML
SOLUTION/ DROPS OPHTHALMIC
Qty: 100 EACH | Refills: 3 | Status: SHIPPED | OUTPATIENT
Start: 2025-08-04

## 2025-08-19 ENCOUNTER — TELEPHONE (OUTPATIENT)
Age: 75
End: 2025-08-19

## 2025-08-28 ENCOUNTER — HOSPITAL ENCOUNTER (OUTPATIENT)
Facility: HOSPITAL | Age: 75
Discharge: HOME | End: 2025-08-31
Payer: MEDICARE

## 2025-08-28 VITALS
RESPIRATION RATE: 20 BRPM | DIASTOLIC BLOOD PRESSURE: 59 MMHG | WEIGHT: 148.15 LBS | HEIGHT: 62 IN | TEMPERATURE: 97.5 F | BODY MASS INDEX: 27.26 KG/M2 | HEART RATE: 66 BPM | SYSTOLIC BLOOD PRESSURE: 143 MMHG | OXYGEN SATURATION: 100 %

## 2025-08-28 LAB
25(OH)D3 SERPL-MCNC: 49.5 NG/ML (ref 30–100)
ABO + RH BLD: NORMAL
ALBUMIN SERPL-MCNC: 3.7 G/DL (ref 3.5–5.2)
ALBUMIN/GLOB SERPL: 1 (ref 1.1–2.2)
ALP SERPL-CCNC: 100 U/L (ref 35–104)
ALT SERPL-CCNC: 14 U/L (ref 10–35)
AMPHET UR QL SCN: NEGATIVE
ANION GAP SERPL CALC-SCNC: 14 MMOL/L (ref 2–14)
APPEARANCE UR: CLEAR
AST SERPL-CCNC: 20 U/L (ref 10–35)
BACTERIA URNS QL MICRO: NEGATIVE /HPF
BARBITURATES UR QL SCN: POSITIVE
BENZODIAZ UR QL: NEGATIVE
BILIRUB SERPL-MCNC: <0.2 MG/DL (ref 0–1.2)
BILIRUB UR QL: NEGATIVE
BLOOD GROUP ANTIBODIES SERPL: NORMAL
BUN SERPL-MCNC: 15 MG/DL (ref 8–23)
BUN/CREAT SERPL: 19 (ref 12–20)
CALCIUM SERPL-MCNC: 10.1 MG/DL (ref 8.8–10.2)
CANNABINOIDS UR QL SCN: NEGATIVE
CHLORIDE SERPL-SCNC: 105 MMOL/L (ref 98–107)
CO2 SERPL-SCNC: 22 MMOL/L (ref 20–29)
COCAINE UR QL SCN: NEGATIVE
COLOR UR: ABNORMAL
CREAT SERPL-MCNC: 0.82 MG/DL (ref 0.6–1)
EPITH CASTS URNS QL MICRO: ABNORMAL /LPF
ERYTHROCYTE [DISTWIDTH] IN BLOOD BY AUTOMATED COUNT: 15.2 % (ref 11.5–14.5)
EST. AVERAGE GLUCOSE BLD GHB EST-MCNC: 144 MG/DL
FENTANYL: NEGATIVE
GLOBULIN SER CALC-MCNC: 3.9 G/DL (ref 2–4)
GLUCOSE SERPL-MCNC: 83 MG/DL (ref 65–100)
GLUCOSE UR STRIP.AUTO-MCNC: >1000 MG/DL
HBA1C MFR BLD: 6.7 % (ref 4–5.6)
HCT VFR BLD AUTO: 42.6 % (ref 35–47)
HGB BLD-MCNC: 13 G/DL (ref 11.5–16)
HGB UR QL STRIP: NEGATIVE
HYALINE CASTS URNS QL MICRO: ABNORMAL /LPF (ref 0–2)
INR PPP: 1 (ref 0.9–1.1)
KETONES UR QL STRIP.AUTO: NEGATIVE MG/DL
LEUKOCYTE ESTERASE UR QL STRIP.AUTO: NEGATIVE
Lab: ABNORMAL
MCH RBC QN AUTO: 28.9 PG (ref 26–34)
MCHC RBC AUTO-ENTMCNC: 30.5 G/DL (ref 30–36.5)
MCV RBC AUTO: 94.7 FL (ref 80–99)
METHADONE UR QL: NEGATIVE
NITRITE UR QL STRIP.AUTO: NEGATIVE
NRBC # BLD: 0 K/UL (ref 0–0.01)
NRBC BLD-RTO: 0 PER 100 WBC
OPIATES UR QL: NEGATIVE
PCP UR QL: NEGATIVE
PH UR STRIP: 5.5 (ref 5–8)
PLATELET # BLD AUTO: 233 K/UL (ref 150–400)
PMV BLD AUTO: 10.9 FL (ref 8.9–12.9)
POTASSIUM SERPL-SCNC: 3.8 MMOL/L (ref 3.5–5.1)
PREALB SERPL-MCNC: 37 MG/DL (ref 20–40)
PROT SERPL-MCNC: 7.6 G/DL (ref 6.4–8.3)
PROT UR STRIP-MCNC: NEGATIVE MG/DL
PROTHROMBIN TIME: 10.2 SEC (ref 9.2–11.2)
RBC # BLD AUTO: 4.5 M/UL (ref 3.8–5.2)
RBC #/AREA URNS HPF: ABNORMAL /HPF (ref 0–5)
SODIUM SERPL-SCNC: 142 MMOL/L (ref 136–145)
SP GR UR REFRACTOMETRY: 1.01 (ref 1–1.03)
SPECIMEN EXP DATE BLD: NORMAL
URINE CULTURE IF INDICATED: ABNORMAL
UROBILINOGEN UR QL STRIP.AUTO: 0.2 EU/DL (ref 0.2–1)
WBC # BLD AUTO: 4.5 K/UL (ref 3.6–11)
WBC URNS QL MICRO: ABNORMAL /HPF (ref 0–4)

## 2025-08-28 PROCEDURE — 82306 VITAMIN D 25 HYDROXY: CPT

## 2025-08-28 PROCEDURE — 86901 BLOOD TYPING SEROLOGIC RH(D): CPT

## 2025-08-28 PROCEDURE — 36415 COLL VENOUS BLD VENIPUNCTURE: CPT

## 2025-08-28 PROCEDURE — 86850 RBC ANTIBODY SCREEN: CPT

## 2025-08-28 PROCEDURE — 85610 PROTHROMBIN TIME: CPT

## 2025-08-28 PROCEDURE — 80053 COMPREHEN METABOLIC PANEL: CPT

## 2025-08-28 PROCEDURE — 97530 THERAPEUTIC ACTIVITIES: CPT

## 2025-08-28 PROCEDURE — 84134 ASSAY OF PREALBUMIN: CPT

## 2025-08-28 PROCEDURE — 81001 URINALYSIS AUTO W/SCOPE: CPT

## 2025-08-28 PROCEDURE — 71046 X-RAY EXAM CHEST 2 VIEWS: CPT

## 2025-08-28 PROCEDURE — 83036 HEMOGLOBIN GLYCOSYLATED A1C: CPT

## 2025-08-28 PROCEDURE — 86900 BLOOD TYPING SEROLOGIC ABO: CPT

## 2025-08-28 PROCEDURE — 97161 PT EVAL LOW COMPLEX 20 MIN: CPT

## 2025-08-28 PROCEDURE — 85027 COMPLETE CBC AUTOMATED: CPT

## 2025-08-28 RX ORDER — SODIUM CHLORIDE, SODIUM LACTATE, POTASSIUM CHLORIDE, CALCIUM CHLORIDE 600; 310; 30; 20 MG/100ML; MG/100ML; MG/100ML; MG/100ML
INJECTION, SOLUTION INTRAVENOUS CONTINUOUS
OUTPATIENT
Start: 2025-09-09

## 2025-08-28 RX ORDER — PREGABALIN 150 MG/1
150 CAPSULE ORAL ONCE
OUTPATIENT
Start: 2025-09-09

## 2025-08-28 RX ORDER — ACETAMINOPHEN 500 MG
1000 TABLET ORAL ONCE
OUTPATIENT
Start: 2025-09-09

## 2025-08-28 ASSESSMENT — PAIN SCALES - GENERAL: PAINLEVEL_OUTOF10: 4

## 2025-08-28 ASSESSMENT — PAIN DESCRIPTION - LOCATION: LOCATION: BACK

## 2025-08-29 LAB
BACTERIA SPEC CULT: NORMAL
BACTERIA SPEC CULT: NORMAL
SERVICE CMNT-IMP: NORMAL

## 2025-09-02 ENCOUNTER — TELEPHONE (OUTPATIENT)
Age: 75
End: 2025-09-02

## 2025-09-03 ENCOUNTER — OFFICE VISIT (OUTPATIENT)
Age: 75
End: 2025-09-03
Payer: MEDICARE

## 2025-09-03 VITALS
WEIGHT: 148 LBS | BODY MASS INDEX: 27.94 KG/M2 | HEIGHT: 61 IN | SYSTOLIC BLOOD PRESSURE: 114 MMHG | DIASTOLIC BLOOD PRESSURE: 72 MMHG | OXYGEN SATURATION: 99 % | RESPIRATION RATE: 16 BRPM | TEMPERATURE: 98 F | HEART RATE: 73 BPM

## 2025-09-03 DIAGNOSIS — G89.29 CHRONIC BILATERAL LOW BACK PAIN WITH BILATERAL SCIATICA: Primary | ICD-10-CM

## 2025-09-03 DIAGNOSIS — E11.21 TYPE 2 DIABETES WITH NEPHROPATHY (HCC): ICD-10-CM

## 2025-09-03 DIAGNOSIS — M54.42 CHRONIC BILATERAL LOW BACK PAIN WITH BILATERAL SCIATICA: Primary | ICD-10-CM

## 2025-09-03 DIAGNOSIS — M54.41 CHRONIC BILATERAL LOW BACK PAIN WITH BILATERAL SCIATICA: Primary | ICD-10-CM

## 2025-09-03 PROCEDURE — 1090F PRES/ABSN URINE INCON ASSESS: CPT | Performed by: FAMILY MEDICINE

## 2025-09-03 PROCEDURE — 1036F TOBACCO NON-USER: CPT | Performed by: FAMILY MEDICINE

## 2025-09-03 PROCEDURE — 99213 OFFICE O/P EST LOW 20 MIN: CPT | Performed by: FAMILY MEDICINE

## 2025-09-03 PROCEDURE — G8419 CALC BMI OUT NRM PARAM NOF/U: HCPCS | Performed by: FAMILY MEDICINE

## 2025-09-03 PROCEDURE — 1125F AMNT PAIN NOTED PAIN PRSNT: CPT | Performed by: FAMILY MEDICINE

## 2025-09-03 PROCEDURE — 3074F SYST BP LT 130 MM HG: CPT | Performed by: FAMILY MEDICINE

## 2025-09-03 PROCEDURE — 3078F DIAST BP <80 MM HG: CPT | Performed by: FAMILY MEDICINE

## 2025-09-03 PROCEDURE — 1159F MED LIST DOCD IN RCRD: CPT | Performed by: FAMILY MEDICINE

## 2025-09-03 PROCEDURE — 3017F COLORECTAL CA SCREEN DOC REV: CPT | Performed by: FAMILY MEDICINE

## 2025-09-03 PROCEDURE — 2022F DILAT RTA XM EVC RTNOPTHY: CPT | Performed by: FAMILY MEDICINE

## 2025-09-03 PROCEDURE — 1123F ACP DISCUSS/DSCN MKR DOCD: CPT | Performed by: FAMILY MEDICINE

## 2025-09-03 PROCEDURE — G8399 PT W/DXA RESULTS DOCUMENT: HCPCS | Performed by: FAMILY MEDICINE

## 2025-09-03 PROCEDURE — 3044F HG A1C LEVEL LT 7.0%: CPT | Performed by: FAMILY MEDICINE

## 2025-09-03 PROCEDURE — G8427 DOCREV CUR MEDS BY ELIG CLIN: HCPCS | Performed by: FAMILY MEDICINE

## 2025-09-03 RX ORDER — INSULIN GLARGINE 100 [IU]/ML
24 INJECTION, SOLUTION SUBCUTANEOUS NIGHTLY
Qty: 9 ML | Refills: 5
Start: 2025-09-03

## 2025-09-03 ASSESSMENT — ENCOUNTER SYMPTOMS
COUGH: 0
CHEST TIGHTNESS: 0
SHORTNESS OF BREATH: 0

## 2025-09-05 ENCOUNTER — HOSPITAL ENCOUNTER (OUTPATIENT)
Facility: HOSPITAL | Age: 75
Discharge: HOME | End: 2025-09-05
Attending: ORTHOPAEDIC SURGERY
Payer: MEDICARE

## 2025-09-05 DIAGNOSIS — M54.50 LOW BACK PAIN, NON-SPECIFIC: ICD-10-CM

## 2025-09-05 PROCEDURE — 72131 CT LUMBAR SPINE W/O DYE: CPT

## (undated) DEVICE — C-ARMOR C-ARM EQUIPMENT COVERS CLEAR STERILE UNIVERSAL FIT 12 PER CASE: Brand: C-ARMOR

## (undated) DEVICE — BASIN EMESIS 500CC GRAY 250/CS 60/PLT: Brand: MEDEGEN MEDICAL PRODUCTS, LLC

## (undated) DEVICE — SYSTEM SKIN CLSR 22CM DERMBND PRINEO

## (undated) DEVICE — PADDING CST CRMPD 3INX4YD NS --

## (undated) DEVICE — SOLUTION IRRIG 1000ML 0.9% SOD CHL USP POUR PLAS BTL

## (undated) DEVICE — Device

## (undated) DEVICE — Z DISCONTINUED NO SUB IDED CAPSULE PH GASTROENTEROLOGY ES MON FOR REFLX DEL SYS BRAVO

## (undated) DEVICE — 3M™ TEGADERM™ TRANSPARENT FILM DRESSING FRAME STYLE, 1624W, 2-3/8 IN X 2-3/4 IN (6 CM X 7 CM), 100/CT 4CT/CASE: Brand: 3M™ TEGADERM™

## (undated) DEVICE — SYRINGE 50ML E/T

## (undated) DEVICE — SYR 20ML LL STRL LF --

## (undated) DEVICE — K-WIRE, SINGLE ENDED TROCAR TIP, SMOOTH, 1.2 X 150MM: Brand: MONSTER SCREW SYSTEM

## (undated) DEVICE — CATH IV AUTOGRD BC BLU 22GA 25 -- INSYTE

## (undated) DEVICE — CURITY NON-ADHERENT STRIPS: Brand: CURITY

## (undated) DEVICE — SNAP KOVER: Brand: UNBRANDED

## (undated) DEVICE — NEEDLE HYPO 18GA L1.5IN PNK S STL HUB POLYPR SHLD REG BVL

## (undated) DEVICE — SOLIDIFIER MEDC 1200ML -- CONVERT TO 356117

## (undated) DEVICE — SYRINGE MED 20ML STD CLR PLAS LUERLOCK TIP N CTRL DISP

## (undated) DEVICE — SOLIDIFIER FLUID 3000 CC ABSORB

## (undated) DEVICE — SYR 10ML LUER LOK 1/5ML GRAD --

## (undated) DEVICE — KENDALL DL ECG CABLE AND LEAD WIRE SYSTEM, 3-LEAD, SINGLE PATIENT USE: Brand: KENDALL

## (undated) DEVICE — CATH REFLX PH Z IMPED 1CH 6.4F -- VERSAFLEX

## (undated) DEVICE — REM POLYHESIVE ADULT PATIENT RETURN ELECTRODE: Brand: VALLEYLAB

## (undated) DEVICE — DRILL, 2.0 X 110MM, SOLID, MEASURING, AO: Brand: BABY GORILLA®/GORILLA® PLATING SYSTEM

## (undated) DEVICE — Z DISCONTINUED PER MEDLINE LINE GAS SAMPLING O2/CO2 LNG AD 13 FT NSL W/ TBNG FILTERLINE

## (undated) DEVICE — SOLUTION IRRIGATION NACL 0.9% 1000 ML FLX CONTAINER

## (undated) DEVICE — TUBING, SUCTION, 1/4" X 12', STRAIGHT: Brand: MEDLINE

## (undated) DEVICE — KENDALL RADIOLUCENT FOAM MONITORING ELECTRODE RECTANGULAR SHAPE: Brand: KENDALL

## (undated) DEVICE — TRAP SPEC COLL POLYP POLYSTYR --

## (undated) DEVICE — SOLUTION IRRIG 1000ML STRL H2O USP PLAS POUR BTL

## (undated) DEVICE — SET EXTN TBNG L BOR 4 W STPCOCK ST 32IN PRIMING VOL 6ML

## (undated) DEVICE — DRAPE,U/ SHT,SPLIT,PLAS,STERIL: Brand: MEDLINE

## (undated) DEVICE — MASTISOL ADHESIVE LIQ 2/3ML

## (undated) DEVICE — CONTAINER SPEC 20 ML LID NEUT BUFF FORMALIN 10 % POLYPR STS

## (undated) DEVICE — CATHETER URET 4FR L70CM POLYUR OLV FLX TIP KINK RESIST W/

## (undated) DEVICE — PENCIL ES L3M BTTN SWCH S STL HEX LOK BLDE ELECTRD HOLSTER

## (undated) DEVICE — (D)STRIP SKN CLSR 0.5X4IN WHT --

## (undated) DEVICE — DRAPE,EXTREMITY,89X128,STERILE: Brand: MEDLINE

## (undated) DEVICE — FORCEPS BX L160CM DIA8MM GRSP DISECT CUP TIP NONLOCKING ROT

## (undated) DEVICE — SYR 3ML LL TIP 1/10ML GRAD --

## (undated) DEVICE — SUTURE MCRYL SZ 4-0 L27IN ABSRB UD L19MM PS-2 1/2 CIR PRIM Y426H

## (undated) DEVICE — BASIN EMSIS 16OZ GRAPHITE PLAS KID SHP MOLD GRAD FOR ORAL

## (undated) DEVICE — PADDING CST 4IN STERILE --

## (undated) DEVICE — FORCEPS BX L240CM JAW DIA2.8MM L CAP W/ NDL MIC MESH TOOTH

## (undated) DEVICE — LAMINECTOMY-MRMC: Brand: MEDLINE INDUSTRIES, INC.

## (undated) DEVICE — DRAPE C ARM DISP FOR EXCELSIUSGPS ROBOTIC NAVIGATION PLATFRM

## (undated) DEVICE — STERILE POLYISOPRENE POWDER-FREE SURGICAL GLOVES: Brand: PROTEXIS

## (undated) DEVICE — Z CONVERTED USE 2107985 COVER FLROSCP W36XL28IN 4 SIDE ADH

## (undated) DEVICE — DUAL LUMEN STOMACH TUBE MULTI-FUNCTIONAL PORT: Brand: SALEM SUMP

## (undated) DEVICE — SHEATH CATH ANORECT MNOMTR

## (undated) DEVICE — BITE BLK ENDOSCP AD 54FR GRN POLYETH ENDOSCP W STRP SLD

## (undated) DEVICE — KIT,1200CC CANISTER,3/16"X6' TUBING: Brand: MEDLINE INDUSTRIES, INC.

## (undated) DEVICE — SOLUTION IV 250ML 0.9% SOD CHL CLR INJ FLX BG CONT PRT CLSR

## (undated) DEVICE — LIGHT HANDLE: Brand: DEVON

## (undated) DEVICE — (D)SYR 10ML 1/5ML GRAD NSAF -- PKGING CHANGE USE ITEM 338027

## (undated) DEVICE — SURGICAL PROCEDURE PACK BASIN MAJ SET CUST NO CAUT

## (undated) DEVICE — (D)PREP SKN CHLRAPRP APPL 26ML -- CONVERT TO ITEM 371833

## (undated) DEVICE — SUTURE VCRL SZ 3-0 L27IN ABSRB UD L26MM SH 1/2 CIR J416H

## (undated) DEVICE — NEONATAL-ADULT SPO2 SENSOR: Brand: NELLCOR

## (undated) DEVICE — BLOCK BITE ENDOSCOPIC LF FM PROTCT

## (undated) DEVICE — FLOSEAL MATRIX IS INDICATED IN SURGICAL PROCEDURES (OTHER THAN IN OPHTHALMIC) AS AN ADJUNCT TO HEMOSTASIS WHEN CONTROL OF BLEEDING BY LIGATURE OR CONVENTIONALPROCEDURES IS INEFFECTIVE OR IMPRACTICAL.: Brand: FLOSEAL HEMOSTATIC MATRIX

## (undated) DEVICE — Device: Brand: SINGLE USE SOFT BRUSH

## (undated) DEVICE — SOLUTION LACTATED RINGERS INJECTION USP

## (undated) DEVICE — ICE PK EYE 4 1/2INX10IN (15/BX 2BX/CS

## (undated) DEVICE — SPONGE GZ W4XL4IN COT 12 PLY TYP VII WVN C FLD DSGN

## (undated) DEVICE — BLOCK BITE ENDOSCP AD 21 MM W/ DIL BLU LF DISP

## (undated) DEVICE — FILTER IV 5UM L1.75IN FLX STRW FOR FLD ASPIR FR GLS AMP

## (undated) DEVICE — BAG SPEC BIOHZRD 10 X 10 IN --

## (undated) DEVICE — NEEDLE HYPO 25GA L1.5IN BVL ORIENTED ECLIPSE

## (undated) DEVICE — DRAPE,LAP,CHOLE,W/TROUGHS,STERILE: Brand: MEDLINE

## (undated) DEVICE — PREP SKN CHLRAPRP APL 26ML STR --

## (undated) DEVICE — HOOK LOCK LATEX FREE ELASTIC BANDAGE 4INX5YD

## (undated) DEVICE — ELECTRODE BLDE L4IN NONINSULATED EDGE

## (undated) DEVICE — AIRLIFE™ FACE TENT MASK VINYL: Brand: AIRLIFE™

## (undated) DEVICE — SUTURE SZ 0 27IN 5/8 CIR UR-6  TAPER PT VIOLET ABSRB VICRYL J603H

## (undated) DEVICE — DRILL, 2.6 X 130MM, CANNULATED, AO: Brand: MONSTER SCREW SYSTEM

## (undated) DEVICE — SET ADMIN 16ML TBNG L100IN 2 Y INJ SITE IV PIGGY BK DISP

## (undated) DEVICE — DRAPE MON DISP FOR EXCELSIUSGPS ROBOTIC NAVIGATION PLATFRM

## (undated) DEVICE — TOWEL 4 PLY TISS 19X30 SUE WHT

## (undated) DEVICE — OLIVE WIRE, SMOOTH, 1.4MM: Brand: BABY GORILLA/GORILLA PLATING SYSTEM

## (undated) DEVICE — CANNULA CUSH AD W/ 14FT TBG

## (undated) DEVICE — SUTURE STRATAFIX SPRL MCRYL + SZ 2-0 L18IN ABSRB UD CT-1 SXMP1B413

## (undated) DEVICE — BAG SPEC BIOHZD LF 2MIL 6X10IN -- CONVERT TO ITEM 357326

## (undated) DEVICE — TOTAL TRAY, 16FR 10ML SIL FOLEY, URN: Brand: MEDLINE

## (undated) DEVICE — INFECTION CONTROL KIT SYS

## (undated) DEVICE — ARGYLE FRAZIER SURGICAL SUCTION INSTRUMENT 10 FR/CH (3.3 MM): Brand: ARGYLE

## (undated) DEVICE — SUTURE VCRL SZ 2-0 L18IN ABSRB UD CT-1 L36MM 1/2 CIR J839D

## (undated) DEVICE — GLOVE ORTHO 7 1/2   MSG9475

## (undated) DEVICE — BNDG ADH FABRIC 2X4IN ST LF --

## (undated) DEVICE — CANN NASAL O2 CAPNOGRAPHY AD -- FILTERLINE

## (undated) DEVICE — BLUNT TROCAR WITH THREADED ANCHOR: Brand: VERSAONE

## (undated) DEVICE — KIT JACK TBL PT CARE

## (undated) DEVICE — BW-412T DISP COMBO CLEANING BRUSH: Brand: SINGLE USE COMBINATION CLEANING BRUSH

## (undated) DEVICE — GLOVE SURG SZ 85 L12IN FNGR THK79MIL GRN LTX FREE

## (undated) DEVICE — DISPOSABLE TOURNIQUET CUFF SINGLE BLADDER, DUAL PORT AND QUICK CONNECT CONNECTOR: Brand: COLOR CUFF

## (undated) DEVICE — DEVON™ KNEE AND BODY STRAP 60" X 3" (1.5 M X 7.6 CM): Brand: DEVON

## (undated) DEVICE — NDL SPNE QNCKE 18GX3.5IN LF --

## (undated) DEVICE — NEEDLE BX 11GA L152MM STREAMLINED SUP SHRP T HNDL TRCR TAPR

## (undated) DEVICE — BANDAGE COMPR 9 FTX4 IN SMOOTH COMFORTABLE SYNTH ESMRK LF

## (undated) DEVICE — DRAPE,CHEST,FENES,15X10,STERIL: Brand: MEDLINE

## (undated) DEVICE — STOPCOCK IV 4 W TRNSPAR

## (undated) DEVICE — DERMABOND SKIN ADH 0.7ML -- DERMABOND ADVANCED 12/BX

## (undated) DEVICE — BASIC PACK: Brand: CONVERTORS

## (undated) DEVICE — SUTURE VCRL SZ 4-0 L27IN ABSRB UD L19MM FS-2 3/8 CIR REV J422H

## (undated) DEVICE — MEDI-VAC NON-CONDUCTIVE SUCTION TUBING: Brand: CARDINAL HEALTH

## (undated) DEVICE — GOWN,SIRUS,NONRNF,SETINSLV,2XL,18/CS: Brand: MEDLINE

## (undated) DEVICE — 1200 GUARD II KIT W/5MM TUBE W/O VAC TUBE: Brand: GUARDIAN

## (undated) DEVICE — GLOVE SURG SZ 75 L12IN FNGR THK79MIL GRN LTX FREE

## (undated) DEVICE — CONTINU-FLO SOLUTION SET, 2 INJECTION SITES, MALE LUER LOCK ADAPTER WITH RETRACTABLE COLLAR, LARGE BORE STOPCOCK WITH ROTATING MALE LUER LOCK EXTENSION SET, 2 INJECTION SITES, MALE LUER LOCK ADAPTER WITH RETRACTABLE COLLAR: Brand: INTERLINK/CONTINU-FLO

## (undated) DEVICE — SURGIFOAM SPNG SZ 100

## (undated) DEVICE — BLADE SAW OSC 18.5X9.0X.038MM -- STRYKER 2296-3

## (undated) DEVICE — HEX-LOCKING BLADE ELECTRODE: Brand: EDGE

## (undated) DEVICE — SYR 50ML LR LCK 1ML GRAD NSAF --

## (undated) DEVICE — TOWEL SURG W17XL27IN STD BLU COT NONFENESTRATED PREWASHED

## (undated) DEVICE — CATH IV AUTOGRD BC PNK 20GA 25 -- INSYTE

## (undated) DEVICE — YANKAUER,TAPERED BULBOUS TIP,W/O VENT: Brand: MEDLINE

## (undated) DEVICE — SPHERE STEALTH 12PK/TY --

## (undated) DEVICE — HYPODERMIC SAFETY NEEDLE: Brand: MAGELLAN

## (undated) DEVICE — AIRLIFE™ CORRUGATED FLEXIBLE POLYETHYLENE AND EVA TUBING FOR AEROSOL AND IPPB USE, SEGMENTED, 6 FEET (1.8 M) LENGTH, 22 MM I.D.: Brand: AIRLIFE™

## (undated) DEVICE — BW-400L DISP SNGL-END CLEANINGBRUSH: Brand: OLYMPUS

## (undated) DEVICE — TRANSFER SET 3": Brand: MEDLINE INDUSTRIES, INC.

## (undated) DEVICE — KIT COMPLIANCE W ENDOGLDE + 11 NO BRSH ENDOKT

## (undated) DEVICE — 4-PORT MANIFOLD: Brand: NEPTUNE 2

## (undated) DEVICE — MEDI-VAC YANK SUCT HNDL W/TPRD BULBOUS TIP: Brand: CARDINAL HEALTH

## (undated) DEVICE — ENDO CARRY-ON PROCEDURE KIT INCLUDES ENZYMATIC SPONGE, GAUZE, BIOHAZARD LABEL, TRAY, LUBRICANT, DIRTY SCOPE LABEL, WATER LABEL, TRAY, DRAWSTRING PAD, AND DEFENDO 4-PIECE KIT.: Brand: ENDO CARRY-ON PROCEDURE KIT

## (undated) DEVICE — STRETCH BANDAGE ROLL: Brand: DERMACEA

## (undated) DEVICE — STERILE POLYISOPRENE POWDER-FREE SURGICAL GLOVES WITH EMOLLIENT COATING: Brand: PROTEXIS

## (undated) DEVICE — 450 ML BOTTLE OF 0.05% CHLORHEXIDINE GLUCONATE IN 99.95% STERILE WATER FOR IRRIGATION, USP AND APPLICATOR.: Brand: IRRISEPT ANTIMICROBIAL WOUND LAVAGE

## (undated) DEVICE — SNARE ENDOSCP L240CM LOOP W27MM SHTH DIA2.4MM WRK CHN 2.8MM

## (undated) DEVICE — SOLUTION IV 1000ML 0.9% SOD CHL

## (undated) DEVICE — SET 2ND L34IN N DEHP THE QUEENS MED CNTR VALUELINK

## (undated) DEVICE — KENDALL RADIOLUCENT FOAM MONITORING ELECTRODE -RECTANGULAR SHAPE: Brand: KENDALL

## (undated) DEVICE — BAG BELONG PT PERS CLEAR HANDL

## (undated) DEVICE — KIT IV STRT W CHLORAPREP PD 1ML

## (undated) DEVICE — SUTURE VCRL SZ 4-0 L27IN ABSRB UD L19MM PS-2 3/8 CIR PRIM J426H

## (undated) DEVICE — SUTURE ABSORBABLE MONOFILAMENT 0 CT-1 36 MM DYED SPRL PDS + SXPP1B450

## (undated) DEVICE — INTENDED FOR TISSUE SEPARATION, AND OTHER PROCEDURES THAT REQUIRE A SHARP SURGICAL BLADE TO PUNCTURE OR CUT.: Brand: BARD-PARKER ® CARBON RIB-BACK BLADES

## (undated) DEVICE — SURGICAL PROCEDURE KIT GEN LAPAROSCOPY LF

## (undated) DEVICE — DRAPE,REIN 53X77,STERILE: Brand: MEDLINE

## (undated) DEVICE — APPLIER CLP M/L SHFT DIA5MM 15 LIG LIGAMAX 5

## (undated) DEVICE — HANDLE LT SNAP ON ULT DURABLE LENS FOR TRUMPF ALC DISPOSABLE

## (undated) DEVICE — ELECTRODE,RADIOTRANSLUCENT,FOAM,5PK: Brand: MEDLINE